# Patient Record
Sex: FEMALE | Race: OTHER | NOT HISPANIC OR LATINO | URBAN - METROPOLITAN AREA
[De-identification: names, ages, dates, MRNs, and addresses within clinical notes are randomized per-mention and may not be internally consistent; named-entity substitution may affect disease eponyms.]

---

## 2023-08-01 ENCOUNTER — INPATIENT (INPATIENT)
Facility: HOSPITAL | Age: 78
LOS: 12 days | Discharge: HOME | End: 2023-08-14
Attending: INTERNAL MEDICINE | Admitting: INTERNAL MEDICINE
Payer: MEDICARE

## 2023-08-01 VITALS
DIASTOLIC BLOOD PRESSURE: 57 MMHG | OXYGEN SATURATION: 96 % | SYSTOLIC BLOOD PRESSURE: 96 MMHG | TEMPERATURE: 99 F | HEART RATE: 73 BPM | RESPIRATION RATE: 16 BRPM

## 2023-08-01 DIAGNOSIS — R62.7 ADULT FAILURE TO THRIVE: ICD-10-CM

## 2023-08-01 LAB
ALBUMIN SERPL ELPH-MCNC: 3.3 G/DL — SIGNIFICANT CHANGE UP (ref 3.3–5)
ALP SERPL-CCNC: 83 U/L — SIGNIFICANT CHANGE UP (ref 40–120)
ALT FLD-CCNC: 45 U/L — HIGH (ref 4–33)
ANION GAP SERPL CALC-SCNC: 11 MMOL/L — SIGNIFICANT CHANGE UP (ref 7–14)
APPEARANCE UR: CLEAR — SIGNIFICANT CHANGE UP
APTT BLD: 24.5 SEC — SIGNIFICANT CHANGE UP (ref 24.5–35.6)
AST SERPL-CCNC: 37 U/L — HIGH (ref 4–32)
B PERT DNA SPEC QL NAA+PROBE: SIGNIFICANT CHANGE UP
B PERT+PARAPERT DNA PNL SPEC NAA+PROBE: SIGNIFICANT CHANGE UP
BACTERIA # UR AUTO: NEGATIVE /HPF — SIGNIFICANT CHANGE UP
BASE EXCESS BLDV CALC-SCNC: -0.4 MMOL/L — SIGNIFICANT CHANGE UP (ref -2–3)
BASOPHILS # BLD AUTO: 0.01 K/UL — SIGNIFICANT CHANGE UP (ref 0–0.2)
BASOPHILS NFR BLD AUTO: 0.1 % — SIGNIFICANT CHANGE UP (ref 0–2)
BILIRUB SERPL-MCNC: 1.1 MG/DL — SIGNIFICANT CHANGE UP (ref 0.2–1.2)
BILIRUB UR-MCNC: NEGATIVE — SIGNIFICANT CHANGE UP
BLOOD GAS VENOUS COMPREHENSIVE RESULT: SIGNIFICANT CHANGE UP
BORDETELLA PARAPERTUSSIS (RAPRVP): SIGNIFICANT CHANGE UP
BUN SERPL-MCNC: 31 MG/DL — HIGH (ref 7–23)
C PNEUM DNA SPEC QL NAA+PROBE: SIGNIFICANT CHANGE UP
CALCIUM SERPL-MCNC: 9 MG/DL — SIGNIFICANT CHANGE UP (ref 8.4–10.5)
CAST: 1 /LPF — SIGNIFICANT CHANGE UP (ref 0–4)
CHLORIDE BLDV-SCNC: 105 MMOL/L — SIGNIFICANT CHANGE UP (ref 96–108)
CHLORIDE SERPL-SCNC: 103 MMOL/L — SIGNIFICANT CHANGE UP (ref 98–107)
CO2 BLDV-SCNC: 26.1 MMOL/L — HIGH (ref 22–26)
CO2 SERPL-SCNC: 21 MMOL/L — LOW (ref 22–31)
COLOR SPEC: YELLOW — SIGNIFICANT CHANGE UP
CREAT SERPL-MCNC: 0.67 MG/DL — SIGNIFICANT CHANGE UP (ref 0.5–1.3)
DIFF PNL FLD: NEGATIVE — SIGNIFICANT CHANGE UP
EGFR: 90 ML/MIN/1.73M2 — SIGNIFICANT CHANGE UP
EOSINOPHIL # BLD AUTO: 0.08 K/UL — SIGNIFICANT CHANGE UP (ref 0–0.5)
EOSINOPHIL NFR BLD AUTO: 0.9 % — SIGNIFICANT CHANGE UP (ref 0–6)
FLUAV SUBTYP SPEC NAA+PROBE: SIGNIFICANT CHANGE UP
FLUBV RNA SPEC QL NAA+PROBE: SIGNIFICANT CHANGE UP
GAS PNL BLDV: 135 MMOL/L — LOW (ref 136–145)
GLUCOSE BLDV-MCNC: 93 MG/DL — SIGNIFICANT CHANGE UP (ref 70–99)
GLUCOSE SERPL-MCNC: 95 MG/DL — SIGNIFICANT CHANGE UP (ref 70–99)
GLUCOSE UR QL: NEGATIVE MG/DL — SIGNIFICANT CHANGE UP
HADV DNA SPEC QL NAA+PROBE: SIGNIFICANT CHANGE UP
HCO3 BLDV-SCNC: 25 MMOL/L — SIGNIFICANT CHANGE UP (ref 22–29)
HCOV 229E RNA SPEC QL NAA+PROBE: SIGNIFICANT CHANGE UP
HCOV HKU1 RNA SPEC QL NAA+PROBE: SIGNIFICANT CHANGE UP
HCOV NL63 RNA SPEC QL NAA+PROBE: SIGNIFICANT CHANGE UP
HCOV OC43 RNA SPEC QL NAA+PROBE: SIGNIFICANT CHANGE UP
HCT VFR BLD CALC: 28.7 % — LOW (ref 34.5–45)
HCT VFR BLDA CALC: 27 % — LOW (ref 34.5–46.5)
HGB BLD CALC-MCNC: 9.1 G/DL — LOW (ref 11.7–16.1)
HGB BLD-MCNC: 8.6 G/DL — LOW (ref 11.5–15.5)
HMPV RNA SPEC QL NAA+PROBE: SIGNIFICANT CHANGE UP
HPIV1 RNA SPEC QL NAA+PROBE: SIGNIFICANT CHANGE UP
HPIV2 RNA SPEC QL NAA+PROBE: SIGNIFICANT CHANGE UP
HPIV3 RNA SPEC QL NAA+PROBE: SIGNIFICANT CHANGE UP
HPIV4 RNA SPEC QL NAA+PROBE: SIGNIFICANT CHANGE UP
IANC: 7.48 K/UL — HIGH (ref 1.8–7.4)
IMM GRANULOCYTES NFR BLD AUTO: 0.8 % — SIGNIFICANT CHANGE UP (ref 0–0.9)
INR BLD: 1.09 RATIO — SIGNIFICANT CHANGE UP (ref 0.85–1.18)
KETONES UR-MCNC: NEGATIVE MG/DL — SIGNIFICANT CHANGE UP
LACTATE BLDV-MCNC: 1.2 MMOL/L — SIGNIFICANT CHANGE UP (ref 0.5–2)
LEUKOCYTE ESTERASE UR-ACNC: ABNORMAL
LYMPHOCYTES # BLD AUTO: 1.15 K/UL — SIGNIFICANT CHANGE UP (ref 1–3.3)
LYMPHOCYTES # BLD AUTO: 12.6 % — LOW (ref 13–44)
M PNEUMO DNA SPEC QL NAA+PROBE: SIGNIFICANT CHANGE UP
MCHC RBC-ENTMCNC: 19.4 PG — LOW (ref 27–34)
MCHC RBC-ENTMCNC: 30 GM/DL — LOW (ref 32–36)
MCV RBC AUTO: 64.8 FL — LOW (ref 80–100)
MONOCYTES # BLD AUTO: 0.37 K/UL — SIGNIFICANT CHANGE UP (ref 0–0.9)
MONOCYTES NFR BLD AUTO: 4 % — SIGNIFICANT CHANGE UP (ref 2–14)
NEUTROPHILS # BLD AUTO: 7.48 K/UL — HIGH (ref 1.8–7.4)
NEUTROPHILS NFR BLD AUTO: 81.6 % — HIGH (ref 43–77)
NITRITE UR-MCNC: NEGATIVE — SIGNIFICANT CHANGE UP
NRBC # BLD: 0 /100 WBCS — SIGNIFICANT CHANGE UP (ref 0–0)
NRBC # FLD: 0 K/UL — SIGNIFICANT CHANGE UP (ref 0–0)
PCO2 BLDV: 42 MMHG — SIGNIFICANT CHANGE UP (ref 39–52)
PH BLDV: 7.38 — SIGNIFICANT CHANGE UP (ref 7.32–7.43)
PH UR: 6.5 — SIGNIFICANT CHANGE UP (ref 5–8)
PLATELET # BLD AUTO: 220 K/UL — SIGNIFICANT CHANGE UP (ref 150–400)
PO2 BLDV: 29 MMHG — SIGNIFICANT CHANGE UP (ref 25–45)
POTASSIUM BLDV-SCNC: 4.8 MMOL/L — SIGNIFICANT CHANGE UP (ref 3.5–5.1)
POTASSIUM SERPL-MCNC: 5.1 MMOL/L — SIGNIFICANT CHANGE UP (ref 3.5–5.3)
POTASSIUM SERPL-SCNC: 5.1 MMOL/L — SIGNIFICANT CHANGE UP (ref 3.5–5.3)
PROT SERPL-MCNC: 7.1 G/DL — SIGNIFICANT CHANGE UP (ref 6–8.3)
PROT UR-MCNC: SIGNIFICANT CHANGE UP MG/DL
PROTHROM AB SERPL-ACNC: 12.3 SEC — SIGNIFICANT CHANGE UP (ref 9.5–13)
RAPID RVP RESULT: SIGNIFICANT CHANGE UP
RBC # BLD: 4.43 M/UL — SIGNIFICANT CHANGE UP (ref 3.8–5.2)
RBC # FLD: 22.3 % — HIGH (ref 10.3–14.5)
RBC CASTS # UR COMP ASSIST: 1 /HPF — SIGNIFICANT CHANGE UP (ref 0–4)
REVIEW: SIGNIFICANT CHANGE UP
RSV RNA SPEC QL NAA+PROBE: SIGNIFICANT CHANGE UP
RV+EV RNA SPEC QL NAA+PROBE: SIGNIFICANT CHANGE UP
SAO2 % BLDV: 45.9 % — LOW (ref 67–88)
SARS-COV-2 RNA SPEC QL NAA+PROBE: SIGNIFICANT CHANGE UP
SODIUM SERPL-SCNC: 135 MMOL/L — SIGNIFICANT CHANGE UP (ref 135–145)
SP GR SPEC: 1.02 — SIGNIFICANT CHANGE UP (ref 1–1.03)
SQUAMOUS # UR AUTO: 4 /HPF — SIGNIFICANT CHANGE UP (ref 0–5)
UROBILINOGEN FLD QL: 1 MG/DL — SIGNIFICANT CHANGE UP (ref 0.2–1)
WBC # BLD: 9.16 K/UL — SIGNIFICANT CHANGE UP (ref 3.8–10.5)
WBC # FLD AUTO: 9.16 K/UL — SIGNIFICANT CHANGE UP (ref 3.8–10.5)
WBC UR QL: 6 /HPF — HIGH (ref 0–5)

## 2023-08-01 PROCEDURE — 71045 X-RAY EXAM CHEST 1 VIEW: CPT | Mod: 26

## 2023-08-01 PROCEDURE — 93010 ELECTROCARDIOGRAM REPORT: CPT

## 2023-08-01 PROCEDURE — 99285 EMERGENCY DEPT VISIT HI MDM: CPT

## 2023-08-01 RX ORDER — LANOLIN ALCOHOL/MO/W.PET/CERES
3 CREAM (GRAM) TOPICAL AT BEDTIME
Refills: 0 | Status: DISCONTINUED | OUTPATIENT
Start: 2023-08-01 | End: 2023-08-14

## 2023-08-01 RX ORDER — CEFTRIAXONE 500 MG/1
1000 INJECTION, POWDER, FOR SOLUTION INTRAMUSCULAR; INTRAVENOUS ONCE
Refills: 0 | Status: COMPLETED | OUTPATIENT
Start: 2023-08-01 | End: 2023-08-01

## 2023-08-01 RX ORDER — CARVEDILOL PHOSPHATE 80 MG/1
6.25 CAPSULE, EXTENDED RELEASE ORAL EVERY 12 HOURS
Refills: 0 | Status: DISCONTINUED | OUTPATIENT
Start: 2023-08-01 | End: 2023-08-14

## 2023-08-01 RX ORDER — HEPARIN SODIUM 5000 [USP'U]/ML
5000 INJECTION INTRAVENOUS; SUBCUTANEOUS EVERY 8 HOURS
Refills: 0 | Status: DISCONTINUED | OUTPATIENT
Start: 2023-08-01 | End: 2023-08-14

## 2023-08-01 RX ORDER — POTASSIUM BICARBONATE 978 MG/1
20 TABLET, EFFERVESCENT ORAL DAILY
Refills: 0 | Status: DISCONTINUED | OUTPATIENT
Start: 2023-08-01 | End: 2023-08-01

## 2023-08-01 RX ORDER — SODIUM CHLORIDE 9 MG/ML
1000 INJECTION INTRAMUSCULAR; INTRAVENOUS; SUBCUTANEOUS
Refills: 0 | Status: DISCONTINUED | OUTPATIENT
Start: 2023-08-01 | End: 2023-08-02

## 2023-08-01 RX ORDER — MEGESTROL ACETATE 40 MG/ML
40 SUSPENSION ORAL
Refills: 0 | Status: DISCONTINUED | OUTPATIENT
Start: 2023-08-01 | End: 2023-08-04

## 2023-08-01 RX ORDER — FERROUS SULFATE 325(65) MG
325 TABLET ORAL DAILY
Refills: 0 | Status: DISCONTINUED | OUTPATIENT
Start: 2023-08-01 | End: 2023-08-14

## 2023-08-01 RX ORDER — MIRTAZAPINE 45 MG/1
30 TABLET, ORALLY DISINTEGRATING ORAL DAILY
Refills: 0 | Status: DISCONTINUED | OUTPATIENT
Start: 2023-08-01 | End: 2023-08-14

## 2023-08-01 RX ORDER — ACETAMINOPHEN 500 MG
650 TABLET ORAL EVERY 6 HOURS
Refills: 0 | Status: DISCONTINUED | OUTPATIENT
Start: 2023-08-01 | End: 2023-08-14

## 2023-08-01 RX ORDER — SODIUM CHLORIDE 9 MG/ML
500 INJECTION INTRAMUSCULAR; INTRAVENOUS; SUBCUTANEOUS ONCE
Refills: 0 | Status: COMPLETED | OUTPATIENT
Start: 2023-08-01 | End: 2023-08-01

## 2023-08-01 RX ORDER — ACETAMINOPHEN 500 MG
1000 TABLET ORAL ONCE
Refills: 0 | Status: COMPLETED | OUTPATIENT
Start: 2023-08-01 | End: 2023-08-01

## 2023-08-01 RX ADMIN — Medication 400 MILLIGRAM(S): at 18:16

## 2023-08-01 RX ADMIN — Medication 1000 MILLIGRAM(S): at 21:06

## 2023-08-01 RX ADMIN — CEFTRIAXONE 100 MILLIGRAM(S): 500 INJECTION, POWDER, FOR SOLUTION INTRAMUSCULAR; INTRAVENOUS at 19:25

## 2023-08-01 RX ADMIN — SODIUM CHLORIDE 75 MILLILITER(S): 9 INJECTION INTRAMUSCULAR; INTRAVENOUS; SUBCUTANEOUS at 23:19

## 2023-08-01 RX ADMIN — SODIUM CHLORIDE 500 MILLILITER(S): 9 INJECTION INTRAMUSCULAR; INTRAVENOUS; SUBCUTANEOUS at 18:16

## 2023-08-01 RX ADMIN — HEPARIN SODIUM 5000 UNIT(S): 5000 INJECTION INTRAVENOUS; SUBCUTANEOUS at 23:18

## 2023-08-01 NOTE — ED ADULT TRIAGE NOTE - CHIEF COMPLAINT QUOTE
Coming from [facility]. Pt is failure to thrive and hasn't been eating for three months, coming in for possible PEG tube placement. History HTN, alzheimer's.

## 2023-08-01 NOTE — ED PROVIDER NOTE - OBJECTIVE STATEMENT
Gaby Olmos is a 78 year old female with an extensive medical history including multiple myeloma and dementia who presents from the assisted living facility [facility]. Patient is unable to provide a reliable history due to dementia.    Per Ramiro Jain, patient is presenting with referral from her PCP for PEG tube placement. [facility] nursing staff reports that the patient is not eating and has failure to thrive. Her MOLST form states she is DNR/DNI but is agreeable with PEG tube. Patient's sister, Lindsey ([phone]), is to be contacted for medical decisions/consent. Gaby Olmos is a 78 year old female with an extensive medical history including multiple myeloma and dementia who presents from the assisted living facility [facility]. Patient is unable to provide a reliable history due to dementia.    Per [facility], patient is presenting with referral from her PCP for PEG tube placement. [facility] nursing staff reports that the patient is not eating and has failure to thrive. Her MOLST form states she is DNR/DNI but is agreeable with PEG tube. Patient's sister, Lindsey ([phone]), is to be contacted for medical decisions/consent.    Ne Baugh MD PGY3: Patient is a 78-year-old history of dementia and multiple myeloma presented from [facility] for failure to thrive.  Family states that patient has not been eating, decreased p.o.  Family would like PEG tube placed for additional nutrition for patient.  Patient unable to provide additional history, alert and oriented to self and to sometimes to place.  This is her baseline.

## 2023-08-01 NOTE — CHART NOTE - NSCHARTNOTEFT_GEN_A_CORE
medical attending (ROMULO [phone])  patient well known to me from the nursing home she will be admitted to my service for PEG placement discuss with the family and the patient despite all medical management including Remeron and Megace patient condition did not improve in the past 3-4 weeks.  Any question please contact me

## 2023-08-01 NOTE — ED PROVIDER NOTE - PSYCHIATRIC LEVEL OF CONSCIOUSNESS
Frequently repeats questions regarding the examiner's role and her current location ("is this the doctor's office?")/CONFUSED

## 2023-08-01 NOTE — ED PROVIDER NOTE - PROGRESS NOTE DETAILS
Ne Baugh MD PGY3: Patient TBA FTT. CXR no focal findings. UA pending, will straight cath. Patient TBA medicine. Rectal temp 100.6 will give empiric CTX.

## 2023-08-01 NOTE — ED PROVIDER NOTE - CHIEF COMPLAINT
The patient is a 78y Female complaining of The patient is a 78y Female complaining of "I can't breathe through my sinuses sometimes"

## 2023-08-01 NOTE — ED ADULT NURSE NOTE - CHIEF COMPLAINT QUOTE
Coming from [facility]. Pt is failure to thrive and hasn't been eating for three months, coming in for possible PEG tube placement. History HTN, alzheimer's.

## 2023-08-01 NOTE — ED ADULT NURSE NOTE - NSFALLUNIVINTERV_ED_ALL_ED
Bed/Stretcher in lowest position, wheels locked, appropriate side rails in place/Call bell, personal items and telephone in reach/Instruct patient to call for assistance before getting out of bed/chair/stretcher/Non-slip footwear applied when patient is off stretcher/[place] to call system/Physically safe environment - no spills, clutter or unnecessary equipment/Purposeful proactive rounding/Room/bathroom lighting operational, light cord in reach

## 2023-08-01 NOTE — ED PROVIDER NOTE - ATTENDING CONTRIBUTION TO CARE
Attending Statement: I have personally seen and examined this patient. I have fully participated in the care of this patient. I have reviewed all pertinent clinical information, including history physical exam, plan and the Resident's note and agree except as noted  78-year-old female history of hypertension, dementia sent in from [facility] for PEG placement for failure to thrive.  According to notes from the nursing home patient has been decreased p.o. intake and there is a concern for failure to thrive.  Patient not endorsing any vomiting or abdominal pain but states she does not feel well.  She is ANO x2 at baseline.  There is no family at bedside.    Vital signs noted blood pressure of 96/57 with an oral temp of 99 obtain a rectal temp patient feels warm to touch.  Alert and oriented x2, cooperative.  Dry mucous membranes not icteric not jaundiced.  No work of breathing respiratory effort but not hypoxic soft nondistended nontender abdomen.    Plan EKG, rectal time, labs, urine, IV fluids, admission.

## 2023-08-01 NOTE — ED PROVIDER NOTE - BREATH SOUNDS
Few crackles to the bilateral bases and faint expiratory wheezes heard best at the bases. Otherwise clear.

## 2023-08-01 NOTE — ED PROVIDER NOTE - CLINICAL SUMMARY MEDICAL DECISION MAKING FREE TEXT BOX
Patient is a 78-year-old history of dementia and multiple myeloma presented from [facility] for failure to thrive.  Will get infectious work-up, patient not febrile in triage however feels warm on exam.  Patient to be admitted for PEG tube, failure to thrive.  Will get UA, chest x-ray, and blood work.  Patient vital stable at this time, no need for emergent interventions, will continue to monitor and reassess.

## 2023-08-01 NOTE — ED PROVIDER NOTE - CONSTITUTIONAL MENTATION
Alert when called by name. Oriented to self. Disoriented to time (states it is 02/1923), location (states she is in [place]).

## 2023-08-01 NOTE — ED ADULT TRIAGE NOTE - PRO INTERPRETER NEED 2
What Type Of Note Output Would You Prefer (Optional)?: Bullet Format
Is The Patient Presenting As Previously Scheduled?: No, they are a work-in
Is This A New Presentation, Or A Follow-Up?: Rash
English

## 2023-08-02 DIAGNOSIS — E87.5 HYPERKALEMIA: ICD-10-CM

## 2023-08-02 DIAGNOSIS — R33.9 RETENTION OF URINE, UNSPECIFIED: ICD-10-CM

## 2023-08-02 DIAGNOSIS — E83.52 HYPERCALCEMIA: ICD-10-CM

## 2023-08-02 DIAGNOSIS — R74.01 ELEVATION OF LEVELS OF LIVER TRANSAMINASE LEVELS: ICD-10-CM

## 2023-08-02 DIAGNOSIS — D50.9 IRON DEFICIENCY ANEMIA, UNSPECIFIED: ICD-10-CM

## 2023-08-02 DIAGNOSIS — E43 UNSPECIFIED SEVERE PROTEIN-CALORIE MALNUTRITION: ICD-10-CM

## 2023-08-02 DIAGNOSIS — Z98.890 OTHER SPECIFIED POSTPROCEDURAL STATES: Chronic | ICD-10-CM

## 2023-08-02 DIAGNOSIS — F32.9 MAJOR DEPRESSIVE DISORDER, SINGLE EPISODE, UNSPECIFIED: ICD-10-CM

## 2023-08-02 DIAGNOSIS — Z29.9 ENCOUNTER FOR PROPHYLACTIC MEASURES, UNSPECIFIED: ICD-10-CM

## 2023-08-02 LAB
A1C WITH ESTIMATED AVERAGE GLUCOSE RESULT: 4.8 % — SIGNIFICANT CHANGE UP (ref 4–5.6)
ALBUMIN SERPL ELPH-MCNC: 3.2 G/DL — LOW (ref 3.3–5)
ALP SERPL-CCNC: 82 U/L — SIGNIFICANT CHANGE UP (ref 40–120)
ALT FLD-CCNC: 45 U/L — HIGH (ref 4–33)
ANION GAP SERPL CALC-SCNC: 10 MMOL/L — SIGNIFICANT CHANGE UP (ref 7–14)
APTT BLD: 21.6 SEC — LOW (ref 24.5–35.6)
AST SERPL-CCNC: 37 U/L — HIGH (ref 4–32)
BASOPHILS # BLD AUTO: 0.01 K/UL — SIGNIFICANT CHANGE UP (ref 0–0.2)
BASOPHILS NFR BLD AUTO: 0.1 % — SIGNIFICANT CHANGE UP (ref 0–2)
BILIRUB SERPL-MCNC: 0.8 MG/DL — SIGNIFICANT CHANGE UP (ref 0.2–1.2)
BUN SERPL-MCNC: 28 MG/DL — HIGH (ref 7–23)
CALCIUM SERPL-MCNC: 8.5 MG/DL — SIGNIFICANT CHANGE UP (ref 8.4–10.5)
CHLORIDE SERPL-SCNC: 105 MMOL/L — SIGNIFICANT CHANGE UP (ref 98–107)
CHOLEST SERPL-MCNC: 86 MG/DL — SIGNIFICANT CHANGE UP
CO2 SERPL-SCNC: 21 MMOL/L — LOW (ref 22–31)
CREAT SERPL-MCNC: 0.7 MG/DL — SIGNIFICANT CHANGE UP (ref 0.5–1.3)
CULTURE RESULTS: SIGNIFICANT CHANGE UP
EGFR: 89 ML/MIN/1.73M2 — SIGNIFICANT CHANGE UP
EOSINOPHIL # BLD AUTO: 0.06 K/UL — SIGNIFICANT CHANGE UP (ref 0–0.5)
EOSINOPHIL NFR BLD AUTO: 0.9 % — SIGNIFICANT CHANGE UP (ref 0–6)
ESTIMATED AVERAGE GLUCOSE: 91 — SIGNIFICANT CHANGE UP
FOLATE SERPL-MCNC: >20 NG/ML — HIGH (ref 3.1–17.5)
GLUCOSE BLDC GLUCOMTR-MCNC: 79 MG/DL — SIGNIFICANT CHANGE UP (ref 70–99)
GLUCOSE BLDC GLUCOMTR-MCNC: 80 MG/DL — SIGNIFICANT CHANGE UP (ref 70–99)
GLUCOSE BLDC GLUCOMTR-MCNC: 87 MG/DL — SIGNIFICANT CHANGE UP (ref 70–99)
GLUCOSE BLDC GLUCOMTR-MCNC: 97 MG/DL — SIGNIFICANT CHANGE UP (ref 70–99)
GLUCOSE BLDC GLUCOMTR-MCNC: 97 MG/DL — SIGNIFICANT CHANGE UP (ref 70–99)
GLUCOSE BLDC GLUCOMTR-MCNC: 98 MG/DL — SIGNIFICANT CHANGE UP (ref 70–99)
GLUCOSE SERPL-MCNC: 79 MG/DL — SIGNIFICANT CHANGE UP (ref 70–99)
HBV CORE AB SER-ACNC: REACTIVE
HBV SURFACE AB SER-ACNC: SIGNIFICANT CHANGE UP
HCT VFR BLD CALC: 29.5 % — LOW (ref 34.5–45)
HCV AB S/CO SERPL IA: 0.07 S/CO — SIGNIFICANT CHANGE UP (ref 0–0.99)
HCV AB SERPL-IMP: SIGNIFICANT CHANGE UP
HDLC SERPL-MCNC: 23 MG/DL — LOW
HGB BLD-MCNC: 8.9 G/DL — LOW (ref 11.5–15.5)
IANC: 5.44 K/UL — SIGNIFICANT CHANGE UP (ref 1.8–7.4)
IMM GRANULOCYTES NFR BLD AUTO: 0.9 % — SIGNIFICANT CHANGE UP (ref 0–0.9)
INR BLD: 1.13 RATIO — SIGNIFICANT CHANGE UP (ref 0.85–1.18)
LIPID PNL WITH DIRECT LDL SERPL: 44 MG/DL — SIGNIFICANT CHANGE UP
LYMPHOCYTES # BLD AUTO: 1.1 K/UL — SIGNIFICANT CHANGE UP (ref 1–3.3)
LYMPHOCYTES # BLD AUTO: 15.8 % — SIGNIFICANT CHANGE UP (ref 13–44)
MAGNESIUM SERPL-MCNC: 2.2 MG/DL — SIGNIFICANT CHANGE UP (ref 1.6–2.6)
MCHC RBC-ENTMCNC: 19.5 PG — LOW (ref 27–34)
MCHC RBC-ENTMCNC: 30.2 GM/DL — LOW (ref 32–36)
MCV RBC AUTO: 64.7 FL — LOW (ref 80–100)
MONOCYTES # BLD AUTO: 0.3 K/UL — SIGNIFICANT CHANGE UP (ref 0–0.9)
MONOCYTES NFR BLD AUTO: 4.3 % — SIGNIFICANT CHANGE UP (ref 2–14)
NEUTROPHILS # BLD AUTO: 5.44 K/UL — SIGNIFICANT CHANGE UP (ref 1.8–7.4)
NEUTROPHILS NFR BLD AUTO: 78 % — HIGH (ref 43–77)
NON HDL CHOLESTEROL: 63 MG/DL — SIGNIFICANT CHANGE UP
NRBC # BLD: 0 /100 WBCS — SIGNIFICANT CHANGE UP (ref 0–0)
NRBC # FLD: 0 K/UL — SIGNIFICANT CHANGE UP (ref 0–0)
PHOSPHATE SERPL-MCNC: 3 MG/DL — SIGNIFICANT CHANGE UP (ref 2.5–4.5)
PLATELET # BLD AUTO: 228 K/UL — SIGNIFICANT CHANGE UP (ref 150–400)
POTASSIUM SERPL-MCNC: 4.5 MMOL/L — SIGNIFICANT CHANGE UP (ref 3.5–5.3)
POTASSIUM SERPL-SCNC: 4.5 MMOL/L — SIGNIFICANT CHANGE UP (ref 3.5–5.3)
PROT SERPL-MCNC: 7.2 G/DL — SIGNIFICANT CHANGE UP (ref 6–8.3)
PROTHROM AB SERPL-ACNC: 12.6 SEC — SIGNIFICANT CHANGE UP (ref 9.5–13)
RBC # BLD: 4.56 M/UL — SIGNIFICANT CHANGE UP (ref 3.8–5.2)
RBC # FLD: 22.7 % — HIGH (ref 10.3–14.5)
SODIUM SERPL-SCNC: 136 MMOL/L — SIGNIFICANT CHANGE UP (ref 135–145)
SPECIMEN SOURCE: SIGNIFICANT CHANGE UP
TRIGL SERPL-MCNC: 94 MG/DL — SIGNIFICANT CHANGE UP
VIT B12 SERPL-MCNC: 312 PG/ML — SIGNIFICANT CHANGE UP (ref 200–900)
WBC # BLD: 6.97 K/UL — SIGNIFICANT CHANGE UP (ref 3.8–10.5)
WBC # FLD AUTO: 6.97 K/UL — SIGNIFICANT CHANGE UP (ref 3.8–10.5)

## 2023-08-02 PROCEDURE — 76705 ECHO EXAM OF ABDOMEN: CPT | Mod: 26

## 2023-08-02 PROCEDURE — 99233 SBSQ HOSP IP/OBS HIGH 50: CPT

## 2023-08-02 PROCEDURE — 99223 1ST HOSP IP/OBS HIGH 75: CPT | Mod: GC

## 2023-08-02 RX ADMIN — Medication 3 MILLIGRAM(S): at 23:00

## 2023-08-02 RX ADMIN — Medication 325 MILLIGRAM(S): at 12:04

## 2023-08-02 RX ADMIN — MIRTAZAPINE 30 MILLIGRAM(S): 45 TABLET, ORALLY DISINTEGRATING ORAL at 12:04

## 2023-08-02 RX ADMIN — MEGESTROL ACETATE 40 MILLIGRAM(S): 40 SUSPENSION ORAL at 18:02

## 2023-08-02 RX ADMIN — HEPARIN SODIUM 5000 UNIT(S): 5000 INJECTION INTRAVENOUS; SUBCUTANEOUS at 13:37

## 2023-08-02 RX ADMIN — HEPARIN SODIUM 5000 UNIT(S): 5000 INJECTION INTRAVENOUS; SUBCUTANEOUS at 21:03

## 2023-08-02 RX ADMIN — CARVEDILOL PHOSPHATE 6.25 MILLIGRAM(S): 80 CAPSULE, EXTENDED RELEASE ORAL at 05:50

## 2023-08-02 RX ADMIN — Medication 1 APPLICATORFUL: at 14:09

## 2023-08-02 RX ADMIN — HEPARIN SODIUM 5000 UNIT(S): 5000 INJECTION INTRAVENOUS; SUBCUTANEOUS at 05:51

## 2023-08-02 RX ADMIN — CARVEDILOL PHOSPHATE 6.25 MILLIGRAM(S): 80 CAPSULE, EXTENDED RELEASE ORAL at 18:02

## 2023-08-02 RX ADMIN — MEGESTROL ACETATE 40 MILLIGRAM(S): 40 SUSPENSION ORAL at 05:50

## 2023-08-02 NOTE — H&P ADULT - NSHPPHYSICALEXAM_GEN_ALL_CORE
Vital Signs Last 24 Hrs  T(C): 36.8 (01 Aug 2023 21:05), Max: 38 (01 Aug 2023 16:43)  T(F): 98.2 (01 Aug 2023 21:05), Max: 100.4 (01 Aug 2023 16:43)  HR: 73 (01 Aug 2023 21:05) (73 - 77)  BP: 100/63 (01 Aug 2023 21:05) (96/57 - 100/63)  BP(mean): --  RR: 16 (01 Aug 2023 21:05) (16 - 18)  SpO2: 99% (01 Aug 2023 21:05) (96% - 99%)    Parameters below as of 01 Aug 2023 21:05  Patient On (Oxygen Delivery Method): room air        CONSTITUTIONAL: in no apparent distress, temporal wasting, cachectic, frail elderly female   EYES: No conjunctival or scleral injection, non-icteric; PERRLA and symmetric  ENMT: No external nasal lesions; nasal mucosa not inflamed; partially edentulous; no pharyngeal injection or exudates, dry mucous membranes  NECK: Trachea midline without palpable neck mass; thyroid not enlarged and non-tender  RESPIRATORY: Breathing comfortably; no dullness to percussion; lungs CTA without wheeze, rhonchi audible bilaterally   CARDIOVASCULAR: +S1S2, RRR, no M/G/R; pedal pulses full and symmetric; no lower extremity edema   CHEST/BREAST: Breasts are symmetric in appearance; no palpable masses or lumps  GASTROINTESTINAL: No palpable masses or tenderness, +BS throughout, no rebound/guarding; no hepatosplenomegaly; no hernia palpated  MUSCULOSKELETAL: no digital clubbing or cyanosis; no paraspinal tenderness; examination of the  (head/neck, spine/ribs/pelvis, RUE, LUE, RLE, LLE) without misalignment, normal strength and tone of extremities  SKIN: No rashes or ulcers noted; no subcutaneous nodules or induration palpable, multiple ecchymotic areas on both legs and arms (old and new bruising), swan neck deformity to MCP and PIP of both hands   NEUROLOGIC: CN II-XII intact; normal reflexes in upper and lower extremities; sensation intact in LEs b/l to light touch  PSYCHIATRIC: A+O x 1 (not oriented to location or time, oriented to self); mood and affect appropriate; appropriate insight and judgment

## 2023-08-02 NOTE — H&P ADULT - ASSESSMENT
Ms. Olmos is a 78-year-old F with history of dementia/alzheimer’s disease, MM, RA, primary pulmonary hypertension, MDD, cataract, OA, MM (not currently treated, previously on revlimid), mild protein calorie malnutrition who presents as a transfer for failure to thrive with recommendation of  PEG tube placement by her PCP.

## 2023-08-02 NOTE — H&P ADULT - HISTORY OF PRESENT ILLNESS
Ms. Olmos is a 78-year-old F with history of dementia/alzheimer’s disease, MM, RA, primary pulmonary hypertension, MDD, cataract, OA, MM (not currently treated, previously on revlimid), mild protein calorie malnutrition who presents as a transfer for failure to thrive and need for PEG tube placement. She was recently seen by her PCP with recommendation for a PEG tube. She currently lives in assisted living facility- [facility].     She currently reports that she has decrease PO intake over the last few months with associated nausea and vomiting of undigested food which started 1 month ago. She denies abdominal pain. She also reports urinary retention in the past with some fecal incontinence. She was straight cath in the ED. She received 500 cc of IVF and one dose of ceftriaxone in the ED. Her MOLST says DNR/DNI/No dialysis but states yes for PEG tube. She associates the decrease PO intake with a significant amount of weight over the last few months.    She had a recent admission for E. Coli UTI in 01/2023 which was complicated by aspiration pneumonia (treated with 10-day course of Augmentin). She was seen by speech and swallow which recommended a chopped thin liquids diet. She was previously on Revlimid for MM. She also has a history of hypotension in the past. Baseline Cr 0.94 a, Ca 9, Hb 9.3/32.3. Labs today is sig for the following: WBC 7.48, Hb/HCT 8.6/28.7, MCV 64.8, RDW 22.3, ANC 7.48, neutrophil 81.6%, BUN 31, Cr 0.67, K 5.1, AST/ALT 37/45, LA 1.2. Urinalysis showed the following: trace LE, 6 WBC.

## 2023-08-02 NOTE — H&P ADULT - PROBLEM SELECTOR PLAN 4
-Ca 10.4 on admission- differential diagnosis includes MM (has history of) vs dehydration   -will treat with IVF (500cc bolus on admission)  -continue to monitor for now -AST/ALT 37/45  -will order ABD US and hepatitis labs   -continue to monitor -K 5.1, likely from dehydration  -no chest pain on admission.   -EKG ordered  -repeat K in the AM   -continue to monitor closely

## 2023-08-02 NOTE — H&P ADULT - PROBLEM SELECTOR PLAN 6
DVT prophylaxis: heparin 5000 units Q8hr   GI prophylaxis: none  Diet: NPO at midnight for G tube placement tomorrow   DNR/DNI/no dialysis Hb 8.3 from 9 in March 2023   -MCV 64 on admission   -likely combination of iron deficiency and anemia of chronic disease  -continue ferrous sulfate 324 mg daily

## 2023-08-02 NOTE — H&P ADULT - PROBLEM SELECTOR PLAN 2
-continue Remeron 30 mg nightly -history of urinary retention in the past  -will do bladder scan every 6 hours and on 3rd attempt place knight catheter

## 2023-08-02 NOTE — H&P ADULT - NSHPREVIEWOFSYSTEMS_GEN_ALL_CORE
limited review of symptoms- states no fevers, chills, has pain in joints in her arms and feet, urinary retention, fecal incontinence and rest of symptoms as HPI.

## 2023-08-02 NOTE — H&P ADULT - TIME BILLING
Preparing to see the patient including review of tests and other providers' notes, confirming history with patient/family member, performing medical examination and evaluation, counseling and educating the patient/family/caregiver, ordering medications, tests and procedures, communicating with other health care professionals, documenting clinical information in the EMR, independently interpreting results and communicating results to the patient/family/caregiver, care coordination.

## 2023-08-02 NOTE — H&P ADULT - NSICDXPASTMEDICALHX_GEN_ALL_CORE_FT
PAST MEDICAL HISTORY:  Cataract     Dementia     MDD (major depressive disorder)     Mild protein-calorie malnutrition     Multiple myeloma     Osteoarthritis     Primary pulmonary hypertension     Rheumatoid arthritis

## 2023-08-02 NOTE — H&P ADULT - PROBLEM SELECTOR PLAN 3
-K 5.1, likely from dehydration  -no chest pain on admission.   -EKG ordered  -repeat K in the AM   -continue to monitor closely -continue Remeron 30 mg nightly

## 2023-08-02 NOTE — H&P ADULT - PROBLEM SELECTOR PLAN 7
DVT prophylaxis: heparin 5000 units Q8hr   GI prophylaxis: none  Diet: NPO at midnight for G tube placement tomorrow   DNR/DNI/no dialysis

## 2023-08-02 NOTE — PROGRESS NOTE ADULT - SUBJECTIVE AND OBJECTIVE BOX
CHIEF COMPLAINT:   is a 78 year old female with an extensive medical history including multiple myeloma and dementia who presents from the assisted living facility [facility]. Patient is unable to provide a reliable history due to dementia.    	Per [facility], patient is presenting with referral from her PCP for PEG tube placement. [facility] nursing staff reports that the patient is not eating and has failure to thrive. Her MOLST form states she is DNR/DNI but is agreeable with PEG tube. Patient's sister, Lindsey ([phone]), is to be contacted for medical decisions/consent.    	Ne Baugh MD PGY3: Patient is a 78-year-old history of dementia and multiple myeloma presented from [facility] for failure to thrive.  Family states that patient has not been eating, decreased p.o.  Family would like PEG tube placed for additional nutrition for patient.  Patient unable to provide additional history, alert and oriented to self and to sometimes to place.  This is her baselineSUBJECTIVE:     REVIEW OF SYSTEMS:    CONSTITUTIONAL: (  )  weakness,  (  ) fevers or chills  EYES/ENT: (  )visual changes;     NECK: (  ) pain or stiffness  RESPIRATORY:   (  )cough, wheezing, hemoptysis;  (  ) shortness of breath  CARDIOVASCULAR:  (  )chest pain or palpitations  GASTROINTESTINAL:   (  )abdominal or epigastric pain.  (  ) nausea, vomiting, or hematemesis;   (   ) diarrhea or constipation.   GENITOURINARY:   (    ) dysuria, frequency or hematuria  NEUROLOGICAL:  (   ) numbness or weakness   All other review of systems is negative unless indicated above    Vital Signs Last 24 Hrs  T(C): 36.9 (02 Aug 2023 05:37), Max: 38 (01 Aug 2023 16:43)  T(F): 98.4 (02 Aug 2023 05:37), Max: 100.4 (01 Aug 2023 16:43)  HR: 62 (02 Aug 2023 05:37) (62 - 79)  BP: 107/66 (02 Aug 2023 05:37) (96/57 - 123/72)  BP(mean): --  RR: 20 (02 Aug 2023 05:37) (16 - 22)  SpO2: 100% (02 Aug 2023 05:37) (96% - 100%)    Parameters below as of 02 Aug 2023 05:37  Patient On (Oxygen Delivery Method): room air        I&O's Summary      CAPILLARY BLOOD GLUCOSE      POCT Blood Glucose.: 80 mg/dL (02 Aug 2023 04:45)  POCT Blood Glucose.: 79 mg/dL (02 Aug 2023 03:31)  POCT Blood Glucose.: 93 mg/dL (01 Aug 2023 13:34)      PHYSICAL EXAM:    Constitutional:  (   ) NAD,   (   )awake and alert  HEENT: PERR, EOMI,    Neck: Soft and supple, No LAD, No JVD  Respiratory:  (    Breath sounds are clear bilaterally,    (   ) wheezing, rales or rhonchi  Cardiovascular:     (   )S1 and S2, regular rate and rhythm, no Murmurs, gallops or rubs  Gastrointestinal:  (   )Bowel Sounds present, soft,   (  )nontender, nondistended,    Extremities:    (  ) peripheral edema  Vascular: 2+ peripheral pulses  Neurological:    (    )A/O x 3,   (  ) focal deficits  Musculoskeletal:    (   )  normal strength b/l upper  (     ) normal  lower extremities  Skin: No rashes    MEDICATIONS:  MEDICATIONS  (STANDING):  carvedilol 6.25 milliGRAM(s) Oral every 12 hours  clotrimazole 2% Vaginal Cream 1 Applicatorful Vaginal daily  ferrous    sulfate 325 milliGRAM(s) Oral daily  heparin   Injectable 5000 Unit(s) SubCutaneous every 8 hours  megestrol 40 milliGRAM(s) Oral two times a day  mirtazapine 30 milliGRAM(s) Oral daily      LABS: All Labs Reviewed:                        8.9    6.97  )-----------( 228      ( 02 Aug 2023 05:40 )             29.5     08-02    136  |  105  |  28<H>  ----------------------------<  79  4.5   |  21<L>  |  0.70    Ca    8.5      02 Aug 2023 05:40  Phos  3.0     08-02  Mg     2.20     08-02    TPro  7.2  /  Alb  3.2<L>  /  TBili  0.8  /  DBili  x   /  AST  37<H>  /  ALT  45<H>  /  AlkPhos  82  08-02    PT/INR - ( 02 Aug 2023 05:40 )   PT: 12.6 sec;   INR: 1.13 ratio         PTT - ( 02 Aug 2023 05:40 )  PTT:21.6 sec      Blood Culture:   Urine Culture      RADIOLOGY/EKG:    ASSESSMENT AND PLAN:  Ms. Olmos is a 78-year-old F with history of dementia/alzheimer’s disease, MM, RA, primary pulmonary hypertension, MDD, cataract, OA, MM (not currently treated, previously on revlimid), mild protein calorie malnutrition who presents as a transfer for failure to thrive with recommendation of  PEG tube placement by her PCP.        Problem/Plan - 1:  ·  Problem: Severe protein-calorie malnutrition.   ·  Plan: -patient with significant weight loss  -decrease PO intake,  was megestrol BID at nursing home  but not effective   -GI consult for PEG tube sent, follow up with GI in the AM.    Problem/Plan - 2:  ·  Problem: Urinary retention.  ·  Plan: -history of urinary retention in the past  -will do bladder scan every 6 hours and on 3rd attempt place knight catheter.    Problem/Plan - 3:  ·  Problem: MDD (major depressive disorder).  ·  Plan: -continue Remeron 30 mg nightly.    Problem/Plan - 4:  ·  Problem: Hyperkalemia.   -repeat K in the AM   -continue to monitor closely.    Problem/Plan - 5:     ·  Problem: Microcytic anemia.  ·  Plan: Hb 8.3 from 9 in March 2023    -continue ferrous sulfate 324 mg daily.    Problem/Plan - 7:  ·  Problem: Need for prophylactic measure.   ·  Plan: DVT prophylaxis: heparin 5000 units Q8hr   GI prophylaxis: none       DVT PPX:    ADVANCED DIRECTIVE:    DISPOSITION: CHIEF COMPLAINT:   is a 78 year old female with an extensive medical history including multiple myeloma and dementia who presents from the assisted living facility [facility]. Patient is unable to provide a reliable history due to dementia.    	 She is a patient at [facility], patient is presenting for PEG tube placement.   patient is not eating and has failure to thrive. Her MOLST form states she is DNR/DNI but is agreeable with PEG tube. Patient's sister, Lindsey ([phone]), is to be contacted for medical decisions/consent.    	     REVIEW OF SYSTEMS:    CONSTITUTIONAL: (x  )  weakness,  (  ) fevers or chills  EYES/ENT: (  )visual changes;     NECK: (  ) pain or stiffness  RESPIRATORY:   (  )cough, wheezing, hemoptysis;  (  ) shortness of breath  CARDIOVASCULAR:  (  )chest pain or palpitations  GASTROINTESTINAL:   (  )abdominal or epigastric pain.  (  ) nausea, vomiting, or hematemesis;   (   ) diarrhea or constipation.   GENITOURINARY:   (    ) dysuria, frequency or hematuria  NEUROLOGICAL:  (   ) numbness or weakness   All other review of systems is negative unless indicated above    Vital Signs Last 24 Hrs  T(C): 36.9 (02 Aug 2023 05:37), Max: 38 (01 Aug 2023 16:43)  T(F): 98.4 (02 Aug 2023 05:37), Max: 100.4 (01 Aug 2023 16:43)  HR: 62 (02 Aug 2023 05:37) (62 - 79)  BP: 107/66 (02 Aug 2023 05:37) (96/57 - 123/72)  BP(mean): --  RR: 20 (02 Aug 2023 05:37) (16 - 22)  SpO2: 100% (02 Aug 2023 05:37) (96% - 100%)    Parameters below as of 02 Aug 2023 05:37  Patient On (Oxygen Delivery Method): room air        I&O's Summary      CAPILLARY BLOOD GLUCOSE      POCT Blood Glucose.: 80 mg/dL (02 Aug 2023 04:45)  POCT Blood Glucose.: 79 mg/dL (02 Aug 2023 03:31)  POCT Blood Glucose.: 93 mg/dL (01 Aug 2023 13:34)      PHYSICAL EXAM:    Constitutional:  ( x  ) NAD,   (  x )awake and verbalt  HEENT: PERR, EOMI,    Neck: Soft and supple, No LAD, No JVD  Respiratory:  (   decrease Breath sounds    Cardiovascular:     ( x  )S1 and S2, regular rate and rhythm, no Murmurs, gallops or rubs  Gastrointestinal:  ( x  )Bowel Sounds present, soft,   (  )nontender, nondistended,    Extremities:    (  ) peripheral edema  Vascular: 2+ peripheral pulses  Neurological:    ( x   )A/O x 1   (  ) focal deficits  Musculoskeletal:    ( x  )  normal strength b/l upper    Skin: No rashes    MEDICATIONS:  MEDICATIONS  (STANDING):  carvedilol 6.25 milliGRAM(s) Oral every 12 hours  clotrimazole 2% Vaginal Cream 1 Applicatorful Vaginal daily  ferrous    sulfate 325 milliGRAM(s) Oral daily  heparin   Injectable 5000 Unit(s) SubCutaneous every 8 hours  megestrol 40 milliGRAM(s) Oral two times a day  mirtazapine 30 milliGRAM(s) Oral daily      LABS: All Labs Reviewed:                        8.9    6.97  )-----------( 228      ( 02 Aug 2023 05:40 )             29.5     08-02    136  |  105  |  28<H>  ----------------------------<  79  4.5   |  21<L>  |  0.70    Ca    8.5      02 Aug 2023 05:40  Phos  3.0     08-02  Mg     2.20     08-02    TPro  7.2  /  Alb  3.2<L>  /  TBili  0.8  /  DBili  x   /  AST  37<H>  /  ALT  45<H>  /  AlkPhos  82  08-02    PT/INR - ( 02 Aug 2023 05:40 )   PT: 12.6 sec;   INR: 1.13 ratio         PTT - ( 02 Aug 2023 05:40 )  PTT:21.6 sec      Blood Culture:   Urine Culture      RADIOLOGY/EKG:    ASSESSMENT AND PLAN:  Ms. Olmos is a 78-year-old F with history of dementia/alzheimer’s disease, MM, RA, primary pulmonary hypertension, MDD, cataract, OA, MM (not currently treated, previously on revlimid), mild protein calorie malnutrition who presents as a transfer for failure to thrive with recommendation of  PEG tube placement          Problem/Plan - 1:  ·  Problem: Severe protein-calorie malnutrition.   ·  Plan: -patient with significant weight loss  -decrease PO intake,  was megestrol BID at nursing home  but not effective   -GI consult for PEG tube sent, follow up with GI  .    Problem/Plan - 2:  ·  Problem: Urinary retention.  ·  Plan: -history of urinary retention in the past  -will do bladder scan every 6 hours and on 3rd attempt place knight catheter.    Problem/Plan - 3:  ·  Problem: MDD (major depressive disorder).  ·  Plan: -continue Remeron 30 mg nightly.    Problem/Plan - 4:  ·  Problem: Hyperkalemia.   -repeat K in the AM   -continue to monitor closely.    Problem/Plan - 5:     ·  Problem: Microcytic anemia.  ·  Plan: Hb 8.3 from 9 in March 2023    -continue ferrous sulfate 324 mg daily.    Problem/Plan - 7:  ·  Problem: Need for prophylactic measure.   ·  Plan: DVT prophylaxis: heparin 5000 units Q8hr   GI prophylaxis: none       DVT PPX:    ADVANCED DIRECTIVE:    DISPOSITION:

## 2023-08-02 NOTE — H&P ADULT - NSHPLABSRESULTS_GEN_ALL_CORE
8.6    9.16  )-----------( 220      ( 01 Aug 2023 16:30 )             28.7     135  |  103  |  31<H>  ----------------------------<  95     08  5.1   |  21<L>  |  0.67    Ca    9.0      01 Aug 2023 16:30  Phos  2.8       Mg     2.10         TPro  7.1  /  Alb  3.3  /  TBili  1.1  /  DBili  x   /  AST  37<H>  /  ALT  45<H>  /  AlkPhos  83      PT/INR: 12.3/1.09 (23 @ 16:30)  PTT: 24.5 (23 @ 16:30)      16:30 - VBG - pH: 7.38  | pCO2: 42    | pO2: 29    | Lactate: 1.2                        Urinalysis Basic - ( 01 Aug 2023 18:40 )  Color: Yellow / Appearance: Clear / S.020 / pH: 6.5  Gluc: Negative mg/dL / Ketone: Negative mg/dL  / Bili: Negative / Urobili: 1.0 mg/dL   Blood: Negative / Protein: Trace mg/dL / Nitrite: Negative   Leuk Esterase: Trace / RBC: 1 /HPF / WBC 6 /HPF   Sq Epi: x / Non Sq Epi: x / Bacteria: Negative /HPF

## 2023-08-02 NOTE — H&P ADULT - PROBLEM SELECTOR PLAN 1
-patient with significant weight loss  -decrease PO intake, taking megestrol BID at nursing home for appetite stimulation   -nutrition consult for severe protein malnutrition   -at risk for refeeding syndrome, needs to have close monitoring of K, Mg, and phosphorous level  -GI consult for PEG tube sent, follow up with GI in the AM

## 2023-08-02 NOTE — H&P ADULT - PROBLEM SELECTOR PLAN 5
Hb 8.3 from 9 in March 2023   -MCV 64 on admission   -likely combination of iron deficiency and anemia of chronic disease  -continue ferrous sulfate 324 mg daily -AST/ALT 37/45  -will order ABD US and hepatitis labs   -continue to monitor

## 2023-08-02 NOTE — H&P ADULT - NSHPSOCIALHISTORY_GEN_ALL_CORE
lives in assisted living facility. smoked cigarettes as a teenager and in college, drinks etoh occasionally, and denies drugs.

## 2023-08-02 NOTE — CONSULT NOTE ADULT - ASSESSMENT
Pt is a 77 yo F w/ PMHx dementia/alzheimer’s disease, RA, primary pulmonary hypertension, MDD, cataract, OA, MM (not currently treated, previously on revlimid), mild protein calorie malnutrition who presents as a transfer for failure to thrive.     #Dementia  #Pulmonary hypertension  #FTT. Pt with worsening appetite over the past few weeks, reduced po intake, weight loss.    Recommendations  - speech and swallow evaluation  - nutrition c/s  - TTE given history of pulmonary hypertension Pt is a 79 yo F w/ PMHx dementia/alzheimer’s disease, RA, primary pulmonary hypertension, MDD, cataract, OA, MM (not currently treated, previously on revlimid), mild protein calorie malnutrition who presents as a transfer for failure to thrive.     #Dementia  #Pulmonary hypertension  #FTT. Pt with worsening appetite over the past few weeks, reduced po intake, weight loss.    Recommendations  - speech and swallow evaluation  - nutrition c/s  - TTE given history of pulmonary hypertension  - discussed risks/benefits of EGD and PEG placement with sister and HCP Lindsey, who wants to go ahead with PEG placement    GI will continue to follow.     All recommendations are tentative until note is attested by attending.     Alida Brown, PGY5  Gastroenterology/Hepatology Fellow  Available on Microsoft Teams  [phone] ([facility] Short Range Pager)  [phone] (Long Range Pager)    After 5pm, please contact the on-call GI fellow. [phone]

## 2023-08-03 LAB
ALBUMIN SERPL ELPH-MCNC: 3.2 G/DL — LOW (ref 3.3–5)
ALP SERPL-CCNC: 85 U/L — SIGNIFICANT CHANGE UP (ref 40–120)
ALT FLD-CCNC: 46 U/L — HIGH (ref 4–33)
ANION GAP SERPL CALC-SCNC: 12 MMOL/L — SIGNIFICANT CHANGE UP (ref 7–14)
AST SERPL-CCNC: 31 U/L — SIGNIFICANT CHANGE UP (ref 4–32)
BASOPHILS # BLD AUTO: 0.02 K/UL — SIGNIFICANT CHANGE UP (ref 0–0.2)
BASOPHILS NFR BLD AUTO: 0.3 % — SIGNIFICANT CHANGE UP (ref 0–2)
BILIRUB SERPL-MCNC: 0.8 MG/DL — SIGNIFICANT CHANGE UP (ref 0.2–1.2)
BUN SERPL-MCNC: 28 MG/DL — HIGH (ref 7–23)
CALCIUM SERPL-MCNC: 8.6 MG/DL — SIGNIFICANT CHANGE UP (ref 8.4–10.5)
CHLORIDE SERPL-SCNC: 104 MMOL/L — SIGNIFICANT CHANGE UP (ref 98–107)
CO2 SERPL-SCNC: 18 MMOL/L — LOW (ref 22–31)
CREAT SERPL-MCNC: 0.68 MG/DL — SIGNIFICANT CHANGE UP (ref 0.5–1.3)
EGFR: 90 ML/MIN/1.73M2 — SIGNIFICANT CHANGE UP
EOSINOPHIL # BLD AUTO: 0.05 K/UL — SIGNIFICANT CHANGE UP (ref 0–0.5)
EOSINOPHIL NFR BLD AUTO: 0.8 % — SIGNIFICANT CHANGE UP (ref 0–6)
GLUCOSE BLDC GLUCOMTR-MCNC: 81 MG/DL — SIGNIFICANT CHANGE UP (ref 70–99)
GLUCOSE BLDC GLUCOMTR-MCNC: 91 MG/DL — SIGNIFICANT CHANGE UP (ref 70–99)
GLUCOSE SERPL-MCNC: 85 MG/DL — SIGNIFICANT CHANGE UP (ref 70–99)
HCT VFR BLD CALC: 33.1 % — LOW (ref 34.5–45)
HCV AB S/CO SERPL IA: 0.07 S/CO — SIGNIFICANT CHANGE UP (ref 0–0.99)
HCV AB SERPL-IMP: SIGNIFICANT CHANGE UP
HGB BLD-MCNC: 9.8 G/DL — LOW (ref 11.5–15.5)
IANC: 4 K/UL — SIGNIFICANT CHANGE UP (ref 1.8–7.4)
IMM GRANULOCYTES NFR BLD AUTO: 1.7 % — HIGH (ref 0–0.9)
LYMPHOCYTES # BLD AUTO: 1.41 K/UL — SIGNIFICANT CHANGE UP (ref 1–3.3)
LYMPHOCYTES # BLD AUTO: 23.7 % — SIGNIFICANT CHANGE UP (ref 13–44)
MAGNESIUM SERPL-MCNC: 2.2 MG/DL — SIGNIFICANT CHANGE UP (ref 1.6–2.6)
MCHC RBC-ENTMCNC: 19.4 PG — LOW (ref 27–34)
MCHC RBC-ENTMCNC: 29.6 GM/DL — LOW (ref 32–36)
MCV RBC AUTO: 65.5 FL — LOW (ref 80–100)
MONOCYTES # BLD AUTO: 0.36 K/UL — SIGNIFICANT CHANGE UP (ref 0–0.9)
MONOCYTES NFR BLD AUTO: 6.1 % — SIGNIFICANT CHANGE UP (ref 2–14)
NEUTROPHILS # BLD AUTO: 4 K/UL — SIGNIFICANT CHANGE UP (ref 1.8–7.4)
NEUTROPHILS NFR BLD AUTO: 67.4 % — SIGNIFICANT CHANGE UP (ref 43–77)
NRBC # BLD: 0 /100 WBCS — SIGNIFICANT CHANGE UP (ref 0–0)
NRBC # FLD: 0 K/UL — SIGNIFICANT CHANGE UP (ref 0–0)
PHOSPHATE SERPL-MCNC: 3.1 MG/DL — SIGNIFICANT CHANGE UP (ref 2.5–4.5)
PLATELET # BLD AUTO: 237 K/UL — SIGNIFICANT CHANGE UP (ref 150–400)
POTASSIUM SERPL-MCNC: 5 MMOL/L — SIGNIFICANT CHANGE UP (ref 3.5–5.3)
POTASSIUM SERPL-SCNC: 5 MMOL/L — SIGNIFICANT CHANGE UP (ref 3.5–5.3)
PROT SERPL-MCNC: 7.1 G/DL — SIGNIFICANT CHANGE UP (ref 6–8.3)
RBC # BLD: 5.05 M/UL — SIGNIFICANT CHANGE UP (ref 3.8–5.2)
RBC # FLD: 22.5 % — HIGH (ref 10.3–14.5)
SODIUM SERPL-SCNC: 134 MMOL/L — LOW (ref 135–145)
WBC # BLD: 5.94 K/UL — SIGNIFICANT CHANGE UP (ref 3.8–10.5)
WBC # FLD AUTO: 5.94 K/UL — SIGNIFICANT CHANGE UP (ref 3.8–10.5)

## 2023-08-03 PROCEDURE — 93306 TTE W/DOPPLER COMPLETE: CPT | Mod: 26

## 2023-08-03 RX ADMIN — HEPARIN SODIUM 5000 UNIT(S): 5000 INJECTION INTRAVENOUS; SUBCUTANEOUS at 22:01

## 2023-08-03 RX ADMIN — MEGESTROL ACETATE 40 MILLIGRAM(S): 40 SUSPENSION ORAL at 17:46

## 2023-08-03 RX ADMIN — HEPARIN SODIUM 5000 UNIT(S): 5000 INJECTION INTRAVENOUS; SUBCUTANEOUS at 13:05

## 2023-08-03 RX ADMIN — Medication 1 APPLICATORFUL: at 11:36

## 2023-08-03 RX ADMIN — HEPARIN SODIUM 5000 UNIT(S): 5000 INJECTION INTRAVENOUS; SUBCUTANEOUS at 06:38

## 2023-08-03 RX ADMIN — CARVEDILOL PHOSPHATE 6.25 MILLIGRAM(S): 80 CAPSULE, EXTENDED RELEASE ORAL at 17:55

## 2023-08-03 RX ADMIN — Medication 325 MILLIGRAM(S): at 11:35

## 2023-08-03 RX ADMIN — MIRTAZAPINE 30 MILLIGRAM(S): 45 TABLET, ORALLY DISINTEGRATING ORAL at 11:36

## 2023-08-03 RX ADMIN — MEGESTROL ACETATE 40 MILLIGRAM(S): 40 SUSPENSION ORAL at 06:37

## 2023-08-03 NOTE — DIETITIAN INITIAL EVALUATION ADULT - PROBLEM SELECTOR PLAN 4
-K 5.1, likely from dehydration  -no chest pain on admission.   -EKG ordered  -repeat K in the AM   -continue to monitor closely

## 2023-08-03 NOTE — DIETITIAN INITIAL EVALUATION ADULT - OTHER INFO
78 year old female with a PMH of dementia/alzheimer’s disease, RA, primary pulmonary hypertension, MDD, cataract, MM (not currently treated, previously on revlimid), mild protein calorie malnutrition who presents as a transfer for failure to thrive with recommendation of PEG tube placement per chart.    Patient currently on pureed and is pending swallow evaluation. Patient didn't consume much of breakfast this morning per observation. Currently on Megestrol and Remeron per chart. No GI distress reported at this time. No food allergies noted per chart. Confirms height is 66 in. Unable to obtain UBW. No weights noted in HIE. No admission weight noted at this time, recommend obtain weight to assess tube feed. No edema or pressure injuries noted per RN flow sheet.    Per GI note (8/2), plan for possible PEG placement Friday (8/4).

## 2023-08-03 NOTE — PROGRESS NOTE ADULT - SUBJECTIVE AND OBJECTIVE BOX
CHIEF COMPLAINT: patient seemed to be more awake and was eating her breakfast pure diet       she  is a 78 year old female with an extensive medical history including multiple myeloma and dementia who presents from the assisted living facility [facility]. Patient is unable to provide a reliable history due to dementia.    	 She is a patient at [facility], patient is presenting for PEG tube placement.   patient is not eating and has failure to thrive. Her MOLST form states she is DNR/DNI but is agreeable with PEG tube. Patient's sister, Lindsey ([phone]), is to be contacted for medical decisions/consent.      SUBJECTIVE:     REVIEW OF SYSTEMS:    CONSTITUTIONAL: (  )  weakness,  (  ) fevers or chills  EYES/ENT: (  )visual changes;     NECK: (  ) pain or stiffness  RESPIRATORY:   (  )cough, wheezing, hemoptysis;  (  ) shortness of breath  CARDIOVASCULAR:  (  )chest pain or palpitations  GASTROINTESTINAL:   (  )abdominal or epigastric pain.  (  ) nausea, vomiting, or hematemesis;   (   ) diarrhea or constipation.   GENITOURINARY:   (    ) dysuria, frequency or hematuria  NEUROLOGICAL:  (   ) numbness or weakness   All other review of systems is negative unless indicated above    Vital Signs Last 24 Hrs  T(C): 36.4 (03 Aug 2023 09:06), Max: 37.1 (02 Aug 2023 20:59)  T(F): 97.6 (03 Aug 2023 09:06), Max: 98.8 (02 Aug 2023 20:59)  HR: 58 (03 Aug 2023 09:06) (57 - 80)  BP: 115/66 (03 Aug 2023 09:06) (95/55 - 143/76)  BP(mean): --  RR: 18 (03 Aug 2023 09:06) (14 - 19)  SpO2: 100% (03 Aug 2023 09:06) (96% - 100%)    Parameters below as of 03 Aug 2023 09:06  Patient On (Oxygen Delivery Method): room air        I&O's Summary    02 Aug 2023 07:01  -  03 Aug 2023 07:00  --------------------------------------------------------  IN: 300 mL / OUT: 500 mL / NET: -200 mL    03 Aug 2023 07:01  -  03 Aug 2023 12:15  --------------------------------------------------------  IN: 0 mL / OUT: 250 mL / NET: -250 mL        CAPILLARY BLOOD GLUCOSE      POCT Blood Glucose.: 81 mg/dL (03 Aug 2023 08:32)  POCT Blood Glucose.: 97 mg/dL (02 Aug 2023 20:50)  POCT Blood Glucose.: 87 mg/dL (02 Aug 2023 17:16)  POCT Blood Glucose.: 98 mg/dL (02 Aug 2023 13:05)      PHYSICAL EXAM:    Constitutional:  (   ) NAD,   (   )awake and alert  HEENT: PERR, EOMI,    Neck: Soft and supple, No LAD, No JVD  Respiratory:  (    Breath sounds are clear bilaterally,    (   ) wheezing, rales or rhonchi  Cardiovascular:     (   )S1 and S2, regular rate and rhythm, no Murmurs, gallops or rubs  Gastrointestinal:  (   )Bowel Sounds present, soft,   (  )nontender, nondistended,    Extremities:    (  ) peripheral edema  Vascular: 2+ peripheral pulses  Neurological:    (    )A/O x 3,   (  ) focal deficits  Musculoskeletal:    (   )  normal strength b/l upper  (     ) normal  lower extremities  Skin: No rashes    MEDICATIONS:  MEDICATIONS  (STANDING):  carvedilol 6.25 milliGRAM(s) Oral every 12 hours  clotrimazole 2% Vaginal Cream 1 Applicatorful Vaginal daily  ferrous    sulfate 325 milliGRAM(s) Oral daily  heparin   Injectable 5000 Unit(s) SubCutaneous every 8 hours  megestrol 40 milliGRAM(s) Oral two times a day  mirtazapine 30 milliGRAM(s) Oral daily      LABS: All Labs Reviewed:                        9.8    5.94  )-----------( 237      ( 03 Aug 2023 06:58 )             33.1     08-03    134<L>  |  104  |  28<H>  ----------------------------<  85  5.0   |  18<L>  |  0.68    Ca    8.6      03 Aug 2023 06:58  Phos  3.1     08-03  Mg     2.20     08-03    TPro  7.1  /  Alb  3.2<L>  /  TBili  0.8  /  DBili  x   /  AST  31  /  ALT  46<H>  /  AlkPhos  85  08-03    PT/INR - ( 02 Aug 2023 05:40 )   PT: 12.6 sec;   INR: 1.13 ratio         PTT - ( 02 Aug 2023 05:40 )  PTT:21.6 sec      Blood Culture: 08-01 @ 23:00  Organism --  Gram Stain Blood -- Gram Stain --  Specimen Source Clean Catch Clean Catch (Midstream)  Culture-Blood --    08-01 @ 18:15  Organism --  Gram Stain Blood -- Gram Stain --  Specimen Source .Blood Blood-Peripheral  Culture-Blood --    08-01 @ 18:00  Organism --  Gram Stain Blood -- Gram Stain --  Specimen Source .Blood Blood-Peripheral  Culture-Blood --           8.9    6.97  )-----------( 228      ( 02 Aug 2023 05:40 )             29.5     08-02    136  |  105  |  28<H>  ----------------------------<  79  4.5   |  21<L>  |  0.70        Urine Culture      RADIOLOGY/EKG:    ASSESSMENT AND PLAN:  Ms. Olmos is a 78-year-old F with history of dementia/alzheimer’s disease, MM, RA, primary pulmonary hypertension, MDD, cataract, OA, MM (not currently treated, previously on revlimid), mild protein calorie malnutrition who presents as a transfer for failure to thrive with recommendation of  PEG tube placement          Problem/Plan - 1:  ·  Problem: Severe protein-calorie malnutrition.   ·  Plan: -patient with significant weight loss  -decrease PO intake,  was megestrol BID at nursing home  but not effective   -GI consult for PEG tube sent, follow up with GI  .    Problem/Plan - 2:  ·  Problem: Urinary retention.  ·  Plan: -history of urinary retention in the past  -will do bladder scan every 6 hours and on 3rd attempt place knight catheter.    Problem/Plan - 3:  ·  Problem: MDD (major depressive disorder).  ·  Plan: -continue Remeron 30 mg nightly.    Problem/Plan - 4:  ·  Problem: Hyperkalemia.   -repeat K in the AM   -continue to monitor closely.    Problem/Plan - 5:     ·  Problem: Microcytic anemia.  ·  Plan: Hb 8.3 from 9 in March 2023    -continue ferrous sulfate 324 mg daily.    Problem/Plan - 7:  ·  Problem: Need for prophylactic measure.   ·  Plan: DVT prophylaxis: heparin 5000 units Q8hr   GI prophylaxis: none      -8/3/2023 GI consult appreciated for possible PEG insertion a.m.    ADVANCED DIRECTIVE:    DISPOSITION:

## 2023-08-03 NOTE — PATIENT PROFILE ADULT - FALL HARM RISK - HARM RISK INTERVENTIONS

## 2023-08-03 NOTE — DIETITIAN INITIAL EVALUATION ADULT - PERTINENT MEDS FT
MEDICATIONS  (STANDING):  carvedilol 6.25 milliGRAM(s) Oral every 12 hours  clotrimazole 2% Vaginal Cream 1 Applicatorful Vaginal daily  ferrous    sulfate 325 milliGRAM(s) Oral daily  heparin   Injectable 5000 Unit(s) SubCutaneous every 8 hours  megestrol 40 milliGRAM(s) Oral two times a day  mirtazapine 30 milliGRAM(s) Oral daily    MEDICATIONS  (PRN):  acetaminophen     Tablet .. 650 milliGRAM(s) Oral every 6 hours PRN Mild Pain (1 - 3)  melatonin 3 milliGRAM(s) Oral at bedtime PRN Insomnia

## 2023-08-03 NOTE — DIETITIAN INITIAL EVALUATION ADULT - PERTINENT LABORATORY DATA
08-03    134<L>  |  104  |  28<H>  ----------------------------<  85  5.0   |  18<L>  |  0.68    Ca    8.6      03 Aug 2023 06:58  Phos  3.1     08-03  Mg     2.20     08-03    TPro  7.1  /  Alb  3.2<L>  /  TBili  0.8  /  DBili  x   /  AST  31  /  ALT  46<H>  /  AlkPhos  85  08-03  POCT Blood Glucose.: 81 mg/dL (08-03-23 @ 08:32)  A1C with Estimated Average Glucose Result: 4.8 % (08-02-23 @ 05:40)

## 2023-08-03 NOTE — DIETITIAN INITIAL EVALUATION ADULT - PROBLEM SELECTOR PLAN 6
Hb 8.3 from 9 in March 2023   -MCV 64 on admission   -likely combination of iron deficiency and anemia of chronic disease  -continue ferrous sulfate 324 mg daily

## 2023-08-03 NOTE — DIETITIAN INITIAL EVALUATION ADULT - REASON
Unable to get consent at this time, however noted w/ overt signs of severe muscle loss in temporal region and severe fat loss in orbital/buccal region per observation.

## 2023-08-03 NOTE — DIETITIAN INITIAL EVALUATION ADULT - ORAL INTAKE PTA/DIET HISTORY
Patient seen for assessment. Not receptive to assessment at this time. Stated "I'm not feeling well, please go away". Patient did confirm having poor appetite/PO intake for months PTA.

## 2023-08-03 NOTE — PROGRESS NOTE ADULT - SUBJECTIVE AND OBJECTIVE BOX
CHIEF COMPLAINT:    SUBJECTIVE:     REVIEW OF SYSTEMS:    CONSTITUTIONAL: (  )  weakness,  (  ) fevers or chills  EYES/ENT: (  )visual changes;     NECK: (  ) pain or stiffness  RESPIRATORY:   (  )cough, wheezing, hemoptysis;  (  ) shortness of breath  CARDIOVASCULAR:  (  )chest pain or palpitations  GASTROINTESTINAL:   (  )abdominal or epigastric pain.  (  ) nausea, vomiting, or hematemesis;   (   ) diarrhea or constipation.   GENITOURINARY:   (    ) dysuria, frequency or hematuria  NEUROLOGICAL:  (   ) numbness or weakness   All other review of systems is negative unless indicated above    Vital Signs Last 24 Hrs  T(C): 36.4 (03 Aug 2023 09:06), Max: 37.1 (02 Aug 2023 20:59)  T(F): 97.6 (03 Aug 2023 09:06), Max: 98.8 (02 Aug 2023 20:59)  HR: 58 (03 Aug 2023 09:06) (57 - 80)  BP: 115/66 (03 Aug 2023 09:06) (95/55 - 143/76)  BP(mean): --  RR: 18 (03 Aug 2023 09:06) (14 - 19)  SpO2: 100% (03 Aug 2023 09:06) (96% - 100%)    Parameters below as of 03 Aug 2023 09:06  Patient On (Oxygen Delivery Method): room air        I&O's Summary    02 Aug 2023 07:01  -  03 Aug 2023 07:00  --------------------------------------------------------  IN: 300 mL / OUT: 500 mL / NET: -200 mL    03 Aug 2023 07:01  -  03 Aug 2023 09:14  --------------------------------------------------------  IN: 0 mL / OUT: 250 mL / NET: -250 mL        CAPILLARY BLOOD GLUCOSE      POCT Blood Glucose.: 81 mg/dL (03 Aug 2023 08:32)  POCT Blood Glucose.: 97 mg/dL (02 Aug 2023 20:50)  POCT Blood Glucose.: 87 mg/dL (02 Aug 2023 17:16)  POCT Blood Glucose.: 98 mg/dL (02 Aug 2023 13:05)      PHYSICAL EXAM:    Constitutional:  (   ) NAD,   (   )awake and alert  HEENT: PERR, EOMI,    Neck: Soft and supple, No LAD, No JVD  Respiratory:  (    Breath sounds are clear bilaterally,    (   ) wheezing, rales or rhonchi  Cardiovascular:     (   )S1 and S2, regular rate and rhythm, no Murmurs, gallops or rubs  Gastrointestinal:  (   )Bowel Sounds present, soft,   (  )nontender, nondistended,    Extremities:    (  ) peripheral edema  Vascular: 2+ peripheral pulses  Neurological:    (    )A/O x 3,   (  ) focal deficits  Musculoskeletal:    (   )  normal strength b/l upper  (     ) normal  lower extremities  Skin: No rashes    MEDICATIONS:  MEDICATIONS  (STANDING):  carvedilol 6.25 milliGRAM(s) Oral every 12 hours  clotrimazole 2% Vaginal Cream 1 Applicatorful Vaginal daily  ferrous    sulfate 325 milliGRAM(s) Oral daily  heparin   Injectable 5000 Unit(s) SubCutaneous every 8 hours  megestrol 40 milliGRAM(s) Oral two times a day  mirtazapine 30 milliGRAM(s) Oral daily      LABS: All Labs Reviewed:                        9.8    5.94  )-----------( 237      ( 03 Aug 2023 06:58 )             33.1     08-03    134<L>  |  104  |  28<H>  ----------------------------<  85  5.0   |  18<L>  |  0.68    Ca    8.6      03 Aug 2023 06:58  Phos  3.1     08-03  Mg     2.20     08-03    TPro  7.1  /  Alb  3.2<L>  /  TBili  0.8  /  DBili  x   /  AST  31  /  ALT  46<H>  /  AlkPhos  85  08-03    PT/INR - ( 02 Aug 2023 05:40 )   PT: 12.6 sec;   INR: 1.13 ratio         PTT - ( 02 Aug 2023 05:40 )  PTT:21.6 sec      Blood Culture: 08-01 @ 23:00  Organism --  Gram Stain Blood -- Gram Stain --  Specimen Source Clean Catch Clean Catch (Midstream)  Culture-Blood --    08-01 @ 18:15  Organism --  Gram Stain Blood -- Gram Stain --  Specimen Source .Blood Blood-Peripheral  Culture-Blood --    08-01 @ 18:00  Organism --  Gram Stain Blood -- Gram Stain --  Specimen Source .Blood Blood-Peripheral  Culture-Blood --      Urine Culture      RADIOLOGY/EKG:    ASSESSMENT AND PLAN:    DVT PPX:    ADVANCED DIRECTIVE:    DISPOSITION:

## 2023-08-03 NOTE — DIETITIAN INITIAL EVALUATION ADULT - ENTERAL
When medically feasible, recommend Jevity 1.5 via PEG @ 45 mL/hr. x 24 hrs. for total volume 1,080 mL. Provides 1,620 kcal (27.5 kcal/kg), 69 g pro (1.2 g/kg) based on IBW 58.9 kg and 821 mL of fluid (TF free water), defer additional water flushes to team. Begin tube feeds @ 5 mL/hr. and increase by 10 mL/hr. every 6 hrs. to goal rate @ 45 mL/hr.

## 2023-08-03 NOTE — DIETITIAN INITIAL EVALUATION ADULT - ADD RECOMMEND
Continue w/ regular diet, defer consistencies to team. Nutrition department will provide magic cup BID (580 kcal, 18 g pro). Monitor PO intake unless contraindicated pending swallow evaluation. Nutrition remains available for reconsult as needed.

## 2023-08-03 NOTE — DIETITIAN INITIAL EVALUATION ADULT - PROBLEM SELECTOR PLAN 2
-history of urinary retention in the past  -will do bladder scan every 6 hours and on 3rd attempt place knight catheter

## 2023-08-04 LAB
ALBUMIN SERPL ELPH-MCNC: 3.4 G/DL — SIGNIFICANT CHANGE UP (ref 3.3–5)
ALP SERPL-CCNC: 83 U/L — SIGNIFICANT CHANGE UP (ref 40–120)
ALT FLD-CCNC: 42 U/L — HIGH (ref 4–33)
ANION GAP SERPL CALC-SCNC: 12 MMOL/L — SIGNIFICANT CHANGE UP (ref 7–14)
AST SERPL-CCNC: 25 U/L — SIGNIFICANT CHANGE UP (ref 4–32)
BASOPHILS # BLD AUTO: 0 K/UL — SIGNIFICANT CHANGE UP (ref 0–0.2)
BASOPHILS NFR BLD AUTO: 0 % — SIGNIFICANT CHANGE UP (ref 0–2)
BILIRUB SERPL-MCNC: 0.5 MG/DL — SIGNIFICANT CHANGE UP (ref 0.2–1.2)
BLD GP AB SCN SERPL QL: NEGATIVE — SIGNIFICANT CHANGE UP
BUN SERPL-MCNC: 27 MG/DL — HIGH (ref 7–23)
CALCIUM SERPL-MCNC: 8.9 MG/DL — SIGNIFICANT CHANGE UP (ref 8.4–10.5)
CHLORIDE SERPL-SCNC: 105 MMOL/L — SIGNIFICANT CHANGE UP (ref 98–107)
CO2 SERPL-SCNC: 19 MMOL/L — LOW (ref 22–31)
CREAT SERPL-MCNC: 0.72 MG/DL — SIGNIFICANT CHANGE UP (ref 0.5–1.3)
EGFR: 86 ML/MIN/1.73M2 — SIGNIFICANT CHANGE UP
EOSINOPHIL # BLD AUTO: 0 K/UL — SIGNIFICANT CHANGE UP (ref 0–0.5)
EOSINOPHIL NFR BLD AUTO: 0 % — SIGNIFICANT CHANGE UP (ref 0–6)
GLUCOSE BLDC GLUCOMTR-MCNC: 57 MG/DL — LOW (ref 70–99)
GLUCOSE BLDC GLUCOMTR-MCNC: 64 MG/DL — LOW (ref 70–99)
GLUCOSE BLDC GLUCOMTR-MCNC: 68 MG/DL — LOW (ref 70–99)
GLUCOSE BLDC GLUCOMTR-MCNC: 71 MG/DL — SIGNIFICANT CHANGE UP (ref 70–99)
GLUCOSE BLDC GLUCOMTR-MCNC: 81 MG/DL — SIGNIFICANT CHANGE UP (ref 70–99)
GLUCOSE SERPL-MCNC: 76 MG/DL — SIGNIFICANT CHANGE UP (ref 70–99)
HCT VFR BLD CALC: 33 % — LOW (ref 34.5–45)
HGB BLD-MCNC: 9.8 G/DL — LOW (ref 11.5–15.5)
IANC: 6.8 K/UL — SIGNIFICANT CHANGE UP (ref 1.8–7.4)
LYMPHOCYTES # BLD AUTO: 0.22 K/UL — LOW (ref 1–3.3)
LYMPHOCYTES # BLD AUTO: 2.6 % — LOW (ref 13–44)
MAGNESIUM SERPL-MCNC: 2.3 MG/DL — SIGNIFICANT CHANGE UP (ref 1.6–2.6)
MCHC RBC-ENTMCNC: 19.6 PG — LOW (ref 27–34)
MCHC RBC-ENTMCNC: 29.7 GM/DL — LOW (ref 32–36)
MCV RBC AUTO: 65.9 FL — LOW (ref 80–100)
MONOCYTES # BLD AUTO: 0.36 K/UL — SIGNIFICANT CHANGE UP (ref 0–0.9)
MONOCYTES NFR BLD AUTO: 4.3 % — SIGNIFICANT CHANGE UP (ref 2–14)
NEUTROPHILS # BLD AUTO: 7.77 K/UL — HIGH (ref 1.8–7.4)
NEUTROPHILS NFR BLD AUTO: 92.2 % — HIGH (ref 43–77)
PHOSPHATE SERPL-MCNC: 3.2 MG/DL — SIGNIFICANT CHANGE UP (ref 2.5–4.5)
PLATELET # BLD AUTO: 222 K/UL — SIGNIFICANT CHANGE UP (ref 150–400)
POTASSIUM SERPL-MCNC: 4.4 MMOL/L — SIGNIFICANT CHANGE UP (ref 3.5–5.3)
POTASSIUM SERPL-SCNC: 4.4 MMOL/L — SIGNIFICANT CHANGE UP (ref 3.5–5.3)
PROT SERPL-MCNC: 7.3 G/DL — SIGNIFICANT CHANGE UP (ref 6–8.3)
RBC # BLD: 5.01 M/UL — SIGNIFICANT CHANGE UP (ref 3.8–5.2)
RBC # FLD: 22.6 % — HIGH (ref 10.3–14.5)
RH IG SCN BLD-IMP: POSITIVE — SIGNIFICANT CHANGE UP
SODIUM SERPL-SCNC: 136 MMOL/L — SIGNIFICANT CHANGE UP (ref 135–145)
WBC # BLD: 8.43 K/UL — SIGNIFICANT CHANGE UP (ref 3.8–10.5)
WBC # FLD AUTO: 8.43 K/UL — SIGNIFICANT CHANGE UP (ref 3.8–10.5)

## 2023-08-04 PROCEDURE — 43235 EGD DIAGNOSTIC BRUSH WASH: CPT | Mod: GC

## 2023-08-04 DEVICE — FEEDING TUBE PEG KIT ENDOVIVE STANDARD 20FR PULL WITH LIDOCAINE AMPULE: Type: IMPLANTABLE DEVICE | Status: FUNCTIONAL

## 2023-08-04 RX ORDER — ACETAMINOPHEN 500 MG
1000 TABLET ORAL ONCE
Refills: 0 | Status: COMPLETED | OUTPATIENT
Start: 2023-08-04 | End: 2023-08-04

## 2023-08-04 RX ADMIN — Medication 400 MILLIGRAM(S): at 18:53

## 2023-08-04 RX ADMIN — Medication 1 APPLICATORFUL: at 12:24

## 2023-08-04 RX ADMIN — HEPARIN SODIUM 5000 UNIT(S): 5000 INJECTION INTRAVENOUS; SUBCUTANEOUS at 14:27

## 2023-08-04 RX ADMIN — HEPARIN SODIUM 5000 UNIT(S): 5000 INJECTION INTRAVENOUS; SUBCUTANEOUS at 05:14

## 2023-08-04 RX ADMIN — HEPARIN SODIUM 5000 UNIT(S): 5000 INJECTION INTRAVENOUS; SUBCUTANEOUS at 21:41

## 2023-08-04 NOTE — CHART NOTE - NSCHARTNOTEFT_GEN_A_CORE
Pt s/p EGD with attempted PEG placement. PEG placement aborted as patient noted to develop extraluminal hematoma after trochar insertion, which remained stable in size by end of procedure. Patient was hemodynamically stable during and after procedure.   - please obtain CBC tonight for monitoring of H/H  - CLD diet today  - if patient stable over the weekend, can reattempt PEG placement next week  - provation note to follow    Alida Brown PGY6  GI/Hepatology Fellow

## 2023-08-04 NOTE — CHART NOTE - NSCHARTNOTEFT_GEN_A_CORE
Consult in for MST score 2 or >. Patient was assessed by writer yesterday, refer to RD note (8/3). Will continue to monitor and f/u per protocol.

## 2023-08-04 NOTE — SWALLOW BEDSIDE ASSESSMENT ADULT - COMMENTS
As per Internal Medicine Progress Note " patient seemed to be more awake and was eating her breakfast pure diet she  is a 78 year old female with an extensive medical history including multiple myeloma and dementia who presents from the assisted living facility [facility]. Patient is unable to provide a reliable history due to dementia. She is a patient at [facility], patient is presenting for PEG tube placement.   patient is not eating and has failure to thrive. Her MOLST form states she is DNR/DNI but is agreeable with PEG tube. Patient's sister, Lindsey ([phone]), is to be contacted for medical decisions/consent"    CXR 8/1 "No focal consolidation. Small areas of linear atelectasis versus scarring in the left lower lobe."     Order received, chart contents noted. As per discussion with ACP, patient is NPO for a potential PEG placement today. As per team, swallow evaluation to be deferred at this time, and will reconsult when medically optimized.

## 2023-08-04 NOTE — PROGRESS NOTE ADULT - SUBJECTIVE AND OBJECTIVE BOX
CHIEF COMPLAINT:    patient seems to be more awake NPO for PEG insertion       she  is a 78 year old female with an extensive medical history including multiple myeloma and dementia who presents from the assisted living facility [facility]. Patient is unable to provide a reliable history due to dementia.    	 She is a patient at [facility], patient is presenting for PEG tube placement.   patient is not eating and has failure to thrive. Her MOLST form states she is DNR/DNI but is agreeable with PEG tube. Patient's sister, Lindsey ([phone]), is to be contacted for medical decisions/consent.    SUBJECTIVE:     REVIEW OF SYSTEMS:    CONSTITUTIONAL: ( x )  weakness,  (  ) fevers or chills  EYES/ENT: (  )visual changes;     NECK: (  ) pain or stiffness  RESPIRATORY:   (  )cough, wheezing, hemoptysis;  (  ) shortness of breath  CARDIOVASCULAR:  (  )chest pain or palpitations  GASTROINTESTINAL:   (  )abdominal or epigastric pain.  (  ) nausea, vomiting, or hematemesis;   (   ) diarrhea or constipation.   GENITOURINARY:   (    ) dysuria, frequency or hematuria  NEUROLOGICAL:  (   ) numbness or weakness   All other review of systems is negative unless indicated above    Vital Signs Last 24 Hrs  T(C): 36.4 (04 Aug 2023 04:19), Max: 36.9 (03 Aug 2023 17:05)  T(F): 97.5 (04 Aug 2023 04:19), Max: 98.5 (03 Aug 2023 17:05)  HR: 63 (04 Aug 2023 04:19) (60 - 72)  BP: 139/77 (04 Aug 2023 04:19) (98/58 - 139/77)  BP(mean): --  RR: 18 (04 Aug 2023 04:19) (18 - 18)  SpO2: 100% (04 Aug 2023 04:19) (100% - 100%)    Parameters below as of 04 Aug 2023 04:19  Patient On (Oxygen Delivery Method): nasal cannula  O2 Flow (L/min): 2      I&O's Summary    03 Aug 2023 07:01  -  04 Aug 2023 07:00  --------------------------------------------------------  IN: 0 mL / OUT: 700 mL / NET: -700 mL        CAPILLARY BLOOD GLUCOSE      POCT Blood Glucose.: 91 mg/dL (03 Aug 2023 12:26)      PHYSICAL EXAM:    Constitutional:  ( x  ) NAD,   ( x  )awake and  verbal  HEENT: PERR, EOMI,    Neck: Soft and supple, No LAD, No JVD  Respiratory:  (   x Breath sounds are clear bilaterally,    (   ) wheezing, rales or rhonchi  Cardiovascular:     (  x )S1 and S2, regular rate and rhythm, no Murmurs, gallops or rubs  Gastrointestinal:  (  x )Bowel Sounds present, soft,   (  )nontender, nondistended,    Extremities:    (  ) peripheral edema  Vascular: 2+ peripheral pulses  Neurological:    ( x   )A/O x  1,   (  ) focal deficits  Musculoskeletal:    (   )  normal strength b/l upper  (     ) normal  lower extremities  Skin: No rashes    MEDICATIONS:  MEDICATIONS  (STANDING):  carvedilol 6.25 milliGRAM(s) Oral every 12 hours  clotrimazole 2% Vaginal Cream 1 Applicatorful Vaginal daily  ferrous    sulfate 325 milliGRAM(s) Oral daily  heparin   Injectable 5000 Unit(s) SubCutaneous every 8 hours  megestrol 40 milliGRAM(s) Oral two times a day  mirtazapine 30 milliGRAM(s) Oral daily      LABS: All Labs Reviewed:                        9.8    5.94  )-----------( 237      ( 03 Aug 2023 06:58 )             33.1     08-04    136  |  105  |  27<H>  ----------------------------<  76  4.4   |  19<L>  |  0.72    Ca    8.9      04 Aug 2023 05:34  Phos  3.2     08-04  Mg     2.30     08-04    TPro  7.3  /  Alb  3.4  /  TBili  0.5  /  DBili  x   /  AST  25  /  ALT  42<H>  /  AlkPhos  83  08-04          Blood Culture: 08-01 @ 23:00  Organism --  Gram Stain Blood -- Gram Stain --  Specimen Source Clean Catch Clean Catch (Midstream)  Culture-Blood --    08-01 @ 18:15  Organism --  Gram Stain Blood -- Gram Stain --  Specimen Source .Blood Blood-Peripheral  Culture-Blood --    08-01 @ 18:00  Organism --  Gram Stain Blood -- Gram Stain --  Specimen Source .Blood Blood-Peripheral  Culture-Blood --      Urine Culture      RADIOLOGY/EKG:    ASSESSMENT AND PLAN:  Ms. Olmos is a 78-year-old F with history of dementia/alzheimer’s disease, MM, RA, primary pulmonary hypertension, MDD, cataract, OA, MM (not currently treated, previously on revlimid), mild protein calorie malnutrition who presents as a transfer for failure to thrive with recommendation of  PEG tube placement          Problem/Plan - 1:  ·  Problem: Severe protein-calorie malnutrition.   ·  Plan: -patient with significant weight loss  -decrease PO intake,  was megestrol BID at nursing home  but not effective   -GI consult for PEG tube sent, follow up with GI  .    Problem/Plan - 2:  ·  Problem: Urinary retention.  ·  Plan: -history of urinary retention in the past  -will do bladder scan every 6 hours and on 3rd attempt place knight catheter.    Problem/Plan - 3:  ·  Problem: MDD (major depressive disorder).  ·  Plan: -continue Remeron 30 mg nightly.    Problem/Plan - 4:  ·  Problem: Hyperkalemia.   -repeat K in the AM   -continue to monitor closely.    Problem/Plan - 5:     ·  Problem: Microcytic anemia.  ·  Plan: Hb 8.3 from 9 in March 2023    -continue ferrous sulfate 324 mg daily.    Problem/Plan - 7:  ·  Problem: Need for prophylactic measure.   ·  Plan: DVT prophylaxis: heparin 5000 units Q8hr   GI prophylaxis: none      -8/3/2023 GI consult appreciated for possible PEG insertion a.m.  -8/4/2023 will D/C megace      DVT PPX:    ADVANCED DIRECTIVE:    DISPOSITION:

## 2023-08-05 LAB
ALBUMIN SERPL ELPH-MCNC: 3.1 G/DL — LOW (ref 3.3–5)
ALP SERPL-CCNC: 84 U/L — SIGNIFICANT CHANGE UP (ref 40–120)
ALT FLD-CCNC: 26 U/L — SIGNIFICANT CHANGE UP (ref 4–33)
ANION GAP SERPL CALC-SCNC: 15 MMOL/L — HIGH (ref 7–14)
AST SERPL-CCNC: 26 U/L — SIGNIFICANT CHANGE UP (ref 4–32)
BILIRUB SERPL-MCNC: 0.6 MG/DL — SIGNIFICANT CHANGE UP (ref 0.2–1.2)
BUN SERPL-MCNC: 37 MG/DL — HIGH (ref 7–23)
CALCIUM SERPL-MCNC: 8.7 MG/DL — SIGNIFICANT CHANGE UP (ref 8.4–10.5)
CHLORIDE SERPL-SCNC: 105 MMOL/L — SIGNIFICANT CHANGE UP (ref 98–107)
CO2 SERPL-SCNC: 17 MMOL/L — LOW (ref 22–31)
CREAT SERPL-MCNC: 0.88 MG/DL — SIGNIFICANT CHANGE UP (ref 0.5–1.3)
EGFR: 68 ML/MIN/1.73M2 — SIGNIFICANT CHANGE UP
GLUCOSE BLDC GLUCOMTR-MCNC: 77 MG/DL — SIGNIFICANT CHANGE UP (ref 70–99)
GLUCOSE SERPL-MCNC: 53 MG/DL — CRITICAL LOW (ref 70–99)
HCT VFR BLD CALC: 33.9 % — LOW (ref 34.5–45)
HGB BLD-MCNC: 9.8 G/DL — LOW (ref 11.5–15.5)
MAGNESIUM SERPL-MCNC: 2.2 MG/DL — SIGNIFICANT CHANGE UP (ref 1.6–2.6)
MCHC RBC-ENTMCNC: 19.6 PG — LOW (ref 27–34)
MCHC RBC-ENTMCNC: 28.9 GM/DL — LOW (ref 32–36)
MCV RBC AUTO: 67.9 FL — LOW (ref 80–100)
NRBC # BLD: 0 /100 WBCS — SIGNIFICANT CHANGE UP (ref 0–0)
NRBC # FLD: 0 K/UL — SIGNIFICANT CHANGE UP (ref 0–0)
PHOSPHATE SERPL-MCNC: 3 MG/DL — SIGNIFICANT CHANGE UP (ref 2.5–4.5)
PLATELET # BLD AUTO: 206 K/UL — SIGNIFICANT CHANGE UP (ref 150–400)
POTASSIUM SERPL-MCNC: 4.5 MMOL/L — SIGNIFICANT CHANGE UP (ref 3.5–5.3)
POTASSIUM SERPL-SCNC: 4.5 MMOL/L — SIGNIFICANT CHANGE UP (ref 3.5–5.3)
PROT SERPL-MCNC: 7 G/DL — SIGNIFICANT CHANGE UP (ref 6–8.3)
RBC # BLD: 4.99 M/UL — SIGNIFICANT CHANGE UP (ref 3.8–5.2)
RBC # FLD: 23.2 % — HIGH (ref 10.3–14.5)
SODIUM SERPL-SCNC: 137 MMOL/L — SIGNIFICANT CHANGE UP (ref 135–145)
WBC # BLD: 5.89 K/UL — SIGNIFICANT CHANGE UP (ref 3.8–10.5)
WBC # FLD AUTO: 5.89 K/UL — SIGNIFICANT CHANGE UP (ref 3.8–10.5)

## 2023-08-05 RX ADMIN — MIRTAZAPINE 30 MILLIGRAM(S): 45 TABLET, ORALLY DISINTEGRATING ORAL at 11:44

## 2023-08-05 RX ADMIN — Medication 325 MILLIGRAM(S): at 11:48

## 2023-08-05 RX ADMIN — HEPARIN SODIUM 5000 UNIT(S): 5000 INJECTION INTRAVENOUS; SUBCUTANEOUS at 06:57

## 2023-08-05 RX ADMIN — HEPARIN SODIUM 5000 UNIT(S): 5000 INJECTION INTRAVENOUS; SUBCUTANEOUS at 21:27

## 2023-08-05 RX ADMIN — Medication 650 MILLIGRAM(S): at 22:27

## 2023-08-05 RX ADMIN — CARVEDILOL PHOSPHATE 6.25 MILLIGRAM(S): 80 CAPSULE, EXTENDED RELEASE ORAL at 06:53

## 2023-08-05 RX ADMIN — CARVEDILOL PHOSPHATE 6.25 MILLIGRAM(S): 80 CAPSULE, EXTENDED RELEASE ORAL at 18:02

## 2023-08-05 RX ADMIN — HEPARIN SODIUM 5000 UNIT(S): 5000 INJECTION INTRAVENOUS; SUBCUTANEOUS at 13:12

## 2023-08-05 RX ADMIN — Medication 650 MILLIGRAM(S): at 21:27

## 2023-08-05 NOTE — PROGRESS NOTE ADULT - SUBJECTIVE AND OBJECTIVE BOX
Gastroenterology Progress Note    Interval Events:   -No further bleeding from site of trochar with pt otherwise denying any ongoing n/v/abd pain.     Allergies:  No Known Allergies      Hospital Medications:  acetaminophen     Tablet .. 650 milliGRAM(s) Oral every 6 hours PRN  carvedilol 6.25 milliGRAM(s) Oral every 12 hours  clotrimazole 1% Cream 1 Application(s) Topical <User Schedule>  ferrous    sulfate 325 milliGRAM(s) Oral daily  heparin   Injectable 5000 Unit(s) SubCutaneous every 8 hours  melatonin 3 milliGRAM(s) Oral at bedtime PRN  mirtazapine 30 milliGRAM(s) Oral daily      ROS: 14 point ROS negative unless otherwise state in subjective    PHYSICAL EXAM:   Vital Signs:  Vital Signs Last 24 Hrs  T(C): 37.1 (05 Aug 2023 09:00), Max: 37.1 (04 Aug 2023 21:05)  T(F): 98.8 (05 Aug 2023 09:00), Max: 98.8 (05 Aug 2023 09:00)  HR: 63 (05 Aug 2023 09:00) (62 - 76)  BP: 97/83 (05 Aug 2023 09:00) (91/51 - 122/79)  BP(mean): --  RR: 17 (05 Aug 2023 09:00) (15 - 20)  SpO2: 98% (05 Aug 2023 09:00) (95% - 100%)    Parameters below as of 05 Aug 2023 09:00  Patient On (Oxygen Delivery Method): room air      Daily Height in cm: 170.18 (04 Aug 2023 17:05)    Daily     GENERAL:  No acute distress  HEENT:  NCAT  CHEST: no resp distress  HEART:  RRR  ABDOMEN:  Soft, non-tender, non-distended, normoactive bowel sounds. No bleeding from trochar site insertion   EXTREMITIES:  No edema  NEURO:  Alert and oriented x 3, no asterixis, no tremor    LABS:                        9.8    5.89  )-----------( 206      ( 05 Aug 2023 04:17 )             33.9     Mean Cell Volume: 67.9 fL (08-05-23 @ 04:17)    08-05    137  |  105  |  37<H>  ----------------------------<  53<LL>  4.5   |  17<L>  |  0.88    Ca    8.7      05 Aug 2023 04:17  Phos  3.0     08-05  Mg     2.20     08-05    TPro  7.0  /  Alb  3.1<L>  /  TBili  0.6  /  DBili  x   /  AST  26  /  ALT  26  /  AlkPhos  84  08-05    LIVER FUNCTIONS - ( 05 Aug 2023 04:17 )  Alb: 3.1 g/dL / Pro: 7.0 g/dL / ALK PHOS: 84 U/L / ALT: 26 U/L / AST: 26 U/L / GGT: x             Urinalysis Basic - ( 05 Aug 2023 04:17 )    Color: x / Appearance: x / SG: x / pH: x  Gluc: 53 mg/dL / Ketone: x  / Bili: x / Urobili: x   Blood: x / Protein: x / Nitrite: x   Leuk Esterase: x / RBC: x / WBC x   Sq Epi: x / Non Sq Epi: x / Bacteria: x      Imaging: EGD report reviewed

## 2023-08-05 NOTE — PROGRESS NOTE ADULT - SUBJECTIVE AND OBJECTIVE BOX
CHIEF COMPLAINT:    SUBJECTIVE:     REVIEW OF SYSTEMS:    CONSTITUTIONAL: (  )  weakness,  (  ) fevers or chills  EYES/ENT: (  )visual changes;     NECK: (  ) pain or stiffness  RESPIRATORY:   (  )cough, wheezing, hemoptysis;  (  ) shortness of breath  CARDIOVASCULAR:  (  )chest pain or palpitations  GASTROINTESTINAL:   (  )abdominal or epigastric pain.  (  ) nausea, vomiting, or hematemesis;   (   ) diarrhea or constipation.   GENITOURINARY:   (    ) dysuria, frequency or hematuria  NEUROLOGICAL:  (   ) numbness or weakness   All other review of systems is negative unless indicated above    Vital Signs Last 24 Hrs  T(C): 36.9 (05 Aug 2023 05:05), Max: 37.1 (04 Aug 2023 21:05)  T(F): 98.4 (05 Aug 2023 05:05), Max: 98.7 (04 Aug 2023 21:05)  HR: 75 (05 Aug 2023 05:05) (62 - 76)  BP: 99/62 (05 Aug 2023 05:05) (91/51 - 122/79)  BP(mean): --  RR: 16 (05 Aug 2023 05:05) (15 - 20)  SpO2: 95% (05 Aug 2023 05:05) (95% - 100%)    Parameters below as of 05 Aug 2023 05:05  Patient On (Oxygen Delivery Method): room air        I&O's Summary      CAPILLARY BLOOD GLUCOSE      POCT Blood Glucose.: 77 mg/dL (05 Aug 2023 08:50)  POCT Blood Glucose.: 81 mg/dL (04 Aug 2023 16:07)  POCT Blood Glucose.: 64 mg/dL (04 Aug 2023 16:03)  POCT Blood Glucose.: 68 mg/dL (04 Aug 2023 15:59)  POCT Blood Glucose.: 71 mg/dL (04 Aug 2023 11:55)  POCT Blood Glucose.: 57 mg/dL (04 Aug 2023 11:49)      PHYSICAL EXAM:    Constitutional:  (   ) NAD,   (   )awake and alert  HEENT: PERR, EOMI,    Neck: Soft and supple, No LAD, No JVD  Respiratory:  (    Breath sounds are clear bilaterally,    (   ) wheezing, rales or rhonchi  Cardiovascular:     (   )S1 and S2, regular rate and rhythm, no Murmurs, gallops or rubs  Gastrointestinal:  (   )Bowel Sounds present, soft,   (  )nontender, nondistended,    Extremities:    (  ) peripheral edema  Vascular: 2+ peripheral pulses  Neurological:    (    )A/O x 3,   (  ) focal deficits  Musculoskeletal:    (   )  normal strength b/l upper  (     ) normal  lower extremities  Skin: No rashes    MEDICATIONS:  MEDICATIONS  (STANDING):  carvedilol 6.25 milliGRAM(s) Oral every 12 hours  clotrimazole 2% Vaginal Cream 1 Applicatorful Vaginal daily  ferrous    sulfate 325 milliGRAM(s) Oral daily  heparin   Injectable 5000 Unit(s) SubCutaneous every 8 hours  mirtazapine 30 milliGRAM(s) Oral daily      LABS: All Labs Reviewed:                        9.8    5.89  )-----------( 206      ( 05 Aug 2023 04:17 )             33.9     08-05    137  |  105  |  37<H>  ----------------------------<  53<LL>  4.5   |  17<L>  |  0.88    Ca    8.7      05 Aug 2023 04:17  Phos  3.0     08-05  Mg     2.20     08-05    TPro  7.0  /  Alb  3.1<L>  /  TBili  0.6  /  DBili  x   /  AST  26  /  ALT  26  /  AlkPhos  84  08-05          Blood Culture: 08-01 @ 23:00  Organism --  Gram Stain Blood -- Gram Stain --  Specimen Source Clean Catch Clean Catch (Midstream)  Culture-Blood --    08-01 @ 18:15  Organism --  Gram Stain Blood -- Gram Stain --  Specimen Source .Blood Blood-Peripheral  Culture-Blood --    08-01 @ 18:00  Organism --  Gram Stain Blood -- Gram Stain --  Specimen Source .Blood Blood-Peripheral  Culture-Blood --      Urine Culture      RADIOLOGY/EKG:    ASSESSMENT AND PLAN:    DVT PPX:    ADVANCED DIRECTIVE:    DISPOSITION: CHIEF COMPLAINT:  event of PEG placement noted it was discontinued due to intraluminal hematoma during the procedure patient hungry once food discussed with ACP team and nursing to start on pure diet  SUBJECTIVE:     REVIEW OF SYSTEMS:    CONSTITUTIONAL: ( x )  weakness,  (  ) fevers or chills  EYES/ENT: (  )visual changes;     NECK: (  ) pain or stiffness  RESPIRATORY:   (  )cough, wheezing, hemoptysis;  (  ) shortness of breath  CARDIOVASCULAR:  (  )chest pain or palpitations  GASTROINTESTINAL:   (  )abdominal or epigastric pain.  (  ) nausea, vomiting, or hematemesis;   (   ) diarrhea or constipation.   GENITOURINARY:   (    ) dysuria, frequency or hematuria  NEUROLOGICAL:  (   ) numbness or weakness   All other review of systems is negative unless indicated above    Vital Signs Last 24 Hrs  T(C): 36.9 (05 Aug 2023 05:05), Max: 37.1 (04 Aug 2023 21:05)  T(F): 98.4 (05 Aug 2023 05:05), Max: 98.7 (04 Aug 2023 21:05)  HR: 75 (05 Aug 2023 05:05) (62 - 76)  BP: 99/62 (05 Aug 2023 05:05) (91/51 - 122/79)  BP(mean): --  RR: 16 (05 Aug 2023 05:05) (15 - 20)  SpO2: 95% (05 Aug 2023 05:05) (95% - 100%)    Parameters below as of 05 Aug 2023 05:05  Patient On (Oxygen Delivery Method): room air        I&O's Summary      CAPILLARY BLOOD GLUCOSE      POCT Blood Glucose.: 77 mg/dL (05 Aug 2023 08:50)  POCT Blood Glucose.: 81 mg/dL (04 Aug 2023 16:07)  POCT Blood Glucose.: 64 mg/dL (04 Aug 2023 16:03)  POCT Blood Glucose.: 68 mg/dL (04 Aug 2023 15:59)  POCT Blood Glucose.: 71 mg/dL (04 Aug 2023 11:55)  POCT Blood Glucose.: 57 mg/dL (04 Aug 2023 11:49)      PHYSICAL EXAM:    Constitutional:  (  x ) NAD,   (   )awake and alert  HEENT: PERR, EOMI,    Neck: Soft and supple, No LAD, No JVD  Respiratory:  (  x  Breath sounds are clear bilaterally,    (   ) wheezing, rales or rhonchi  Cardiovascular:     (x   )S1 and S2, regular rate and rhythm, no Murmurs, gallops or rubs  Gastrointestinal:  ( x  )Bowel Sounds present, soft,   (  )nontender, nondistended,    Extremities:    (x  ) peripheral edema  Vascular: 2+ peripheral pulses  Neurological:    (  x  )A/O x  2,   (  ) focal deficits  Musculoskeletal:    (   )  normal strength b/l upper  (     ) normal  lower extremities  Skin: No rashes    MEDICATIONS:  MEDICATIONS  (STANDING):  carvedilol 6.25 milliGRAM(s) Oral every 12 hours  clotrimazole 2% Vaginal Cream 1 Applicatorful Vaginal daily  ferrous    sulfate 325 milliGRAM(s) Oral daily  heparin   Injectable 5000 Unit(s) SubCutaneous every 8 hours  mirtazapine 30 milliGRAM(s) Oral daily      LABS: All Labs Reviewed:                        9.8    5.89  )-----------( 206      ( 05 Aug 2023 04:17 )             33.9     08-05    137  |  105  |  37<H>  ----------------------------<  53<LL>  4.5   |  17<L>  |  0.88    Ca    8.7      05 Aug 2023 04:17  Phos  3.0     08-05  Mg     2.20     08-05    TPro  7.0  /  Alb  3.1<L>  /  TBili  0.6  /  DBili  x   /  AST  26  /  ALT  26  /  AlkPhos  84  08-05                 9.8    5.94  )-----------( 237      ( 03 Aug 2023 06:58 )             33.1     Blood Culture: 08-01 @ 23:00  Organism --  Gram Stain Blood -- Gram Stain --  Specimen Source Clean Catch Clean Catch (Midstream)  Culture-Blood --    08-01 @ 18:15  Organism --  Gram Stain Blood -- Gram Stain --  Specimen Source .Blood Blood-Peripheral  Culture-Blood --    08-01 @ 18:00  Organism --  Gram Stain Blood -- Gram Stain --  Specimen Source .Blood Blood-Peripheral  Culture-Blood --      Urine Culture      RADIOLOGY/EKG:    ASSESSMENT AND PLAN:  Ms. Olmos is a 78-year-old F with history of dementia/alzheimer’s disease, MM, RA, primary pulmonary hypertension, MDD, cataract, OA, MM (not currently treated, previously on revlimid), mild protein calorie malnutrition who presents as a transfer for failure to thrive with recommendation of  PEG tube placement          Problem/Plan - 1:  ·  Problem: Severe protein-calorie malnutrition.   ·  Plan: -patient with significant weight loss  -decrease PO intake,  was megestrol BID at nursing home  but not effective   -GI consult for PEG tube sent, follow up with GI  .    Problem/Plan - 2:  ·  Problem: Urinary retention.  ·  Plan: -history of urinary retention in the past  -will do bladder scan every 6 hours and on 3rd attempt place knight catheter.    Problem/Plan - 3:  ·  Problem: MDD (major depressive disorder).  ·  Plan: -continue Remeron 30 mg nightly.    Problem/Plan - 4:  ·  Problem: Hyperkalemia.   -repeat K in the AM   -continue to monitor closely.    Problem/Plan - 5:     ·  Problem: Microcytic anemia.  ·  Plan: Hb 8.3 from 9 in March 2023    -continue ferrous sulfate 324 mg daily.    Problem/Plan - 7:  ·  Problem: Need for prophylactic measure.   ·  Plan: DVT prophylaxis: heparin 5000 units Q8hr   GI prophylaxis: none      -8/3/2023 GI consult appreciated for possible PEG insertion a.m.  -8/4/2023 will D/C megace  -8/5/2023 PEG placement was unsuccessful start patient on pured diet for PEG placement in the next 2-3 days hemoglobin stable        DVT PPX:    ADVANCED DIRECTIVE:    DISPOSITION:

## 2023-08-05 NOTE — PROGRESS NOTE ADULT - ASSESSMENT
79 yo F w/ PMHx dementia/alzheimer’s disease, RA, primary pulmonary hypertension, MDD, cataract, OA, MM (not currently treated, previously on revlimid), mild protein calorie malnutrition who presents as a transfer for failure to thrive.     #Dementia  #Pulmonary hypertension  #FTT. Pt with worsening appetite over the past few weeks, reduced po intake, weight loss.  Underwent EGD yesterday with attempted PEG placement. PEG placement aborted as patient noted to develop extraluminal hematoma after trochar insertion, which remained stable in size by end of procedure. Patient was hemodynamically stable during and after procedure. CB stable with no signs of any overt bleedng.if patient stable over the weekend, can reattempt PEG placement next week     Recommendations  -Dier per SLP recs   -Trend BCC QD and transfuse to keep hgb>7   -if patient stable over the weekend, can reattempt PEG placement next week. Please keep NPO after MN on Sunday night for possible repeat EGD Monday 8/7/2023 pending endo availably     All recommendations are tentative until note is attested by attending.     Howie Crum, PGY-5  Gastroenterology/Hepatology Fellow  Available on [employer] Teams   [phone] (Long Range Pager)  [phone] (Short Range Pager [facility])    After 5pm, please contact the on-call GI fellow. [phone]

## 2023-08-06 LAB
ALBUMIN SERPL ELPH-MCNC: 3.1 G/DL — LOW (ref 3.3–5)
ALP SERPL-CCNC: 79 U/L — SIGNIFICANT CHANGE UP (ref 40–120)
ALT FLD-CCNC: 15 U/L — SIGNIFICANT CHANGE UP (ref 4–33)
ANION GAP SERPL CALC-SCNC: 13 MMOL/L — SIGNIFICANT CHANGE UP (ref 7–14)
AST SERPL-CCNC: 22 U/L — SIGNIFICANT CHANGE UP (ref 4–32)
B PERT DNA SPEC QL NAA+PROBE: SIGNIFICANT CHANGE UP
B PERT+PARAPERT DNA PNL SPEC NAA+PROBE: SIGNIFICANT CHANGE UP
BILIRUB SERPL-MCNC: 0.7 MG/DL — SIGNIFICANT CHANGE UP (ref 0.2–1.2)
BORDETELLA PARAPERTUSSIS (RAPRVP): SIGNIFICANT CHANGE UP
BUN SERPL-MCNC: 53 MG/DL — HIGH (ref 7–23)
C PNEUM DNA SPEC QL NAA+PROBE: SIGNIFICANT CHANGE UP
CALCIUM SERPL-MCNC: 8.5 MG/DL — SIGNIFICANT CHANGE UP (ref 8.4–10.5)
CHLORIDE SERPL-SCNC: 106 MMOL/L — SIGNIFICANT CHANGE UP (ref 98–107)
CO2 SERPL-SCNC: 16 MMOL/L — LOW (ref 22–31)
CREAT SERPL-MCNC: 0.85 MG/DL — SIGNIFICANT CHANGE UP (ref 0.5–1.3)
CULTURE RESULTS: SIGNIFICANT CHANGE UP
CULTURE RESULTS: SIGNIFICANT CHANGE UP
EGFR: 71 ML/MIN/1.73M2 — SIGNIFICANT CHANGE UP
FLUAV SUBTYP SPEC NAA+PROBE: SIGNIFICANT CHANGE UP
FLUBV RNA SPEC QL NAA+PROBE: SIGNIFICANT CHANGE UP
GLUCOSE SERPL-MCNC: 83 MG/DL — SIGNIFICANT CHANGE UP (ref 70–99)
HADV DNA SPEC QL NAA+PROBE: SIGNIFICANT CHANGE UP
HCOV 229E RNA SPEC QL NAA+PROBE: SIGNIFICANT CHANGE UP
HCOV HKU1 RNA SPEC QL NAA+PROBE: SIGNIFICANT CHANGE UP
HCOV NL63 RNA SPEC QL NAA+PROBE: SIGNIFICANT CHANGE UP
HCOV OC43 RNA SPEC QL NAA+PROBE: SIGNIFICANT CHANGE UP
HCT VFR BLD CALC: 35 % — SIGNIFICANT CHANGE UP (ref 34.5–45)
HGB BLD-MCNC: 10.2 G/DL — LOW (ref 11.5–15.5)
HMPV RNA SPEC QL NAA+PROBE: SIGNIFICANT CHANGE UP
HPIV1 RNA SPEC QL NAA+PROBE: SIGNIFICANT CHANGE UP
HPIV2 RNA SPEC QL NAA+PROBE: SIGNIFICANT CHANGE UP
HPIV3 RNA SPEC QL NAA+PROBE: SIGNIFICANT CHANGE UP
HPIV4 RNA SPEC QL NAA+PROBE: SIGNIFICANT CHANGE UP
M PNEUMO DNA SPEC QL NAA+PROBE: SIGNIFICANT CHANGE UP
MAGNESIUM SERPL-MCNC: 2.2 MG/DL — SIGNIFICANT CHANGE UP (ref 1.6–2.6)
MCHC RBC-ENTMCNC: 19.5 PG — LOW (ref 27–34)
MCHC RBC-ENTMCNC: 29.1 GM/DL — LOW (ref 32–36)
MCV RBC AUTO: 67 FL — LOW (ref 80–100)
NRBC # BLD: 0 /100 WBCS — SIGNIFICANT CHANGE UP (ref 0–0)
NRBC # FLD: 0 K/UL — SIGNIFICANT CHANGE UP (ref 0–0)
PHOSPHATE SERPL-MCNC: 2.3 MG/DL — LOW (ref 2.5–4.5)
PLATELET # BLD AUTO: 253 K/UL — SIGNIFICANT CHANGE UP (ref 150–400)
POTASSIUM SERPL-MCNC: 3.9 MMOL/L — SIGNIFICANT CHANGE UP (ref 3.5–5.3)
POTASSIUM SERPL-SCNC: 3.9 MMOL/L — SIGNIFICANT CHANGE UP (ref 3.5–5.3)
PROT SERPL-MCNC: 6.8 G/DL — SIGNIFICANT CHANGE UP (ref 6–8.3)
RAPID RVP RESULT: SIGNIFICANT CHANGE UP
RBC # BLD: 5.22 M/UL — HIGH (ref 3.8–5.2)
RBC # FLD: 23.6 % — HIGH (ref 10.3–14.5)
RSV RNA SPEC QL NAA+PROBE: SIGNIFICANT CHANGE UP
RV+EV RNA SPEC QL NAA+PROBE: SIGNIFICANT CHANGE UP
SARS-COV-2 RNA SPEC QL NAA+PROBE: SIGNIFICANT CHANGE UP
SODIUM SERPL-SCNC: 135 MMOL/L — SIGNIFICANT CHANGE UP (ref 135–145)
SPECIMEN SOURCE: SIGNIFICANT CHANGE UP
SPECIMEN SOURCE: SIGNIFICANT CHANGE UP
VIT B1 SERPL-MCNC: 83.4 NMOL/L — SIGNIFICANT CHANGE UP (ref 66.5–200)
WBC # BLD: 5.18 K/UL — SIGNIFICANT CHANGE UP (ref 3.8–10.5)
WBC # FLD AUTO: 5.18 K/UL — SIGNIFICANT CHANGE UP (ref 3.8–10.5)

## 2023-08-06 PROCEDURE — 71045 X-RAY EXAM CHEST 1 VIEW: CPT | Mod: 26

## 2023-08-06 RX ORDER — IPRATROPIUM/ALBUTEROL SULFATE 18-103MCG
3 AEROSOL WITH ADAPTER (GRAM) INHALATION ONCE
Refills: 0 | Status: COMPLETED | OUTPATIENT
Start: 2023-08-06 | End: 2023-08-06

## 2023-08-06 RX ORDER — SODIUM CHLORIDE 9 MG/ML
1000 INJECTION, SOLUTION INTRAVENOUS
Refills: 0 | Status: DISCONTINUED | OUTPATIENT
Start: 2023-08-06 | End: 2023-08-14

## 2023-08-06 RX ADMIN — MIRTAZAPINE 30 MILLIGRAM(S): 45 TABLET, ORALLY DISINTEGRATING ORAL at 11:54

## 2023-08-06 RX ADMIN — CARVEDILOL PHOSPHATE 6.25 MILLIGRAM(S): 80 CAPSULE, EXTENDED RELEASE ORAL at 06:51

## 2023-08-06 RX ADMIN — Medication 1 APPLICATION(S): at 09:54

## 2023-08-06 RX ADMIN — HEPARIN SODIUM 5000 UNIT(S): 5000 INJECTION INTRAVENOUS; SUBCUTANEOUS at 21:55

## 2023-08-06 RX ADMIN — Medication 3 MILLILITER(S): at 17:50

## 2023-08-06 RX ADMIN — Medication 325 MILLIGRAM(S): at 11:55

## 2023-08-06 RX ADMIN — HEPARIN SODIUM 5000 UNIT(S): 5000 INJECTION INTRAVENOUS; SUBCUTANEOUS at 14:30

## 2023-08-06 RX ADMIN — CARVEDILOL PHOSPHATE 6.25 MILLIGRAM(S): 80 CAPSULE, EXTENDED RELEASE ORAL at 18:29

## 2023-08-06 RX ADMIN — SODIUM CHLORIDE 75 MILLILITER(S): 9 INJECTION, SOLUTION INTRAVENOUS at 18:21

## 2023-08-06 RX ADMIN — HEPARIN SODIUM 5000 UNIT(S): 5000 INJECTION INTRAVENOUS; SUBCUTANEOUS at 06:51

## 2023-08-06 NOTE — CHART NOTE - NSCHARTNOTEFT_GEN_A_CORE
Patient desaturating to 85% on room air. Assessed at bedside. She is in NAD and denies SOB. Lungs with ronchi and course wheezes. Duoneb x1 ordered. RVP ordered. F/u CXR. Placed on 3L NC with SpO2 >98%. Will continue to monitor on continuous pulse oximetry. Patient desaturating to 85% on room air. Assessed at bedside. She is in NAD and denies SOB. Lungs with ronchi and coarse wheezes. Duoneb x1 ordered. RVP ordered. F/u CXR. Placed on 3L NC with SpO2 >98%. Will continue to monitor on continuous pulse oximetry.

## 2023-08-06 NOTE — CHART NOTE - NSCHARTNOTEFT_GEN_A_CORE
Pending endoscopy availability on Monday, can plan for possible repeat EGD+/-PEG tube placement on Monday 8/7/2023    Recommendations:  -Please keep NPO after MN on Sunday night for possible repeat EGD+/-PEG tube placement on Monday 8/7/2023 pending endoscopy availability   -Please ensure morning labs are drawn at 2am, electrolytes repleted as necessary, and Hg>7      Howie Crum, PGY-5  Gastroenterology/Hepatology Fellow  Available on [employer] Teams   [phone] (Long Range Pager)  [phone] (Short Range Pager [facility])    After 5pm, please contact the on-call GI fellow. [phone]

## 2023-08-06 NOTE — PROGRESS NOTE ADULT - SUBJECTIVE AND OBJECTIVE BOX
CHIEF COMPLAINT:    SUBJECTIVE:     REVIEW OF SYSTEMS:    CONSTITUTIONAL: (  )  weakness,  (  ) fevers or chills  EYES/ENT: (  )visual changes;     NECK: (  ) pain or stiffness  RESPIRATORY:   (  )cough, wheezing, hemoptysis;  (  ) shortness of breath  CARDIOVASCULAR:  (  )chest pain or palpitations  GASTROINTESTINAL:   (  )abdominal or epigastric pain.  (  ) nausea, vomiting, or hematemesis;   (   ) diarrhea or constipation.   GENITOURINARY:   (    ) dysuria, frequency or hematuria  NEUROLOGICAL:  (   ) numbness or weakness   All other review of systems is negative unless indicated above    Vital Signs Last 24 Hrs  T(C): 36.8 (06 Aug 2023 10:05), Max: 37.1 (05 Aug 2023 13:00)  T(F): 98.2 (06 Aug 2023 10:05), Max: 98.8 (05 Aug 2023 13:00)  HR: 81 (06 Aug 2023 10:05) (72 - 96)  BP: 96/58 (06 Aug 2023 10:05) (96/58 - 144/89)  BP(mean): --  RR: 18 (06 Aug 2023 10:05) (16 - 18)  SpO2: 96% (06 Aug 2023 10:05) (96% - 100%)    Parameters below as of 06 Aug 2023 10:05  Patient On (Oxygen Delivery Method): room air        I&O's Summary    05 Aug 2023 07:01  -  06 Aug 2023 07:00  --------------------------------------------------------  IN: 443 mL / OUT: 200 mL / NET: 243 mL    06 Aug 2023 07:01  -  06 Aug 2023 11:03  --------------------------------------------------------  IN: 60 mL / OUT: 0 mL / NET: 60 mL        CAPILLARY BLOOD GLUCOSE          PHYSICAL EXAM:    Constitutional:  (   ) NAD,   (   )awake and alert  HEENT: PERR, EOMI,    Neck: Soft and supple, No LAD, No JVD  Respiratory:  (    Breath sounds are clear bilaterally,    (   ) wheezing, rales or rhonchi  Cardiovascular:     (   )S1 and S2, regular rate and rhythm, no Murmurs, gallops or rubs  Gastrointestinal:  (   )Bowel Sounds present, soft,   (  )nontender, nondistended,    Extremities:    (  ) peripheral edema  Vascular: 2+ peripheral pulses  Neurological:    (    )A/O x 3,   (  ) focal deficits  Musculoskeletal:    (   )  normal strength b/l upper  (     ) normal  lower extremities  Skin: No rashes    MEDICATIONS:  MEDICATIONS  (STANDING):  carvedilol 6.25 milliGRAM(s) Oral every 12 hours  clotrimazole 1% Cream 1 Application(s) Topical <User Schedule>  ferrous    sulfate 325 milliGRAM(s) Oral daily  heparin   Injectable 5000 Unit(s) SubCutaneous every 8 hours  mirtazapine 30 milliGRAM(s) Oral daily      LABS: All Labs Reviewed:                        10.2   5.18  )-----------( 253      ( 06 Aug 2023 07:00 )             35.0     08-06    135  |  106  |  53<H>  ----------------------------<  83  3.9   |  16<L>  |  0.85    Ca    8.5      06 Aug 2023 07:00  Phos  2.3     08-06  Mg     2.20     08-06    TPro  6.8  /  Alb  3.1<L>  /  TBili  0.7  /  DBili  x   /  AST  22  /  ALT  15  /  AlkPhos  79  08-06          Blood Culture: 08-01 @ 23:00  Organism --  Gram Stain Blood -- Gram Stain --  Specimen Source Clean Catch Clean Catch (Midstream)  Culture-Blood --    08-01 @ 18:15  Organism --  Gram Stain Blood -- Gram Stain --  Specimen Source .Blood Blood-Peripheral  Culture-Blood --    08-01 @ 18:00  Organism --  Gram Stain Blood -- Gram Stain --  Specimen Source .Blood Blood-Peripheral  Culture-Blood --      Urine Culture      RADIOLOGY/EKG:    ASSESSMENT AND PLAN:    DVT PPX:    ADVANCED DIRECTIVE:    DISPOSITION: CHIEF COMPLAINT:  patient awake verbal reported decreased oral intake she is taking only Ensure and liquid discussed with the patient and nursing in detail hopefully she is scheduled for PEG insertion a.m.  SUBJECTIVE:     REVIEW OF SYSTEMS:    CONSTITUTIONAL: ( x )  weakness,  (  ) fevers or chills  EYES/ENT: (  )visual changes;     NECK: (  ) pain or stiffness  RESPIRATORY:   (  )cough, wheezing, hemoptysis;  (  ) shortness of breath  CARDIOVASCULAR:  (  )chest pain or palpitations  GASTROINTESTINAL:   (  )abdominal or epigastric pain.  (  ) nausea, vomiting, or hematemesis;   (   ) diarrhea or constipation.   GENITOURINARY:   (    ) dysuria, frequency or hematuria  NEUROLOGICAL:  (   ) numbness or weakness   All other review of systems is negative unless indicated above    Vital Signs Last 24 Hrs  T(C): 36.8 (06 Aug 2023 10:05), Max: 37.1 (05 Aug 2023 13:00)  T(F): 98.2 (06 Aug 2023 10:05), Max: 98.8 (05 Aug 2023 13:00)  HR: 81 (06 Aug 2023 10:05) (72 - 96)  BP: 96/58 (06 Aug 2023 10:05) (96/58 - 144/89)  BP(mean): --  RR: 18 (06 Aug 2023 10:05) (16 - 18)  SpO2: 96% (06 Aug 2023 10:05) (96% - 100%)    Parameters below as of 06 Aug 2023 10:05  Patient On (Oxygen Delivery Method): room air        I&O's Summary    05 Aug 2023 07:01  -  06 Aug 2023 07:00  --------------------------------------------------------  IN: 443 mL / OUT: 200 mL / NET: 243 mL    06 Aug 2023 07:01  -  06 Aug 2023 11:03  --------------------------------------------------------  IN: 60 mL / OUT: 0 mL / NET: 60 mL        CAPILLARY BLOOD GLUCOSE          PHYSICAL EXAM:    Constitutional:  ( x  ) NAD,   ( x  )awake and  verbal  HEENT: PERR, EOMI,    Neck: Soft and supple, No LAD, No JVD  Respiratory:  (  x  Breath sounds are clear bilaterally,    (   ) wheezing, rales or rhonchi  Cardiovascular:     ( x  )S1 and S2, regular rate and rhythm, no Murmurs, gallops or rubs  Gastrointestinal:  (  x )Bowel Sounds present, soft,   (  )nontender, nondistended,    Extremities:    (  ) peripheral edema  Vascular: 2+ peripheral pulses  Neurological:    (  x  )A/O x  1   (  ) focal deficits  Musculoskeletal:    (   )  normal strength b/l upper  (     ) normal  lower extremities  Skin: No rashes    MEDICATIONS:  MEDICATIONS  (STANDING):  carvedilol 6.25 milliGRAM(s) Oral every 12 hours  clotrimazole 1% Cream 1 Application(s) Topical <User Schedule>  ferrous    sulfate 325 milliGRAM(s) Oral daily  heparin   Injectable 5000 Unit(s) SubCutaneous every 8 hours  mirtazapine 30 milliGRAM(s) Oral daily      LABS: All Labs Reviewed:                        10.2   5.18  )-----------( 253      ( 06 Aug 2023 07:00 )             35.0     08-06    135  |  106  |  53<H>  ----------------------------<  83  3.9   |  16<L>  |  0.85    Ca    8.5      06 Aug 2023 07:00  Phos  2.3     08-06  Mg     2.20     08-06    TPro  6.8  /  Alb  3.1<L>  /  TBili  0.7  /  DBili  x   /  AST  22  /  ALT  15  /  AlkPhos  79  08-06          Blood Culture: 08-01 @ 23:00  Organism --  Gram Stain Blood -- Gram Stain --  Specimen Source Clean Catch Clean Catch (Midstream)  Culture-Blood --    08-01 @ 18:15  Organism --  Gram Stain Blood -- Gram Stain --  Specimen Source .Blood Blood-Peripheral  Culture-Blood --    08-01 @ 18:00  Organism --  Gram Stain Blood -- Gram Stain --  Specimen Source .Blood Blood-Peripheral  Culture-Blood --  137  |  105  |  37<H>  ----------------------------<  53<LL>  4.5   |  17<L>  |  0.88       Urine Culture      RADIOLOGY/EKG:    ASSESSMENT AND PLAN:  Ms. Olmos is a 78-year-old F with history of dementia/alzheimer’s disease, MM, RA, primary pulmonary hypertension, MDD, cataract, OA, MM (not currently treated, previously on revlimid), mild protein calorie malnutrition who presents as a transfer for failure to thrive with recommendation of  PEG tube placement          Problem/Plan - 1:  ·  Problem: Severe protein-calorie malnutrition.   ·  Plan: -patient with significant weight loss  -decrease PO intake,  was megestrol BID at nursing home  but not effective   -GI consult for PEG tube sent, follow up with GI  .    Problem/Plan - 2:  ·  Problem: Urinary retention.  ·  Plan: -history of urinary retention in the past  -will do bladder scan every 6 hours and on 3rd attempt place knight catheter.    Problem/Plan - 3:  ·  Problem: MDD (major depressive disorder).  ·  Plan: -continue Remeron 30 mg nightly.    Problem/Plan - 4:  ·  Problem: Hyperkalemia.   -repeat K in the AM   -continue to monitor closely.    Problem/Plan - 5:     ·  Problem: Microcytic anemia.  ·  Plan: Hb 8.3 from 9 in March 2023    -continue ferrous sulfate 324 mg daily.    Problem/Plan - 7:  ·  Problem: Need for prophylactic measure.   ·  Plan: DVT prophylaxis: heparin 5000 units Q8hr   GI prophylaxis: none      -8/3/2023 GI consult appreciated for possible PEG insertion a.m.  -8/4/2023 will D/C megace  -8/5/2023 PEG placement was unsuccessful start patient on pured diet for PEG placement in the next 2-3 days hemoglobin stable   -8/6/2023 with started on gentle hydration her BUN is 53 was 37 yesterday discussed with ACP  DVT PPX:    ADVANCED DIRECTIVE:    DISPOSITION:

## 2023-08-07 LAB
ALBUMIN SERPL ELPH-MCNC: 3 G/DL — LOW (ref 3.3–5)
ALP SERPL-CCNC: 69 U/L — SIGNIFICANT CHANGE UP (ref 40–120)
ALT FLD-CCNC: 17 U/L — SIGNIFICANT CHANGE UP (ref 4–33)
ANION GAP SERPL CALC-SCNC: 9 MMOL/L — SIGNIFICANT CHANGE UP (ref 7–14)
APTT BLD: 26.3 SEC — SIGNIFICANT CHANGE UP (ref 24.5–35.6)
AST SERPL-CCNC: 18 U/L — SIGNIFICANT CHANGE UP (ref 4–32)
BILIRUB SERPL-MCNC: 0.4 MG/DL — SIGNIFICANT CHANGE UP (ref 0.2–1.2)
BLD GP AB SCN SERPL QL: NEGATIVE — SIGNIFICANT CHANGE UP
BUN SERPL-MCNC: 46 MG/DL — HIGH (ref 7–23)
CALCIUM SERPL-MCNC: 8.4 MG/DL — SIGNIFICANT CHANGE UP (ref 8.4–10.5)
CHLORIDE SERPL-SCNC: 106 MMOL/L — SIGNIFICANT CHANGE UP (ref 98–107)
CO2 SERPL-SCNC: 22 MMOL/L — SIGNIFICANT CHANGE UP (ref 22–31)
CREAT SERPL-MCNC: 0.78 MG/DL — SIGNIFICANT CHANGE UP (ref 0.5–1.3)
EGFR: 78 ML/MIN/1.73M2 — SIGNIFICANT CHANGE UP
GLUCOSE BLDC GLUCOMTR-MCNC: 108 MG/DL — HIGH (ref 70–99)
GLUCOSE SERPL-MCNC: 115 MG/DL — HIGH (ref 70–99)
HCT VFR BLD CALC: 31.2 % — LOW (ref 34.5–45)
HGB BLD-MCNC: 9.3 G/DL — LOW (ref 11.5–15.5)
MAGNESIUM SERPL-MCNC: 2.1 MG/DL — SIGNIFICANT CHANGE UP (ref 1.6–2.6)
MCHC RBC-ENTMCNC: 20 PG — LOW (ref 27–34)
MCHC RBC-ENTMCNC: 29.8 GM/DL — LOW (ref 32–36)
MCV RBC AUTO: 67 FL — LOW (ref 80–100)
NRBC # BLD: 0 /100 WBCS — SIGNIFICANT CHANGE UP (ref 0–0)
NRBC # FLD: 0 K/UL — SIGNIFICANT CHANGE UP (ref 0–0)
PHOSPHATE SERPL-MCNC: 2.2 MG/DL — LOW (ref 2.5–4.5)
PLATELET # BLD AUTO: 241 K/UL — SIGNIFICANT CHANGE UP (ref 150–400)
POTASSIUM SERPL-MCNC: 3.6 MMOL/L — SIGNIFICANT CHANGE UP (ref 3.5–5.3)
POTASSIUM SERPL-SCNC: 3.6 MMOL/L — SIGNIFICANT CHANGE UP (ref 3.5–5.3)
PROT SERPL-MCNC: 6.2 G/DL — SIGNIFICANT CHANGE UP (ref 6–8.3)
RBC # BLD: 4.66 M/UL — SIGNIFICANT CHANGE UP (ref 3.8–5.2)
RBC # FLD: 23.2 % — HIGH (ref 10.3–14.5)
RH IG SCN BLD-IMP: POSITIVE — SIGNIFICANT CHANGE UP
SODIUM SERPL-SCNC: 137 MMOL/L — SIGNIFICANT CHANGE UP (ref 135–145)
WBC # BLD: 6.59 K/UL — SIGNIFICANT CHANGE UP (ref 3.8–10.5)
WBC # FLD AUTO: 6.59 K/UL — SIGNIFICANT CHANGE UP (ref 3.8–10.5)

## 2023-08-07 PROCEDURE — 99232 SBSQ HOSP IP/OBS MODERATE 35: CPT | Mod: GC

## 2023-08-07 RX ORDER — PANTOPRAZOLE SODIUM 20 MG/1
40 TABLET, DELAYED RELEASE ORAL
Refills: 0 | Status: DISCONTINUED | OUTPATIENT
Start: 2023-08-07 | End: 2023-08-13

## 2023-08-07 RX ADMIN — PANTOPRAZOLE SODIUM 40 MILLIGRAM(S): 20 TABLET, DELAYED RELEASE ORAL at 18:38

## 2023-08-07 RX ADMIN — SODIUM CHLORIDE 75 MILLILITER(S): 9 INJECTION, SOLUTION INTRAVENOUS at 23:43

## 2023-08-07 RX ADMIN — HEPARIN SODIUM 5000 UNIT(S): 5000 INJECTION INTRAVENOUS; SUBCUTANEOUS at 22:58

## 2023-08-07 RX ADMIN — Medication 1 APPLICATION(S): at 09:32

## 2023-08-07 NOTE — PROGRESS NOTE ADULT - SUBJECTIVE AND OBJECTIVE BOX
CHIEF COMPLAINT:    SUBJECTIVE:     REVIEW OF SYSTEMS:    CONSTITUTIONAL: (  )  weakness,  (  ) fevers or chills  EYES/ENT: (  )visual changes;     NECK: (  ) pain or stiffness  RESPIRATORY:   (  )cough, wheezing, hemoptysis;  (  ) shortness of breath  CARDIOVASCULAR:  (  )chest pain or palpitations  GASTROINTESTINAL:   (  )abdominal or epigastric pain.  (  ) nausea, vomiting, or hematemesis;   (   ) diarrhea or constipation.   GENITOURINARY:   (    ) dysuria, frequency or hematuria  NEUROLOGICAL:  (   ) numbness or weakness   All other review of systems is negative unless indicated above    Vital Signs Last 24 Hrs  T(C): 36.6 (07 Aug 2023 06:05), Max: 36.9 (06 Aug 2023 14:05)  T(F): 97.8 (07 Aug 2023 06:05), Max: 98.5 (06 Aug 2023 18:05)  HR: 69 (07 Aug 2023 06:05) (60 - 85)  BP: 110/60 (07 Aug 2023 06:05) (96/58 - 112/57)  BP(mean): --  RR: 18 (07 Aug 2023 06:05) (17 - 18)  SpO2: 100% (07 Aug 2023 06:05) (85% - 100%)    Parameters below as of 07 Aug 2023 06:05  Patient On (Oxygen Delivery Method): nasal cannula  O2 Flow (L/min): 3      I&O's Summary    06 Aug 2023 07:01  -  07 Aug 2023 07:00  --------------------------------------------------------  IN: 120 mL / OUT: 550 mL / NET: -430 mL        CAPILLARY BLOOD GLUCOSE          PHYSICAL EXAM:    Constitutional:  (   ) NAD,   (   )awake and alert  HEENT: PERR, EOMI,    Neck: Soft and supple, No LAD, No JVD  Respiratory:  (    Breath sounds are clear bilaterally,    (   ) wheezing, rales or rhonchi  Cardiovascular:     (   )S1 and S2, regular rate and rhythm, no Murmurs, gallops or rubs  Gastrointestinal:  (   )Bowel Sounds present, soft,   (  )nontender, nondistended,    Extremities:    (  ) peripheral edema  Vascular: 2+ peripheral pulses  Neurological:    (    )A/O x 3,   (  ) focal deficits  Musculoskeletal:    (   )  normal strength b/l upper  (     ) normal  lower extremities  Skin: No rashes    MEDICATIONS:  MEDICATIONS  (STANDING):  carvedilol 6.25 milliGRAM(s) Oral every 12 hours  clotrimazole 1% Cream 1 Application(s) Topical <User Schedule>  dextrose 5% + sodium chloride 0.45%. 1000 milliLiter(s) (75 mL/Hr) IV Continuous <Continuous>  ferrous    sulfate 325 milliGRAM(s) Oral daily  heparin   Injectable 5000 Unit(s) SubCutaneous every 8 hours  mirtazapine 30 milliGRAM(s) Oral daily      LABS: All Labs Reviewed:                        9.3    6.59  )-----------( 241      ( 07 Aug 2023 01:40 )             31.2     08-07    137  |  106  |  46<H>  ----------------------------<  115<H>  3.6   |  22  |  0.78    Ca    8.4      07 Aug 2023 01:40  Phos  2.2     08-07  Mg     2.10     08-07    TPro  6.2  /  Alb  3.0<L>  /  TBili  0.4  /  DBili  x   /  AST  18  /  ALT  17  /  AlkPhos  69  08-07    PTT - ( 07 Aug 2023 04:18 )  PTT:26.3 sec      Blood Culture:   Urine Culture      RADIOLOGY/EKG:    ASSESSMENT AND PLAN:    DVT PPX:    ADVANCED DIRECTIVE:    DISPOSITION: CHIEF COMPLAINT:  no event overnight patient laying in the bed seems to be more lethargic waiting for possible PEG placement today  SUBJECTIVE:     REVIEW OF SYSTEMS:    CONSTITUTIONAL: ( X )  weakness,  (  ) fevers or chills  EYES/ENT: (  )visual changes;     NECK: (  ) pain or stiffness  RESPIRATORY:   (  )cough, wheezing, hemoptysis;  (  ) shortness of breath  CARDIOVASCULAR:  (  )chest pain or palpitations  GASTROINTESTINAL:   (  )abdominal or epigastric pain.  (  ) nausea, vomiting, or hematemesis;   (   ) diarrhea or constipation.   GENITOURINARY:   (    ) dysuria, frequency or hematuria  NEUROLOGICAL:  (   ) numbness or weakness   All other review of systems is negative unless indicated above    Vital Signs Last 24 Hrs  T(C): 36.6 (07 Aug 2023 06:05), Max: 36.9 (06 Aug 2023 14:05)  T(F): 97.8 (07 Aug 2023 06:05), Max: 98.5 (06 Aug 2023 18:05)  HR: 69 (07 Aug 2023 06:05) (60 - 85)  BP: 110/60 (07 Aug 2023 06:05) (96/58 - 112/57)  BP(mean): --  RR: 18 (07 Aug 2023 06:05) (17 - 18)  SpO2: 100% (07 Aug 2023 06:05) (85% - 100%)    Parameters below as of 07 Aug 2023 06:05  Patient On (Oxygen Delivery Method): nasal cannula  O2 Flow (L/min): 3      I&O's Summary    06 Aug 2023 07:01  -  07 Aug 2023 07:00  --------------------------------------------------------  IN: 120 mL / OUT: 550 mL / NET: -430 mL        CAPILLARY BLOOD GLUCOSE          PHYSICAL EXAM:    Constitutional:  (  next ) NAD,   ( X  )awake lethargic  HEENT: PERR, EOMI,    Neck: Soft and supple, No LAD, No JVD  Respiratory:  (  X  Breath sounds are clear bilaterally,    (   ) wheezing, rales or rhonchi  Cardiovascular:     ( X  )S1 and S2, regular rate and rhythm, no Murmurs, gallops or rubs  Gastrointestinal:  (   )Bowel Sounds present, soft,   (  )nontender, nondistended,    Extremities:    (  ) peripheral edema  Vascular: 2+ peripheral pulses  Neurological:    (  X  )A/O x1,   (  ) focal deficits  Musculoskeletal:    (   )  normal strength b/l upper  (     ) normal  lower extremities  Skin: No rashes    MEDICATIONS:  MEDICATIONS  (STANDING):  carvedilol 6.25 milliGRAM(s) Oral every 12 hours  clotrimazole 1% Cream 1 Application(s) Topical <User Schedule>  dextrose 5% + sodium chloride 0.45%. 1000 milliLiter(s) (75 mL/Hr) IV Continuous <Continuous>  ferrous    sulfate 325 milliGRAM(s) Oral daily  heparin   Injectable 5000 Unit(s) SubCutaneous every 8 hours  mirtazapine 30 milliGRAM(s) Oral daily      LABS: All Labs Reviewed:                        9.3    6.59  )-----------( 241      ( 07 Aug 2023 01:40 )             31.2     08-07    137  |  106  |  46<H>  ----------------------------<  115<H>  3.6   |  22  |  0.78    Ca    8.4      07 Aug 2023 01:40  Phos  2.2     08-07  Mg     2.10     08-07    TPro  6.2  /  Alb  3.0<L>  /  TBili  0.4  /  DBili  x   /  AST  18  /  ALT  17  /  AlkPhos  69  08-07    PTT - ( 07 Aug 2023 04:18 )  PTT:26.3 sec  135  |  106  |  53<H>  ----------------------------<  83  3.9   |  16<L>  |  0.85    Blood Culture:   Urine Culture      RADIOLOGY/EKG:    ASSESSMENT AND PLAN:  Ms. Olmos is a 78-year-old F with history of dementia/alzheimer’s disease, MM, RA, primary pulmonary hypertension, MDD, cataract, OA, MM (not currently treated, previously on revlimid), mild protein calorie malnutrition who presents as a transfer for failure to thrive with recommendation of  PEG tube placement          Problem/Plan - 1:  ·  Problem: Severe protein-calorie malnutrition.   ·  Plan: -patient with significant weight loss  -decrease PO intake,  was megestrol BID at nursing home  but not effective   -GI consult for PEG tube sent, follow up with GI  .    Problem/Plan - 2:  ·  Problem: Urinary retention.  ·  Plan: -history of urinary retention in the past  -will do bladder scan every 6 hours and on 3rd attempt place knight catheter.    Problem/Plan - 3:  ·  Problem: MDD (major depressive disorder).  ·  Plan: -continue Remeron 30 mg nightly.    Problem/Plan - 4:  ·  Problem: Hyperkalemia.   -repeat K in the AM   -continue to monitor closely.    Problem/Plan - 5:     ·  Problem: Microcytic anemia.  ·  Plan: Hb 8.3 from 9 in March 2023    -continue ferrous sulfate 324 mg daily.    Problem/Plan - 7:  ·  Problem: Need for prophylactic measure.   ·  Plan: DVT prophylaxis: heparin 5000 units Q8hr   GI prophylaxis: none      -8/3/2023 GI consult appreciated for possible PEG insertion a.m.  -8/4/2023 will D/C megace  -8/5/2023 PEG placement was unsuccessful start patient on pured diet for PEG placement in the next 2-3 days hemoglobin stable   -8/6/2023 with started on gentle hydration her BUN is 53 was 37 yesterday discussed with ACP  -8/7/2023 hypernatremia improved waiting for possible PEG placement if can be off oxygen as per GI    DVT PPX:    ADVANCED DIRECTIVE:    DISPOSITION:

## 2023-08-07 NOTE — PROGRESS NOTE ADULT - SUBJECTIVE AND OBJECTIVE BOX
Gastroenterology/Hepatology Progress Note    Interval Events: Patient denies abdominal pain. No reported melena, hematochezia. Pt noted to have new hypoxia.    Allergies:  No Known Allergies    Hospital Medications:  acetaminophen     Tablet .. 650 milliGRAM(s) Oral every 6 hours PRN  carvedilol 6.25 milliGRAM(s) Oral every 12 hours  clotrimazole 1% Cream 1 Application(s) Topical <User Schedule>  dextrose 5% + sodium chloride 0.45%. 1000 milliLiter(s) IV Continuous <Continuous>  ferrous    sulfate 325 milliGRAM(s) Oral daily  heparin   Injectable 5000 Unit(s) SubCutaneous every 8 hours  melatonin 3 milliGRAM(s) Oral at bedtime PRN  mirtazapine 30 milliGRAM(s) Oral daily    ROS: 14 point ROS negative unless otherwise state in subjective    PHYSICAL EXAM:   Vital Signs:  Vital Signs Last 24 Hrs  T(C): 36.3 (07 Aug 2023 09:30), Max: 36.9 (06 Aug 2023 14:05)  T(F): 97.4 (07 Aug 2023 09:30), Max: 98.5 (06 Aug 2023 18:05)  HR: 53 (07 Aug 2023 09:30) (53 - 85)  BP: 98/62 (07 Aug 2023 09:30) (96/58 - 112/57)  BP(mean): --  RR: 18 (07 Aug 2023 09:30) (17 - 18)  SpO2: 100% (07 Aug 2023 09:30) (85% - 100%)    Parameters below as of 07 Aug 2023 09:30  Patient On (Oxygen Delivery Method): nasal cannula  O2 Flow (L/min): 1    Daily     Daily     GENERAL:  No acute distress  HEENT:  NCAT, no scleral icterus  CHEST: no resp distress  HEART:  RRR  ABDOMEN:  Soft, non-tender, non-distended. small horizontal incision at site of prior attempted PEG placement CDI  EXTREMITIES:  No cyanosis, clubbing, or edema  SKIN:  No rash/erythema/ecchymoses   NEURO:  Awake    LABS:                        9.3    6.59  )-----------( 241      ( 07 Aug 2023 01:40 )             31.2     Mean Cell Volume: 67.0 fL (08-07-23 @ 01:40)    08-07    137  |  106  |  46<H>  ----------------------------<  115<H>  3.6   |  22  |  0.78    Ca    8.4      07 Aug 2023 01:40  Phos  2.2     08-07  Mg     2.10     08-07    TPro  6.2  /  Alb  3.0<L>  /  TBili  0.4  /  DBili  x   /  AST  18  /  ALT  17  /  AlkPhos  69  08-07    LIVER FUNCTIONS - ( 07 Aug 2023 01:40 )  Alb: 3.0 g/dL / Pro: 6.2 g/dL / ALK PHOS: 69 U/L / ALT: 17 U/L / AST: 18 U/L / GGT: x           PTT - ( 07 Aug 2023 04:18 )  PTT:26.3 sec  Urinalysis Basic - ( 07 Aug 2023 01:40 )    Color: x / Appearance: x / SG: x / pH: x  Gluc: 115 mg/dL / Ketone: x  / Bili: x / Urobili: x   Blood: x / Protein: x / Nitrite: x   Leuk Esterase: x / RBC: x / WBC x   Sq Epi: x / Non Sq Epi: x / Bacteria: x    Imaging:  reviewed

## 2023-08-07 NOTE — PROGRESS NOTE ADULT - ASSESSMENT
77 yo F w/ PMHx dementia/alzheimer’s disease, RA, primary pulmonary hypertension, MDD, cataract, OA, MM (not currently treated, previously on revlimid), mild protein calorie malnutrition who presents as a transfer for failure to thrive.     #Dementia  #Pulmonary hypertension  #FTT. Pt with worsening appetite over the past few weeks, reduced po intake, weight loss.  Underwent EGD yesterday with attempted PEG placement. PEG placement aborted as patient noted to develop extraluminal hematoma after trochar insertion, which remained stable in size by end of procedure. Patient was hemodynamically stable during and after procedure.      Recommendations  -Diet per SLP recs   - place on protonix 40 mg IV BID given esophagitis seen on EGD  -Will hold off on EGD/PEG today given new hypoxia requiring supplemental O2. Patient undergoing w/u.   -can reattempt PEG placement tomorrow if off O2  - please keep NPO after MN      GI will continue to follow.     All recommendations are tentative until note is attested by attending.     Alida Brown, PGY5  Gastroenterology/Hepatology Fellow  Available on [employer] Teams  [phone] ([employer] Short Range Pager)  [phone] (Long Range Pager)    After 5pm, please contact the on-call GI fellow. [phone]

## 2023-08-08 LAB
ALBUMIN SERPL ELPH-MCNC: 3 G/DL — LOW (ref 3.3–5)
ALP SERPL-CCNC: 67 U/L — SIGNIFICANT CHANGE UP (ref 40–120)
ALT FLD-CCNC: 14 U/L — SIGNIFICANT CHANGE UP (ref 4–33)
ANION GAP SERPL CALC-SCNC: 10 MMOL/L — SIGNIFICANT CHANGE UP (ref 7–14)
APTT BLD: 32.6 SEC — SIGNIFICANT CHANGE UP (ref 24.5–35.6)
AST SERPL-CCNC: 20 U/L — SIGNIFICANT CHANGE UP (ref 4–32)
BASOPHILS # BLD AUTO: 0.01 K/UL — SIGNIFICANT CHANGE UP (ref 0–0.2)
BASOPHILS # BLD AUTO: 0.02 K/UL — SIGNIFICANT CHANGE UP (ref 0–0.2)
BASOPHILS NFR BLD AUTO: 0.2 % — SIGNIFICANT CHANGE UP (ref 0–2)
BASOPHILS NFR BLD AUTO: 0.2 % — SIGNIFICANT CHANGE UP (ref 0–2)
BILIRUB SERPL-MCNC: 0.6 MG/DL — SIGNIFICANT CHANGE UP (ref 0.2–1.2)
BLD GP AB SCN SERPL QL: NEGATIVE — SIGNIFICANT CHANGE UP
BUN SERPL-MCNC: 22 MG/DL — SIGNIFICANT CHANGE UP (ref 7–23)
CALCIUM SERPL-MCNC: 7.9 MG/DL — LOW (ref 8.4–10.5)
CHLORIDE SERPL-SCNC: 105 MMOL/L — SIGNIFICANT CHANGE UP (ref 98–107)
CK MB CFR SERPL CALC: 1.2 NG/ML — SIGNIFICANT CHANGE UP
CK SERPL-CCNC: <10 U/L — LOW (ref 25–170)
CO2 SERPL-SCNC: 20 MMOL/L — LOW (ref 22–31)
CREAT SERPL-MCNC: 0.54 MG/DL — SIGNIFICANT CHANGE UP (ref 0.5–1.3)
EGFR: 95 ML/MIN/1.73M2 — SIGNIFICANT CHANGE UP
EOSINOPHIL # BLD AUTO: 0.04 K/UL — SIGNIFICANT CHANGE UP (ref 0–0.5)
EOSINOPHIL # BLD AUTO: 0.04 K/UL — SIGNIFICANT CHANGE UP (ref 0–0.5)
EOSINOPHIL NFR BLD AUTO: 0.5 % — SIGNIFICANT CHANGE UP (ref 0–6)
EOSINOPHIL NFR BLD AUTO: 0.7 % — SIGNIFICANT CHANGE UP (ref 0–6)
GLUCOSE BLDC GLUCOMTR-MCNC: 119 MG/DL — HIGH (ref 70–99)
GLUCOSE BLDC GLUCOMTR-MCNC: 92 MG/DL — SIGNIFICANT CHANGE UP (ref 70–99)
GLUCOSE BLDC GLUCOMTR-MCNC: 96 MG/DL — SIGNIFICANT CHANGE UP (ref 70–99)
GLUCOSE SERPL-MCNC: 121 MG/DL — HIGH (ref 70–99)
HCT VFR BLD CALC: 21.1 % — LOW (ref 34.5–45)
HCT VFR BLD CALC: 24.3 % — LOW (ref 34.5–45)
HCT VFR BLD CALC: 31.9 % — LOW (ref 34.5–45)
HGB BLD-MCNC: 5.6 G/DL — CRITICAL LOW (ref 11.5–15.5)
HGB BLD-MCNC: 7.2 G/DL — LOW (ref 11.5–15.5)
HGB BLD-MCNC: 9.4 G/DL — LOW (ref 11.5–15.5)
IANC: 4.14 K/UL — SIGNIFICANT CHANGE UP (ref 1.8–7.4)
IANC: 6 K/UL — SIGNIFICANT CHANGE UP (ref 1.8–7.4)
IMM GRANULOCYTES NFR BLD AUTO: 0.6 % — SIGNIFICANT CHANGE UP (ref 0–0.9)
IMM GRANULOCYTES NFR BLD AUTO: 0.9 % — SIGNIFICANT CHANGE UP (ref 0–0.9)
INR BLD: 1.74 RATIO — HIGH (ref 0.85–1.18)
LYMPHOCYTES # BLD AUTO: 0.86 K/UL — LOW (ref 1–3.3)
LYMPHOCYTES # BLD AUTO: 1.55 K/UL — SIGNIFICANT CHANGE UP (ref 1–3.3)
LYMPHOCYTES # BLD AUTO: 16 % — SIGNIFICANT CHANGE UP (ref 13–44)
LYMPHOCYTES # BLD AUTO: 19.3 % — SIGNIFICANT CHANGE UP (ref 13–44)
MAGNESIUM SERPL-MCNC: 1.9 MG/DL — SIGNIFICANT CHANGE UP (ref 1.6–2.6)
MCHC RBC-ENTMCNC: 19.5 PG — LOW (ref 27–34)
MCHC RBC-ENTMCNC: 19.7 PG — LOW (ref 27–34)
MCHC RBC-ENTMCNC: 20.4 PG — LOW (ref 27–34)
MCHC RBC-ENTMCNC: 26.5 GM/DL — LOW (ref 32–36)
MCHC RBC-ENTMCNC: 29.5 GM/DL — LOW (ref 32–36)
MCHC RBC-ENTMCNC: 29.6 GM/DL — LOW (ref 32–36)
MCV RBC AUTO: 66.3 FL — LOW (ref 80–100)
MCV RBC AUTO: 66.4 FL — LOW (ref 80–100)
MCV RBC AUTO: 76.7 FL — LOW (ref 80–100)
MONOCYTES # BLD AUTO: 0.27 K/UL — SIGNIFICANT CHANGE UP (ref 0–0.9)
MONOCYTES # BLD AUTO: 0.37 K/UL — SIGNIFICANT CHANGE UP (ref 0–0.9)
MONOCYTES NFR BLD AUTO: 4.6 % — SIGNIFICANT CHANGE UP (ref 2–14)
MONOCYTES NFR BLD AUTO: 5 % — SIGNIFICANT CHANGE UP (ref 2–14)
NEUTROPHILS # BLD AUTO: 4.14 K/UL — SIGNIFICANT CHANGE UP (ref 1.8–7.4)
NEUTROPHILS # BLD AUTO: 6 K/UL — SIGNIFICANT CHANGE UP (ref 1.8–7.4)
NEUTROPHILS NFR BLD AUTO: 74.8 % — SIGNIFICANT CHANGE UP (ref 43–77)
NEUTROPHILS NFR BLD AUTO: 77.2 % — HIGH (ref 43–77)
NRBC # BLD: 0 /100 WBCS — SIGNIFICANT CHANGE UP (ref 0–0)
NRBC # FLD: 0 K/UL — SIGNIFICANT CHANGE UP (ref 0–0)
NRBC # FLD: 0 K/UL — SIGNIFICANT CHANGE UP (ref 0–0)
NRBC # FLD: 0.02 K/UL — HIGH (ref 0–0)
PHOSPHATE SERPL-MCNC: 1.1 MG/DL — LOW (ref 2.5–4.5)
PLATELET # BLD AUTO: 165 K/UL — SIGNIFICANT CHANGE UP (ref 150–400)
PLATELET # BLD AUTO: 210 K/UL — SIGNIFICANT CHANGE UP (ref 150–400)
PLATELET # BLD AUTO: 238 K/UL — SIGNIFICANT CHANGE UP (ref 150–400)
POTASSIUM SERPL-MCNC: 3.3 MMOL/L — LOW (ref 3.5–5.3)
POTASSIUM SERPL-SCNC: 3.3 MMOL/L — LOW (ref 3.5–5.3)
PROT SERPL-MCNC: 5.8 G/DL — LOW (ref 6–8.3)
PROTHROM AB SERPL-ACNC: 19.2 SEC — HIGH (ref 9.5–13)
RBC # BLD: 2.75 M/UL — LOW (ref 3.8–5.2)
RBC # BLD: 3.66 M/UL — LOW (ref 3.8–5.2)
RBC # BLD: 4.81 M/UL — SIGNIFICANT CHANGE UP (ref 3.8–5.2)
RBC # FLD: 23.4 % — HIGH (ref 10.3–14.5)
RBC # FLD: 23.7 % — HIGH (ref 10.3–14.5)
RBC # FLD: 25.6 % — HIGH (ref 10.3–14.5)
RH IG SCN BLD-IMP: POSITIVE — SIGNIFICANT CHANGE UP
SODIUM SERPL-SCNC: 135 MMOL/L — SIGNIFICANT CHANGE UP (ref 135–145)
TROPONIN T, HIGH SENSITIVITY RESULT: 16 NG/L — SIGNIFICANT CHANGE UP
WBC # BLD: 5.37 K/UL — SIGNIFICANT CHANGE UP (ref 3.8–10.5)
WBC # BLD: 7 K/UL — SIGNIFICANT CHANGE UP (ref 3.8–10.5)
WBC # BLD: 8.03 K/UL — SIGNIFICANT CHANGE UP (ref 3.8–10.5)
WBC # FLD AUTO: 5.37 K/UL — SIGNIFICANT CHANGE UP (ref 3.8–10.5)
WBC # FLD AUTO: 7 K/UL — SIGNIFICANT CHANGE UP (ref 3.8–10.5)
WBC # FLD AUTO: 8.03 K/UL — SIGNIFICANT CHANGE UP (ref 3.8–10.5)

## 2023-08-08 PROCEDURE — 93010 ELECTROCARDIOGRAM REPORT: CPT

## 2023-08-08 PROCEDURE — 99223 1ST HOSP IP/OBS HIGH 75: CPT

## 2023-08-08 PROCEDURE — 99232 SBSQ HOSP IP/OBS MODERATE 35: CPT | Mod: GC

## 2023-08-08 PROCEDURE — 71045 X-RAY EXAM CHEST 1 VIEW: CPT | Mod: 26

## 2023-08-08 RX ORDER — POTASSIUM CHLORIDE 20 MEQ
20 PACKET (EA) ORAL
Refills: 0 | Status: COMPLETED | OUTPATIENT
Start: 2023-08-08 | End: 2023-08-08

## 2023-08-08 RX ADMIN — SODIUM CHLORIDE 75 MILLILITER(S): 9 INJECTION, SOLUTION INTRAVENOUS at 12:33

## 2023-08-08 RX ADMIN — CARVEDILOL PHOSPHATE 6.25 MILLIGRAM(S): 80 CAPSULE, EXTENDED RELEASE ORAL at 18:03

## 2023-08-08 RX ADMIN — MIRTAZAPINE 30 MILLIGRAM(S): 45 TABLET, ORALLY DISINTEGRATING ORAL at 12:22

## 2023-08-08 RX ADMIN — HEPARIN SODIUM 5000 UNIT(S): 5000 INJECTION INTRAVENOUS; SUBCUTANEOUS at 14:21

## 2023-08-08 RX ADMIN — PANTOPRAZOLE SODIUM 40 MILLIGRAM(S): 20 TABLET, DELAYED RELEASE ORAL at 18:12

## 2023-08-08 RX ADMIN — Medication 1 APPLICATION(S): at 10:06

## 2023-08-08 RX ADMIN — Medication 325 MILLIGRAM(S): at 12:22

## 2023-08-08 RX ADMIN — Medication 20 MILLIEQUIVALENT(S): at 16:07

## 2023-08-08 RX ADMIN — Medication 20 MILLIEQUIVALENT(S): at 18:06

## 2023-08-08 RX ADMIN — HEPARIN SODIUM 5000 UNIT(S): 5000 INJECTION INTRAVENOUS; SUBCUTANEOUS at 21:45

## 2023-08-08 RX ADMIN — PANTOPRAZOLE SODIUM 40 MILLIGRAM(S): 20 TABLET, DELAYED RELEASE ORAL at 05:42

## 2023-08-08 NOTE — PROGRESS NOTE ADULT - SUBJECTIVE AND OBJECTIVE BOX
CHIEF COMPLAINT:    SUBJECTIVE:     REVIEW OF SYSTEMS:    CONSTITUTIONAL: (  )  weakness,  (  ) fevers or chills  EYES/ENT: (  )visual changes;     NECK: (  ) pain or stiffness  RESPIRATORY:   (  )cough, wheezing, hemoptysis;  (  ) shortness of breath  CARDIOVASCULAR:  (  )chest pain or palpitations  GASTROINTESTINAL:   (  )abdominal or epigastric pain.  (  ) nausea, vomiting, or hematemesis;   (   ) diarrhea or constipation.   GENITOURINARY:   (    ) dysuria, frequency or hematuria  NEUROLOGICAL:  (   ) numbness or weakness   All other review of systems is negative unless indicated above    Vital Signs Last 24 Hrs  T(C): 37.1 (08 Aug 2023 04:06), Max: 37.1 (08 Aug 2023 04:06)  T(F): 98.7 (08 Aug 2023 04:06), Max: 98.7 (08 Aug 2023 04:06)  HR: 76 (08 Aug 2023 04:06) (53 - 80)  BP: 132/83 (08 Aug 2023 04:06) (89/47 - 132/83)  BP(mean): --  RR: 17 (08 Aug 2023 04:06) (17 - 18)  SpO2: 100% (08 Aug 2023 04:06) (98% - 100%)    Parameters below as of 08 Aug 2023 04:06  Patient On (Oxygen Delivery Method): room air        I&O's Summary    07 Aug 2023 07:01  -  08 Aug 2023 07:00  --------------------------------------------------------  IN: 0 mL / OUT: 400 mL / NET: -400 mL        CAPILLARY BLOOD GLUCOSE      POCT Blood Glucose.: 92 mg/dL (08 Aug 2023 08:54)  POCT Blood Glucose.: 119 mg/dL (08 Aug 2023 02:37)  POCT Blood Glucose.: 108 mg/dL (07 Aug 2023 21:14)      PHYSICAL EXAM:    Constitutional:  (   ) NAD,   (   )awake and alert  HEENT: PERR, EOMI,    Neck: Soft and supple, No LAD, No JVD  Respiratory:  (    Breath sounds are clear bilaterally,    (   ) wheezing, rales or rhonchi  Cardiovascular:     (   )S1 and S2, regular rate and rhythm, no Murmurs, gallops or rubs  Gastrointestinal:  (   )Bowel Sounds present, soft,   (  )nontender, nondistended,    Extremities:    (  ) peripheral edema  Vascular: 2+ peripheral pulses  Neurological:    (    )A/O x 3,   (  ) focal deficits  Musculoskeletal:    (   )  normal strength b/l upper  (     ) normal  lower extremities  Skin: No rashes    MEDICATIONS:  MEDICATIONS  (STANDING):  carvedilol 6.25 milliGRAM(s) Oral every 12 hours  clotrimazole 1% Cream 1 Application(s) Topical <User Schedule>  dextrose 5% + sodium chloride 0.45%. 1000 milliLiter(s) (75 mL/Hr) IV Continuous <Continuous>  ferrous    sulfate 325 milliGRAM(s) Oral daily  heparin   Injectable 5000 Unit(s) SubCutaneous every 8 hours  mirtazapine 30 milliGRAM(s) Oral daily  pantoprazole  Injectable 40 milliGRAM(s) IV Push two times a day      LABS: All Labs Reviewed:                        7.2    7.00  )-----------( 210      ( 08 Aug 2023 05:48 )             24.3     08-08    135  |  105  |  22  ----------------------------<  121<H>  3.3<L>   |  20<L>  |  0.54    Ca    7.9<L>      08 Aug 2023 05:17  Phos  1.1     08-08  Mg     1.90     08-08    TPro  5.8<L>  /  Alb  3.0<L>  /  TBili  0.6  /  DBili  x   /  AST  20  /  ALT  14  /  AlkPhos  67  08-08    PT/INR - ( 08 Aug 2023 03:52 )   PT: 19.2 sec;   INR: 1.74 ratio         PTT - ( 08 Aug 2023 03:52 )  PTT:32.6 sec  CARDIAC MARKERS ( 08 Aug 2023 03:52 )  x     / x     / <10 U/L / x     / 1.2 ng/mL      Blood Culture:   Urine Culture      RADIOLOGY/EKG:    ASSESSMENT AND PLAN:    DVT PPX:    ADVANCED DIRECTIVE:    DISPOSITION: CHIEF COMPLAINT:  no event overnight patient resting in the bed without any shortness of breath she is more verbal than yesterday waiting for PEG placement  SUBJECTIVE:     REVIEW OF SYSTEMS:    CONSTITUTIONAL: (x  )  weakness,  (  ) fevers or chills  EYES/ENT: (  )visual changes;     NECK: (  ) pain or stiffness  RESPIRATORY:   (  )cough, wheezing, hemoptysis;  (  ) shortness of breath  CARDIOVASCULAR:  (  )chest pain or palpitations  GASTROINTESTINAL:   (  )abdominal or epigastric pain.  (  ) nausea, vomiting, or hematemesis;   (   ) diarrhea or constipation.   GENITOURINARY:   (    ) dysuria, frequency or hematuria  NEUROLOGICAL:  (   ) numbness or weakness   All other review of systems is negative unless indicated above    Vital Signs Last 24 Hrs  T(C): 37.1 (08 Aug 2023 04:06), Max: 37.1 (08 Aug 2023 04:06)  T(F): 98.7 (08 Aug 2023 04:06), Max: 98.7 (08 Aug 2023 04:06)  HR: 76 (08 Aug 2023 04:06) (53 - 80)  BP: 132/83 (08 Aug 2023 04:06) (89/47 - 132/83)  BP(mean): --  RR: 17 (08 Aug 2023 04:06) (17 - 18)  SpO2: 100% (08 Aug 2023 04:06) (98% - 100%)    Parameters below as of 08 Aug 2023 04:06  Patient On (Oxygen Delivery Method): room air        I&O's Summary    07 Aug 2023 07:01  -  08 Aug 2023 07:00  --------------------------------------------------------  IN: 0 mL / OUT: 400 mL / NET: -400 mL        CAPILLARY BLOOD GLUCOSE      POCT Blood Glucose.: 92 mg/dL (08 Aug 2023 08:54)  POCT Blood Glucose.: 119 mg/dL (08 Aug 2023 02:37)  POCT Blood Glucose.: 108 mg/dL (07 Aug 2023 21:14)      PHYSICAL EXAM:    Constitutional:  ( x  ) NAD,   (  x )awake and  verbal  HEENT: PERR, EOMI,    Neck: Soft and supple, No LAD, No JVD  Respiratory:  (  x  Breath sounds are clear bilaterally,    (   ) wheezing, rales or rhonchi  Cardiovascular:     ( x  )S1 and S2, regular rate and rhythm, no Murmurs, gallops or rubs  Gastrointestinal:  ( x  )Bowel Sounds present, soft,   (  )nontender, nondistended,    Extremities:    (  ) peripheral edema  Vascular: 2+ peripheral pulses  Neurological:    (  x  )A/O x  1   (  ) focal deficits  Musculoskeletal:    (   )  normal strength b/l upper  (     ) normal  lower extremities  Skin: No rashes    MEDICATIONS:  MEDICATIONS  (STANDING):  carvedilol 6.25 milliGRAM(s) Oral every 12 hours  clotrimazole 1% Cream 1 Application(s) Topical <User Schedule>  dextrose 5% + sodium chloride 0.45%. 1000 milliLiter(s) (75 mL/Hr) IV Continuous <Continuous>  ferrous    sulfate 325 milliGRAM(s) Oral daily  heparin   Injectable 5000 Unit(s) SubCutaneous every 8 hours  mirtazapine 30 milliGRAM(s) Oral daily  pantoprazole  Injectable 40 milliGRAM(s) IV Push two times a day      LABS: All Labs Reviewed:                        7.2    7.00  )-----------( 210      ( 08 Aug 2023 05:48 )             24.3     08-08    135  |  105  |  22  ----------------------------<  121<H>  3.3<L>   |  20<L>  |  0.54    Ca    7.9<L>      08 Aug 2023 05:17  Phos  1.1     08-08  Mg     1.90     08-08    TPro  5.8<L>  /  Alb  3.0<L>  /  TBili  0.6  /  DBili  x   /  AST  20  /  ALT  14  /  AlkPhos  67  08-08    PT/INR - ( 08 Aug 2023 03:52 )   PT: 19.2 sec;   INR: 1.74 ratio         PTT - ( 08 Aug 2023 03:52 )  PTT:32.6 sec  CARDIAC MARKERS ( 08 Aug 2023 03:52 )  x     / x     / <10 U/L / x     / 1.2 ng/mL      Blood Culture:   Urine Culture      RADIOLOGY/EKG:    ASSESSMENT AND PLAN:  Ms. Olmos is a 78-year-old F with history of dementia/alzheimer’s disease, MM, RA, primary pulmonary hypertension, MDD, cataract, OA, MM (not currently treated, previously on revlimid), mild protein calorie malnutrition who presents as a transfer for failure to thrive with recommendation of  PEG tube placement          Problem/Plan - 1:  ·  Problem: Severe protein-calorie malnutrition.   ·  Plan: -patient with significant weight loss  -decrease PO intake,  was megestrol BID at nursing home  but not effective   -GI consult for PEG tube sent, follow up with GI  .    Problem/Plan - 2:  ·  Problem: Urinary retention.  ·  Plan: -history of urinary retention in the past  -will do bladder scan every 6 hours and on 3rd attempt place knight catheter.    Problem/Plan - 3:  ·  Problem: MDD (major depressive disorder).  ·  Plan: -continue Remeron 30 mg nightly.    Problem/Plan - 4:  ·  Problem: Hyperkalemia.   -repeat K in the AM   -continue to monitor closely.    Problem/Plan - 5:     ·  Problem: Microcytic anemia.  ·  Plan: Hb 8.3 from 9 in March 2023    -continue ferrous sulfate 324 mg daily.    Problem/Plan - 7:  ·  Problem: Need for prophylactic measure.   ·  Plan: DVT prophylaxis: heparin 5000 units Q8hr   GI prophylaxis: none      -8/3/2023 GI consult appreciated for possible PEG insertion a.m.  -8/4/2023 will D/C megace  -8/5/2023 PEG placement was unsuccessful start patient on pured diet for PEG placement in the next 2-3 days hemoglobin stable   -8/6/2023 with started on gentle hydration her BUN is 53 was 37 yesterday discussed with ACP  -8/7/2023 hypernatremia improved waiting for possible PEG placement if can be off oxygen as per GI  -8/8/2023 waiting for PEG placement      DVT PPX:    ADVANCED DIRECTIVE:    DISPOSITION:

## 2023-08-08 NOTE — CHART NOTE - NSCHARTNOTEFT_GEN_A_CORE
Notified by RN that patient complains of chest pain. Patient seen and examined at bedside. Patient endorses 8/10 chest pain that is worse with coughing. Chest pain is located in bilateral upper chest and nonradiating. On exam, heart sounds RRR, S1, S2. Lung sounds with coarse breath sounds throughout. Patient saturating 99% on RA but with copious secretions. Will repeat CXR. EKG performed without any ischemic changes, NSR @ 82 BPM. Will add on cardiac enzymes to am labs sent now. Continue continuous pulse oximetry.     Vital Signs Last 24 Hrs  T(C): 37.1 (08 Aug 2023 04:06), Max: 37.1 (08 Aug 2023 04:06)  T(F): 98.7 (08 Aug 2023 04:06), Max: 98.7 (08 Aug 2023 04:06)  HR: 76 (08 Aug 2023 04:06) (53 - 80)  BP: 132/83 (08 Aug 2023 04:06) (89/47 - 132/83)  BP(mean): --  RR: 17 (08 Aug 2023 04:06) (17 - 18)  SpO2: 100% (08 Aug 2023 04:06) (98% - 100%)    Parameters below as of 08 Aug 2023 04:06  Patient On (Oxygen Delivery Method): room air Notified by RN that patient complains of chest pain. Patient seen and examined at bedside. Patient endorses 9/10 chest pain that is worse with coughing. Chest pain is located in bilateral upper chest and nonradiating. On exam, heart sounds RRR, S1, S2. Lung sounds with coarse breath sounds throughout. Patient saturating 99% on RA but with copious secretions. Will repeat CXR. EKG performed without any ischemic changes, NSR @ 82 BPM. Will add on cardiac enzymes to am labs sent now. Continue continuous pulse oximetry.     Vital Signs Last 24 Hrs  T(C): 37.1 (08 Aug 2023 04:06), Max: 37.1 (08 Aug 2023 04:06)  T(F): 98.7 (08 Aug 2023 04:06), Max: 98.7 (08 Aug 2023 04:06)  HR: 76 (08 Aug 2023 04:06) (53 - 80)  BP: 132/83 (08 Aug 2023 04:06) (89/47 - 132/83)  BP(mean): --  RR: 17 (08 Aug 2023 04:06) (17 - 18)  SpO2: 100% (08 Aug 2023 04:06) (98% - 100%)    Parameters below as of 08 Aug 2023 04:06  Patient On (Oxygen Delivery Method): room air Notified by RN that patient complains of chest pain. Patient seen and examined at bedside. Patient endorses 9/10 chest pain that is worse with coughing. Chest pain is located in bilateral upper chest and nonradiating. Denies SOB or HA. On exam, heart sounds RRR, S1, S2. Lung sounds with coarse breath sounds throughout. Patient saturating 99% on RA but with copious secretions. Will repeat CXR. EKG performed without any ischemic changes, NSR @ 82 BPM. Will add on cardiac enzymes to am labs sent now. Continue continuous pulse oximetry.     Vital Signs Last 24 Hrs  T(C): 37.1 (08 Aug 2023 04:06), Max: 37.1 (08 Aug 2023 04:06)  T(F): 98.7 (08 Aug 2023 04:06), Max: 98.7 (08 Aug 2023 04:06)  HR: 76 (08 Aug 2023 04:06) (53 - 80)  BP: 132/83 (08 Aug 2023 04:06) (89/47 - 132/83)  BP(mean): --  RR: 17 (08 Aug 2023 04:06) (17 - 18)  SpO2: 100% (08 Aug 2023 04:06) (98% - 100%)    Parameters below as of 08 Aug 2023 04:06  Patient On (Oxygen Delivery Method): room air

## 2023-08-08 NOTE — PROGRESS NOTE ADULT - SUBJECTIVE AND OBJECTIVE BOX
Gastroenterology/Hepatology Progress Note    Interval Events: Patient noted to have episode of hypotension overnight. Hgb noted to drop to 5.6, however was 7.2 on repeat. Remains on NC.    Allergies:  No Known Allergies    Hospital Medications:  acetaminophen     Tablet .. 650 milliGRAM(s) Oral every 6 hours PRN  carvedilol 6.25 milliGRAM(s) Oral every 12 hours  clotrimazole 1% Cream 1 Application(s) Topical <User Schedule>  dextrose 5% + sodium chloride 0.45%. 1000 milliLiter(s) IV Continuous <Continuous>  ferrous    sulfate 325 milliGRAM(s) Oral daily  heparin   Injectable 5000 Unit(s) SubCutaneous every 8 hours  melatonin 3 milliGRAM(s) Oral at bedtime PRN  mirtazapine 30 milliGRAM(s) Oral daily  pantoprazole  Injectable 40 milliGRAM(s) IV Push two times a day    ROS: 14 point ROS negative unless otherwise state in subjective    PHYSICAL EXAM:   Vital Signs:  Vital Signs Last 24 Hrs  T(C): 36.9 (08 Aug 2023 08:06), Max: 37.1 (08 Aug 2023 04:06)  T(F): 98.4 (08 Aug 2023 08:06), Max: 98.7 (08 Aug 2023 04:06)  HR: 70 (08 Aug 2023 08:06) (62 - 80)  BP: 113/72 (08 Aug 2023 08:06) (89/47 - 132/83)  BP(mean): --  RR: 17 (08 Aug 2023 08:06) (17 - 18)  SpO2: 100% (08 Aug 2023 08:06) (98% - 100%)    Parameters below as of 08 Aug 2023 08:06  Patient On (Oxygen Delivery Method): room air      Daily     Daily     GENERAL:  No acute distress  HEENT:  NCAT, no scleral icterus  CHEST: no resp distress  HEART:  RRR  ABDOMEN:  Soft, non-tender, non-distended   EXTREMITIES:  No cyanosis, clubbing, or edema  SKIN:  No rash/erythema/ecchymoses   NEURO:  Awake, alert    LABS:                        7.2    7.00  )-----------( 210      ( 08 Aug 2023 05:48 )             24.3     Mean Cell Volume: 66.4 fL (08-08-23 @ 05:48)    08-08    135  |  105  |  22  ----------------------------<  121<H>  3.3<L>   |  20<L>  |  0.54    Ca    7.9<L>      08 Aug 2023 05:17  Phos  1.1     08-08  Mg     1.90     08-08    TPro  5.8<L>  /  Alb  3.0<L>  /  TBili  0.6  /  DBili  x   /  AST  20  /  ALT  14  /  AlkPhos  67  08-08    LIVER FUNCTIONS - ( 08 Aug 2023 05:17 )  Alb: 3.0 g/dL / Pro: 5.8 g/dL / ALK PHOS: 67 U/L / ALT: 14 U/L / AST: 20 U/L / GGT: x           PT/INR - ( 08 Aug 2023 03:52 )   PT: 19.2 sec;   INR: 1.74 ratio         PTT - ( 08 Aug 2023 03:52 )  PTT:32.6 sec  Urinalysis Basic - ( 08 Aug 2023 05:17 )    Color: x / Appearance: x / SG: x / pH: x  Gluc: 121 mg/dL / Ketone: x  / Bili: x / Urobili: x   Blood: x / Protein: x / Nitrite: x   Leuk Esterase: x / RBC: x / WBC x   Sq Epi: x / Non Sq Epi: x / Bacteria: x      Imaging:  reviewed

## 2023-08-08 NOTE — CONSULT NOTE ADULT - ATTENDING COMMENTS
# FTT in adult with progressive dementia  # Pulmonary HTN  # MDD  # MM not currently on treatment    --Please obtain baseline TTE  --Nutrition consultation  --A PEG tube is unlikely to improve patient's quality of life given comorbidities; however, at times it can be beneficial to family or caregivers and is therefore often pursued in patients with progressive dementia. Risks of EGD/PEG include, but are not limited to anesthesia-related complications, bleeding, infection, perforation, and injury to adjacent organs; complications may be more acute, but long-term complications (infection, leakage, buried bumper, etc) exist as well and a PEG tube does not decrease the risk of aspiration. If within GOC, can pursue EGD with PEG placement later this week, likely Friday 8/4 if schedule allows. Would request temporary reversal of DNR/DNI for procedure itself    Additional recommendations as above.
patient seen and examined  labs and vitals reviewed  agree with above assessment and plan  78F w dementia/Alzheimer's disease, multiple myeloma, primary pulmonary HTN, p/w FTT now plan for PEG tube  pt is comfortable appearing  limited history due to baseline mental status A and o x1  denies cp or SOB  EKG showing SR  TTE showing preserved LV systolic function, normal RV. no severe valvular lesions  pt requiring PEG placement due to FTT, no acute CV issues, pt may proceed without further CV testing

## 2023-08-08 NOTE — PROGRESS NOTE ADULT - ASSESSMENT
79 yo F w/ PMHx dementia/alzheimer’s disease, RA, primary pulmonary hypertension, MDD, cataract, OA, MM (not currently treated, previously on revlimid), mild protein calorie malnutrition who presents as a transfer for failure to thrive.     #Dementia  #Pulmonary hypertension  #FTT. Pt with worsening appetite over the past few weeks, reduced po intake, weight loss.  Underwent EGD yesterday with attempted PEG placement. PEG placement aborted as patient noted to develop extraluminal hematoma after trochar insertion, which remained stable in size by end of procedure. Patient was hemodynamically stable during and after procedure.      Recommendations  - repeat CBC this am. If drop in Hgb is actual, recommend obtaining CT angio A/P to rule out enlarging extra-luminal hematoma  - protonix 40 mg IV BID given esophagitis seen on EGD  -Will hold off on EGD/PEG today given new hypoxia requiring supplemental O2 and drop in Hgb    GI will continue to follow.     All recommendations are tentative until note is attested by attending.     Alida Brown, PGY5  Gastroenterology/Hepatology Fellow  Available on [employer] Teams  [phone] ([employer] Short Range Pager)  [phone] (Long Range Pager)    After 5pm, please contact the on-call GI fellow. [phone]

## 2023-08-08 NOTE — CONSULT NOTE ADULT - ASSESSMENT
Gaby Olmos is a 78y old female with a hx of dementia/Alzheimer's disease, multiple myeloma (prevously on Revlimid), RA, primary pulmonary HTN, MDD, OA, and protein calorie malnutrition who presented from assisted living facility for recommendations for a PEG tube from her PCP. Cardiology is consulted for pre-op risk stratification. Gaby Olmos is a 78y old female with a hx of dementia/Alzheimer's disease, multiple myeloma (prevously on Revlimid), RA, primary pulmonary HTN, MDD, OA, and protein calorie malnutrition who presented from assisted living facility for recommendations for a PEG tube from her PCP. Cardiology is consulted for pre-op risk stratification.    Unclear hx of pulmonary HTN and severity. Does not seem to follow with a cardiologist or take medications for pulmonary HTN. Able to ambulate, but with occasional requirements for oxygen (<4 METS). Currently requiring NC O2 3L. No known prior hx of ischemia or arrhythmia. On Tele, has been in NSR. TTE 8/5 demonstrated EF 66% with normal biventricular function and no wall abnormalities.      RECOMMENDATIONS:  -     Plan discussed with attending. Note is incomplete until signed by attending.    __________  Too Thorpe MD PGY-1 Gaby Olmos is a 78y old female with a hx of dementia/Alzheimer's disease, multiple myeloma (prevously on Revlimid), RA, primary pulmonary HTN, MDD, OA, and protein calorie malnutrition who presented from assisted living facility for recommendations for a PEG tube from her PCP. Cardiology is consulted for pre-op risk stratification.    Unclear hx of pulmonary HTN and severity. Does not seem to follow with a cardiologist or take medications for pulmonary HTN. Able to ambulate, but with occasional requirements for oxygen (<4 METS). Currently requiring NC O2 3L. No known prior hx of ischemia or arrhythmia. EKG 8/8 was NSR with no signs of acute ischemia. TTE 8/5 demonstrated EF 66% with normal biventricular function and no wall abnormalities.      RECOMMENDATIONS:  -     Plan discussed with attending. Note is incomplete until signed by attending.    __________  Too Thorpe MD PGY-1 Gaby Olmos is a 78y old female with a hx of dementia/Alzheimer's disease, multiple myeloma (prevously on Revlimid), RA, primary pulmonary HTN, MDD, OA, and protein calorie malnutrition who presented from assisted living facility for recommendations for a PEG tube from her PCP. Cardiology is consulted for pre-op risk stratification.    Patient is comfortable in bed and satting 90s on RA. No known prior hx of ischemia or arrhythmia. EKG 8/8 was NSR with no signs of acute ischemia. TTE 8/5 demonstrated EF 66% with normal biventricular function and no wall abnormalities. Unclear hx of pulmonary HTN, but does not take any medications for the illness and the TTE showed RV/RA size and function.    RECOMMENDATION: No further cardiac testing needed at this time. Patient may go for the necessary PEG tube placement.    Plan discussed with attending. Note is incomplete until signed by attending.    __________  Too Thorpe MD PGY-1

## 2023-08-08 NOTE — CONSULT NOTE ADULT - SUBJECTIVE AND OBJECTIVE BOX
HPI:  Gaby Olmos is a 78y old female with a hx of dementia/Alzheimer's disease, multiple myeloma (prevously on Revlimid), RA, primary pulmonary HTN, MDD, OA, and protein calorie malnutrition who presented from assisted living facility for recommendations for a PEG tube from her PCP. Cardiology is consulted for pre-op risk stratification.    Patient is AxO x1 this morning, which is consistent since admission, and unable to provide an accurate history. She has been having decreased PO intake for the last few months with associated n/v. States she is able to ambulate slowly without issues, but occasionally requires oxygen. Able to go up stairs as well, but has weakness and SOB. Previously diagnosed with pulmonary HTN about 2-3 yrs ago at an OSH, but does not remember where, what tests were done, or what medications she was on. No prior hx of ischemia or arrhythmias.    PEG placement was attempted on 8/5, but aborted due to the development of an extraluminal hematoma after trochar insertion. hematoma remained stable in size through end of procedure and patient was hemodynamically stable throughout procedure. Patient became hypoxic to 85% on 8/6, requiring O2 3L NC. CXR had no acute concerns with mild atelectasis. Patient also occasionally hypotensive throughout admission and noted to have a hx of hypotensive episodes. Currently BP 110s/70-80s while on home carvedilol.       Allergies:  No Known Allergies    Medications:  acetaminophen     Tablet .. 650 milliGRAM(s) Oral every 6 hours PRN  carvedilol 6.25 milliGRAM(s) Oral every 12 hours  clotrimazole 1% Cream 1 Application(s) Topical <User Schedule>  dextrose 5% + sodium chloride 0.45%. 1000 milliLiter(s) IV Continuous <Continuous>  ferrous    sulfate 325 milliGRAM(s) Oral daily  heparin   Injectable 5000 Unit(s) SubCutaneous every 8 hours  melatonin 3 milliGRAM(s) Oral at bedtime PRN  mirtazapine 30 milliGRAM(s) Oral daily  pantoprazole  Injectable 40 milliGRAM(s) IV Push two times a day      Vitals:  T(C): 36.9 (08-08-23 @ 08:06), Max: 37.1 (08-08-23 @ 04:06)  HR: 70 (08-08-23 @ 08:06) (62 - 80)  BP: 113/72 (08-08-23 @ 08:06) (89/47 - 132/83)  RR: 17 (08-08-23 @ 08:06) (17 - 18)  SpO2: 100% (08-08-23 @ 08:06) (98% - 100%)    I/O's:    08-07-23 @ 07:01  -  08-08-23 @ 07:00  --------------------------------------------------------  IN: 0 mL / OUT: 400 mL / NET: -400 mL    Physical Exam:  CONSTITUTIONAL: Thin, cachetic. No apparent distress.  SKIN: No rashes or ecchymosis.  EYES: PERRLA. EOMI. No scleral icterus.  ENT: No JVD. Supple. Oral mucosa with moist membranes. Hearing intact bilaterally.  CV: RRR. Normal S1 and S2. No murmurs, rubs, or gallops. No edema. Pulses equal bilaterally.  PULM: Clear to auscultation throughout. Respirations unlabored. No wheezing, rales, or rhonchi.  GI: Soft, non-tender. No palpable masses.   MSK: No cyanosis or clubbing. Normal ROM.  NEURO: CN grossly intact.  PSYCH: A+Ox1 only to self (stated she was in [place], [place], and the year was 1944)    Labs:                        7.2    7.00  )-----------( 210      ( 08 Aug 2023 05:48 )             24.3     08-08    135  |  105  |  22  ----------------------------<  121<H>  3.3<L>   |  20<L>  |  0.54    Ca    7.9<L>      08 Aug 2023 05:17  Phos  1.1     08-08  Mg     1.90     08-08    TPro  5.8<L>  /  Alb  3.0<L>  /  TBili  0.6  /  DBili  x   /  AST  20  /  ALT  14  /  AlkPhos  67  08-08      Radiology/Procedures:   HPI:  Gaby Olmos is a 78y old female with a hx of dementia/Alzheimer's disease, multiple myeloma (prevously on Revlimid), RA, primary pulmonary HTN, MDD, OA, and protein calorie malnutrition who presented from assisted living facility for recommendations for a PEG tube from her PCP. Cardiology is consulted for pre-op risk stratification.    Patient is AxO x1 this morning, which is consistent since admission, and unable to provide an accurate history. She has been having decreased PO intake for the last few months with associated n/v. States she is able to ambulate slowly without issues, but occasionally requires oxygen. Able to go up stairs as well, but has weakness and SOB. Previously diagnosed with pulmonary HTN about 2-3 yrs ago at an OSH, but does not remember where, what tests were done, or what medications she was on. No prior hx of ischemia or arrhythmias.    PEG placement was attempted on 8/5, but aborted due to the development of an extraluminal hematoma after trochar insertion. hematoma remained stable in size through end of procedure and patient was hemodynamically stable throughout procedure. Patient became hypoxic to 85% on 8/6, requiring O2 3L NC. CXR had no acute concerns with mild atelectasis. Patient also occasionally hypotensive throughout admission and noted to have a hx of hypotensive episodes. Currently BP 110s/70-80s while on home carvedilol.       Allergies:  No Known Allergies    Medications:  acetaminophen     Tablet .. 650 milliGRAM(s) Oral every 6 hours PRN  carvedilol 6.25 milliGRAM(s) Oral every 12 hours  clotrimazole 1% Cream 1 Application(s) Topical <User Schedule>  dextrose 5% + sodium chloride 0.45%. 1000 milliLiter(s) IV Continuous <Continuous>  ferrous    sulfate 325 milliGRAM(s) Oral daily  heparin   Injectable 5000 Unit(s) SubCutaneous every 8 hours  melatonin 3 milliGRAM(s) Oral at bedtime PRN  mirtazapine 30 milliGRAM(s) Oral daily  pantoprazole  Injectable 40 milliGRAM(s) IV Push two times a day      Vitals:  T(C): 36.9 (08-08-23 @ 08:06), Max: 37.1 (08-08-23 @ 04:06)  HR: 70 (08-08-23 @ 08:06) (62 - 80)  BP: 113/72 (08-08-23 @ 08:06) (89/47 - 132/83)  RR: 17 (08-08-23 @ 08:06) (17 - 18)  SpO2: 100% (08-08-23 @ 08:06) (98% - 100%)    I/O's:    08-07-23 @ 07:01  -  08-08-23 @ 07:00  --------------------------------------------------------  IN: 0 mL / OUT: 400 mL / NET: -400 mL    Physical Exam:  CONSTITUTIONAL: Thin, cachetic. No apparent distress.  SKIN: No rashes or ecchymosis.  EYES: PERRLA. EOMI. No scleral icterus.  ENT: No JVD. Supple. Oral mucosa with moist membranes. Hearing intact bilaterally.  CV: RRR. Normal S1 and S2. No murmurs, rubs, or gallops. No edema. Pulses equal bilaterally.  PULM: Clear to auscultation throughout. Respirations unlabored. No wheezing, rales, or rhonchi.  GI: Soft, non-tender. No palpable masses.   MSK: No cyanosis or clubbing. Normal ROM.  NEURO: CN grossly intact.  PSYCH: A+Ox1 only to self (stated she was in [place], [place], and the year was 1944)    Labs:                        7.2    7.00  )-----------( 210      ( 08 Aug 2023 05:48 )             24.3     08-08    135  |  105  |  22  ----------------------------<  121<H>  3.3<L>   |  20<L>  |  0.54    Ca    7.9<L>      08 Aug 2023 05:17  Phos  1.1     08-08  Mg     1.90     08-08    TPro  5.8<L>  /  Alb  3.0<L>  /  TBili  0.6  /  DBili  x   /  AST  20  /  ALT  14  /  AlkPhos  67  08-08    Cardiovascular Testing:  TTE 08.03.2023  CONCLUSIONS:  EF: 66%  1. Mitral annular calcification, otherwise normal mitral  valve. Moderate mitral regurgitation.  2. Calcified trileaflet aortic valve with decreased  opening. Peak transaortic valve gradient equals 15 mm Hg,  mean transaortic valve gradient equals 7 mm Hg, estimated  aortic valve area equals 1.7 sqcm (by continuity equation),  consistent with mild aortic stenosis. Mild aortic  regurgitation.  3. Normal left ventricular internal dimensions and wall  thicknesses.  4. Normal left ventricular systolic function. No segmental  wall motion abnormalities.  5. Normal right ventricular size and function.    No EKG on file.  Tele: NSR HPI:  Gaby Olmos is a 78y old female with a hx of dementia/Alzheimer's disease, multiple myeloma (prevously on Revlimid), RA, primary pulmonary HTN, MDD, OA, and protein calorie malnutrition who presented from assisted living facility for recommendations for a PEG tube from her PCP. Cardiology is consulted for pre-op risk stratification.    Patient is AxO x1 this morning, which is consistent since admission, and unable to provide an accurate history. She has been having decreased PO intake for the last few months with associated n/v. States she is able to ambulate slowly without issues, but occasionally requires oxygen. Able to go up stairs as well, but has weakness and SOB. Previously diagnosed with pulmonary HTN about 2-3 yrs ago at an OSH, but does not remember where, what tests were done, or what medications she was on. No prior hx of ischemia or arrhythmias.    PEG placement was attempted on 8/5, but aborted due to the development of an extraluminal hematoma after trochar insertion. hematoma remained stable in size through end of procedure and patient was hemodynamically stable throughout procedure. Patient became hypoxic to 85% on 8/6, requiring O2 3L NC. CXR had no acute concerns with mild atelectasis. Patient also occasionally hypotensive throughout admission and noted to have a hx of hypotensive episodes. Currently BP 110s/70-80s while on home carvedilol.       Allergies:  No Known Allergies    Medications:  acetaminophen     Tablet .. 650 milliGRAM(s) Oral every 6 hours PRN  carvedilol 6.25 milliGRAM(s) Oral every 12 hours  clotrimazole 1% Cream 1 Application(s) Topical <User Schedule>  dextrose 5% + sodium chloride 0.45%. 1000 milliLiter(s) IV Continuous <Continuous>  ferrous    sulfate 325 milliGRAM(s) Oral daily  heparin   Injectable 5000 Unit(s) SubCutaneous every 8 hours  melatonin 3 milliGRAM(s) Oral at bedtime PRN  mirtazapine 30 milliGRAM(s) Oral daily  pantoprazole  Injectable 40 milliGRAM(s) IV Push two times a day      Vitals:  T(C): 36.9 (08-08-23 @ 08:06), Max: 37.1 (08-08-23 @ 04:06)  HR: 70 (08-08-23 @ 08:06) (62 - 80)  BP: 113/72 (08-08-23 @ 08:06) (89/47 - 132/83)  RR: 17 (08-08-23 @ 08:06) (17 - 18)  SpO2: 100% (08-08-23 @ 08:06) (98% - 100%)    I/O's:    08-07-23 @ 07:01  -  08-08-23 @ 07:00  --------------------------------------------------------  IN: 0 mL / OUT: 400 mL / NET: -400 mL    Physical Exam:  CONSTITUTIONAL: Thin, cachetic. No apparent distress.  SKIN: No rashes or ecchymosis.  EYES: PERRLA. EOMI. No scleral icterus.  ENT: No JVD. Supple. Oral mucosa with moist membranes. Hearing intact bilaterally.  CV: RRR. Normal S1 and S2. No murmurs, rubs, or gallops. No edema. Pulses equal bilaterally.  PULM: Clear to auscultation throughout. Respirations unlabored. No wheezing, rales, or rhonchi.  GI: Soft, non-tender. No palpable masses.   MSK: No cyanosis or clubbing. Normal ROM.  NEURO: CN grossly intact.  PSYCH: A+Ox1 only to self (stated she was in [place], [place], and the year was 1944)    Labs:                        7.2    7.00  )-----------( 210      ( 08 Aug 2023 05:48 )             24.3     08-08    135  |  105  |  22  ----------------------------<  121<H>  3.3<L>   |  20<L>  |  0.54    Ca    7.9<L>      08 Aug 2023 05:17  Phos  1.1     08-08  Mg     1.90     08-08    TPro  5.8<L>  /  Alb  3.0<L>  /  TBili  0.6  /  DBili  x   /  AST  20  /  ALT  14  /  AlkPhos  67  08-08    Cardiovascular Testing:  TTE 08.03.2023  CONCLUSIONS:  EF: 66%  1. Mitral annular calcification, otherwise normal mitral  valve. Moderate mitral regurgitation.  2. Calcified trileaflet aortic valve with decreased  opening. Peak transaortic valve gradient equals 15 mm Hg,  mean transaortic valve gradient equals 7 mm Hg, estimated  aortic valve area equals 1.7 sqcm (by continuity equation),  consistent with mild aortic stenosis. Mild aortic  regurgitation.  3. Normal left ventricular internal dimensions and wall  thicknesses.  4. Normal left ventricular systolic function. No segmental  wall motion abnormalities.  5. Normal right ventricular size and function.    EKG 08.08.2023: NSR with no signs of ischemia

## 2023-08-09 LAB
ALBUMIN SERPL ELPH-MCNC: 2.8 G/DL — LOW (ref 3.3–5)
ALP SERPL-CCNC: 61 U/L — SIGNIFICANT CHANGE UP (ref 40–120)
ALT FLD-CCNC: 15 U/L — SIGNIFICANT CHANGE UP (ref 4–33)
ANION GAP SERPL CALC-SCNC: 9 MMOL/L — SIGNIFICANT CHANGE UP (ref 7–14)
APTT BLD: 34.8 SEC — SIGNIFICANT CHANGE UP (ref 24.5–35.6)
AST SERPL-CCNC: 15 U/L — SIGNIFICANT CHANGE UP (ref 4–32)
BASOPHILS # BLD AUTO: 0.03 K/UL — SIGNIFICANT CHANGE UP (ref 0–0.2)
BASOPHILS NFR BLD AUTO: 0.5 % — SIGNIFICANT CHANGE UP (ref 0–2)
BILIRUB SERPL-MCNC: 0.5 MG/DL — SIGNIFICANT CHANGE UP (ref 0.2–1.2)
BUN SERPL-MCNC: 12 MG/DL — SIGNIFICANT CHANGE UP (ref 7–23)
CALCIUM SERPL-MCNC: 8 MG/DL — LOW (ref 8.4–10.5)
CHLORIDE SERPL-SCNC: 109 MMOL/L — HIGH (ref 98–107)
CO2 SERPL-SCNC: 20 MMOL/L — LOW (ref 22–31)
CREAT SERPL-MCNC: 0.54 MG/DL — SIGNIFICANT CHANGE UP (ref 0.5–1.3)
EGFR: 95 ML/MIN/1.73M2 — SIGNIFICANT CHANGE UP
EOSINOPHIL # BLD AUTO: 0.07 K/UL — SIGNIFICANT CHANGE UP (ref 0–0.5)
EOSINOPHIL NFR BLD AUTO: 1.1 % — SIGNIFICANT CHANGE UP (ref 0–6)
GLUCOSE BLDC GLUCOMTR-MCNC: 83 MG/DL — SIGNIFICANT CHANGE UP (ref 70–99)
GLUCOSE SERPL-MCNC: 77 MG/DL — SIGNIFICANT CHANGE UP (ref 70–99)
HCT VFR BLD CALC: 29.8 % — LOW (ref 34.5–45)
HGB BLD-MCNC: 8.8 G/DL — LOW (ref 11.5–15.5)
IANC: 4.17 K/UL — SIGNIFICANT CHANGE UP (ref 1.8–7.4)
IMM GRANULOCYTES NFR BLD AUTO: 0.8 % — SIGNIFICANT CHANGE UP (ref 0–0.9)
INR BLD: 1.14 RATIO — SIGNIFICANT CHANGE UP (ref 0.85–1.18)
LYMPHOCYTES # BLD AUTO: 1.78 K/UL — SIGNIFICANT CHANGE UP (ref 1–3.3)
LYMPHOCYTES # BLD AUTO: 27.6 % — SIGNIFICANT CHANGE UP (ref 13–44)
MAGNESIUM SERPL-MCNC: 1.8 MG/DL — SIGNIFICANT CHANGE UP (ref 1.6–2.6)
MCHC RBC-ENTMCNC: 20 PG — LOW (ref 27–34)
MCHC RBC-ENTMCNC: 29.5 GM/DL — LOW (ref 32–36)
MCV RBC AUTO: 67.6 FL — LOW (ref 80–100)
MONOCYTES # BLD AUTO: 0.34 K/UL — SIGNIFICANT CHANGE UP (ref 0–0.9)
MONOCYTES NFR BLD AUTO: 5.3 % — SIGNIFICANT CHANGE UP (ref 2–14)
NEUTROPHILS # BLD AUTO: 4.17 K/UL — SIGNIFICANT CHANGE UP (ref 1.8–7.4)
NEUTROPHILS NFR BLD AUTO: 64.7 % — SIGNIFICANT CHANGE UP (ref 43–77)
NRBC # BLD: 0 /100 WBCS — SIGNIFICANT CHANGE UP (ref 0–0)
NRBC # FLD: 0 K/UL — SIGNIFICANT CHANGE UP (ref 0–0)
PHOSPHATE SERPL-MCNC: 1.3 MG/DL — LOW (ref 2.5–4.5)
PLATELET # BLD AUTO: 214 K/UL — SIGNIFICANT CHANGE UP (ref 150–400)
POTASSIUM SERPL-MCNC: 3.6 MMOL/L — SIGNIFICANT CHANGE UP (ref 3.5–5.3)
POTASSIUM SERPL-SCNC: 3.6 MMOL/L — SIGNIFICANT CHANGE UP (ref 3.5–5.3)
PROT SERPL-MCNC: 5.7 G/DL — LOW (ref 6–8.3)
PROTHROM AB SERPL-ACNC: 12.8 SEC — SIGNIFICANT CHANGE UP (ref 9.5–13)
RBC # BLD: 4.41 M/UL — SIGNIFICANT CHANGE UP (ref 3.8–5.2)
RBC # FLD: 24 % — HIGH (ref 10.3–14.5)
SODIUM SERPL-SCNC: 138 MMOL/L — SIGNIFICANT CHANGE UP (ref 135–145)
WBC # BLD: 6.44 K/UL — SIGNIFICANT CHANGE UP (ref 3.8–10.5)
WBC # FLD AUTO: 6.44 K/UL — SIGNIFICANT CHANGE UP (ref 3.8–10.5)

## 2023-08-09 PROCEDURE — 99233 SBSQ HOSP IP/OBS HIGH 50: CPT

## 2023-08-09 PROCEDURE — 99232 SBSQ HOSP IP/OBS MODERATE 35: CPT | Mod: GC

## 2023-08-09 RX ORDER — POTASSIUM PHOSPHATE, MONOBASIC POTASSIUM PHOSPHATE, DIBASIC 236; 224 MG/ML; MG/ML
30 INJECTION, SOLUTION INTRAVENOUS ONCE
Refills: 0 | Status: COMPLETED | OUTPATIENT
Start: 2023-08-09 | End: 2023-08-09

## 2023-08-09 RX ADMIN — PANTOPRAZOLE SODIUM 40 MILLIGRAM(S): 20 TABLET, DELAYED RELEASE ORAL at 18:47

## 2023-08-09 RX ADMIN — PANTOPRAZOLE SODIUM 40 MILLIGRAM(S): 20 TABLET, DELAYED RELEASE ORAL at 05:50

## 2023-08-09 RX ADMIN — SODIUM CHLORIDE 75 MILLILITER(S): 9 INJECTION, SOLUTION INTRAVENOUS at 16:18

## 2023-08-09 RX ADMIN — SODIUM CHLORIDE 75 MILLILITER(S): 9 INJECTION, SOLUTION INTRAVENOUS at 22:44

## 2023-08-09 RX ADMIN — CARVEDILOL PHOSPHATE 6.25 MILLIGRAM(S): 80 CAPSULE, EXTENDED RELEASE ORAL at 17:11

## 2023-08-09 RX ADMIN — SODIUM CHLORIDE 75 MILLILITER(S): 9 INJECTION, SOLUTION INTRAVENOUS at 04:27

## 2023-08-09 RX ADMIN — Medication 1 APPLICATION(S): at 08:23

## 2023-08-09 RX ADMIN — POTASSIUM PHOSPHATE, MONOBASIC POTASSIUM PHOSPHATE, DIBASIC 83.33 MILLIMOLE(S): 236; 224 INJECTION, SOLUTION INTRAVENOUS at 12:56

## 2023-08-09 NOTE — PROGRESS NOTE ADULT - ASSESSMENT
78F w dementia/Alzheimer's disease, multiple myeloma, primary pulmonary HTN, p/w FTT now plan for PEG tube  pt is comfortable appearing  limited history due to baseline mental status A and o x1  denies cp or SOB  EKG showing SR  TTE showing preserved LV systolic function, normal RV. no severe valvular lesions  pt requiring PEG placement due to FTT, no acute CV issues, pt may proceed without further CV testing.

## 2023-08-09 NOTE — PROGRESS NOTE ADULT - SUBJECTIVE AND OBJECTIVE BOX
24H hour events:   pt resting  no acute events overnight  MEDICATIONS:  carvedilol 6.25 milliGRAM(s) Oral every 12 hours  heparin   Injectable 5000 Unit(s) SubCutaneous every 8 hours        acetaminophen     Tablet .. 650 milliGRAM(s) Oral every 6 hours PRN  melatonin 3 milliGRAM(s) Oral at bedtime PRN  mirtazapine 30 milliGRAM(s) Oral daily    pantoprazole  Injectable 40 milliGRAM(s) IV Push two times a day      clotrimazole 1% Cream 1 Application(s) Topical <User Schedule>  dextrose 5% + sodium chloride 0.45%. 1000 milliLiter(s) IV Continuous <Continuous>  ferrous    sulfate 325 milliGRAM(s) Oral daily          PHYSICAL EXAM:  T(C): 36.6 (08-09-23 @ 09:00), Max: 37.1 (08-08-23 @ 18:00)  HR: 61 (08-09-23 @ 09:00) (61 - 81)  BP: 134/66 (08-09-23 @ 09:00) (93/55 - 134/66)  RR: 17 (08-09-23 @ 09:00) (17 - 18)  SpO2: 100% (08-09-23 @ 09:00) (96% - 100%)  Wt(kg): --  I&O's Summary    08 Aug 2023 07:01  -  09 Aug 2023 07:00  --------------------------------------------------------  IN: 0 mL / OUT: 850 mL / NET: -850 mL        Appearance: Normal	  HEENT:   Normal oral mucosa, PERRL, EOMI	  Lymphatic: No lymphadenopathy  Cardiovascular: Normal S1 S2, No JVD, No murmurs, No edema  Respiratory: coarse bs b/l	  Psychiatry: A & O x 1  Gastrointestinal:  Soft, Non-tender, + BS	  Skin: No rashes, No ecchymoses, No cyanosis	  Neurologic: unable to assess  Extremities: Normal range of motion, No clubbing, cyanosis       LABS:	 	    CBC Full  -  ( 09 Aug 2023 04:15 )  WBC Count : 6.44 K/uL  Hemoglobin : 8.8 g/dL  Hematocrit : 29.8 %  Platelet Count - Automated : 214 K/uL  Mean Cell Volume : 67.6 fL  Mean Cell Hemoglobin : 20.0 pg  Mean Cell Hemoglobin Concentration : 29.5 gm/dL  Auto Neutrophil # : 4.17 K/uL  Auto Lymphocyte # : 1.78 K/uL  Auto Monocyte # : 0.34 K/uL  Auto Eosinophil # : 0.07 K/uL  Auto Basophil # : 0.03 K/uL  Auto Neutrophil % : 64.7 %  Auto Lymphocyte % : 27.6 %  Auto Monocyte % : 5.3 %  Auto Eosinophil % : 1.1 %  Auto Basophil % : 0.5 %    08-09    138  |  109<H>  |  12  ----------------------------<  77  3.6   |  20<L>  |  0.54  08-08    135  |  105  |  22  ----------------------------<  121<H>  3.3<L>   |  20<L>  |  0.54    Ca    8.0<L>      09 Aug 2023 04:15  Ca    7.9<L>      08 Aug 2023 05:17  Phos  1.3     08-09  Phos  1.1     08-08  Mg     1.80     08-09  Mg     1.90     08-08    TPro  5.7<L>  /  Alb  2.8<L>  /  TBili  0.5  /  DBili  x   /  AST  15  /  ALT  15  /  AlkPhos  61  08-09  TPro  5.8<L>  /  Alb  3.0<L>  /  TBili  0.6  /  DBili  x   /  AST  20  /  ALT  14  /  AlkPhos  67  08-08      proBNP:   Lipid Profile:   HgA1c:   TSH:       CARDIAC MARKERS:            TELEMETRY: 	    ECG:  	  RADIOLOGY:  OTHER: 	    PREVIOUS DIAGNOSTIC TESTING:    [ ] Echocardiogram:  [ ]  Catheterization:  [ ] Stress Test:  	  	  ASSESSMENT/PLAN:

## 2023-08-09 NOTE — PROGRESS NOTE ADULT - SUBJECTIVE AND OBJECTIVE BOX
Gastroenterology/Hepatology Progress Note    Interval Events: Hgb stable. Patient denies abdominal pain. Comfortable off O2 today.    Allergies:  No Known Allergies    Hospital Medications:  acetaminophen     Tablet .. 650 milliGRAM(s) Oral every 6 hours PRN  carvedilol 6.25 milliGRAM(s) Oral every 12 hours  clotrimazole 1% Cream 1 Application(s) Topical <User Schedule>  dextrose 5% + sodium chloride 0.45%. 1000 milliLiter(s) IV Continuous <Continuous>  ferrous    sulfate 325 milliGRAM(s) Oral daily  heparin   Injectable 5000 Unit(s) SubCutaneous every 8 hours  melatonin 3 milliGRAM(s) Oral at bedtime PRN  mirtazapine 30 milliGRAM(s) Oral daily  pantoprazole  Injectable 40 milliGRAM(s) IV Push two times a day  potassium phosphate IVPB 30 milliMole(s) IV Intermittent once    ROS: 14 point ROS negative unless otherwise state in subjective    PHYSICAL EXAM:   Vital Signs:  Vital Signs Last 24 Hrs  T(C): 36.6 (09 Aug 2023 09:00), Max: 37.1 (08 Aug 2023 18:00)  T(F): 97.9 (09 Aug 2023 09:00), Max: 98.7 (08 Aug 2023 18:00)  HR: 61 (09 Aug 2023 09:00) (61 - 81)  BP: 134/66 (09 Aug 2023 09:00) (93/55 - 134/66)  BP(mean): --  RR: 17 (09 Aug 2023 09:00) (17 - 18)  SpO2: 100% (09 Aug 2023 09:00) (96% - 100%)    Parameters below as of 09 Aug 2023 09:00  Patient On (Oxygen Delivery Method): room air      Daily     Daily     GENERAL:  No acute distress  HEENT:  NCAT, no scleral icterus  CHEST: no resp distress  HEART:  RRR  ABDOMEN:  Soft, non-tender, non-distended   EXTREMITIES:  No cyanosis, clubbing, or edema  SKIN:  No rash/erythema/ecchymoses   NEURO:  awake, alert    LABS:                        8.8    6.44  )-----------( 214      ( 09 Aug 2023 04:15 )             29.8     Mean Cell Volume: 67.6 fL (08-09-23 @ 04:15)    08-09    138  |  109<H>  |  12  ----------------------------<  77  3.6   |  20<L>  |  0.54    Ca    8.0<L>      09 Aug 2023 04:15  Phos  1.3     08-09  Mg     1.80     08-09    TPro  5.7<L>  /  Alb  2.8<L>  /  TBili  0.5  /  DBili  x   /  AST  15  /  ALT  15  /  AlkPhos  61  08-09    LIVER FUNCTIONS - ( 09 Aug 2023 04:15 )  Alb: 2.8 g/dL / Pro: 5.7 g/dL / ALK PHOS: 61 U/L / ALT: 15 U/L / AST: 15 U/L / GGT: x           PT/INR - ( 09 Aug 2023 04:15 )   PT: 12.8 sec;   INR: 1.14 ratio         PTT - ( 09 Aug 2023 04:15 )  PTT:34.8 sec  Urinalysis Basic - ( 09 Aug 2023 04:15 )    Color: x / Appearance: x / SG: x / pH: x  Gluc: 77 mg/dL / Ketone: x  / Bili: x / Urobili: x   Blood: x / Protein: x / Nitrite: x   Leuk Esterase: x / RBC: x / WBC x   Sq Epi: x / Non Sq Epi: x / Bacteria: x        Imaging:  reviewed

## 2023-08-09 NOTE — PROGRESS NOTE ADULT - ASSESSMENT
79 yo F w/ PMHx dementia/alzheimer’s disease, RA, primary pulmonary hypertension, MDD, cataract, OA, MM (not currently treated, previously on revlimid), mild protein calorie malnutrition who presents as a transfer for failure to thrive.     #Dementia  #Pulmonary hypertension  #FTT. Pt with worsening appetite over the past few weeks, reduced po intake, weight loss.  Underwent EGD yesterday with attempted PEG placement. PEG placement aborted as patient noted to develop extraluminal hematoma after trochar insertion, which remained stable in size by end of procedure. Patient was hemodynamically stable during and after procedure.      Recommendations  - protonix 40 mg IV BID given esophagitis seen on EGD  - plan for EGD with PEG placement tomorrow (unable to do today due to limited endo availability). Please keep npo after MN.    GI will continue to follow.     All recommendations are tentative until note is attested by attending.     Alida Brown, PGY5  Gastroenterology/Hepatology Fellow  Available on Microsoft Teams  [phone] ([employer] Short Range Pager)  [phone] (Long Range Pager)    After 5pm, please contact the on-call GI fellow. [phone]

## 2023-08-09 NOTE — CHART NOTE - NSCHARTNOTEFT_GEN_A_CORE
NUTRITION FOLLOW-UP:    78y old female with a hx of dementia/Alzheimer's disease, multiple myeloma (previously on Revlimid), RA, primary pulmonary HTN, MDD, OA, and protein calorie malnutrition who presented from assisted living facility for recommendations for a PEG tube from her PCP. As per GI (8/5/23)- Pt Underwent EGD yesterday with attempted PEG placement. PEG placement aborted as patient noted to develop extraluminal hematoma after trochar insertion, which remained stable in size by end of procedure.    Pt seen NPO today but usually consuming poorly ~ 25% meals per RN. No GI distress (nausea/vomiting/diarrhea/constipation.) at present. whem medically feasible to place PEG tube for alternate nutrition support, will recommend Jevity 1.5 via PEG @ 45 mL/hr. x 24 hrs. for total volume 1,080 mL. Provides 1,620 kcal (27.5 kcal/kg), 69 g pro (1.2 g/kg) based on IBW 58.9 kg and 821 mL H2o. Noted skin ecchymosis, no edema per nursing flow sheet.     Weight: 94.6# (8/8/23), previously used dosing wt- 130# (8/4/23)    Labs: 08-09 Na 138 mmol/L Glu 77 mg/dL K+ 3.6 mmol/L Cr 0.54 mg/dL BUN 12 mg/dL Phos 1.3 mg/dL<L>  08-09 @ 08:26 POCT 83 mg/dL  08-08 @ 15:02 POCT 96 mg/dL     Current Diet: Diet, NPO after Midnight:      NPO Start Date: 08-Aug-2023,   NPO Start Time: 23:59 (08-08-23 @ 14:07)  Diet, Pureed (08-05-23 @ 09:20)    Skin-ecchymosis, no edema as per RN flow sheet    MEDICATIONS  (STANDING):  carvedilol 6.25 milliGRAM(s) Oral every 12 hours  clotrimazole 1% Cream 1 Application(s) Topical <User Schedule>  dextrose 5% + sodium chloride 0.45%. 1000 milliLiter(s) (75 mL/Hr) IV Continuous <Continuous>  ferrous    sulfate 325 milliGRAM(s) Oral daily  heparin   Injectable 5000 Unit(s) SubCutaneous every 8 hours  mirtazapine 30 milliGRAM(s) Oral daily  pantoprazole  Injectable 40 milliGRAM(s) IV Push two times a day    Estimated needs:  No changes since previous assessment    Nutrition Diagnosis:  Ongoing- Severe malnutrition    RD to Remain Available:    Additional Recommendations:   Recommend Jevity 1.5 via PEG @ 45 mL/hr. x 24 hrs.  Monitor tolerance, weight trends, nutrition related lab values, BMs/GI distress, hydration status, skin integrity.       Loretta López RDN #[id number]

## 2023-08-09 NOTE — PROGRESS NOTE ADULT - SUBJECTIVE AND OBJECTIVE BOX
CHIEF COMPLAINT:    SUBJECTIVE:     REVIEW OF SYSTEMS:    CONSTITUTIONAL: (  )  weakness,  (  ) fevers or chills  EYES/ENT: (  )visual changes;     NECK: (  ) pain or stiffness  RESPIRATORY:   (  )cough, wheezing, hemoptysis;  (  ) shortness of breath  CARDIOVASCULAR:  (  )chest pain or palpitations  GASTROINTESTINAL:   (  )abdominal or epigastric pain.  (  ) nausea, vomiting, or hematemesis;   (   ) diarrhea or constipation.   GENITOURINARY:   (    ) dysuria, frequency or hematuria  NEUROLOGICAL:  (   ) numbness or weakness   All other review of systems is negative unless indicated above    Vital Signs Last 24 Hrs  T(C): 36.6 (09 Aug 2023 09:00), Max: 37.1 (08 Aug 2023 18:00)  T(F): 97.9 (09 Aug 2023 09:00), Max: 98.7 (08 Aug 2023 18:00)  HR: 61 (09 Aug 2023 09:00) (61 - 81)  BP: 134/66 (09 Aug 2023 09:00) (93/55 - 134/66)  BP(mean): --  RR: 17 (09 Aug 2023 09:00) (17 - 18)  SpO2: 100% (09 Aug 2023 09:00) (96% - 100%)    Parameters below as of 09 Aug 2023 09:00  Patient On (Oxygen Delivery Method): room air        I&O's Summary    08 Aug 2023 07:01  -  09 Aug 2023 07:00  --------------------------------------------------------  IN: 0 mL / OUT: 850 mL / NET: -850 mL        CAPILLARY BLOOD GLUCOSE      POCT Blood Glucose.: 83 mg/dL (09 Aug 2023 08:26)  POCT Blood Glucose.: 96 mg/dL (08 Aug 2023 15:02)      PHYSICAL EXAM:    Constitutional:  (   ) NAD,   (   )awake and alert  HEENT: PERR, EOMI,    Neck: Soft and supple, No LAD, No JVD  Respiratory:  (    Breath sounds are clear bilaterally,    (   ) wheezing, rales or rhonchi  Cardiovascular:     (   )S1 and S2, regular rate and rhythm, no Murmurs, gallops or rubs  Gastrointestinal:  (   )Bowel Sounds present, soft,   (  )nontender, nondistended,    Extremities:    (  ) peripheral edema  Vascular: 2+ peripheral pulses  Neurological:    (    )A/O x 3,   (  ) focal deficits  Musculoskeletal:    (   )  normal strength b/l upper  (     ) normal  lower extremities  Skin: No rashes    MEDICATIONS:  MEDICATIONS  (STANDING):  carvedilol 6.25 milliGRAM(s) Oral every 12 hours  clotrimazole 1% Cream 1 Application(s) Topical <User Schedule>  dextrose 5% + sodium chloride 0.45%. 1000 milliLiter(s) (75 mL/Hr) IV Continuous <Continuous>  ferrous    sulfate 325 milliGRAM(s) Oral daily  heparin   Injectable 5000 Unit(s) SubCutaneous every 8 hours  mirtazapine 30 milliGRAM(s) Oral daily  pantoprazole  Injectable 40 milliGRAM(s) IV Push two times a day  potassium phosphate IVPB 30 milliMole(s) IV Intermittent once      LABS: All Labs Reviewed:                        8.8    6.44  )-----------( 214      ( 09 Aug 2023 04:15 )             29.8     08-09    138  |  109<H>  |  12  ----------------------------<  77  3.6   |  20<L>  |  0.54    Ca    8.0<L>      09 Aug 2023 04:15  Phos  1.3     08-09  Mg     1.80     08-09    TPro  5.7<L>  /  Alb  2.8<L>  /  TBili  0.5  /  DBili  x   /  AST  15  /  ALT  15  /  AlkPhos  61  08-09    PT/INR - ( 09 Aug 2023 04:15 )   PT: 12.8 sec;   INR: 1.14 ratio         PTT - ( 09 Aug 2023 04:15 )  PTT:34.8 sec  CARDIAC MARKERS ( 08 Aug 2023 03:52 )  x     / x     / <10 U/L / x     / 1.2 ng/mL      Blood Culture:   Urine Culture      RADIOLOGY/EKG:    ASSESSMENT AND PLAN:    DVT PPX:    ADVANCED DIRECTIVE:    DISPOSITION: CHIEF COMPLAINT: patient condition remain stable no event overnight cardiology clearance noted for PEG insertion patient off oxygen O2 sat stable no further cardiac workup need to be done GI aware of cardiology and planning to do the PEG insertion in a.m.    SUBJECTIVE:     REVIEW OF SYSTEMS:    CONSTITUTIONAL: ( x )  weakness,  (  ) fevers or chills  EYES/ENT: (  )visual changes;     NECK: (  ) pain or stiffness  RESPIRATORY:   (  )cough, wheezing, hemoptysis;  (  ) shortness of breath  CARDIOVASCULAR:  (  )chest pain or palpitations  GASTROINTESTINAL:   (  )abdominal or epigastric pain.  (  ) nausea, vomiting, or hematemesis;   (   ) diarrhea or constipation.   GENITOURINARY:   (    ) dysuria, frequency or hematuria  NEUROLOGICAL:  (   ) numbness or weakness   All other review of systems is negative unless indicated above    Vital Signs Last 24 Hrs  T(C): 36.6 (09 Aug 2023 09:00), Max: 37.1 (08 Aug 2023 18:00)  T(F): 97.9 (09 Aug 2023 09:00), Max: 98.7 (08 Aug 2023 18:00)  HR: 61 (09 Aug 2023 09:00) (61 - 81)  BP: 134/66 (09 Aug 2023 09:00) (93/55 - 134/66)  BP(mean): --  RR: 17 (09 Aug 2023 09:00) (17 - 18)  SpO2: 100% (09 Aug 2023 09:00) (96% - 100%)    Parameters below as of 09 Aug 2023 09:00  Patient On (Oxygen Delivery Method): room air        I&O's Summary    08 Aug 2023 07:01  -  09 Aug 2023 07:00  --------------------------------------------------------  IN: 0 mL / OUT: 850 mL / NET: -850 mL        CAPILLARY BLOOD GLUCOSE      POCT Blood Glucose.: 83 mg/dL (09 Aug 2023 08:26)  POCT Blood Glucose.: 96 mg/dL (08 Aug 2023 15:02)      PHYSICAL EXAM:    Constitutional:  ( x  ) NAD,   ( x  )awake and alert  HEENT: PERR, EOMI,    Neck: Soft and supple, No LAD, No JVD  Respiratory:  (  x  Breath sounds are clear bilaterally,    (   ) wheezing, rales or rhonchi  Cardiovascular:     (  x)S1 and S2, regular rate and rhythm, no Murmurs, gallops or rubs  Gastrointestinal:  (  x )Bowel Sounds present, soft,   (  )nontender, nondistended,    Extremities:    (  ) peripheral edema  Vascular: 2+ peripheral pulses  Neurological:    (  x  )A/O x  1   (  ) focal deficits  Musculoskeletal:    (   )  normal strength b/l upper  (     ) normal  lower extremities  Skin: No rashes    MEDICATIONS:  MEDICATIONS  (STANDING):  carvedilol 6.25 milliGRAM(s) Oral every 12 hours  clotrimazole 1% Cream 1 Application(s) Topical <User Schedule>  dextrose 5% + sodium chloride 0.45%. 1000 milliLiter(s) (75 mL/Hr) IV Continuous <Continuous>  ferrous    sulfate 325 milliGRAM(s) Oral daily  heparin   Injectable 5000 Unit(s) SubCutaneous every 8 hours  mirtazapine 30 milliGRAM(s) Oral daily  pantoprazole  Injectable 40 milliGRAM(s) IV Push two times a day  potassium phosphate IVPB 30 milliMole(s) IV Intermittent once      LABS: All Labs Reviewed:                        8.8    6.44  )-----------( 214      ( 09 Aug 2023 04:15 )             29.8     08-09    138  |  109<H>  |  12  ----------------------------<  77  3.6   |  20<L>  |  0.54    Ca    8.0<L>      09 Aug 2023 04:15  Phos  1.3     08-09  Mg     1.80     08-09    TPro  5.7<L>  /  Alb  2.8<L>  /  TBili  0.5  /  DBili  x   /  AST  15  /  ALT  15  /  AlkPhos  61  08-09    PT/INR - ( 09 Aug 2023 04:15 )   PT: 12.8 sec;   INR: 1.14 ratio         PTT - ( 09 Aug 2023 04:15 )  PTT:34.8 sec  CARDIAC MARKERS ( 08 Aug 2023 03:52 )  x     / x     / <10 U/L / x     / 1.2 ng/mL      Blood Culture:   Urine Culture      RADIOLOGY/EKG:    ASSESSMENT AND PLAN:  Ms. Olmos is a 78-year-old F with history of dementia/alzheimer’s disease, MM, RA, primary pulmonary hypertension, MDD, cataract, OA, MM (not currently treated, previously on revlimid), mild protein calorie malnutrition who presents as a transfer for failure to thrive with recommendation of  PEG tube placement          Problem/Plan - 1:  ·  Problem: Severe protein-calorie malnutrition.   ·  Plan: -patient with significant weight loss  -decrease PO intake,  was megestrol BID at nursing home  but not effective   -GI consult for PEG tube sent, follow up with GI  .    Problem/Plan - 2:  ·  Problem: Urinary retention.  ·  Plan: -history of urinary retention in the past  -will do bladder scan every 6 hours and on 3rd attempt place knight catheter.    Problem/Plan - 3:  ·  Problem: MDD (major depressive disorder).  ·  Plan: -continue Remeron 30 mg nightly.    Problem/Plan - 4:  ·  Problem: Hyperkalemia.   -repeat K in the AM   -continue to monitor closely.    Problem/Plan - 5:     ·  Problem: Microcytic anemia.  ·  Plan: Hb 8.3 from 9 in March 2023    -continue ferrous sulfate 324 mg daily.    Problem/Plan - 7:  ·  Problem: Need for prophylactic measure.   ·  Plan: DVT prophylaxis: heparin 5000 units Q8hr   GI prophylaxis: none      -8/3/2023 GI consult appreciated for possible PEG insertion a.m.  -8/4/2023 will D/C megace  -8/5/2023 PEG placement was unsuccessful start patient on pured diet for PEG placement in the next 2-3 days hemoglobin stable   -8/6/2023 with started on gentle hydration her BUN is 53 was 37 yesterday discussed with ACP  -8/7/2023 hypernatremia improved waiting for possible PEG placement if can be off oxygen as per GI  -8/8/2023 waiting for PEG placement  -8/9/2023   cardiology clearance noted for PEG insertion in am  patient off oxygen O2 sat stable    DVT PPX:    ADVANCED DIRECTIVE:    DISPOSITION:

## 2023-08-10 LAB
ANION GAP SERPL CALC-SCNC: 10 MMOL/L — SIGNIFICANT CHANGE UP (ref 7–14)
BUN SERPL-MCNC: 8 MG/DL — SIGNIFICANT CHANGE UP (ref 7–23)
CALCIUM SERPL-MCNC: 7.7 MG/DL — LOW (ref 8.4–10.5)
CHLORIDE SERPL-SCNC: 111 MMOL/L — HIGH (ref 98–107)
CO2 SERPL-SCNC: 17 MMOL/L — LOW (ref 22–31)
CREAT SERPL-MCNC: 0.56 MG/DL — SIGNIFICANT CHANGE UP (ref 0.5–1.3)
EGFR: 94 ML/MIN/1.73M2 — SIGNIFICANT CHANGE UP
GLUCOSE BLDC GLUCOMTR-MCNC: 78 MG/DL — SIGNIFICANT CHANGE UP (ref 70–99)
GLUCOSE BLDC GLUCOMTR-MCNC: 81 MG/DL — SIGNIFICANT CHANGE UP (ref 70–99)
GLUCOSE BLDC GLUCOMTR-MCNC: 90 MG/DL — SIGNIFICANT CHANGE UP (ref 70–99)
GLUCOSE BLDC GLUCOMTR-MCNC: 91 MG/DL — SIGNIFICANT CHANGE UP (ref 70–99)
GLUCOSE BLDC GLUCOMTR-MCNC: 91 MG/DL — SIGNIFICANT CHANGE UP (ref 70–99)
GLUCOSE BLDC GLUCOMTR-MCNC: 95 MG/DL — SIGNIFICANT CHANGE UP (ref 70–99)
GLUCOSE SERPL-MCNC: 82 MG/DL — SIGNIFICANT CHANGE UP (ref 70–99)
HCT VFR BLD CALC: 29.4 % — LOW (ref 34.5–45)
HGB BLD-MCNC: 8.7 G/DL — LOW (ref 11.5–15.5)
MAGNESIUM SERPL-MCNC: 1.6 MG/DL — SIGNIFICANT CHANGE UP (ref 1.6–2.6)
MCHC RBC-ENTMCNC: 20.1 PG — LOW (ref 27–34)
MCHC RBC-ENTMCNC: 29.6 GM/DL — LOW (ref 32–36)
MCV RBC AUTO: 68.1 FL — LOW (ref 80–100)
NRBC # BLD: 0 /100 WBCS — SIGNIFICANT CHANGE UP (ref 0–0)
NRBC # FLD: 0 K/UL — SIGNIFICANT CHANGE UP (ref 0–0)
PHOSPHATE SERPL-MCNC: 2.4 MG/DL — LOW (ref 2.5–4.5)
PLATELET # BLD AUTO: 194 K/UL — SIGNIFICANT CHANGE UP (ref 150–400)
POTASSIUM SERPL-MCNC: 3.8 MMOL/L — SIGNIFICANT CHANGE UP (ref 3.5–5.3)
POTASSIUM SERPL-SCNC: 3.8 MMOL/L — SIGNIFICANT CHANGE UP (ref 3.5–5.3)
RBC # BLD: 4.32 M/UL — SIGNIFICANT CHANGE UP (ref 3.8–5.2)
RBC # FLD: 24.8 % — HIGH (ref 10.3–14.5)
SODIUM SERPL-SCNC: 138 MMOL/L — SIGNIFICANT CHANGE UP (ref 135–145)
WBC # BLD: 4.75 K/UL — SIGNIFICANT CHANGE UP (ref 3.8–10.5)
WBC # FLD AUTO: 4.75 K/UL — SIGNIFICANT CHANGE UP (ref 3.8–10.5)

## 2023-08-10 PROCEDURE — 99232 SBSQ HOSP IP/OBS MODERATE 35: CPT

## 2023-08-10 PROCEDURE — 43246 EGD PLACE GASTROSTOMY TUBE: CPT | Mod: GC

## 2023-08-10 DEVICE — PEG KT SAFETY 20FR: Type: IMPLANTABLE DEVICE | Status: FUNCTIONAL

## 2023-08-10 RX ADMIN — PANTOPRAZOLE SODIUM 40 MILLIGRAM(S): 20 TABLET, DELAYED RELEASE ORAL at 05:59

## 2023-08-10 RX ADMIN — HEPARIN SODIUM 5000 UNIT(S): 5000 INJECTION INTRAVENOUS; SUBCUTANEOUS at 23:34

## 2023-08-10 RX ADMIN — SODIUM CHLORIDE 75 MILLILITER(S): 9 INJECTION, SOLUTION INTRAVENOUS at 06:43

## 2023-08-10 RX ADMIN — PANTOPRAZOLE SODIUM 40 MILLIGRAM(S): 20 TABLET, DELAYED RELEASE ORAL at 18:46

## 2023-08-10 RX ADMIN — CARVEDILOL PHOSPHATE 6.25 MILLIGRAM(S): 80 CAPSULE, EXTENDED RELEASE ORAL at 23:32

## 2023-08-10 RX ADMIN — Medication 1 APPLICATION(S): at 09:17

## 2023-08-10 NOTE — PROVIDER CONTACT NOTE (OTHER) - RECOMMENDATIONS
no recommendations/interventions per acp.
ROSALBA Valles made aware.
fellow made aware. No interventions ordered at this time.
ACP made aware, will continue to monitor.
Start D5 IV fluids
as per endoscopy, patient developed hematoma during PEG placement attempt.

## 2023-08-10 NOTE — PROVIDER CONTACT NOTE (OTHER) - ACTION/TREATMENT ORDERED:
ACP made aware.No interventions ordered at this time.
no new orders given at this time
EGK, vitals, and labs preformed (cardiac enzymes added to labs), provider notified. providers came to bedside to assess
acp aware  awaiting order
no new orders given at this time
remains on . acp aware  no visible signs of distress
ACP made aware. Repeat blood sugar in 1hour, repeat blood sugar 80. No further interventions ordered at this time.
No new orders at this time, continuos monitoring in place.
STAT CBC and type and screen labs
ACP made aware, will continue to monitor.
fellow made aware. No interventions ordered at this time.

## 2023-08-10 NOTE — PROGRESS NOTE ADULT - SUBJECTIVE AND OBJECTIVE BOX
CHIEF COMPLAINT:    SUBJECTIVE:     REVIEW OF SYSTEMS:    CONSTITUTIONAL: (  )  weakness,  (  ) fevers or chills  EYES/ENT: (  )visual changes;     NECK: (  ) pain or stiffness  RESPIRATORY:   (  )cough, wheezing, hemoptysis;  (  ) shortness of breath  CARDIOVASCULAR:  (  )chest pain or palpitations  GASTROINTESTINAL:   (  )abdominal or epigastric pain.  (  ) nausea, vomiting, or hematemesis;   (   ) diarrhea or constipation.   GENITOURINARY:   (    ) dysuria, frequency or hematuria  NEUROLOGICAL:  (   ) numbness or weakness   All other review of systems is negative unless indicated above    Vital Signs Last 24 Hrs  T(C): 36.6 (10 Aug 2023 06:30), Max: 36.9 (09 Aug 2023 17:00)  T(F): 97.9 (10 Aug 2023 06:30), Max: 98.4 (09 Aug 2023 17:00)  HR: 54 (10 Aug 2023 06:30) (54 - 72)  BP: 118/80 (10 Aug 2023 06:30) (117/93 - 136/65)  BP(mean): --  RR: 17 (10 Aug 2023 06:30) (17 - 17)  SpO2: 100% (10 Aug 2023 06:30) (100% - 100%)    Parameters below as of 10 Aug 2023 06:30  Patient On (Oxygen Delivery Method): room air        I&O's Summary    09 Aug 2023 07:01  -  10 Aug 2023 07:00  --------------------------------------------------------  IN: 807 mL / OUT: 500 mL / NET: 307 mL        CAPILLARY BLOOD GLUCOSE      POCT Blood Glucose.: 81 mg/dL (10 Aug 2023 06:31)  POCT Blood Glucose.: 91 mg/dL (10 Aug 2023 00:29)  POCT Blood Glucose.: 90 mg/dL (10 Aug 2023 00:23)  POCT Blood Glucose.: 83 mg/dL (09 Aug 2023 08:26)      PHYSICAL EXAM:    Constitutional:  (   ) NAD,   (   )awake and alert  HEENT: PERR, EOMI,    Neck: Soft and supple, No LAD, No JVD  Respiratory:  (    Breath sounds are clear bilaterally,    (   ) wheezing, rales or rhonchi  Cardiovascular:     (   )S1 and S2, regular rate and rhythm, no Murmurs, gallops or rubs  Gastrointestinal:  (   )Bowel Sounds present, soft,   (  )nontender, nondistended,    Extremities:    (  ) peripheral edema  Vascular: 2+ peripheral pulses  Neurological:    (    )A/O x 3,   (  ) focal deficits  Musculoskeletal:    (   )  normal strength b/l upper  (     ) normal  lower extremities  Skin: No rashes    MEDICATIONS:  MEDICATIONS  (STANDING):  carvedilol 6.25 milliGRAM(s) Oral every 12 hours  clotrimazole 1% Cream 1 Application(s) Topical <User Schedule>  dextrose 5% + sodium chloride 0.45%. 1000 milliLiter(s) (75 mL/Hr) IV Continuous <Continuous>  ferrous    sulfate 325 milliGRAM(s) Oral daily  heparin   Injectable 5000 Unit(s) SubCutaneous every 8 hours  mirtazapine 30 milliGRAM(s) Oral daily  pantoprazole  Injectable 40 milliGRAM(s) IV Push two times a day      LABS: All Labs Reviewed:                        8.7    4.75  )-----------( 194      ( 10 Aug 2023 05:36 )             29.4     08-10    138  |  111<H>  |  8   ----------------------------<  82  3.8   |  17<L>  |  0.56    Ca    7.7<L>      10 Aug 2023 05:36  Phos  1.3     08-09  Mg     1.60     08-10    TPro  5.7<L>  /  Alb  2.8<L>  /  TBili  0.5  /  DBili  x   /  AST  15  /  ALT  15  /  AlkPhos  61  08-09    PT/INR - ( 09 Aug 2023 04:15 )   PT: 12.8 sec;   INR: 1.14 ratio         PTT - ( 09 Aug 2023 04:15 )  PTT:34.8 sec      Blood Culture:   Urine Culture      RADIOLOGY/EKG:    ASSESSMENT AND PLAN:    DVT PPX:    ADVANCED DIRECTIVE:    DISPOSITION: CHIEF COMPLAINT:  patient laying in the bed awake and verbal no event overnight waiting for   PEG placement  today   SUBJECTIVE:     REVIEW OF SYSTEMS:    CONSTITUTIONAL: (x  )  weakness,  (  ) fevers or chills  EYES/ENT: (  )visual changes;     NECK: (  ) pain or stiffness  RESPIRATORY:   (  )cough, wheezing, hemoptysis;  (  ) shortness of breath  CARDIOVASCULAR:  (  )chest pain or palpitations  GASTROINTESTINAL:   (  )abdominal or epigastric pain.  (  ) nausea, vomiting, or hematemesis;   (   ) diarrhea or constipation.   GENITOURINARY:   (    ) dysuria, frequency or hematuria  NEUROLOGICAL:  (   ) numbness or weakness   All other review of systems is negative unless indicated above    Vital Signs Last 24 Hrs  T(C): 36.6 (10 Aug 2023 06:30), Max: 36.9 (09 Aug 2023 17:00)  T(F): 97.9 (10 Aug 2023 06:30), Max: 98.4 (09 Aug 2023 17:00)  HR: 54 (10 Aug 2023 06:30) (54 - 72)  BP: 118/80 (10 Aug 2023 06:30) (117/93 - 136/65)  BP(mean): --  RR: 17 (10 Aug 2023 06:30) (17 - 17)  SpO2: 100% (10 Aug 2023 06:30) (100% - 100%)    Parameters below as of 10 Aug 2023 06:30  Patient On (Oxygen Delivery Method): room air        I&O's Summary    09 Aug 2023 07:01  -  10 Aug 2023 07:00  --------------------------------------------------------  IN: 807 mL / OUT: 500 mL / NET: 307 mL        CAPILLARY BLOOD GLUCOSE      POCT Blood Glucose.: 81 mg/dL (10 Aug 2023 06:31)  POCT Blood Glucose.: 91 mg/dL (10 Aug 2023 00:29)  POCT Blood Glucose.: 90 mg/dL (10 Aug 2023 00:23)  POCT Blood Glucose.: 83 mg/dL (09 Aug 2023 08:26)      PHYSICAL EXAM:    Constitutional:  ( x  ) NAD,   ( x  )awake   HEENT: PERR, EOMI,    Neck: Soft and supple, No LAD, No JVD  Respiratory:  (   x Breath sounds are clear bilaterally,    (   ) wheezing, rales or rhonchi  Cardiovascular:     ( x  )S1 and S2, regular rate and rhythm, no Murmurs, gallops or rubs  Gastrointestinal:  ( x  )Bowel Sounds present, soft,   (  )nontender, nondistended,    Extremities:    (  ) peripheral edema  Vascular: 2+ peripheral pulses  Neurological:    (  x  )A/O x 1,   (  ) focal deficits  Musculoskeletal:    (   )  normal strength b/l upper  (     ) normal  lower extremities  Skin: No rashes    MEDICATIONS:  MEDICATIONS  (STANDING):  carvedilol 6.25 milliGRAM(s) Oral every 12 hours  clotrimazole 1% Cream 1 Application(s) Topical <User Schedule>  dextrose 5% + sodium chloride 0.45%. 1000 milliLiter(s) (75 mL/Hr) IV Continuous <Continuous>  ferrous    sulfate 325 milliGRAM(s) Oral daily  heparin   Injectable 5000 Unit(s) SubCutaneous every 8 hours  mirtazapine 30 milliGRAM(s) Oral daily  pantoprazole  Injectable 40 milliGRAM(s) IV Push two times a day      LABS: All Labs Reviewed:                        8.7    4.75  )-----------( 194      ( 10 Aug 2023 05:36 )             29.4     08-10    138  |  111<H>  |  8   ----------------------------<  82  3.8   |  17<L>  |  0.56    Ca    7.7<L>      10 Aug 2023 05:36  Phos  1.3     08-09  Mg     1.60     08-10    TPro  5.7<L>  /  Alb  2.8<L>  /  TBili  0.5  /  DBili  x   /  AST  15  /  ALT  15  /  AlkPhos  61  08-09    PT/INR - ( 09 Aug 2023 04:15 )   PT: 12.8 sec;   INR: 1.14 ratio         PTT - ( 09 Aug 2023 04:15 )  PTT:34.8 sec      Blood Culture:   Urine Culture      RADIOLOGY/EKG:    ASSESSMENT AND PLAN:  Ms. Olmos is a 78-year-old F with history of dementia/alzheimer’s disease, MM, RA, primary pulmonary hypertension, MDD, cataract, OA, MM (not currently treated, previously on revlimid), mild protein calorie malnutrition who presents as a transfer for failure to thrive with recommendation of  PEG tube placement          Problem/Plan - 1:  ·  Problem: Severe protein-calorie malnutrition.   ·  Plan: -patient with significant weight loss  -decrease PO intake,  was megestrol BID at nursing home  but not effective   -GI consult for PEG tube sent, follow up with GI  .    Problem/Plan - 2:  ·  Problem: Urinary retention.  ·  Plan: -history of urinary retention in the past  -will do bladder scan every 6 hours and on 3rd attempt place knight catheter.    Problem/Plan - 3:  ·  Problem: MDD (major depressive disorder).  ·  Plan: -continue Remeron 30 mg nightly.    Problem/Plan - 4:  ·  Problem: Hyperkalemia.   -repeat K in the AM   -continue to monitor closely.    Problem/Plan - 5:     ·  Problem: Microcytic anemia.  ·  Plan: Hb 8.3 from 9 in March 2023    -continue ferrous sulfate 324 mg daily.    Problem/Plan - 7:  ·  Problem: Need for prophylactic measure.   ·  Plan: DVT prophylaxis: heparin 5000 units Q8hr   GI prophylaxis: none      -8/3/2023 GI consult appreciated for possible PEG insertion a.m.  -8/4/2023 will D/C megace  -8/5/2023 PEG placement was unsuccessful start patient on pured diet for PEG placement in the next 2-3 days hemoglobin stable   -8/6/2023 with started on gentle hydration her BUN is 53 was 37 yesterday discussed with ACP  -8/7/2023 hypernatremia improved waiting for possible PEG placement if can be off oxygen as per GI  -8/8/2023 waiting for PEG placement  -8/9/2023   cardiology clearance noted for PEG insertion in am  patient off oxygen O2 sat stable    -8/10/2023 condition remained stable for PEG placement a day if no complications she can be discharged back to rehab in a.m. Dr. Zora Black([phone])Miguel Garvey will cover me from 8/11/2022 for the rest of her care  DVT PPX:    ADVANCED DIRECTIVE:    DISPOSITION:

## 2023-08-10 NOTE — PROVIDER CONTACT NOTE (OTHER) - REASON
patient noted with bleeding from abdomen
/54
02 sustaining 85% on room air, pt. placed on 2 l/min n.c. and 02 improved to 96%
blood sugar 57, 71
blood sugar 79
chest pressure
o2
bp
pt sp02 100% on room air
HR 40s
BP 91/51

## 2023-08-10 NOTE — PROGRESS NOTE ADULT - SUBJECTIVE AND OBJECTIVE BOX
24H hour events:   pt resting  no acute events overnight  MEDICATIONS:  carvedilol 6.25 milliGRAM(s) Oral every 12 hours  heparin   Injectable 5000 Unit(s) SubCutaneous every 8 hours        acetaminophen     Tablet .. 650 milliGRAM(s) Oral every 6 hours PRN  melatonin 3 milliGRAM(s) Oral at bedtime PRN  mirtazapine 30 milliGRAM(s) Oral daily    pantoprazole  Injectable 40 milliGRAM(s) IV Push two times a day      clotrimazole 1% Cream 1 Application(s) Topical <User Schedule>  dextrose 5% + sodium chloride 0.45%. 1000 milliLiter(s) IV Continuous <Continuous>  ferrous    sulfate 325 milliGRAM(s) Oral daily          PHYSICAL EXAM:  T(C): 36.6 (08-10-23 @ 14:34), Max: 36.9 (08-09-23 @ 17:00)  HR: 76 (08-10-23 @ 14:34) (54 - 76)  BP: 126/66 (08-10-23 @ 14:34) (117/93 - 136/65)  RR: 14 (08-10-23 @ 14:34) (14 - 17)  SpO2: 96% (08-10-23 @ 14:34) (96% - 100%)  Wt(kg): --  I&O's Summary    09 Aug 2023 07:01  -  10 Aug 2023 07:00  --------------------------------------------------------  IN: 1107 mL / OUT: 1500 mL / NET: -393 mL        Appearance: Normal	  HEENT:   Normal oral mucosa, PERRL, EOMI	  Lymphatic: No lymphadenopathy  Cardiovascular: Normal S1 S2, No JVD, No murmurs, No edema  Respiratory: coarse bs b/l	  Psychiatry: A & O x 0  Gastrointestinal:  Soft, Non-tender, + BS	  Skin: No rashes, No ecchymoses, No cyanosis	  Neurologic: unable to assess  Extremities: Normal range of motion, No clubbing, cyanosis       LABS:	 	    CBC Full  -  ( 10 Aug 2023 05:36 )  WBC Count : 4.75 K/uL  Hemoglobin : 8.7 g/dL  Hematocrit : 29.4 %  Platelet Count - Automated : 194 K/uL  Mean Cell Volume : 68.1 fL  Mean Cell Hemoglobin : 20.1 pg  Mean Cell Hemoglobin Concentration : 29.6 gm/dL  Auto Neutrophil # : x  Auto Lymphocyte # : x  Auto Monocyte # : x  Auto Eosinophil # : x  Auto Basophil # : x  Auto Neutrophil % : x  Auto Lymphocyte % : x  Auto Monocyte % : x  Auto Eosinophil % : x  Auto Basophil % : x    08-10    138  |  111<H>  |  8   ----------------------------<  82  3.8   |  17<L>  |  0.56  08-09    138  |  109<H>  |  12  ----------------------------<  77  3.6   |  20<L>  |  0.54    Ca    7.7<L>      10 Aug 2023 05:36  Ca    8.0<L>      09 Aug 2023 04:15  Phos  2.4     08-10  Phos  1.3     08-09  Mg     1.60     08-10  Mg     1.80     08-09    TPro  5.7<L>  /  Alb  2.8<L>  /  TBili  0.5  /  DBili  x   /  AST  15  /  ALT  15  /  AlkPhos  61  08-09      proBNP:   Lipid Profile:   HgA1c:   TSH:       CARDIAC MARKERS:            TELEMETRY: 	    ECG:  	  RADIOLOGY:  OTHER: 	    PREVIOUS DIAGNOSTIC TESTING:    [ ] Echocardiogram:  [ ]  Catheterization:  [ ] Stress Test:  	  	  ASSESSMENT/PLAN:

## 2023-08-10 NOTE — PROVIDER CONTACT NOTE (OTHER) - BACKGROUND
Admitting dx: failure to thrive
Patient admitted for failure to thrive in adult. PMH dementia, multiple myeloma.
pt admitted for failure to thrive, decrease PO intake, currently NPO for possible PEG placement.
pt presented with failure to thrive. pending PEG placement. pmh dementia
Admitting dx: failure to thrive

## 2023-08-10 NOTE — PROVIDER CONTACT NOTE (OTHER) - SITUATION
pt sp02 100% on room air
BP 91/51
patient noted with bleeding from abdomen
02 sustaining 85% on room air, pt. placed on 2 l/min n.c. and 02 improved to 96%
Pt kept desatting to mid 80s on tele.  Placed on 2 L of oxygen with no improvement. Increased to 3 L and satting 95-96%.
Pt victor m to 40s.
pt complaining of 9/10 midsternal chest pressure
her bp is 59/47 hr 66  asmyptomatic  Pt at [facility]  holding coreg per parameter
/54
blood sugar 57, repeat taken mins after; 71. pt NPO since midnight.
blood sugar 79
Laceration of scalp  surgical repair- 2010  Left rotator cuff tear    S/P shoulder surgery  left

## 2023-08-10 NOTE — PROGRESS NOTE ADULT - ASSESSMENT
78F w dementia/Alzheimer's disease, multiple myeloma, primary pulmonary HTN, p/w FTT now plan for PEG tube      s/p peg tube without cv issues  cont care per primary team

## 2023-08-10 NOTE — PROVIDER CONTACT NOTE (OTHER) - NAME OF MD/NP/PA/DO NOTIFIED:
Marcello Brooks (ACP)
ACP Reena Damico
Isaac Vann
RIGOBERTO Velasquez
nancy [employer] [employer]
leon major
Mayra Velasquez (ACP)
[facility] [id number]
Marcello Brooks (ACP)
GI FELLOW BRUCE HAN
ROSALBA WHITE

## 2023-08-10 NOTE — PROVIDER CONTACT NOTE (OTHER) - ASSESSMENT
On assessment 02 sustaining 85% on room air, pt. placed on 2 l/min n.c. and 02 improved to 96%. ACP present at bedside and made aware.
see VS flowsheet. pt persistently coughing. complaining of 9/10 non radiating midsternal chest pressure. no nonverbal signs of pain present.
Pt is a&ox2, forgetful at times. VS WDL. NSR on tele. pt sp02 100% on room air
her bp is 59/47 hr 66  asmyptomatic  Pt at [facility]  holding coreg per parameter
Pt is a&ox2, forgetful at times. VS WDL. NSR on tele. Denies any chest pain, SOB, pain/discomfort, light headedness, dizziness.
Pt is a&ox2, forgetful at times. VS WDL. NSR on tele. Denies any chest pain, SOB, pain/discomfort, light headedness, dizziness.
patient asymptomatic
patient asymptomatic
Pt kept desatting to mid 80s on tele.  Placed on 2 L of oxygen with no improvement. Increased to 3 L and satting 95-96%.
patient asymptomatic
Pt victor m to the 40s. Pt asymptomatic, no signs of respiratory distress.

## 2023-08-10 NOTE — PROVIDER CONTACT NOTE (OTHER) - DATE AND TIME:
02-Aug-2023 06:48
06-Aug-2023 17:01
07-Aug-2023 18:37
08-Aug-2023 09:15
04-Aug-2023 21:15
07-Aug-2023 02:30
10-Aug-2023 00:00
04-Aug-2023 12:00
04-Aug-2023 18:56
04-Aug-2023 20:00
08-Aug-2023 04:10

## 2023-08-11 LAB
ANION GAP SERPL CALC-SCNC: 6 MMOL/L — LOW (ref 7–14)
BUN SERPL-MCNC: 8 MG/DL — SIGNIFICANT CHANGE UP (ref 7–23)
CALCIUM SERPL-MCNC: 7.6 MG/DL — LOW (ref 8.4–10.5)
CHLORIDE SERPL-SCNC: 108 MMOL/L — HIGH (ref 98–107)
CO2 SERPL-SCNC: 21 MMOL/L — LOW (ref 22–31)
CREAT SERPL-MCNC: 0.59 MG/DL — SIGNIFICANT CHANGE UP (ref 0.5–1.3)
EGFR: 93 ML/MIN/1.73M2 — SIGNIFICANT CHANGE UP
GLUCOSE SERPL-MCNC: 72 MG/DL — SIGNIFICANT CHANGE UP (ref 70–99)
HCT VFR BLD CALC: 28.2 % — LOW (ref 34.5–45)
HGB BLD-MCNC: 8.3 G/DL — LOW (ref 11.5–15.5)
MAGNESIUM SERPL-MCNC: 1.5 MG/DL — LOW (ref 1.6–2.6)
MCHC RBC-ENTMCNC: 20 PG — LOW (ref 27–34)
MCHC RBC-ENTMCNC: 29.4 GM/DL — LOW (ref 32–36)
MCV RBC AUTO: 68.1 FL — LOW (ref 80–100)
NRBC # BLD: 0 /100 WBCS — SIGNIFICANT CHANGE UP (ref 0–0)
NRBC # FLD: 0 K/UL — SIGNIFICANT CHANGE UP (ref 0–0)
PHOSPHATE SERPL-MCNC: 1.6 MG/DL — LOW (ref 2.5–4.5)
PLATELET # BLD AUTO: 169 K/UL — SIGNIFICANT CHANGE UP (ref 150–400)
POTASSIUM SERPL-MCNC: 3.3 MMOL/L — LOW (ref 3.5–5.3)
POTASSIUM SERPL-SCNC: 3.3 MMOL/L — LOW (ref 3.5–5.3)
RBC # BLD: 4.14 M/UL — SIGNIFICANT CHANGE UP (ref 3.8–5.2)
RBC # FLD: 24.9 % — HIGH (ref 10.3–14.5)
SODIUM SERPL-SCNC: 135 MMOL/L — SIGNIFICANT CHANGE UP (ref 135–145)
WBC # BLD: 6.98 K/UL — SIGNIFICANT CHANGE UP (ref 3.8–10.5)
WBC # FLD AUTO: 6.98 K/UL — SIGNIFICANT CHANGE UP (ref 3.8–10.5)

## 2023-08-11 PROCEDURE — 99232 SBSQ HOSP IP/OBS MODERATE 35: CPT | Mod: GC

## 2023-08-11 RX ORDER — MAGNESIUM SULFATE 500 MG/ML
1 VIAL (ML) INJECTION ONCE
Refills: 0 | Status: COMPLETED | OUTPATIENT
Start: 2023-08-11 | End: 2023-08-11

## 2023-08-11 RX ORDER — POTASSIUM CHLORIDE 20 MEQ
40 PACKET (EA) ORAL ONCE
Refills: 0 | Status: COMPLETED | OUTPATIENT
Start: 2023-08-11 | End: 2023-08-11

## 2023-08-11 RX ADMIN — PANTOPRAZOLE SODIUM 40 MILLIGRAM(S): 20 TABLET, DELAYED RELEASE ORAL at 05:23

## 2023-08-11 RX ADMIN — SODIUM CHLORIDE 75 MILLILITER(S): 9 INJECTION, SOLUTION INTRAVENOUS at 12:24

## 2023-08-11 RX ADMIN — Medication 40 MILLIEQUIVALENT(S): at 12:24

## 2023-08-11 RX ADMIN — Medication 100 GRAM(S): at 12:51

## 2023-08-11 RX ADMIN — CARVEDILOL PHOSPHATE 6.25 MILLIGRAM(S): 80 CAPSULE, EXTENDED RELEASE ORAL at 21:21

## 2023-08-11 RX ADMIN — HEPARIN SODIUM 5000 UNIT(S): 5000 INJECTION INTRAVENOUS; SUBCUTANEOUS at 05:22

## 2023-08-11 RX ADMIN — HEPARIN SODIUM 5000 UNIT(S): 5000 INJECTION INTRAVENOUS; SUBCUTANEOUS at 21:21

## 2023-08-11 RX ADMIN — PANTOPRAZOLE SODIUM 40 MILLIGRAM(S): 20 TABLET, DELAYED RELEASE ORAL at 17:12

## 2023-08-11 RX ADMIN — HEPARIN SODIUM 5000 UNIT(S): 5000 INJECTION INTRAVENOUS; SUBCUTANEOUS at 13:34

## 2023-08-11 RX ADMIN — Medication 325 MILLIGRAM(S): at 12:21

## 2023-08-11 RX ADMIN — Medication 1 APPLICATION(S): at 09:59

## 2023-08-11 RX ADMIN — MIRTAZAPINE 30 MILLIGRAM(S): 45 TABLET, ORALLY DISINTEGRATING ORAL at 12:20

## 2023-08-11 NOTE — PROGRESS NOTE ADULT - SUBJECTIVE AND OBJECTIVE BOX
Coverage for Dr Vasques     Patient is a 78y old  Female who presents with a chief complaint of severe protein malnutrition (10 Aug 2023 16:58)      INTERVAL HPI/OVERNIGHT EVENTS: PEG placed     MEDICATIONS  (STANDING):  carvedilol 6.25 milliGRAM(s) Oral every 12 hours  clotrimazole 1% Cream 1 Application(s) Topical <User Schedule>  dextrose 5% + sodium chloride 0.45%. 1000 milliLiter(s) (75 mL/Hr) IV Continuous <Continuous>  ferrous    sulfate 325 milliGRAM(s) Oral daily  heparin   Injectable 5000 Unit(s) SubCutaneous every 8 hours  magnesium sulfate  IVPB 1 Gram(s) IV Intermittent once  mirtazapine 30 milliGRAM(s) Oral daily  pantoprazole  Injectable 40 milliGRAM(s) IV Push two times a day  potassium chloride   Powder 40 milliEquivalent(s) Oral once    MEDICATIONS  (PRN):  acetaminophen     Tablet .. 650 milliGRAM(s) Oral every 6 hours PRN Mild Pain (1 - 3)  melatonin 3 milliGRAM(s) Oral at bedtime PRN Insomnia          Allergies    No Known Allergies    Intolerances        REVIEW OF SYSTEMS:  non verbal       PHYSICAL EXAM:  Vital Signs Last 24 Hrs  T(C): 36.8 (11 Aug 2023 09:20), Max: 36.8 (11 Aug 2023 09:20)  T(F): 98.3 (11 Aug 2023 09:20), Max: 98.3 (11 Aug 2023 09:20)  HR: 54 (11 Aug 2023 09:20) (54 - 85)  BP: 114/63 (11 Aug 2023 09:20) (97/63 - 126/66)  BP(mean): --  RR: 17 (11 Aug 2023 09:20) (14 - 19)  SpO2: 100% (11 Aug 2023 09:20) (96% - 100%)    Parameters below as of 11 Aug 2023 09:20  Patient On (Oxygen Delivery Method): room air        GENERAL: NAD, well-groomed, well-developed  HEAD:  Atraumatic, Normocephalic  EYES: EOMI, PERRLA, conjunctiva and sclera clear  NECK: Supple, No JVD, Normal thyroid    CHEST/LUNG: Clear to auscultation bilaterally; No rales, rhonchi, wheezing, or rubs  HEART: S1S2 regular, without murmur, rub nor gallop  ABDOMEN: Soft, Nontender, Nondistended; Bowel sounds present        LABS:                        8.3    6.98  )-----------( 169      ( 11 Aug 2023 03:37 )             28.2     11 Aug 2023 03:37    135    |  108    |  8      ----------------------------<  72     3.3     |  21     |  0.59     Ca    7.6        11 Aug 2023 03:37  Phos  1.6       11 Aug 2023 03:37  Mg     1.50      11 Aug 2023 03:37        CAPILLARY BLOOD GLUCOSE        < end of copied text >  < from: Upper Endoscopy (08.10.23 @ 15:38) >  Impression:          - Bleb found inthe esophagus.                       - LA Grade B esophagitis.                       - Z-line regular.                       - Gastroesophageal flap valve classified as Hill Grade                        III (minimal fold, loose to endoscope, hiatal hernia                        likely).                       - Non-bleeding erosive gastropathy.                       - Scar in the gastric body.                       - Normal duodenal bulb and second portion of the                        duodenum.                      - An externally removable PEG placement was successfully                        completed.                       - No specimens collected.  Recommendation:      - Return patient to [facility] for ongoing care.      - Hold tube feeds until cleared by GI in the morning    < end of copied text >  POCT Blood Glucose.: 91 mg/dL (10 Aug 2023 15:50)  POCT Blood Glucose.: 78 mg/dL (10 Aug 2023 12:37)          Assessment-  s/p PEG  to start PEG feedings and return to [facility] Coverage for Dr Vasques     Patient is a 78y old  Female who presents with a chief complaint of severe protein malnutrition (10 Aug 2023 16:58)      INTERVAL HPI/OVERNIGHT EVENTS: PEG placed     MEDICATIONS  (STANDING):  carvedilol 6.25 milliGRAM(s) Oral every 12 hours  clotrimazole 1% Cream 1 Application(s) Topical <User Schedule>  dextrose 5% + sodium chloride 0.45%. 1000 milliLiter(s) (75 mL/Hr) IV Continuous <Continuous>  ferrous    sulfate 325 milliGRAM(s) Oral daily  heparin   Injectable 5000 Unit(s) SubCutaneous every 8 hours  magnesium sulfate  IVPB 1 Gram(s) IV Intermittent once  mirtazapine 30 milliGRAM(s) Oral daily  pantoprazole  Injectable 40 milliGRAM(s) IV Push two times a day  potassium chloride   Powder 40 milliEquivalent(s) Oral once    MEDICATIONS  (PRN):  acetaminophen     Tablet .. 650 milliGRAM(s) Oral every 6 hours PRN Mild Pain (1 - 3)  melatonin 3 milliGRAM(s) Oral at bedtime PRN Insomnia          Allergies    No Known Allergies    Intolerances        REVIEW OF SYSTEMS:  non verbal       PHYSICAL EXAM:  Vital Signs Last 24 Hrs  T(C): 36.8 (11 Aug 2023 09:20), Max: 36.8 (11 Aug 2023 09:20)  T(F): 98.3 (11 Aug 2023 09:20), Max: 98.3 (11 Aug 2023 09:20)  HR: 54 (11 Aug 2023 09:20) (54 - 85)  BP: 114/63 (11 Aug 2023 09:20) (97/63 - 126/66)  BP(mean): --  RR: 17 (11 Aug 2023 09:20) (14 - 19)  SpO2: 100% (11 Aug 2023 09:20) (96% - 100%)    Parameters below as of 11 Aug 2023 09:20  Patient On (Oxygen Delivery Method): room air        GENERAL: NAD, well-groomed, well-developed  HEAD:  Atraumatic, Normocephalic  EYES: EOMI, PERRLA, conjunctiva and sclera clear  NECK: Supple, No JVD, Normal thyroid    CHEST/LUNG: Clear to auscultation bilaterally; No rales, rhonchi, wheezing, or rubs  HEART: S1S2 regular, without murmur, rub nor gallop  ABDOMEN: Soft, Nontender, Nondistended; Bowel sounds present        LABS:                        8.3    6.98  )-----------( 169      ( 11 Aug 2023 03:37 )             28.2     11 Aug 2023 03:37    135    |  108    |  8      ----------------------------<  72     3.3     |  21     |  0.59     Ca    7.6        11 Aug 2023 03:37  Phos  1.6       11 Aug 2023 03:37  Mg     1.50      11 Aug 2023 03:37        CAPILLARY BLOOD GLUCOSE        < end of copied text >  < from: Upper Endoscopy (08.10.23 @ 15:38) >  Impression:          - Bleb found inthe esophagus.                       - LA Grade B esophagitis.                       - Z-line regular.                       - Gastroesophageal flap valve classified as Hill Grade                        III (minimal fold, loose to endoscope, hiatal hernia                        likely).                       - Non-bleeding erosive gastropathy.                       - Scar in the gastric body.                       - Normal duodenal bulb and second portion of the                        duodenum.                      - An externally removable PEG placement was successfully                        completed.                       - No specimens collected.  Recommendation:      - Return patient to hospital [facility] for ongoing care.      - Hold tube feeds until cleared by GI in the morning    < end of copied text >  POCT Blood Glucose.: 91 mg/dL (10 Aug 2023 15:50)  POCT Blood Glucose.: 78 mg/dL (10 Aug 2023 12:37)          Assessment-  s/p PEG  to start PEG feedings and return to NH  Anemia-  H/H stable

## 2023-08-11 NOTE — PROGRESS NOTE ADULT - SUBJECTIVE AND OBJECTIVE BOX
Gastroenterology/Hepatology Progress Note      Interval Events:     Allergies:  No Known Allergies      Hospital Medications:  acetaminophen     Tablet .. 650 milliGRAM(s) Oral every 6 hours PRN  carvedilol 6.25 milliGRAM(s) Oral every 12 hours  clotrimazole 1% Cream 1 Application(s) Topical <User Schedule>  dextrose 5% + sodium chloride 0.45%. 1000 milliLiter(s) IV Continuous <Continuous>  ferrous    sulfate 325 milliGRAM(s) Oral daily  heparin   Injectable 5000 Unit(s) SubCutaneous every 8 hours  melatonin 3 milliGRAM(s) Oral at bedtime PRN  mirtazapine 30 milliGRAM(s) Oral daily  pantoprazole  Injectable 40 milliGRAM(s) IV Push two times a day      ROS: 14 point ROS negative unless otherwise state in subjective    PHYSICAL EXAM:   Vital Signs:  Vital Signs Last 24 Hrs  T(C): 36.8 (11 Aug 2023 09:20), Max: 36.8 (11 Aug 2023 09:20)  T(F): 98.3 (11 Aug 2023 09:20), Max: 98.3 (11 Aug 2023 09:20)  HR: 54 (11 Aug 2023 09:20) (54 - 85)  BP: 114/63 (11 Aug 2023 09:20) (97/63 - 126/66)  BP(mean): --  RR: 17 (11 Aug 2023 09:20) (14 - 19)  SpO2: 100% (11 Aug 2023 09:20) (96% - 100%)    Parameters below as of 11 Aug 2023 09:20  Patient On (Oxygen Delivery Method): room air      Daily     Daily     GENERAL:  No acute distress  HEENT:  NCAT, no scleral icterus  CHEST: no resp distress  HEART:  RRR  ABDOMEN:  Soft, non-tender, non-distended, normoactive bowel sounds, no masses  EXTREMITIES:  No cyanosis, clubbing, or edema  SKIN:  No rash/erythema/ecchymoses/petechiae/wounds/abscess/warm/dry  NEURO:  Alert and oriented x 3, no asterixis, no tremor    LABS:                        8.3    6.98  )-----------( 169      ( 11 Aug 2023 03:37 )             28.2     Mean Cell Volume: 68.1 fL (08-11-23 @ 03:37)    08-11    135  |  108<H>  |  8   ----------------------------<  72  3.3<L>   |  21<L>  |  0.59    Ca    7.6<L>      11 Aug 2023 03:37  Phos  1.6     08-11  Mg     1.50     08-11          Urinalysis Basic - ( 11 Aug 2023 03:37 )    Color: x / Appearance: x / SG: x / pH: x  Gluc: 72 mg/dL / Ketone: x  / Bili: x / Urobili: x   Blood: x / Protein: x / Nitrite: x   Leuk Esterase: x / RBC: x / WBC x   Sq Epi: x / Non Sq Epi: x / Bacteria: x            Imaging:           Gastroenterology/Hepatology Progress Note    Interval Events: s/p PEG placement yesterday. Patient denies abdominal pain.    Allergies:  No Known Allergies      Hospital Medications:  acetaminophen     Tablet .. 650 milliGRAM(s) Oral every 6 hours PRN  carvedilol 6.25 milliGRAM(s) Oral every 12 hours  clotrimazole 1% Cream 1 Application(s) Topical <User Schedule>  dextrose 5% + sodium chloride 0.45%. 1000 milliLiter(s) IV Continuous <Continuous>  ferrous    sulfate 325 milliGRAM(s) Oral daily  heparin   Injectable 5000 Unit(s) SubCutaneous every 8 hours  melatonin 3 milliGRAM(s) Oral at bedtime PRN  mirtazapine 30 milliGRAM(s) Oral daily  pantoprazole  Injectable 40 milliGRAM(s) IV Push two times a day      ROS: 14 point ROS negative unless otherwise state in subjective    PHYSICAL EXAM:   Vital Signs:  Vital Signs Last 24 Hrs  T(C): 36.8 (11 Aug 2023 09:20), Max: 36.8 (11 Aug 2023 09:20)  T(F): 98.3 (11 Aug 2023 09:20), Max: 98.3 (11 Aug 2023 09:20)  HR: 54 (11 Aug 2023 09:20) (54 - 85)  BP: 114/63 (11 Aug 2023 09:20) (97/63 - 126/66)  BP(mean): --  RR: 17 (11 Aug 2023 09:20) (14 - 19)  SpO2: 100% (11 Aug 2023 09:20) (96% - 100%)    Parameters below as of 11 Aug 2023 09:20  Patient On (Oxygen Delivery Method): room air      Daily     Daily     GENERAL:  No acute distress  HEENT:  NCAT, no scleral icterus  CHEST: no resp distress  HEART:  RRR  ABDOMEN:  Soft, non-tender, non-distended   EXTREMITIES:  No cyanosis, clubbing, or edema  SKIN:  No rash/erythema/ecchymoses   NEURO:  Awake    LABS:                        8.3    6.98  )-----------( 169      ( 11 Aug 2023 03:37 )             28.2     Mean Cell Volume: 68.1 fL (08-11-23 @ 03:37)    08-11    135  |  108<H>  |  8   ----------------------------<  72  3.3<L>   |  21<L>  |  0.59    Ca    7.6<L>      11 Aug 2023 03:37  Phos  1.6     08-11  Mg     1.50     08-11      Urinalysis Basic - ( 11 Aug 2023 03:37 )    Color: x / Appearance: x / SG: x / pH: x  Gluc: 72 mg/dL / Ketone: x  / Bili: x / Urobili: x   Blood: x / Protein: x / Nitrite: x   Leuk Esterase: x / RBC: x / WBC x   Sq Epi: x / Non Sq Epi: x / Bacteria: x        Imaging:    [facility]  _______________________________________________________________________________  Patient Name: Gaby Olmos             Procedure Date: 8/10/2023 3:38 PM  MRN: [id number]                     Account Number: [id number]  YOB: 1945              Admit Type: Inpatient  Room: [place] [address]                         Gender: Female  Attending MD: MOHAMUD BEST MD   _______________________________________________________________________________     Procedure:           Upper GI endoscopy  Indications:         Failure to thrive  Providers:           MOHAMUD BEST MD, Alida Brown (Fellow), Gabriella Menjivar (Fellow)  Medicines:           Monitored Anesthesia Care  Complications:       No immediate complications.  Procedure:           Pre-Anesthesia Assessment:                       - Prior to the procedure, a History and Physical was                        performed, and patient medications and allergies were              reviewed. The patient is unable to give consent                        secondary to the patient being legally incompetent to                        consent. The risks and benefits of the procedure and the                        sedation options and risks were discussed with the                        patient's sister. All questions were answered and                        informed consent was obtained. Patient identification                        and proposed procedure were verified by the physician,                        the nurse, the anesthesiologist and the anesthetist in                        the pre-procedure area in the procedure room in the                        endoscopy suite. Mental Status Examination: alert but                     confused. ASA Grade Assessment: III - A patient with                        severe systemic disease. After reviewing the risks and                        benefits, the patient was deemed in satisfactory                        condition to undergo the procedure. The anesthesia plan                        was to use monitored anesthesia care (MAC). Immediately                        prior to administration of medications, the patient was                        re-assessed for adequacy to receive sedatives. The heart                        rate, respiratory rate, oxygen saturations, blood                        pressure, adequacy of pulmonary ventilation, and                        response to care were monitored throughout the                       procedure. The physical status of the patient was                        re-assessed after the procedure.                       After obtaining informed consent, the endoscope was                        passed under direct vision. Throughout the procedure,                        the patient's blood pressure, pulse, and oxygen                        saturations were monitored continuously. The Endoscope                        was introduced through the mouth, and advanced tothe                        second part of duodenum. The upper GI endoscopy was                        accomplished without difficulty. The patient tolerated                        the procedure well.                                 Findings:       A single medium-sized submucosal lesion with a localized distribution        was found in the lower third of the esophagus.       LA Grade B (one or more mucosal breaks greater than 5 mm, not extending        between the tops of two mucosal folds) esophagitis with no bleeding was        found.       The Z-line was regular.       The gastroesophageal flap valve was visualized endoscopically and        classified as Hill Grade III (minimal fold, loose to endoscope, hiatal        hernia likely).       The exam of the esophagus was otherwise normal.       A few localized, diminutive non-bleeding erosions were found in the        gastric antrum. There were no stigmata of recent bleeding.       A scar was found in the gastric body. The scar tissue was healthy in        appearance.       The exam of the stomach was otherwise normal.       The duodenal bulb and second portion of the duodenum were normal.       The patient was placed in the supine position for PEG placement. The        stomach was insufflated to appose gastric and abdominal walls. A site        was located in the body of the stomach with excellent transillumination        for placement. The abdominal wall was marked and prepped erci sterile        manner. The area was anesthetized with 10 mL of 0.5% lidocaine. The        trocar needle was introduced through the abdominal wall and into the        stomach under direct endoscopic view. A snare was introduced through the        endoscope and opened in the gastric lumen. The guide wire was passed        through the trocar and into the open snare. The snare was closed around        the guide wire. The endoscope and snare were removed, pulling the wire        out through the mouth. A skin incision was made at the site of needle        insertion. The externally removable 20 Fr EndoVive Safety gastrostomy        tube was lubricated. The G-tube was tied to the guide wire and pulled        through the mouth and into the stomach. The trocar needle was removed,        and the gastrostomy tube was pulled out from the stomach through the        skin. The external bumper was attached to the gastrostomy tube, and the        tube was cut to remove the guide wire. The final position of the        gastrostomy tube was confirmed by skin marking noted to be 3 cm at the        external bumper. The final tension and compression of the abdominal wall        by the PEG tube and external bumper were checked and revealed that the        bumper was moderately tight and mildly deforming the skin. The feeding        tube was capped, and the tube site cleaned and dressed.                                                                                   Impression:          - Bleb found inthe esophagus.                       - LA Grade B esophagitis.                       - Z-line regular.                       - Gastroesophageal flap valve classified as Hill Grade                        III (minimal fold, loose to endoscope, hiatal hernia                        likely).                       - Non-bleeding erosive gastropathy.                       - Scar in the gastric body.                       - Normal duodenal bulb and second portion of the                        duodenum.                      - An externally removable PEG placement was successfully                        completed.                       - No specimens collected.  Recommendation:      - Return patient to hospital [facility] for ongoing care.      - Hold tube feeds until cleared by GI in the morning                                                                                   Attending Participation:       I was present and participated during the entire procedure from        insertionto removal of the endoscope.                                                                                     _________________________  MOHAMUD BEST MD  8/10/2023 5:20:06 PM  Number of Addenda: 0    Note Initiated On: 8/10/2023 3:38 PM           Gastroenterology/Hepatology Progress Note    Interval Events: s/p PEG placement yesterday. Patient denies abdominal pain. PEG loosened to 3.5 cm.     Allergies:  No Known Allergies    Hospital Medications:  acetaminophen     Tablet .. 650 milliGRAM(s) Oral every 6 hours PRN  carvedilol 6.25 milliGRAM(s) Oral every 12 hours  clotrimazole 1% Cream 1 Application(s) Topical <User Schedule>  dextrose 5% + sodium chloride 0.45%. 1000 milliLiter(s) IV Continuous <Continuous>  ferrous    sulfate 325 milliGRAM(s) Oral daily  heparin   Injectable 5000 Unit(s) SubCutaneous every 8 hours  melatonin 3 milliGRAM(s) Oral at bedtime PRN  mirtazapine 30 milliGRAM(s) Oral daily  pantoprazole  Injectable 40 milliGRAM(s) IV Push two times a day    ROS: 14 point ROS negative unless otherwise state in subjective    PHYSICAL EXAM:   Vital Signs:  Vital Signs Last 24 Hrs  T(C): 36.8 (11 Aug 2023 09:20), Max: 36.8 (11 Aug 2023 09:20)  T(F): 98.3 (11 Aug 2023 09:20), Max: 98.3 (11 Aug 2023 09:20)  HR: 54 (11 Aug 2023 09:20) (54 - 85)  BP: 114/63 (11 Aug 2023 09:20) (97/63 - 126/66)  BP(mean): --  RR: 17 (11 Aug 2023 09:20) (14 - 19)  SpO2: 100% (11 Aug 2023 09:20) (96% - 100%)    Parameters below as of 11 Aug 2023 09:20  Patient On (Oxygen Delivery Method): room air    Daily     Daily     GENERAL:  No acute distress  HEENT:  NCAT, no scleral icterus  CHEST: no resp distress  HEART:  RRR  ABDOMEN:  Soft, non-tender, non-distended   EXTREMITIES:  No cyanosis, clubbing, or edema  SKIN:  No rash/erythema/ecchymoses   NEURO:  Awake    LABS:                        8.3    6.98  )-----------( 169      ( 11 Aug 2023 03:37 )             28.2     Mean Cell Volume: 68.1 fL (08-11-23 @ 03:37)    08-11    135  |  108<H>  |  8   ----------------------------<  72  3.3<L>   |  21<L>  |  0.59    Ca    7.6<L>      11 Aug 2023 03:37  Phos  1.6     08-11  Mg     1.50     08-11      Urinalysis Basic - ( 11 Aug 2023 03:37 )    Color: x / Appearance: x / SG: x / pH: x  Gluc: 72 mg/dL / Ketone: x  / Bili: x / Urobili: x   Blood: x / Protein: x / Nitrite: x   Leuk Esterase: x / RBC: x / WBC x   Sq Epi: x / Non Sq Epi: x / Bacteria: x        Imaging:    [facility]  _______________________________________________________________________________  Patient Name: Gaby Olmos             Procedure Date: 8/10/2023 3:38 PM  MRN: [id number]                     Account Number: [id number]  YOB: 1945              Admit Type: Inpatient  Room: [place] [address]                         Gender: Female  Attending MD: MOHAMUD BEST MD   _______________________________________________________________________________     Procedure:           Upper GI endoscopy  Indications:         Failure to thrive  Providers:           MOHAMUD BEST MD, Alida Brown (Fellow), Gabriella Menjivar (Fellow)  Medicines:           Monitored Anesthesia Care  Complications:       No immediate complications.  Procedure:           Pre-Anesthesia Assessment:                       - Prior to the procedure, a History and Physical was                        performed, and patient medications and allergies were              reviewed. The patient is unable to give consent                        secondary to the patient being legally incompetent to                        consent. The risks and benefits of the procedure and the                        sedation options and risks were discussed with the                        patient's sister. All questions were answered and                        informed consent was obtained. Patient identification                        and proposed procedure were verified by the physician,                        the nurse, the anesthesiologist and the anesthetist in                        the pre-procedure area in the procedure room in the                        endoscopy suite. Mental Status Examination: alert but                     confused. ASA Grade Assessment: III - A patient with                        severe systemic disease. After reviewing the risks and                        benefits, the patient was deemed in satisfactory                        condition to undergo the procedure. The anesthesia plan                        was to use monitored anesthesia care (MAC). Immediately                        prior to administration of medications, the patient was                        re-assessed for adequacy to receive sedatives. The heart                        rate, respiratory rate, oxygen saturations, blood                        pressure, adequacy of pulmonary ventilation, and                        response to care were monitored throughout the                       procedure. The physical status of the patient was                        re-assessed after the procedure.                       After obtaining informed consent, the endoscope was                        passed under direct vision. Throughout the procedure,                        the patient's blood pressure, pulse, and oxygen                        saturations were monitored continuously. The Endoscope                        was introduced through the mouth, and advanced tothe                        second part of duodenum. The upper GI endoscopy was                        accomplished without difficulty. The patient tolerated                        the procedure well.                                 Findings:       A single medium-sized submucosal lesion with a localized distribution        was found in the lower third of the esophagus.       LA Grade B (one or more mucosal breaks greater than 5 mm, not extending        between the tops of two mucosal folds) esophagitis with no bleeding was        found.       The Z-line was regular.       The gastroesophageal flap valve was visualized endoscopically and        classified as Hill Grade III (minimal fold, loose to endoscope, hiatal        hernia likely).       The exam of the esophagus was otherwise normal.       A few localized, diminutive non-bleeding erosions were found in the        gastric antrum. There were no stigmata of recent bleeding.       A scar was found in the gastric body. The scar tissue was healthy in        appearance.       The exam of the stomach was otherwise normal.       The duodenal bulb and second portion of the duodenum were normal.       The patient was placed in the supine position for PEG placement. The        stomach was insufflated to appose gastric and abdominal walls. A site        was located in the body of the stomach with excellent transillumination        for placement. The abdominal wall was marked and prepped eric sterile        manner. The area was anesthetized with 10 mL of 0.5% lidocaine. The        trocar needle was introduced through the abdominal wall and into the        stomach under direct endoscopic view. A snare was introduced through the        endoscope and opened in the gastric lumen. The guide wire was passed        through the trocar and into the open snare. The snare was closed around        the guide wire. The endoscope and snare were removed, pulling the wire        out through the mouth. A skin incision was made at the site of needle        insertion. The externally removable 20 Fr EndoVive Safety gastrostomy        tube was lubricated. The G-tube was tied to the guide wire and pulled        through the mouth and into the stomach. The trocar needle was removed,        and the gastrostomy tube was pulled out from the stomach through the        skin. The external bumper was attached to the gastrostomy tube, and the        tube was cut to remove the guide wire. The final position of the        gastrostomy tube was confirmed by skin marking noted to be 3 cm at the        external bumper. The final tension and compression of the abdominal wall        by the PEG tube and external bumper were checked and revealed that the        bumper was moderately tight and mildly deforming the skin. The feeding        tube was capped, and the tube site cleaned and dressed.                                                                                   Impression:          - Bleb found inthe esophagus.                       - LA Grade B esophagitis.                       - Z-line regular.                       - Gastroesophageal flap valve classified as Hill Grade                        III (minimal fold, loose to endoscope, hiatal hernia                        likely).                       - Non-bleeding erosive gastropathy.                       - Scar in the gastric body.                       - Normal duodenal bulb and second portion of the                        duodenum.                      - An externally removable PEG placement was successfully                        completed.                       - No specimens collected.  Recommendation:      - Return patient to hospital [facility] for ongoing care.      - Hold tube feeds until cleared by GI in the morning                                                                                   Attending Participation:       I was present and participated during the entire procedure from        insertionto removal of the endoscope.                                                                                     _________________________  MOHAMUD BEST MD  8/10/2023 5:20:06 PM  Number of Addenda: 0    Note Initiated On: 8/10/2023 3:38 PM           Gastroenterology/Hepatology Progress Note    Interval Events: s/p PEG placement yesterday. Patient denies abdominal pain. PEG loosened to 4 cm at the skin.     Allergies:  No Known Allergies    Hospital Medications:  acetaminophen     Tablet .. 650 milliGRAM(s) Oral every 6 hours PRN  carvedilol 6.25 milliGRAM(s) Oral every 12 hours  clotrimazole 1% Cream 1 Application(s) Topical <User Schedule>  dextrose 5% + sodium chloride 0.45%. 1000 milliLiter(s) IV Continuous <Continuous>  ferrous    sulfate 325 milliGRAM(s) Oral daily  heparin   Injectable 5000 Unit(s) SubCutaneous every 8 hours  melatonin 3 milliGRAM(s) Oral at bedtime PRN  mirtazapine 30 milliGRAM(s) Oral daily  pantoprazole  Injectable 40 milliGRAM(s) IV Push two times a day    ROS: 14 point ROS negative unless otherwise state in subjective    PHYSICAL EXAM:   Vital Signs:  Vital Signs Last 24 Hrs  T(C): 36.8 (11 Aug 2023 09:20), Max: 36.8 (11 Aug 2023 09:20)  T(F): 98.3 (11 Aug 2023 09:20), Max: 98.3 (11 Aug 2023 09:20)  HR: 54 (11 Aug 2023 09:20) (54 - 85)  BP: 114/63 (11 Aug 2023 09:20) (97/63 - 126/66)  BP(mean): --  RR: 17 (11 Aug 2023 09:20) (14 - 19)  SpO2: 100% (11 Aug 2023 09:20) (96% - 100%)    Parameters below as of 11 Aug 2023 09:20  Patient On (Oxygen Delivery Method): room air    Daily     Daily     GENERAL:  No acute distress  HEENT:  NCAT, no scleral icterus  CHEST: no resp distress  HEART:  RRR  ABDOMEN:  Soft, non-tender, non-distended. PEG site CDI  EXTREMITIES:  No cyanosis, clubbing, or edema  SKIN:  No rash/erythema/ecchymoses   NEURO:  Awake    LABS:                        8.3    6.98  )-----------( 169      ( 11 Aug 2023 03:37 )             28.2     Mean Cell Volume: 68.1 fL (08-11-23 @ 03:37)    08-11    135  |  108<H>  |  8   ----------------------------<  72  3.3<L>   |  21<L>  |  0.59    Ca    7.6<L>      11 Aug 2023 03:37  Phos  1.6     08-11  Mg     1.50     08-11      Urinalysis Basic - ( 11 Aug 2023 03:37 )    Color: x / Appearance: x / SG: x / pH: x  Gluc: 72 mg/dL / Ketone: x  / Bili: x / Urobili: x   Blood: x / Protein: x / Nitrite: x   Leuk Esterase: x / RBC: x / WBC x   Sq Epi: x / Non Sq Epi: x / Bacteria: x        Imaging:    [facility]  _______________________________________________________________________________  Patient Name: Gaby Olmos             Procedure Date: 8/10/2023 3:38 PM  MRN: [id number]                     Account Number: [id number]  YOB: 1945              Admit Type: Inpatient  Room: [place] [address]                         Gender: Female  Attending MD: MOHAMUD BEST MD   _______________________________________________________________________________     Procedure:           Upper GI endoscopy  Indications:         Failure to thrive  Providers:           MOHAMUD BEST MD, Alida Brown (Fellow), Gabriella Menjivar (Fellow)  Medicines:           Monitored Anesthesia Care  Complications:       No immediate complications.  Procedure:           Pre-Anesthesia Assessment:                       - Prior to the procedure, a History and Physical was                        performed, and patient medications and allergies were              reviewed. The patient is unable to give consent                        secondary to the patient being legally incompetent to                        consent. The risks and benefits of the procedure and the                        sedation options and risks were discussed with the                        patient's sister. All questions were answered and                        informed consent was obtained. Patient identification                        and proposed procedure were verified by the physician,                        the nurse, the anesthesiologist and the anesthetist in                        the pre-procedure area in the procedure room in the                        endoscopy suite. Mental Status Examination: alert but                     confused. ASA Grade Assessment: III - A patient with                        severe systemic disease. After reviewing the risks and                        benefits, the patient was deemed in satisfactory                        condition to undergo the procedure. The anesthesia plan                        was to use monitored anesthesia care (MAC). Immediately                        prior to administration of medications, the patient was                        re-assessed for adequacy to receive sedatives. The heart                        rate, respiratory rate, oxygen saturations, blood                        pressure, adequacy of pulmonary ventilation, and                        response to care were monitored throughout the                       procedure. The physical status of the patient was                        re-assessed after the procedure.                       After obtaining informed consent, the endoscope was                        passed under direct vision. Throughout the procedure,                        the patient's blood pressure, pulse, and oxygen                        saturations were monitored continuously. The Endoscope                        was introduced through the mouth, and advanced tothe                        second part of duodenum. The upper GI endoscopy was                        accomplished without difficulty. The patient tolerated                        the procedure well.                                 Findings:       A single medium-sized submucosal lesion with a localized distribution        was found in the lower third of the esophagus.       LA Grade B (one or more mucosal breaks greater than 5 mm, not extending        between the tops of two mucosal folds) esophagitis with no bleeding was        found.       The Z-line was regular.       The gastroesophageal flap valve was visualized endoscopically and        classified as Hill Grade III (minimal fold, loose to endoscope, hiatal        hernia likely).       The exam of the esophagus was otherwise normal.       A few localized, diminutive non-bleeding erosions were found in the        gastric antrum. There were no stigmata of recent bleeding.       A scar was found in the gastric body. The scar tissue was healthy in        appearance.       The exam of the stomach was otherwise normal.       The duodenal bulb and second portion of the duodenum were normal.       The patient was placed in the supine position for PEG placement. The        stomach was insufflated to appose gastric and abdominal walls. A site        was located in the body of the stomach with excellent transillumination        for placement. The abdominal wall was marked and prepped eirc sterile        manner. The area was anesthetized with 10 mL of 0.5% lidocaine. The        trocar needle was introduced through the abdominal wall and into the        stomach under direct endoscopic view. A snare was introduced through the        endoscope and opened in the gastric lumen. The guide wire was passed        through the trocar and into the open snare. The snare was closed around        the guide wire. The endoscope and snare were removed, pulling the wire        out through the mouth. A skin incision was made at the site of needle        insertion. The externally removable 20 Fr EndoVive Safety gastrostomy        tube was lubricated. The G-tube was tied to the guide wire and pulled        through the mouth and into the stomach. The trocar needle was removed,        and the gastrostomy tube was pulled out from the stomach through the        skin. The external bumper was attached to the gastrostomy tube, and the        tube was cut to remove the guide wire. The final position of the        gastrostomy tube was confirmed by skin marking noted to be 3 cm at the        external bumper. The final tension and compression of the abdominal wall        by the PEG tube and external bumper were checked and revealed that the        bumper was moderately tight and mildly deforming the skin. The feeding        tube was capped, and the tube site cleaned and dressed.                                                                                   Impression:          - Bleb found inthe esophagus.                       - LA Grade B esophagitis.                       - Z-line regular.                       - Gastroesophageal flap valve classified as Hill Grade                        III (minimal fold, loose to endoscope, hiatal hernia                        likely).                       - Non-bleeding erosive gastropathy.                       - Scar in the gastric body.                       - Normal duodenal bulb and second portion of the                        duodenum.                      - An externally removable PEG placement was successfully                        completed.                       - No specimens collected.  Recommendation:      - Return patient to hospital [facility] for ongoing care.      - Hold tube feeds until cleared by GI in the morning                                                                                   Attending Participation:       I was present and participated during the entire procedure from        insertionto removal of the endoscope.                                                                                     _________________________  MOHAMUD BEST MD  8/10/2023 5:20:06 PM  Number of Addenda: 0    Note Initiated On: 8/10/2023 3:38 PM

## 2023-08-11 NOTE — PROGRESS NOTE ADULT - ATTENDING COMMENTS
78F with failure to thrive. s/p EGD with attempt at PEG placement on Friday 8/4 c/b extraluminal hematoma - procedure aborted. pt h/h stable but now with reported new o2 requirement. would confirm if o2 requirement is real and work up if present prior to repeat attempt at PEG placement.
repeat hgb stable.   Tentative plan for PEG tomorrow.
hgb drop this am - unclear if real  Would repeat CBC and if hgb is accurate, would image to assess the extramural gastric hematoma.
s/p PEG placement. Doing well.   Re gastric submucosal lesion - can follow up with GI to decide if work up is indicated.   please call back with questions

## 2023-08-11 NOTE — PROGRESS NOTE ADULT - ASSESSMENT
79 yo F w/ PMHx dementia/alzheimer’s disease, RA, primary pulmonary hypertension, MDD, cataract, OA, MM (not currently treated, previously on revlimid), mild protein calorie malnutrition who presents as a transfer for failure to thrive.     #Dementia  #Pulmonary hypertension  #FTT. Pt with worsening appetite over the past few weeks, reduced po intake, weight loss.  Underwent EGD w/ attempted PEG placement on 8/4. PEG placement aborted as patient noted to develop extraluminal hematoma after trochar insertion, which remained stable in size by end of procedure. Patient was hemodynamically stable during and after procedure.      Recommendations  - protonix 40 mg BID given esophagitis seen on EGD  - okay to start using PEG today for feeding  - nutrition consult for type and rate of feeding   - keep bumper clean and dry, recommend NO gauze underneath the bumper   - aspiration precautions, elevate head end of the bed  - close observation to prevent dislodgement, can use a binder for protection to prevent the patient from pulling out the PEG  - eventual Speech and Swallow evaluation as an outpatient to evaluate for swallowing, if it improves, can remove the PEG as an outpatient atleast 6-8 weeks post placement   - supportive care as per primary team.      All recommendations are tentative until note is attested by attending.     Alida Brown, PGY5  Gastroenterology/Hepatology Fellow  Available on [employer] Teams  [phone] ([employer] Short Range Pager)  [phone] (Long Range Pager)    After 5pm, please contact the on-call GI fellow. [phone]      77 yo F w/ PMHx dementia/alzheimer’s disease, RA, primary pulmonary hypertension, MDD, cataract, OA, MM (not currently treated, previously on revlimid), mild protein calorie malnutrition who presents as a transfer for failure to thrive.     #Dementia  #Pulmonary hypertension  #FTT. Pt with worsening appetite over the past few weeks, reduced po intake, weight loss.  Underwent EGD w/ attempted PEG placement on 8/4. PEG placement aborted as patient noted to develop extraluminal hematoma after trochar insertion, which remained stable in size by end of procedure. Patient was hemodynamically stable during and after procedure.    - s/p repeat EGD w/ successful PEG placement (bumper at 3 cm) on 8/10  #Submucosal lesion    Recommendations  - protonix 40 mg BID given esophagitis seen on EGD  - pt with a 2 cm submucosal lesion at GE junction. If within GOC, patient can follow up for EGD/EUS on the outpatient setting with Dr. Moni santamaria to start using PEG today for feeding  - nutrition consult for type and rate of feeding   - keep bumper clean and dry, recommend NO gauze underneath the bumper   - aspiration precautions, elevate head end of the bed  - close observation to prevent dislodgement, can use a binder for protection to prevent the patient from pulling out the PEG  - eventual Speech and Swallow evaluation as an outpatient to evaluate for swallowing, if it improves, can remove the PEG as an outpatient atleast 6-8 weeks post placement   - supportive care as per primary team.      All recommendations are tentative until note is attested by attending.     Alida Brown, PGY5  Gastroenterology/Hepatology Fellow  Available on [employer] Teams  [phone] ([employer] Short Range Pager)  [phone] (Long Range Pager)    After 5pm, please contact the on-call GI fellow. [phone]

## 2023-08-12 LAB
ANION GAP SERPL CALC-SCNC: 8 MMOL/L — SIGNIFICANT CHANGE UP (ref 7–14)
BUN SERPL-MCNC: 7 MG/DL — SIGNIFICANT CHANGE UP (ref 7–23)
CALCIUM SERPL-MCNC: 8.2 MG/DL — LOW (ref 8.4–10.5)
CHLORIDE SERPL-SCNC: 111 MMOL/L — HIGH (ref 98–107)
CO2 SERPL-SCNC: 20 MMOL/L — LOW (ref 22–31)
CREAT SERPL-MCNC: 0.61 MG/DL — SIGNIFICANT CHANGE UP (ref 0.5–1.3)
EGFR: 92 ML/MIN/1.73M2 — SIGNIFICANT CHANGE UP
GLUCOSE BLDC GLUCOMTR-MCNC: 98 MG/DL — SIGNIFICANT CHANGE UP (ref 70–99)
GLUCOSE SERPL-MCNC: 71 MG/DL — SIGNIFICANT CHANGE UP (ref 70–99)
HCT VFR BLD CALC: 28.5 % — LOW (ref 34.5–45)
HGB BLD-MCNC: 8.6 G/DL — LOW (ref 11.5–15.5)
MAGNESIUM SERPL-MCNC: 1.9 MG/DL — SIGNIFICANT CHANGE UP (ref 1.6–2.6)
MCHC RBC-ENTMCNC: 20.3 PG — LOW (ref 27–34)
MCHC RBC-ENTMCNC: 30.2 GM/DL — LOW (ref 32–36)
MCV RBC AUTO: 67.4 FL — LOW (ref 80–100)
NRBC # BLD: 0 /100 WBCS — SIGNIFICANT CHANGE UP (ref 0–0)
NRBC # FLD: 0 K/UL — SIGNIFICANT CHANGE UP (ref 0–0)
PHOSPHATE SERPL-MCNC: 1.8 MG/DL — LOW (ref 2.5–4.5)
PLATELET # BLD AUTO: 176 K/UL — SIGNIFICANT CHANGE UP (ref 150–400)
POTASSIUM SERPL-MCNC: 3.4 MMOL/L — LOW (ref 3.5–5.3)
POTASSIUM SERPL-SCNC: 3.4 MMOL/L — LOW (ref 3.5–5.3)
RBC # BLD: 4.23 M/UL — SIGNIFICANT CHANGE UP (ref 3.8–5.2)
RBC # FLD: 24.9 % — HIGH (ref 10.3–14.5)
SODIUM SERPL-SCNC: 139 MMOL/L — SIGNIFICANT CHANGE UP (ref 135–145)
WBC # BLD: 5.47 K/UL — SIGNIFICANT CHANGE UP (ref 3.8–10.5)
WBC # FLD AUTO: 5.47 K/UL — SIGNIFICANT CHANGE UP (ref 3.8–10.5)

## 2023-08-12 RX ORDER — POTASSIUM CHLORIDE 20 MEQ
40 PACKET (EA) ORAL ONCE
Refills: 0 | Status: COMPLETED | OUTPATIENT
Start: 2023-08-12 | End: 2023-08-12

## 2023-08-12 RX ORDER — POTASSIUM PHOSPHATE, MONOBASIC POTASSIUM PHOSPHATE, DIBASIC 236; 224 MG/ML; MG/ML
15 INJECTION, SOLUTION INTRAVENOUS ONCE
Refills: 0 | Status: COMPLETED | OUTPATIENT
Start: 2023-08-12 | End: 2023-08-12

## 2023-08-12 RX ADMIN — HEPARIN SODIUM 5000 UNIT(S): 5000 INJECTION INTRAVENOUS; SUBCUTANEOUS at 05:13

## 2023-08-12 RX ADMIN — Medication 325 MILLIGRAM(S): at 13:32

## 2023-08-12 RX ADMIN — CARVEDILOL PHOSPHATE 6.25 MILLIGRAM(S): 80 CAPSULE, EXTENDED RELEASE ORAL at 21:53

## 2023-08-12 RX ADMIN — HEPARIN SODIUM 5000 UNIT(S): 5000 INJECTION INTRAVENOUS; SUBCUTANEOUS at 13:38

## 2023-08-12 RX ADMIN — Medication 1 APPLICATION(S): at 10:19

## 2023-08-12 RX ADMIN — PANTOPRAZOLE SODIUM 40 MILLIGRAM(S): 20 TABLET, DELAYED RELEASE ORAL at 17:21

## 2023-08-12 RX ADMIN — CARVEDILOL PHOSPHATE 6.25 MILLIGRAM(S): 80 CAPSULE, EXTENDED RELEASE ORAL at 10:17

## 2023-08-12 RX ADMIN — PANTOPRAZOLE SODIUM 40 MILLIGRAM(S): 20 TABLET, DELAYED RELEASE ORAL at 05:13

## 2023-08-12 RX ADMIN — Medication 40 MILLIEQUIVALENT(S): at 10:18

## 2023-08-12 RX ADMIN — POTASSIUM PHOSPHATE, MONOBASIC POTASSIUM PHOSPHATE, DIBASIC 62.5 MILLIMOLE(S): 236; 224 INJECTION, SOLUTION INTRAVENOUS at 10:51

## 2023-08-12 RX ADMIN — MIRTAZAPINE 30 MILLIGRAM(S): 45 TABLET, ORALLY DISINTEGRATING ORAL at 13:43

## 2023-08-12 RX ADMIN — HEPARIN SODIUM 5000 UNIT(S): 5000 INJECTION INTRAVENOUS; SUBCUTANEOUS at 21:53

## 2023-08-12 RX ADMIN — SODIUM CHLORIDE 75 MILLILITER(S): 9 INJECTION, SOLUTION INTRAVENOUS at 14:41

## 2023-08-12 RX ADMIN — SODIUM CHLORIDE 75 MILLILITER(S): 9 INJECTION, SOLUTION INTRAVENOUS at 00:40

## 2023-08-12 NOTE — PROGRESS NOTE ADULT - SUBJECTIVE AND OBJECTIVE BOX
coverage for Dr Vasques  Patient is a 78y old  Female who presents with a chief complaint of severe protein malnutrition (11 Aug 2023 13:05)      INTERVAL HPI/OVERNIGHT EVENTS: none     MEDICATIONS  (STANDING):  carvedilol 6.25 milliGRAM(s) Oral every 12 hours  clotrimazole 1% Cream 1 Application(s) Topical <User Schedule>  dextrose 5% + sodium chloride 0.45%. 1000 milliLiter(s) (75 mL/Hr) IV Continuous <Continuous>  ferrous    sulfate 325 milliGRAM(s) Oral daily  heparin   Injectable 5000 Unit(s) SubCutaneous every 8 hours  mirtazapine 30 milliGRAM(s) Oral daily  pantoprazole  Injectable 40 milliGRAM(s) IV Push two times a day    MEDICATIONS  (PRN):  acetaminophen     Tablet .. 650 milliGRAM(s) Oral every 6 hours PRN Mild Pain (1 - 3)  melatonin 3 milliGRAM(s) Oral at bedtime PRN Insomnia          Allergies    No Known Allergies    Intolerances        REVIEW OF SYSTEMS:  CARDIOVASCULAR: No chest pain, palpitations, dizziness, or leg swelling; no shortness of breath     RESPIRATORY: No cough, wheezing, chills or hemoptysis; No shortness of breath    GASTROINTESTINAL: No abdominal or epigastric pain. No nausea, vomiting, or hematemesis; No diarrhea or constipation. No melena or hematochezia.    NEUROLOGICAL: No headaches, memory loss, loss of strength, numbness      PHYSICAL EXAM:  Vital Signs Last 24 Hrs  T(C): 36.4 (12 Aug 2023 05:10), Max: 36.9 (11 Aug 2023 13:20)  T(F): 97.5 (12 Aug 2023 05:10), Max: 98.5 (11 Aug 2023 17:20)  HR: 70 (12 Aug 2023 05:10) (54 - 95)  BP: 112/89 (12 Aug 2023 05:10) (112/89 - 132/72)  BP(mean): --  RR: 16 (12 Aug 2023 05:10) (16 - 19)  SpO2: 99% (12 Aug 2023 05:10) (98% - 100%)    Parameters below as of 12 Aug 2023 05:10  Patient On (Oxygen Delivery Method): room air          GENERAL: NAD, well-groomed, well-developed  HEAD:  Atraumatic, Normocephalic  EYES: EOMI, PERRLA, conjunctiva and sclera clear  NECK: Supple, No JVD, Normal thyroid    CHEST/LUNG: Clear to auscultation bilaterally; No rales, rhonchi, wheezing, or rubs  HEART: S1S2 regular, without murmur, rub nor gallop  ABDOMEN: Soft, Nontender, Nondistended; Bowel sounds present      LABS:      Ca    7.6        11 Aug 2023 03:37                Assessment-  s/p PEG  awaiting initiation of tube feeds  Anemia-  H/H stable, recheck cbc  Hypokalemia- replenished   D/c planning

## 2023-08-13 LAB
ANION GAP SERPL CALC-SCNC: 9 MMOL/L — SIGNIFICANT CHANGE UP (ref 7–14)
BUN SERPL-MCNC: 9 MG/DL — SIGNIFICANT CHANGE UP (ref 7–23)
CALCIUM SERPL-MCNC: 8.4 MG/DL — SIGNIFICANT CHANGE UP (ref 8.4–10.5)
CHLORIDE SERPL-SCNC: 110 MMOL/L — HIGH (ref 98–107)
CO2 SERPL-SCNC: 21 MMOL/L — LOW (ref 22–31)
CREAT SERPL-MCNC: 0.55 MG/DL — SIGNIFICANT CHANGE UP (ref 0.5–1.3)
EGFR: 94 ML/MIN/1.73M2 — SIGNIFICANT CHANGE UP
GLUCOSE SERPL-MCNC: 84 MG/DL — SIGNIFICANT CHANGE UP (ref 70–99)
HCT VFR BLD CALC: 28.4 % — LOW (ref 34.5–45)
HGB BLD-MCNC: 8.6 G/DL — LOW (ref 11.5–15.5)
MAGNESIUM SERPL-MCNC: 1.7 MG/DL — SIGNIFICANT CHANGE UP (ref 1.6–2.6)
MCHC RBC-ENTMCNC: 20.3 PG — LOW (ref 27–34)
MCHC RBC-ENTMCNC: 30.3 GM/DL — LOW (ref 32–36)
MCV RBC AUTO: 67.1 FL — LOW (ref 80–100)
NRBC # BLD: 0 /100 WBCS — SIGNIFICANT CHANGE UP (ref 0–0)
NRBC # FLD: 0 K/UL — SIGNIFICANT CHANGE UP (ref 0–0)
PHOSPHATE SERPL-MCNC: 1.8 MG/DL — LOW (ref 2.5–4.5)
PLATELET # BLD AUTO: 201 K/UL — SIGNIFICANT CHANGE UP (ref 150–400)
POTASSIUM SERPL-MCNC: 3.9 MMOL/L — SIGNIFICANT CHANGE UP (ref 3.5–5.3)
POTASSIUM SERPL-SCNC: 3.9 MMOL/L — SIGNIFICANT CHANGE UP (ref 3.5–5.3)
RBC # BLD: 4.23 M/UL — SIGNIFICANT CHANGE UP (ref 3.8–5.2)
RBC # FLD: 24.2 % — HIGH (ref 10.3–14.5)
SODIUM SERPL-SCNC: 140 MMOL/L — SIGNIFICANT CHANGE UP (ref 135–145)
WBC # BLD: 5.82 K/UL — SIGNIFICANT CHANGE UP (ref 3.8–10.5)
WBC # FLD AUTO: 5.82 K/UL — SIGNIFICANT CHANGE UP (ref 3.8–10.5)

## 2023-08-13 RX ORDER — POTASSIUM PHOSPHATE, MONOBASIC POTASSIUM PHOSPHATE, DIBASIC 236; 224 MG/ML; MG/ML
15 INJECTION, SOLUTION INTRAVENOUS ONCE
Refills: 0 | Status: COMPLETED | OUTPATIENT
Start: 2023-08-13 | End: 2023-08-13

## 2023-08-13 RX ORDER — PANTOPRAZOLE SODIUM 20 MG/1
40 TABLET, DELAYED RELEASE ORAL
Refills: 0 | Status: DISCONTINUED | OUTPATIENT
Start: 2023-08-13 | End: 2023-08-14

## 2023-08-13 RX ADMIN — CARVEDILOL PHOSPHATE 6.25 MILLIGRAM(S): 80 CAPSULE, EXTENDED RELEASE ORAL at 20:48

## 2023-08-13 RX ADMIN — HEPARIN SODIUM 5000 UNIT(S): 5000 INJECTION INTRAVENOUS; SUBCUTANEOUS at 13:08

## 2023-08-13 RX ADMIN — SODIUM CHLORIDE 75 MILLILITER(S): 9 INJECTION, SOLUTION INTRAVENOUS at 07:33

## 2023-08-13 RX ADMIN — HEPARIN SODIUM 5000 UNIT(S): 5000 INJECTION INTRAVENOUS; SUBCUTANEOUS at 21:11

## 2023-08-13 RX ADMIN — POTASSIUM PHOSPHATE, MONOBASIC POTASSIUM PHOSPHATE, DIBASIC 62.5 MILLIMOLE(S): 236; 224 INJECTION, SOLUTION INTRAVENOUS at 13:33

## 2023-08-13 RX ADMIN — PANTOPRAZOLE SODIUM 40 MILLIGRAM(S): 20 TABLET, DELAYED RELEASE ORAL at 17:19

## 2023-08-13 RX ADMIN — MIRTAZAPINE 30 MILLIGRAM(S): 45 TABLET, ORALLY DISINTEGRATING ORAL at 13:07

## 2023-08-13 RX ADMIN — Medication 1 APPLICATION(S): at 09:35

## 2023-08-13 RX ADMIN — Medication 325 MILLIGRAM(S): at 13:07

## 2023-08-13 RX ADMIN — CARVEDILOL PHOSPHATE 6.25 MILLIGRAM(S): 80 CAPSULE, EXTENDED RELEASE ORAL at 09:29

## 2023-08-13 RX ADMIN — PANTOPRAZOLE SODIUM 40 MILLIGRAM(S): 20 TABLET, DELAYED RELEASE ORAL at 05:11

## 2023-08-13 RX ADMIN — HEPARIN SODIUM 5000 UNIT(S): 5000 INJECTION INTRAVENOUS; SUBCUTANEOUS at 05:11

## 2023-08-13 NOTE — PROGRESS NOTE ADULT - SUBJECTIVE AND OBJECTIVE BOX
Coverage for Dr Vasques      Patient is a 78y old  Female who presents with a chief complaint of severe protein malnutrition (12 Aug 2023 06:12)      INTERVAL HPI/OVERNIGHT EVENTS:    MEDICATIONS  (STANDING):  carvedilol 6.25 milliGRAM(s) Oral every 12 hours  clotrimazole 1% Cream 1 Application(s) Topical <User Schedule>  dextrose 5% + sodium chloride 0.45%. 1000 milliLiter(s) (75 mL/Hr) IV Continuous <Continuous>  ferrous    sulfate 325 milliGRAM(s) Oral daily  heparin   Injectable 5000 Unit(s) SubCutaneous every 8 hours  mirtazapine 30 milliGRAM(s) Oral daily  pantoprazole  Injectable 40 milliGRAM(s) IV Push two times a day    MEDICATIONS  (PRN):  acetaminophen     Tablet .. 650 milliGRAM(s) Oral every 6 hours PRN Mild Pain (1 - 3)  melatonin 3 milliGRAM(s) Oral at bedtime PRN Insomnia        Orders last 24 hours:  POCT  Blood Glucose (08-12-23 @ 23:50)      Allergies    No Known Allergies    Intolerances            PHYSICAL EXAM:  Vital Signs Last 24 Hrs  T(C): 36.8 (13 Aug 2023 05:10), Max: 36.8 (12 Aug 2023 10:10)  T(F): 98.2 (13 Aug 2023 05:10), Max: 98.3 (12 Aug 2023 13:10)  HR: 78 (13 Aug 2023 05:10) (66 - 86)  BP: 131/77 (13 Aug 2023 05:10) (108/91 - 131/87)  BP(mean): --  RR: 17 (13 Aug 2023 05:10) (17 - 18)  SpO2: 100% (13 Aug 2023 05:10) (99% - 100%)    Parameters below as of 13 Aug 2023 05:10  Patient On (Oxygen Delivery Method): room air          GENERAL: NAD, well-groomed, well-developed  HEAD:  Atraumatic, Normocephalic  EYES: EOMI, PERRLA, conjunctiva and sclera clear  NECK: Supple, No JVD, Normal thyroid    CHEST/LUNG: Clear to auscultation bilaterally; No rales, rhonchi, wheezing, or rubs  HEART: S1S2 regular, without murmur, rub nor gallop  ABDOMEN: Soft, Mildly tender at PEG site , Nondistended; Bowel sounds present    LABS:    Assessment-  s/p PEG  awaiting initiation of tube feeds  Anemia-  H/H stable, recheck cbc  Hypokalemia- replenished   D/c planning     Change Pantoprazole to Per PEG :

## 2023-08-14 VITALS
TEMPERATURE: 98 F | SYSTOLIC BLOOD PRESSURE: 133 MMHG | OXYGEN SATURATION: 100 % | DIASTOLIC BLOOD PRESSURE: 74 MMHG | HEART RATE: 72 BPM | RESPIRATION RATE: 17 BRPM

## 2023-08-14 LAB
ANION GAP SERPL CALC-SCNC: 7 MMOL/L — SIGNIFICANT CHANGE UP (ref 7–14)
BLD GP AB SCN SERPL QL: NEGATIVE — SIGNIFICANT CHANGE UP
BUN SERPL-MCNC: 9 MG/DL — SIGNIFICANT CHANGE UP (ref 7–23)
CALCIUM SERPL-MCNC: 8 MG/DL — LOW (ref 8.4–10.5)
CHLORIDE SERPL-SCNC: 109 MMOL/L — HIGH (ref 98–107)
CO2 SERPL-SCNC: 23 MMOL/L — SIGNIFICANT CHANGE UP (ref 22–31)
CREAT SERPL-MCNC: 0.5 MG/DL — SIGNIFICANT CHANGE UP (ref 0.5–1.3)
EGFR: 97 ML/MIN/1.73M2 — SIGNIFICANT CHANGE UP
GLUCOSE SERPL-MCNC: 96 MG/DL — SIGNIFICANT CHANGE UP (ref 70–99)
HCT VFR BLD CALC: 25.8 % — LOW (ref 34.5–45)
HCT VFR BLD CALC: 27.2 % — LOW (ref 34.5–45)
HGB BLD-MCNC: 7.6 G/DL — LOW (ref 11.5–15.5)
HGB BLD-MCNC: 8.2 G/DL — LOW (ref 11.5–15.5)
MAGNESIUM SERPL-MCNC: 1.6 MG/DL — SIGNIFICANT CHANGE UP (ref 1.6–2.6)
MCHC RBC-ENTMCNC: 20 PG — LOW (ref 27–34)
MCHC RBC-ENTMCNC: 20.2 PG — LOW (ref 27–34)
MCHC RBC-ENTMCNC: 29.5 GM/DL — LOW (ref 32–36)
MCHC RBC-ENTMCNC: 30.1 GM/DL — LOW (ref 32–36)
MCV RBC AUTO: 67.2 FL — LOW (ref 80–100)
MCV RBC AUTO: 67.9 FL — LOW (ref 80–100)
NRBC # BLD: 0 /100 WBCS — SIGNIFICANT CHANGE UP (ref 0–0)
NRBC # BLD: 0 /100 WBCS — SIGNIFICANT CHANGE UP (ref 0–0)
NRBC # FLD: 0 K/UL — SIGNIFICANT CHANGE UP (ref 0–0)
NRBC # FLD: 0 K/UL — SIGNIFICANT CHANGE UP (ref 0–0)
PHOSPHATE SERPL-MCNC: 2 MG/DL — LOW (ref 2.5–4.5)
PLATELET # BLD AUTO: 167 K/UL — SIGNIFICANT CHANGE UP (ref 150–400)
PLATELET # BLD AUTO: 173 K/UL — SIGNIFICANT CHANGE UP (ref 150–400)
POTASSIUM SERPL-MCNC: 3.9 MMOL/L — SIGNIFICANT CHANGE UP (ref 3.5–5.3)
POTASSIUM SERPL-SCNC: 3.9 MMOL/L — SIGNIFICANT CHANGE UP (ref 3.5–5.3)
RBC # BLD: 3.8 M/UL — SIGNIFICANT CHANGE UP (ref 3.8–5.2)
RBC # BLD: 4.05 M/UL — SIGNIFICANT CHANGE UP (ref 3.8–5.2)
RBC # FLD: 24.3 % — HIGH (ref 10.3–14.5)
RBC # FLD: 24.5 % — HIGH (ref 10.3–14.5)
RH IG SCN BLD-IMP: POSITIVE — SIGNIFICANT CHANGE UP
SODIUM SERPL-SCNC: 139 MMOL/L — SIGNIFICANT CHANGE UP (ref 135–145)
WBC # BLD: 3.01 K/UL — LOW (ref 3.8–10.5)
WBC # BLD: 3.41 K/UL — LOW (ref 3.8–10.5)
WBC # FLD AUTO: 3.01 K/UL — LOW (ref 3.8–10.5)
WBC # FLD AUTO: 3.41 K/UL — LOW (ref 3.8–10.5)

## 2023-08-14 RX ORDER — PANTOPRAZOLE SODIUM 20 MG/1
40 TABLET, DELAYED RELEASE ORAL
Qty: 0 | Refills: 0 | DISCHARGE
Start: 2023-08-14

## 2023-08-14 RX ORDER — POTASSIUM BICARBONATE 978 MG/1
1 TABLET, EFFERVESCENT ORAL
Refills: 0 | DISCHARGE

## 2023-08-14 RX ORDER — LOPERAMIDE HCL 2 MG
1 TABLET ORAL
Refills: 0 | DISCHARGE

## 2023-08-14 RX ADMIN — Medication 1 APPLICATION(S): at 12:33

## 2023-08-14 RX ADMIN — HEPARIN SODIUM 5000 UNIT(S): 5000 INJECTION INTRAVENOUS; SUBCUTANEOUS at 05:14

## 2023-08-14 RX ADMIN — HEPARIN SODIUM 5000 UNIT(S): 5000 INJECTION INTRAVENOUS; SUBCUTANEOUS at 12:33

## 2023-08-14 RX ADMIN — PANTOPRAZOLE SODIUM 40 MILLIGRAM(S): 20 TABLET, DELAYED RELEASE ORAL at 05:14

## 2023-08-14 RX ADMIN — Medication 325 MILLIGRAM(S): at 12:32

## 2023-08-14 RX ADMIN — SODIUM CHLORIDE 75 MILLILITER(S): 9 INJECTION, SOLUTION INTRAVENOUS at 01:18

## 2023-08-14 RX ADMIN — CARVEDILOL PHOSPHATE 6.25 MILLIGRAM(S): 80 CAPSULE, EXTENDED RELEASE ORAL at 10:36

## 2023-08-14 RX ADMIN — MIRTAZAPINE 30 MILLIGRAM(S): 45 TABLET, ORALLY DISINTEGRATING ORAL at 12:32

## 2023-08-14 NOTE — DISCHARGE NOTE PROVIDER - DETAILS OF MALNUTRITION DIAGNOSIS/DIAGNOSES
This patient has been assessed with a concern for Malnutrition and was treated during this hospitalization for the following Nutrition diagnosis/diagnoses:     -  08/03/2023: Severe protein-calorie malnutrition

## 2023-08-14 NOTE — DISCHARGE NOTE PROVIDER - NSDCMRMEDTOKEN_GEN_ALL_CORE_FT
Coreg 6.25 mg oral tablet: 1 tab(s) orally 2 times a day  ferrous sulfate 324 mg (65 mg elemental iron) oral delayed release tablet: 1 tab(s) orally once a day  loperamide 2 mg oral tablet: 1 tab(s) orally every 8 hours as needed for  constipation  megestrol 40 mg oral tablet: 1 tab(s) orally 2 times a day  miconazole 2% topical ointment: Apply topically to affected area once a day to coccyx and groin  potassium bicarbonate 20 mEq oral tablet, effervescent: 1 tab(s) orally once a day  Remeron 30 mg oral tablet: 1 tab(s) orally once a day   Coreg 6.25 mg oral tablet: 1 tab(s) by gastrostomy tube 2 times a day  ferrous sulfate 324 mg (65 mg elemental iron) oral delayed release tablet: 1 tab(s) by gastrostomy tube once a day  megestrol 40 mg oral tablet: 1 tab(s) by gastrostomy tube 2 times a day  miconazole 2% topical ointment: Apply topically to affected area once a day to coccyx and groin  pantoprazole 40 mg oral granule, delayed release: 40 milligram(s) by gastrostomy tube 2 times a day  Remeron 30 mg oral tablet: 1 tab(s) by gastrostomy tube once a day

## 2023-08-14 NOTE — DISCHARGE NOTE PROVIDER - CARE PROVIDER_API CALL
Moni Brooks  Internal Medicine  [address] [place] [address]  Phone: [phone]  Fax: [phone]  Follow Up Time:     Linda Vasques  Internal Medicine  [address] [place] [address]  Phone: [phone]  Fax: [phone]  Follow Up Time:

## 2023-08-14 NOTE — CHART NOTE - NSCHARTNOTEFT_GEN_A_CORE
Discussed findings of esophageal submucosal lesion at GE junction found on EGD with sister Lindsey. Family will discuss whether they are interested in pursuing further work up on the outpatient setting (ie EUS w/ biopsy).    Please provide the following information for f/u at time of discharge in case family decides to pursue further w/u:  Phone number: [phone]   Faculty Practice at [address] [place] [address]    Alida Brown, PGY5  Gastroenterology/Hepatology Fellow  Available on [employer] Teams  [phone] ([employer] Short Range Pager)  [phone] (Long Range Pager)    After 5pm, please contact the on-call GI fellow. [phone]

## 2023-08-14 NOTE — CHART NOTE - NSCHARTNOTESELECT_GEN_ALL_CORE
GI/Event Note
Event Note
GI Fellow/Off Service Note
GI/Event Note
Nutrition Services
Nutrition Services

## 2023-08-14 NOTE — PROGRESS NOTE ADULT - REASON FOR ADMISSION
severe protein malnutrition

## 2023-08-14 NOTE — DISCHARGE NOTE PROVIDER - INSTRUCTIONS
Pureed diet as tolerated with Jevity 1.5 via PEG @ 45 mL/hr. x 24 hrs for additional caloric intake.  Pureed diet as tolerated with Jevity 1.5 via PEG @ 45 mL/hr. x 24 hrs for additional caloric intake. Free water 200cc q6h via PEG

## 2023-08-14 NOTE — PROGRESS NOTE ADULT - PROVIDER SPECIALTY LIST ADULT
Gastroenterology
Internal Medicine
Cardiology
Cardiology
Gastroenterology
Gastroenterology
Internal Medicine
Gastroenterology
Gastroenterology

## 2023-08-14 NOTE — DISCHARGE NOTE PROVIDER - HOSPITAL COURSE
77 yo F w/ PMHx dementia/alzheimer’s disease, RA, primary pulmonary hypertension, MDD, cataract, OA, MM (not currently treated, previously on Revlimid), mild protein calorie malnutrition who presents as a transfer for failure to thrive.     Pt admitted with FTT, worsening appetite over the past few weeks, reduced po intake, weight loss. Underwent EGD w/ attempted PEG placement on 8/4. PEG placement aborted as patient noted to develop extraluminal hematoma after trochar insertion, which remained stable in size by end of procedure. Patient was hemodynamically stable during and after procedure. Pt is s/p repeat EGD w/ successful PEG placement (bumper at 3 cm) on 8/10. Protonix 40 mg BID given esophagitis seen on EGD. Pt seen by nutrition and started on recommended tube feedings, Pt tolerating goal rate of Jevity 1.5 without complication.     Pt noted to have esophageal submucosal lesion at GE junction found on EGD with sister Lindsey. Family will discuss whether they are interested in pursuing further work up on the outpatient setting (ie EUS w/ biopsy). Clinic information provided.    Case discussed with Dr. Marquez, labs/vitals/medications reviewed, Pt medically cleared for discharge back to NH with outpt follow up as directed. 77 yo F w/ PMHx dementia/alzheimer’s disease, RA, primary pulmonary hypertension, MDD, cataract, OA, MM (not currently treated, previously on Revlimid), mild protein calorie malnutrition who presents as a transfer for failure to thrive.     Pt admitted with FTT, worsening appetite over the past few weeks, reduced po intake, weight loss. Underwent EGD w/ attempted PEG placement on 8/4. PEG placement aborted as patient noted to develop extraluminal hematoma after trochar insertion, which remained stable in size by end of procedure. Patient was hemodynamically stable during and after procedure. Pt is s/p repeat EGD w/ successful PEG placement (bumper at 3 cm) on 8/10. Protonix 40 mg BID given esophagitis seen on EGD. Pt seen by nutrition and started on recommended tube feedings, Pt tolerating goal rate of Jevity 1.5 without complication.     Pt noted to have esophageal submucosal lesion at GE junction found on EGD with sister Lindsey. Family will discuss whether they are interested in pursuing further work up on the outpatient setting (ie EUS w/ biopsy). Clinic information provided.    Case discussed with Dr. Marquez, labs/vitals/medications/feeds+free water reviewed, Pt medically cleared for discharge back to NH with outpt follow up as directed.

## 2023-08-14 NOTE — DISCHARGE NOTE PROVIDER - NSDCCPCAREPLAN_GEN_ALL_CORE_FT
PRINCIPAL DISCHARGE DIAGNOSIS  Diagnosis: FTT (failure to thrive) in adult  Assessment and Plan of Treatment:       SECONDARY DISCHARGE DIAGNOSES  Diagnosis: Severe protein-calorie malnutrition  Assessment and Plan of Treatment: You had a PEG tube placed for additional caloric intake. Continue recommend Jevity 1.5 via PEG @ 45 mL/hr. x 24 hrs.       PRINCIPAL DISCHARGE DIAGNOSIS  Diagnosis: FTT (failure to thrive) in adult  Assessment and Plan of Treatment: You had a PEG tube placed for additional caloric intake. Continue recommend Jevity 1.5 via PEG @ 45 mL/hr. x 24 hrs. Ensure compliance with your medications as directed. Follow up with your primary care physician for further monitoring at NH within 1 week.      SECONDARY DISCHARGE DIAGNOSES  Diagnosis: Severe protein-calorie malnutrition  Assessment and Plan of Treatment: You had a PEG tube placed for additional caloric intake. Continue recommend Jevity 1.5 via PEG @ 45 mL/hr. x 24 hrs. Ensure compliance with your medications as directed. Follow up with your primary care physician for further monitoring at NH within 1 week.    Diagnosis: Microcytic anemia  Assessment and Plan of Treatment: Follow up with your primary care physician for further monitoring at NH within 1 week. Continue iron supplement. Monitor CBC weekly    Diagnosis: Esophageal lesion  Assessment and Plan of Treatment: GI team discussed findings of esophageal submucosal lesion at GE junction found on EGD with sister Lindsey. Family will discuss whether they are interested in pursuing further work up on the outpatient setting (ie EUS w/ biopsy).  If family decides to pursue further work up please call for appointment:  Phone number: [phone]   Faculty Practice at [address] [place] [address]

## 2023-08-14 NOTE — DISCHARGE NOTE NURSING/CASE MANAGEMENT/SOCIAL WORK - PATIENT PORTAL LINK FT
You can access the FollowMyHealth Patient Portal offered by [facility] by registering at the following website: [email or web address]. By joining [employer]’s FollowMyHealth portal, you will also be able to view your health information using other applications (apps) compatible with our system.

## 2023-08-14 NOTE — PROGRESS NOTE ADULT - NUTRITIONAL ASSESSMENT
This patient has been assessed with a concern for Malnutrition and has been determined to have a diagnosis/diagnoses of Severe protein-calorie malnutrition.    This patient is being managed with:   Diet NPO after Midnight-     NPO Start Date: 08-Aug-2023   NPO Start Time: 23:59  Entered: Aug  8 2023  2:07PM    Diet Pureed-  Entered: Aug  5 2023  9:20AM  
This patient has been assessed with a concern for Malnutrition and has been determined to have a diagnosis/diagnoses of Severe protein-calorie malnutrition.    This patient is being managed with:   Diet Pureed-  Entered: Aug  5 2023  9:20AM  
This patient has been assessed with a concern for Malnutrition and has been determined to have a diagnosis/diagnoses of Severe protein-calorie malnutrition.    This patient is being managed with:   Diet NPO after Midnight-     NPO Start Date: 03-Aug-2023   NPO Start Time: 23:59  Entered: Aug  3 2023  4:41PM    Diet Pureed-  Entered: Aug  2 2023  9:26AM  
This patient has been assessed with a concern for Malnutrition and has been determined to have a diagnosis/diagnoses of Severe protein-calorie malnutrition.    This patient is being managed with:   Diet NPO after Midnight-     NPO Start Date: 06-Aug-2023   NPO Start Time: 23:59  Entered: Aug  6 2023  3:17PM    Diet Pureed-  Entered: Aug  5 2023  9:20AM  
This patient has been assessed with a concern for Malnutrition and has been determined to have a diagnosis/diagnoses of Severe protein-calorie malnutrition.    This patient is being managed with:   Diet NPO after Midnight-     NPO Start Date: 07-Aug-2023   NPO Start Time: 23:59  Entered: Aug  7 2023  2:40PM    Diet Pureed-  Entered: Aug  5 2023  9:20AM  
This patient has been assessed with a concern for Malnutrition and has been determined to have a diagnosis/diagnoses of Severe protein-calorie malnutrition.    This patient is being managed with:   Diet Pureed-  Tube Feeding Modality: Gastrostomy  Jevity 1.5 Vignesh (JEVITY1.5RTH)  Total Volume for 24 Hours (mL): 1080  Continuous  Starting Tube Feed Rate {mL per Hour}: 10  Increase Tube Feed Rate by (mL): 10     Every 4 hours  Until Goal Tube Feed Rate (mL per Hour): 45  Tube Feed Duration (in Hours): 24  Tube Feed Start Time: 17:40  Entered: Aug 11 2023  5:39PM  
This patient has been assessed with a concern for Malnutrition and has been determined to have a diagnosis/diagnoses of Severe protein-calorie malnutrition.    This patient is being managed with:   Diet Clear Liquid-  Entered: Aug  4 2023  8:20PM  
This patient has been assessed with a concern for Malnutrition and has been determined to have a diagnosis/diagnoses of Severe protein-calorie malnutrition.    This patient is being managed with:   Diet NPO after Midnight-     NPO Start Date: 09-Aug-2023   NPO Start Time: 23:59  Entered: Aug  9 2023  4:21PM    Diet NPO after Midnight-     NPO Start Date: 09-Aug-2023   NPO Start Time: 23:59  Entered: Aug  9 2023  3:11PM    Diet Pureed-  Entered: Aug  5 2023  9:20AM  
This patient has been assessed with a concern for Malnutrition and has been determined to have a diagnosis/diagnoses of Severe protein-calorie malnutrition.    This patient is being managed with:   Diet Pureed-  Entered: Aug  2 2023  9:26AM  
This patient has been assessed with a concern for Malnutrition and has been determined to have a diagnosis/diagnoses of Severe protein-calorie malnutrition.    This patient is being managed with:   Diet Pureed-  Entered: Aug  5 2023  9:20AM  
This patient has been assessed with a concern for Malnutrition and has been determined to have a diagnosis/diagnoses of Severe protein-calorie malnutrition.    This patient is being managed with:   Diet Pureed-  Tube Feeding Modality: Gastrostomy  Jevity 1.5 Vignesh (JEVITY1.5RTH)  Total Volume for 24 Hours (mL): 1080  Continuous  Starting Tube Feed Rate {mL per Hour}: 10  Increase Tube Feed Rate by (mL): 10     Every 4 hours  Until Goal Tube Feed Rate (mL per Hour): 45  Tube Feed Duration (in Hours): 24  Tube Feed Start Time: 17:40  Entered: Aug 11 2023  5:39PM  
This patient has been assessed with a concern for Malnutrition and has been determined to have a diagnosis/diagnoses of Severe protein-calorie malnutrition.    This patient is being managed with:   Diet Pureed-  Tube Feeding Modality: Gastrostomy  Jevity 1.5 Vignesh (JEVITY1.5RTH)  Total Volume for 24 Hours (mL): 1080  Continuous  Starting Tube Feed Rate {mL per Hour}: 10  Increase Tube Feed Rate by (mL): 10     Every 4 hours  Until Goal Tube Feed Rate (mL per Hour): 45  Tube Feed Duration (in Hours): 24  Tube Feed Start Time: 17:40  Entered: Aug 11 2023  5:39PM

## 2023-08-14 NOTE — PROGRESS NOTE ADULT - SUBJECTIVE AND OBJECTIVE BOX
Coverage for Dr Vasques    Patient is a 78y old  Female who presents with a chief complaint of severe protein malnutrition (13 Aug 2023 09:16)      INTERVAL HPI/OVERNIGHT EVENTS:    MEDICATIONS  (STANDING):  carvedilol 6.25 milliGRAM(s) Oral every 12 hours  clotrimazole 1% Cream 1 Application(s) Topical <User Schedule>  dextrose 5% + sodium chloride 0.45%. 1000 milliLiter(s) (75 mL/Hr) IV Continuous <Continuous>  ferrous    sulfate 325 milliGRAM(s) Oral daily  heparin   Injectable 5000 Unit(s) SubCutaneous every 8 hours  mirtazapine 30 milliGRAM(s) Oral daily  pantoprazole   Suspension 40 milliGRAM(s) Enteral Tube two times a day    MEDICATIONS  (PRN):  acetaminophen     Tablet .. 650 milliGRAM(s) Oral every 6 hours PRN Mild Pain (1 - 3)  melatonin 3 milliGRAM(s) Oral at bedtime PRN Insomnia          Allergies    No Known Allergies    Intolerances        REVIEW OF SYSTEMS:  CARDIOVASCULAR: No chest pain, palpitations, dizziness, or leg swelling; no shortness of breath     RESPIRATORY: No cough, wheezing, chills or hemoptysis; No shortness of breath    GASTROINTESTINAL: No abdominal or epigastric pain. No nausea, vomiting, or hematemesis; No diarrhea or constipation. No melena or hematochezia.    NEUROLOGICAL: No headaches, memory loss, loss of strength, numbness      PHYSICAL EXAM:  Vital Signs Last 24 Hrs  T(C): 36.7 (14 Aug 2023 04:45), Max: 37.1 (13 Aug 2023 17:10)  T(F): 98 (14 Aug 2023 04:45), Max: 98.7 (13 Aug 2023 17:10)  HR: 72 (14 Aug 2023 04:45) (70 - 88)  BP: 100/52 (14 Aug 2023 04:45) (100/52 - 149/77)  BP(mean): --  RR: 17 (14 Aug 2023 04:45) (17 - 18)  SpO2: 100% (14 Aug 2023 04:45) (96% - 100%)    Parameters below as of 14 Aug 2023 04:45  Patient On (Oxygen Delivery Method): room air          GENERAL: NAD, well-groomed, well-developed  HEAD:  Atraumatic, Normocephalic  EYES: EOMI, PERRLA, conjunctiva and sclera clear  NECK: Supple, No JVD, Normal thyroid    CHEST/LUNG: Clear to auscultation bilaterally; No rales, rhonchi, wheezing, or rubs  HEART: S1S2 regular, without murmur, rub nor gallop  ABDOMEN: Soft, Mildly tender at PEG site , Nondistended; Bowel sounds present        LABS:                        7.6    3.41  )-----------( 167      ( 14 Aug 2023 03:55 )             25.8     14 Aug 2023 03:55    139    |  109    |  9      ----------------------------<  96     3.9     |  23     |  0.50     Ca    8.0        14 Aug 2023 03:55  Phos  2.0       14 Aug 2023 03:55  Mg     1.60      14 Aug 2023 03:55      :  Assessment-  s/p PEG  tolerating f tube feeds  Anemia-  Slight drop in H/H stable, would recheck prior to d/c  Hypokalemia- replenished   D/c planning     Changed Pantoprazole to Per PEG

## 2023-09-21 ENCOUNTER — INPATIENT (INPATIENT)
Facility: HOSPITAL | Age: 78
LOS: 21 days | Discharge: NURSING, REHAB OR LONG-TERM CARE | End: 2023-10-13
Attending: INTERNAL MEDICINE | Admitting: INTERNAL MEDICINE
Payer: MEDICARE

## 2023-09-21 VITALS
TEMPERATURE: 99 F | HEIGHT: 67 IN | OXYGEN SATURATION: 98 % | SYSTOLIC BLOOD PRESSURE: 107 MMHG | RESPIRATION RATE: 18 BRPM | HEART RATE: 87 BPM | DIASTOLIC BLOOD PRESSURE: 63 MMHG

## 2023-09-21 DIAGNOSIS — Z98.890 OTHER SPECIFIED POSTPROCEDURAL STATES: Chronic | ICD-10-CM

## 2023-09-21 LAB
BLOOD GAS VENOUS COMPREHENSIVE RESULT: SIGNIFICANT CHANGE UP
IANC: 11.07 K/UL — HIGH (ref 1.8–7.4)

## 2023-09-21 PROCEDURE — 71045 X-RAY EXAM CHEST 1 VIEW: CPT | Mod: 26

## 2023-09-21 PROCEDURE — 99291 CRITICAL CARE FIRST HOUR: CPT | Mod: GC

## 2023-09-21 RX ORDER — SODIUM CHLORIDE 9 MG/ML
1000 INJECTION, SOLUTION INTRAVENOUS ONCE
Refills: 0 | Status: COMPLETED | OUTPATIENT
Start: 2023-09-21 | End: 2023-09-21

## 2023-09-21 RX ADMIN — SODIUM CHLORIDE 1000 MILLILITER(S): 9 INJECTION, SOLUTION INTRAVENOUS at 23:34

## 2023-09-21 NOTE — ED ADULT TRIAGE NOTE - PATIENT ON (OXYGEN DELIVERY METHOD)
Patient awake and alert, rr unlabored, skin warm and dry. VSS. Discharge instructions provided and explained to parent. Parent verbalized understanding of all instructions. Parent aware to follow up with PCP as instructed.  Aware to come back to ED if any difficulty breathing, not tolerating PO, uncontrolled vomiting or worsening symptoms. All questions were answered. Patient discharged home in stable condition.   room air

## 2023-09-21 NOTE — ED PROVIDER NOTE - PROGRESS NOTE DETAILS
Spoke with pt's emergency contact (tomeka jones) over phone.  Updated on pt's lab results requiring transfusion.  Reviewed risks and benefits of receiving blood products.  Patient understands and consents.  Witnessed by RN.

## 2023-09-21 NOTE — ED PROVIDER NOTE - OBJECTIVE STATEMENT
78-year-old female, history of Alzheimer's, presenting from nursing home for hypotension.  Per EMS, blood pressure at nursing home was 87/56.  Repeat here 107/63.  Patient reports cough over the past 2 days, denies chest pain, shortness of breath, abdominal pain, fever.  Reports over a week ago had burning with urination, but states she was put on a medicine and this is now resolved. 78-year-old female, history of Alzheimer's, MM, presenting from nursing home for hypotension.  Per EMS, blood pressure at nursing home was 87/56.  Repeat here 107/63.  Patient reports cough over the past 2 days, denies chest pain, shortness of breath, abdominal pain, fever.  Reports over a week ago had burning with urination, but states she was put on a medicine and this is now resolved.

## 2023-09-21 NOTE — ED ADULT TRIAGE NOTE - CHIEF COMPLAINT QUOTE
hypotension    pt sent from [facility] for hypotension. as per ems, nh report pt bp was 87/56.  now 107/63.  unknown labs were sent and started on ivf and abx.  23 gauge to left upper arm by nh. 20 gauge right wrist by ems.  pt alert, oriented x1 at baseline.  past medical hiustory- Alzheimer's, htn, gerd.  pt has a molst/dnr

## 2023-09-21 NOTE — ED ADULT NURSE NOTE - OBJECTIVE STATEMENT
Pt received, sent from nursing home for hypotension. Pt BP currently 127/74. Pt appears pale/weak. Bedbound, sacral wound and LLE wound noted. R arm swelling noted. Pt arrives with IV line in L arm and IV line in R hand. Per EMS L arm IV placed at nursing home and R hand IV placed by EMS. Pt is contracted. Incontinence care provided. Clean linens provided. Awaiting MD flores. Safety maintained.

## 2023-09-21 NOTE — ED PROVIDER NOTE - ATTENDING CONTRIBUTION TO CARE
78F alzheimers from [facility] for episode of hypotension.  EMS was told BP 80's systolic.  now /80s.  AAOx1.  No current complaints.  Reports cough.  Recent tx for UTI.  Exam contracted, lungs clear.  Abd ttp diffuse.  No LE edema.  Pt awake but noninteractive, mumbling, withdraws extrems to pain, appears chronically ill, bedbound and contracted.  Found to be anemic, having LG temp.  Str cath urine positive.  Rx abx.  Found to be anemic, rx PRBC.  Nontender abd.  Admit.  HD stable.  VS:  unremarkable except LG temp    GEN - Pt awake but noninteractive, mumbling, withdraws extrems to pain, appears chronically ill, bedbound and contracted.  Maintaining airway.  HEAD - NC/AT     ENT - PEERL, EOMI, mucous membranes   dry , no discharge      NECK: Neck supple, non-tender without lymphadenopathy, no masses, no JVD  PULM - CTA b/l,  symmetric breath sounds  COR -  normal heart sounds    ABD - , ND, NT, soft,  PEG site c/d/i   BACK - no CVA tenderness, nontender spine     EXTREMS - no edema, LE limb contractures, warm and well perfused    SKIN - no rash    or bruising    (+)LE, UE ulcers.    NEUROLOGIC - Pt awake but noninteractive, mumbling, withdraws extrems to pain    Upon my evaluation, this patient had a high probability of imminent or life-threatening deterioration due to acute anemia, UTI, which required my direct attention, intervention, and personal management.  The patient has a  medical condition that impairs one or more vital organ systems.  Frequent personal assessment and adjustment of medical interventions was performed.      I have personally provided 50 minutes of critical care time exclusive of time spent on separately billable procedures. Time includes review of laboratory data, radiology results, discussion with consultants, patient and family; monitoring for potential decompensation, as well as time spent retrieving data and reviewing the chart and documenting the visit. Interventions were performed as documented above.

## 2023-09-21 NOTE — ED PROVIDER NOTE - SKIN, MLM
Skin normal color for race, warm, dry and intact. No evidence of rash. Skin normal color for race, warm, dry and intact. +erythema to proximal RUE; small wound superior to right antecubital area with small amount purulent drainage

## 2023-09-21 NOTE — ED ADULT NURSE NOTE - CHIEF COMPLAINT QUOTE
hypotension    pt sent from [facility] for hypotension. as per ems, nh report pt bp was 87/56.  now 107/63.  unknown labs were sent and started on ivf and abx.  23 gauge to left upper arm by nh. 20 gauge right wrist by ems.  pt alert, oriented x1 at baseline.  past medical hiustory- Alzheimer's, htn, gerd.  pt has a molst/dnr

## 2023-09-21 NOTE — ED ADULT NURSE NOTE - NSFALLHARMRISKINTERV_ED_ALL_ED
Assistance OOB with selected safe patient handling equipment if applicable/Communicate risk of Fall with Harm to all staff, patient, and family/Provide patient with walking aids/Provide visual cue: red socks, yellow wristband, yellow gown, etc/Reinforce activity limits and safety measures with patient and family/Bed in lowest position, wheels locked, appropriate side rails in place/Call bell, personal items and telephone in reach/Instruct patient to call for assistance before getting out of bed/chair/stretcher/Non-slip footwear applied when patient is off stretcher/[place] to call system/Physically safe environment - no spills, clutter or unnecessary equipment/Purposeful Proactive Rounding/Room/bathroom lighting operational, light cord in reach

## 2023-09-21 NOTE — ED ADULT NURSE REASSESSMENT NOTE - NS ED NURSE REASSESS COMMENT FT1
Pt awake, confused, resting in stretcher. RR even and unlabored, pt satting 98% on RA. NSR on cardiac monitor. Labs collected and sent as per MD orders. Pt straight catheterized using sterile technique as per MD orders. approx 300ml of cloudy yellow urine drained. Urine collected and sent as per MD orders. Safety measures in place, pt stable upon exiting room.

## 2023-09-21 NOTE — ED PROVIDER NOTE - CLINICAL SUMMARY MEDICAL DECISION MAKING FREE TEXT BOX
78-year-old female, history of Alzheimer's, presenting from nursing home for hypotension.  Patient reports cough over the past 2 days, denies chest pain, shortness of breath, abdominal pain, fever.  Reports over a week ago had burning with urination.  Physical exam remarkable for contracted extremities, and diffuse abdominal tenderness.  Concern for infectious process.  Will check cbc, cmp, vbg, chest xray, lipase, ua, ct abd/pelv with contrast

## 2023-09-21 NOTE — ED PROVIDER NOTE - CARE PLAN
1 Principal Discharge DX:	Drop in hematocrit   Principal Discharge DX:	Drop in hematocrit  Secondary Diagnosis:	UTI (urinary tract infection)

## 2023-09-22 DIAGNOSIS — M06.9 RHEUMATOID ARTHRITIS, UNSPECIFIED: ICD-10-CM

## 2023-09-22 DIAGNOSIS — I83.009 VARICOSE VEINS OF UNSPECIFIED LOWER EXTREMITY WITH ULCER OF UNSPECIFIED SITE: ICD-10-CM

## 2023-09-22 DIAGNOSIS — E87.1 HYPO-OSMOLALITY AND HYPONATREMIA: ICD-10-CM

## 2023-09-22 DIAGNOSIS — D64.9 ANEMIA, UNSPECIFIED: ICD-10-CM

## 2023-09-22 DIAGNOSIS — C90.00 MULTIPLE MYELOMA NOT HAVING ACHIEVED REMISSION: ICD-10-CM

## 2023-09-22 DIAGNOSIS — Z29.9 ENCOUNTER FOR PROPHYLACTIC MEASURES, UNSPECIFIED: ICD-10-CM

## 2023-09-22 DIAGNOSIS — G30.9 ALZHEIMER'S DISEASE, UNSPECIFIED: ICD-10-CM

## 2023-09-22 DIAGNOSIS — Z87.19 PERSONAL HISTORY OF OTHER DISEASES OF THE DIGESTIVE SYSTEM: ICD-10-CM

## 2023-09-22 DIAGNOSIS — R71.0 PRECIPITOUS DROP IN HEMATOCRIT: ICD-10-CM

## 2023-09-22 DIAGNOSIS — A41.9 SEPSIS, UNSPECIFIED ORGANISM: ICD-10-CM

## 2023-09-22 PROBLEM — F03.90 UNSPECIFIED DEMENTIA WITHOUT BEHAVIORAL DISTURBANCE: Chronic | Status: ACTIVE | Noted: 2023-08-02

## 2023-09-22 PROBLEM — E44.1 MILD PROTEIN-CALORIE MALNUTRITION: Chronic | Status: ACTIVE | Noted: 2023-08-02

## 2023-09-22 PROBLEM — I27.0 PRIMARY PULMONARY HYPERTENSION: Chronic | Status: ACTIVE | Noted: 2023-08-02

## 2023-09-22 PROBLEM — M19.90 UNSPECIFIED OSTEOARTHRITIS, UNSPECIFIED SITE: Chronic | Status: ACTIVE | Noted: 2023-08-02

## 2023-09-22 PROBLEM — F32.9 MAJOR DEPRESSIVE DISORDER, SINGLE EPISODE, UNSPECIFIED: Chronic | Status: ACTIVE | Noted: 2023-08-02

## 2023-09-22 PROBLEM — H26.9 UNSPECIFIED CATARACT: Chronic | Status: ACTIVE | Noted: 2023-08-02

## 2023-09-22 LAB
ALBUMIN SERPL ELPH-MCNC: 2 G/DL — LOW (ref 3.3–5)
ALP SERPL-CCNC: 257 U/L — HIGH (ref 40–120)
ALT FLD-CCNC: 64 U/L — HIGH (ref 4–33)
ANION GAP SERPL CALC-SCNC: 10 MMOL/L — SIGNIFICANT CHANGE UP (ref 7–14)
ANION GAP SERPL CALC-SCNC: 12 MMOL/L — SIGNIFICANT CHANGE UP (ref 7–14)
ANION GAP SERPL CALC-SCNC: 8 MMOL/L — SIGNIFICANT CHANGE UP (ref 7–14)
APPEARANCE UR: ABNORMAL
APTT BLD: 24.4 SEC — LOW (ref 24.5–35.6)
AST SERPL-CCNC: 82 U/L — HIGH (ref 4–32)
BACTERIA # UR AUTO: ABNORMAL /HPF
BASOPHILS # BLD AUTO: 0 K/UL — SIGNIFICANT CHANGE UP (ref 0–0.2)
BASOPHILS NFR BLD AUTO: 0 % — SIGNIFICANT CHANGE UP (ref 0–2)
BILIRUB SERPL-MCNC: 1.2 MG/DL — SIGNIFICANT CHANGE UP (ref 0.2–1.2)
BILIRUB UR-MCNC: NEGATIVE — SIGNIFICANT CHANGE UP
BLD GP AB SCN SERPL QL: NEGATIVE — SIGNIFICANT CHANGE UP
BUN SERPL-MCNC: 15 MG/DL — SIGNIFICANT CHANGE UP (ref 7–23)
BUN SERPL-MCNC: 18 MG/DL — SIGNIFICANT CHANGE UP (ref 7–23)
BUN SERPL-MCNC: 18 MG/DL — SIGNIFICANT CHANGE UP (ref 7–23)
CALCIUM SERPL-MCNC: 7.4 MG/DL — LOW (ref 8.4–10.5)
CALCIUM SERPL-MCNC: 7.4 MG/DL — LOW (ref 8.4–10.5)
CALCIUM SERPL-MCNC: 7.5 MG/DL — LOW (ref 8.4–10.5)
CAST: 19 /LPF — HIGH (ref 0–4)
CHLORIDE SERPL-SCNC: 90 MMOL/L — LOW (ref 98–107)
CHLORIDE SERPL-SCNC: 96 MMOL/L — LOW (ref 98–107)
CHLORIDE SERPL-SCNC: 99 MMOL/L — SIGNIFICANT CHANGE UP (ref 98–107)
CO2 SERPL-SCNC: 22 MMOL/L — SIGNIFICANT CHANGE UP (ref 22–31)
CO2 SERPL-SCNC: 23 MMOL/L — SIGNIFICANT CHANGE UP (ref 22–31)
CO2 SERPL-SCNC: 26 MMOL/L — SIGNIFICANT CHANGE UP (ref 22–31)
COLOR SPEC: YELLOW — SIGNIFICANT CHANGE UP
CREAT SERPL-MCNC: 0.44 MG/DL — LOW (ref 0.5–1.3)
CREAT SERPL-MCNC: 0.48 MG/DL — LOW (ref 0.5–1.3)
CREAT SERPL-MCNC: 0.54 MG/DL — SIGNIFICANT CHANGE UP (ref 0.5–1.3)
DIFF PNL FLD: ABNORMAL
EGFR: 100 ML/MIN/1.73M2 — SIGNIFICANT CHANGE UP
EGFR: 95 ML/MIN/1.73M2 — SIGNIFICANT CHANGE UP
EGFR: 97 ML/MIN/1.73M2 — SIGNIFICANT CHANGE UP
EOSINOPHIL # BLD AUTO: 0 K/UL — SIGNIFICANT CHANGE UP (ref 0–0.5)
EOSINOPHIL NFR BLD AUTO: 0 % — SIGNIFICANT CHANGE UP (ref 0–6)
FERRITIN SERPL-MCNC: 397 NG/ML — HIGH (ref 13–330)
GLUCOSE SERPL-MCNC: 80 MG/DL — SIGNIFICANT CHANGE UP (ref 70–99)
GLUCOSE SERPL-MCNC: 91 MG/DL — SIGNIFICANT CHANGE UP (ref 70–99)
GLUCOSE SERPL-MCNC: 97 MG/DL — SIGNIFICANT CHANGE UP (ref 70–99)
GLUCOSE UR QL: NEGATIVE MG/DL — SIGNIFICANT CHANGE UP
GRAM STN FLD: SIGNIFICANT CHANGE UP
HCT VFR BLD CALC: 20.1 % — CRITICAL LOW (ref 34.5–45)
HCT VFR BLD CALC: 21.4 % — LOW (ref 34.5–45)
HCT VFR BLD CALC: 25.3 % — LOW (ref 34.5–45)
HGB BLD-MCNC: 6.2 G/DL — CRITICAL LOW (ref 11.5–15.5)
HGB BLD-MCNC: 6.8 G/DL — CRITICAL LOW (ref 11.5–15.5)
HGB BLD-MCNC: 8.6 G/DL — LOW (ref 11.5–15.5)
IRON SATN MFR SERPL: 64 % — HIGH (ref 14–50)
IRON SATN MFR SERPL: 78 UG/DL — SIGNIFICANT CHANGE UP (ref 30–160)
KETONES UR-MCNC: NEGATIVE MG/DL — SIGNIFICANT CHANGE UP
LACTATE SERPL-SCNC: 1.5 MMOL/L — SIGNIFICANT CHANGE UP (ref 0.5–2)
LEUKOCYTE ESTERASE UR-ACNC: ABNORMAL
LIDOCAIN IGE QN: 121 U/L — HIGH (ref 7–60)
LYMPHOCYTES # BLD AUTO: 0.24 K/UL — LOW (ref 1–3.3)
LYMPHOCYTES # BLD AUTO: 1.8 % — LOW (ref 13–44)
MAGNESIUM SERPL-MCNC: 1.9 MG/DL — SIGNIFICANT CHANGE UP (ref 1.6–2.6)
MAGNESIUM SERPL-MCNC: 1.9 MG/DL — SIGNIFICANT CHANGE UP (ref 1.6–2.6)
MCHC RBC-ENTMCNC: 19.5 PG — LOW (ref 27–34)
MCHC RBC-ENTMCNC: 20.9 PG — LOW (ref 27–34)
MCHC RBC-ENTMCNC: 23.2 PG — LOW (ref 27–34)
MCHC RBC-ENTMCNC: 30.8 GM/DL — LOW (ref 32–36)
MCHC RBC-ENTMCNC: 31.8 GM/DL — LOW (ref 32–36)
MCHC RBC-ENTMCNC: 34 GM/DL — SIGNIFICANT CHANGE UP (ref 32–36)
MCV RBC AUTO: 63.2 FL — LOW (ref 80–100)
MCV RBC AUTO: 65.8 FL — LOW (ref 80–100)
MCV RBC AUTO: 68.2 FL — LOW (ref 80–100)
METHOD TYPE: SIGNIFICANT CHANGE UP
MONOCYTES # BLD AUTO: 0.59 K/UL — SIGNIFICANT CHANGE UP (ref 0–0.9)
MONOCYTES NFR BLD AUTO: 4.4 % — SIGNIFICANT CHANGE UP (ref 2–14)
MSSA DNA SPEC QL NAA+PROBE: SIGNIFICANT CHANGE UP
NEUTROPHILS # BLD AUTO: 12.39 K/UL — HIGH (ref 1.8–7.4)
NEUTROPHILS NFR BLD AUTO: 85.8 % — HIGH (ref 43–77)
NITRITE UR-MCNC: NEGATIVE — SIGNIFICANT CHANGE UP
NRBC # BLD: 0 /100 WBCS — SIGNIFICANT CHANGE UP (ref 0–0)
NRBC # BLD: 0 /100 WBCS — SIGNIFICANT CHANGE UP (ref 0–0)
NRBC # FLD: 0 K/UL — SIGNIFICANT CHANGE UP (ref 0–0)
NRBC # FLD: 0.02 K/UL — HIGH (ref 0–0)
OSMOLALITY SERPL: 275 MOSM/KG — SIGNIFICANT CHANGE UP (ref 275–295)
OSMOLALITY UR: 136 MOSM/KG — SIGNIFICANT CHANGE UP (ref 50–1200)
PH UR: 7 — SIGNIFICANT CHANGE UP (ref 5–8)
PHOSPHATE SERPL-MCNC: 2.1 MG/DL — LOW (ref 2.5–4.5)
PHOSPHATE SERPL-MCNC: 2.4 MG/DL — LOW (ref 2.5–4.5)
PLATELET # BLD AUTO: 182 K/UL — SIGNIFICANT CHANGE UP (ref 150–400)
PLATELET # BLD AUTO: 277 K/UL — SIGNIFICANT CHANGE UP (ref 150–400)
PLATELET # BLD AUTO: 283 K/UL — SIGNIFICANT CHANGE UP (ref 150–400)
POTASSIUM SERPL-MCNC: 3.9 MMOL/L — SIGNIFICANT CHANGE UP (ref 3.5–5.3)
POTASSIUM SERPL-MCNC: 4.5 MMOL/L — SIGNIFICANT CHANGE UP (ref 3.5–5.3)
POTASSIUM SERPL-MCNC: 4.8 MMOL/L — SIGNIFICANT CHANGE UP (ref 3.5–5.3)
POTASSIUM SERPL-SCNC: 3.9 MMOL/L — SIGNIFICANT CHANGE UP (ref 3.5–5.3)
POTASSIUM SERPL-SCNC: 4.5 MMOL/L — SIGNIFICANT CHANGE UP (ref 3.5–5.3)
POTASSIUM SERPL-SCNC: 4.8 MMOL/L — SIGNIFICANT CHANGE UP (ref 3.5–5.3)
PROT SERPL-MCNC: 6 G/DL — SIGNIFICANT CHANGE UP (ref 6–8.3)
PROT UR-MCNC: 100 MG/DL
RBC # BLD: 3.18 M/UL — LOW (ref 3.8–5.2)
RBC # BLD: 3.25 M/UL — LOW (ref 3.8–5.2)
RBC # BLD: 3.71 M/UL — LOW (ref 3.8–5.2)
RBC # FLD: 18.5 % — HIGH (ref 10.3–14.5)
RBC # FLD: 22.3 % — HIGH (ref 10.3–14.5)
RBC # FLD: 24.5 % — HIGH (ref 10.3–14.5)
RBC CASTS # UR COMP ASSIST: 1 /HPF — SIGNIFICANT CHANGE UP (ref 0–4)
REVIEW: SIGNIFICANT CHANGE UP
RH IG SCN BLD-IMP: POSITIVE — SIGNIFICANT CHANGE UP
SODIUM SERPL-SCNC: 124 MMOL/L — LOW (ref 135–145)
SODIUM SERPL-SCNC: 130 MMOL/L — LOW (ref 135–145)
SODIUM SERPL-SCNC: 132 MMOL/L — LOW (ref 135–145)
SODIUM UR-SCNC: <20 MMOL/L — SIGNIFICANT CHANGE UP
SP GR SPEC: 1.01 — SIGNIFICANT CHANGE UP (ref 1–1.03)
SPECIMEN SOURCE: SIGNIFICANT CHANGE UP
SQUAMOUS # UR AUTO: 5 /HPF — SIGNIFICANT CHANGE UP (ref 0–5)
TIBC SERPL-MCNC: 122 UG/DL — LOW (ref 220–430)
UIBC SERPL-MCNC: 44 UG/DL — LOW (ref 110–370)
UROBILINOGEN FLD QL: 1 MG/DL — SIGNIFICANT CHANGE UP (ref 0.2–1)
WBC # BLD: 11.28 K/UL — HIGH (ref 3.8–10.5)
WBC # BLD: 12.28 K/UL — HIGH (ref 3.8–10.5)
WBC # BLD: 13.34 K/UL — HIGH (ref 3.8–10.5)
WBC # FLD AUTO: 11.28 K/UL — HIGH (ref 3.8–10.5)
WBC # FLD AUTO: 12.28 K/UL — HIGH (ref 3.8–10.5)
WBC # FLD AUTO: 13.34 K/UL — HIGH (ref 3.8–10.5)
WBC UR QL: 2921 /HPF — HIGH (ref 0–5)

## 2023-09-22 PROCEDURE — 99223 1ST HOSP IP/OBS HIGH 75: CPT

## 2023-09-22 RX ORDER — FERROUS SULFATE 325(65) MG
220 TABLET ORAL EVERY 12 HOURS
Refills: 0 | Status: DISCONTINUED | OUTPATIENT
Start: 2023-09-22 | End: 2023-10-13

## 2023-09-22 RX ORDER — PANTOPRAZOLE SODIUM 20 MG/1
40 TABLET, DELAYED RELEASE ORAL
Refills: 0 | Status: DISCONTINUED | OUTPATIENT
Start: 2023-09-22 | End: 2023-09-22

## 2023-09-22 RX ORDER — MAGNESIUM HYDROXIDE 400 MG/1
5 TABLET, CHEWABLE ORAL DAILY
Refills: 0 | Status: DISCONTINUED | OUTPATIENT
Start: 2023-09-22 | End: 2023-10-13

## 2023-09-22 RX ORDER — FERROUS SULFATE 325(65) MG
1 TABLET ORAL
Qty: 0 | Refills: 0 | DISCHARGE

## 2023-09-22 RX ORDER — CEFTRIAXONE 500 MG/1
1000 INJECTION, POWDER, FOR SOLUTION INTRAMUSCULAR; INTRAVENOUS EVERY 24 HOURS
Refills: 0 | Status: DISCONTINUED | OUTPATIENT
Start: 2023-09-22 | End: 2023-09-23

## 2023-09-22 RX ORDER — MIRTAZAPINE 45 MG/1
1 TABLET, ORALLY DISINTEGRATING ORAL
Qty: 0 | Refills: 0 | DISCHARGE

## 2023-09-22 RX ORDER — PIPERACILLIN AND TAZOBACTAM 4; .5 G/20ML; G/20ML
3.38 INJECTION, POWDER, LYOPHILIZED, FOR SOLUTION INTRAVENOUS ONCE
Refills: 0 | Status: COMPLETED | OUTPATIENT
Start: 2023-09-22 | End: 2023-09-22

## 2023-09-22 RX ORDER — PANTOPRAZOLE SODIUM 20 MG/1
40 TABLET, DELAYED RELEASE ORAL DAILY
Refills: 0 | Status: DISCONTINUED | OUTPATIENT
Start: 2023-09-22 | End: 2023-10-13

## 2023-09-22 RX ORDER — MICONAZOLE NITRATE 2 %
1 CREAM (GRAM) TOPICAL
Refills: 0 | DISCHARGE

## 2023-09-22 RX ORDER — MIRTAZAPINE 45 MG/1
1 TABLET, ORALLY DISINTEGRATING ORAL
Refills: 0 | DISCHARGE

## 2023-09-22 RX ORDER — VANCOMYCIN HCL 1 G
750 VIAL (EA) INTRAVENOUS EVERY 12 HOURS
Refills: 0 | Status: DISCONTINUED | OUTPATIENT
Start: 2023-09-22 | End: 2023-09-23

## 2023-09-22 RX ORDER — VANCOMYCIN HCL 1 G
1000 VIAL (EA) INTRAVENOUS ONCE
Refills: 0 | Status: COMPLETED | OUTPATIENT
Start: 2023-09-22 | End: 2023-09-22

## 2023-09-22 RX ORDER — MIRTAZAPINE 45 MG/1
15 TABLET, ORALLY DISINTEGRATING ORAL AT BEDTIME
Refills: 0 | Status: DISCONTINUED | OUTPATIENT
Start: 2023-09-22 | End: 2023-10-13

## 2023-09-22 RX ORDER — OMEPRAZOLE 10 MG/1
1 CAPSULE, DELAYED RELEASE ORAL
Refills: 0 | DISCHARGE

## 2023-09-22 RX ORDER — ACETAMINOPHEN 500 MG
650 TABLET ORAL EVERY 6 HOURS
Refills: 0 | Status: DISCONTINUED | OUTPATIENT
Start: 2023-09-22 | End: 2023-09-28

## 2023-09-22 RX ORDER — FERROUS FUMARATE 350(115)MG
220 TABLET ORAL
Refills: 0 | DISCHARGE

## 2023-09-22 RX ORDER — SODIUM CHLORIDE 9 MG/ML
1000 INJECTION INTRAMUSCULAR; INTRAVENOUS; SUBCUTANEOUS
Refills: 0 | Status: DISCONTINUED | OUTPATIENT
Start: 2023-09-22 | End: 2023-09-22

## 2023-09-22 RX ORDER — ACETAMINOPHEN 500 MG
1000 TABLET ORAL ONCE
Refills: 0 | Status: COMPLETED | OUTPATIENT
Start: 2023-09-22 | End: 2023-09-22

## 2023-09-22 RX ORDER — MEGESTROL ACETATE 40 MG/ML
1 SUSPENSION ORAL
Qty: 0 | Refills: 0 | DISCHARGE

## 2023-09-22 RX ADMIN — MAGNESIUM HYDROXIDE 5 MILLILITER(S): 400 TABLET, CHEWABLE ORAL at 18:53

## 2023-09-22 RX ADMIN — SODIUM CHLORIDE 75 MILLILITER(S): 9 INJECTION INTRAMUSCULAR; INTRAVENOUS; SUBCUTANEOUS at 13:50

## 2023-09-22 RX ADMIN — MIRTAZAPINE 15 MILLIGRAM(S): 45 TABLET, ORALLY DISINTEGRATING ORAL at 22:13

## 2023-09-22 RX ADMIN — Medication 400 MILLIGRAM(S): at 03:35

## 2023-09-22 RX ADMIN — Medication 250 MILLIGRAM(S): at 07:23

## 2023-09-22 RX ADMIN — PANTOPRAZOLE SODIUM 40 MILLIGRAM(S): 20 TABLET, DELAYED RELEASE ORAL at 18:51

## 2023-09-22 RX ADMIN — Medication 1 APPLICATION(S): at 18:37

## 2023-09-22 RX ADMIN — PIPERACILLIN AND TAZOBACTAM 200 GRAM(S): 4; .5 INJECTION, POWDER, LYOPHILIZED, FOR SOLUTION INTRAVENOUS at 06:42

## 2023-09-22 RX ADMIN — Medication 250 MILLIGRAM(S): at 19:04

## 2023-09-22 RX ADMIN — Medication 220 MILLIGRAM(S): at 18:50

## 2023-09-22 RX ADMIN — Medication 1000 MILLIGRAM(S): at 06:28

## 2023-09-22 RX ADMIN — CEFTRIAXONE 100 MILLIGRAM(S): 500 INJECTION, POWDER, FOR SOLUTION INTRAMUSCULAR; INTRAVENOUS at 13:43

## 2023-09-22 RX ADMIN — SODIUM CHLORIDE 75 MILLILITER(S): 9 INJECTION INTRAMUSCULAR; INTRAVENOUS; SUBCUTANEOUS at 11:38

## 2023-09-22 NOTE — H&P ADULT - NSHPLABSRESULTS_GEN_ALL_CORE
LABS:                        6.2    13.34 )-----------( 283      ( 21 Sep 2023 23:35 )             20.1         124<L>  |  90<L>  |  18  ----------------------------<  97  4.8   |  22  |  0.48<L>    Ca    7.4<L>      21 Sep 2023 23:35    TPro  6.0  /  Alb  2.0<L>  /  TBili  1.2  /  DBili  x   /  AST  82<H>  /  ALT  64<H>  /  AlkPhos  257<H>            Urinalysis Basic - ( 21 Sep 2023 23:35 )    Color: Yellow / Appearance: Turbid / S.010 / pH: x  Gluc: 97 mg/dL / Ketone: Negative mg/dL  / Bili: Negative / Urobili: 1.0 mg/dL   Blood: x / Protein: 100 mg/dL / Nitrite: Negative   Leuk Esterase: Large / RBC: 1 /HPF / WBC 2921 /HPF   Sq Epi: x / Non Sq Epi: 5 /HPF / Bacteria: Many /HPF        SARS-CoV-2: NotDetec (06 Aug 2023 18:05)  SARS-CoV-2: NotDetec (01 Aug 2023 19:30)      RADIOLOGY & ADDITIONAL TESTS:  New Imaging Personally Reviewed Today: Yes  New Electrocardiogram Personally Reviewed Today: Yes  Other Results Reviewed Today: Yes  Prior or Outpatient Records Reviewed Today with Summary: Yes

## 2023-09-22 NOTE — H&P ADULT - PROBLEM SELECTOR PLAN 4
-would obtain collateral on baseline mental status, attempted to call emergency contact tomeka high was full  -patient is alert, conversant, although A&Ox1 believes she is in [facility]

## 2023-09-22 NOTE — H&P ADULT - NSHPPHYSICALEXAM_GEN_ALL_CORE
Vital Signs Last 24 Hrs  T(C): 37.4 (22 Sep 2023 09:00), Max: 37.9 (21 Sep 2023 23:40)  T(F): 99.3 (22 Sep 2023 09:00), Max: 100.3 (21 Sep 2023 23:40)  HR: 80 (22 Sep 2023 09:00) (75 - 91)  BP: 92/56 (22 Sep 2023 09:00) (92/51 - 127/74)  BP(mean): 64 (22 Sep 2023 09:00) (64 - 75)  RR: 18 (22 Sep 2023 09:00) (16 - 26)  SpO2: 98% (22 Sep 2023 09:00) (97% - 100%)    Parameters below as of 22 Sep 2023 09:00  Patient On (Oxygen Delivery Method): room air        CONSTITUTIONAL: Well-groomed, in no apparent distress  EYES: No conjunctival or scleral injection, non-icteric;   ENMT: No external nasal lesions; MMM  NECK: Trachea midline without palpable neck mass; thyroid not enlarged and non-tender  RESPIRATORY: Breathing comfortably; no dullness to percussion; coarse breath sounds b/l, no wheezing  CARDIOVASCULAR: +S1S2, RRR, no M/G/R; pedal pulses full and symmetric; no lower extremity edema  GASTROINTESTINAL: PEG tube intact, abdomen nontender, nondistended, no rebound or guarding  LYMPHATIC: No cervical LAD or tenderness  SKIN: b/l lower extremity chronic venous stasis with b/l  medial lower extremity erythematous round ulcers 2 x 2 cm  NEUROLOGIC: CN II-XII intact; sensation intact in LEs b/l to light touch  PSYCHIATRIC: A+O x 1; alert and awake, confused

## 2023-09-22 NOTE — ED ADULT NURSE REASSESSMENT NOTE - NS ED NURSE REASSESS COMMENT FT1
Pt awake and alert resting in stretcher, RR even and unlabored. NAD noted. Pt repositioned for comfort. Safety in place , pt stable upon exiting room. Pending bed assignment

## 2023-09-22 NOTE — ED ADULT NURSE REASSESSMENT NOTE - NS ED NURSE REASSESS COMMENT FT1
Prbcs transfusion completed, pt tolerated well. Pt denies any chest pain, itchiness, SOB. RR even and unlabored, pt satting 100% on RA. VS as noted. Safety measures in place, pt stable upon exiting room, pt pending CT

## 2023-09-22 NOTE — ED ADULT NURSE REASSESSMENT NOTE - NS ED NURSE REASSESS COMMENT FT1
Critical lab result received of hgb 6.2. Pt awake and alert, A&Ox1 at this time. RR even and unlabored, NSR on cardiac monitor. Type and screen sent as per orders. Pt stable upon exiting room. Safety in place

## 2023-09-22 NOTE — H&P ADULT - HISTORY OF PRESENT ILLNESS
77 yo F PMH Alzheimer's dementia s/p PEG 8/2023, GE flap valve, grade B esophagitis, Multiple Myeloma (previously on Revlimid), cataracts, chronic venous ulcers b/l LE, OA knee, HTN, primary pulmonary hypertension, MDD sent from NH given hypotension documented at 87/56. Patient is presently A&Ox1 (only oriented to self, believes she is in [facility] and is unable to provide reliable history. Attempt made to contact emergency contact Ubaldo who provided consent for transfusion to ED. History as per chart. Patient experienced cough for two days (patient presently denies cough or phlegm, shortness of breath). Patient also experienced dysuria for 1 week and is presently on treatment with CTX at NH 9/21-9/25. Of note, patient was also on D5 1/2  cc/hr for ?hypotension at NH in addition to free water flushes. Patient also noted to have abdominal tenderness in the ED - patient presently denies abdominal pain, nausea, vomiting, diarrhea, fevers, chills. At [facility] BP 92/56 s/p 1L LR remains systolic 90-100s, T 100.3->99.3. Labs significant for Hgb 6.2, WBC 13.3, Na 124. In the ED Patient was administered 1u PRBC, vancomycin, and zosyn x1.

## 2023-09-22 NOTE — PATIENT PROFILE ADULT - FALL HARM RISK - RISK INTERVENTIONS

## 2023-09-22 NOTE — ED ADULT NURSE REASSESSMENT NOTE - NS ED NURSE REASSESS COMMENT FT1
Break RN: Pt is A&Ox1 to self, resting in stretcher with no complaints at this time. Respirations even and unlabored, chest rise equal b/l. Sinus rhythm noted on cardiac monitor. VS as noted in flow sheets. No acute distress noted. KERA Cuadra at bedside for blood verification. Safety maintained throughout. Will continue to monitor.

## 2023-09-22 NOTE — H&P ADULT - PROBLEM SELECTOR PLAN 1
-patient with fever to 100.3, leukocytosis to 13.3, hypotension 87/56-->92/56 concerning for sepsis 2/2 urinary source, less likely in setting of lower extremity wounds  -s/p vanc and zosyn in the ED, s/p 1L LR  -f/u BCx, UCx, UA turbid 100 protein moderate blood, large LE, WBC 2921 significant for UTI, per documentation patient has experienced dysuria  -will order ceftriaxone and vancomycin  -monitor BP -patient with fever to 100.3, leukocytosis to 13.3, hypotension 87/56-->92/56 concerning for sepsis 2/2 urinary source, less likely in setting of lower extremity wounds, lactic acid 2.4  -s/p vanc and zosyn in the ED, s/p 1L LR  -f/u BCx, UCx, UA turbid 100 protein moderate blood, large LE, WBC 2921 significant for UTI, per documentation patient has experienced dysuria  -will order ceftriaxone and vancomycin  -monitor BP, f/u repeat lactic acid -patient with fever to 100.3, leukocytosis to 13.3, hypotension 87/56-->92/56 concerning for sepsis 2/2 urinary source, less likely in setting of lower extremity wounds, lactic acid 2.4  -s/p vanc and zosyn in the ED, s/p 1L LR  -f/u BCx, UCx, UA turbid 100 protein moderate blood, large LE, WBC 2921 significant for UTI, per documentation patient has experienced dysuria  -CXR clear  -will order ceftriaxone and vancomycin  -monitor BP, f/u repeat lactic acid

## 2023-09-22 NOTE — H&P ADULT - ASSESSMENT
77 yo F PMH Alzheimer's dementia s/p PEG 8/2023, GE flap valve, grade B esophagitis, Multiple Myeloma (previously on Revlimid), cataracts, chronic venous ulcers b/l LE, OA knee, HTN, primary pulmonary hypertension, MDD sent from NH given hypotension documented at 87/56. Unable to obtain reliable history although per documentation patient has experienced cough, dysuria, and abdominal pain. Labs significant for anemia to 6.2 s/p PRBC without active bleeding and Na 124 likely iatrogenic as patient was on D5 1/2 NS and standing free water flushes at nursing home. Patient with fevers (T 100.3) and leukocytosis to 13.3 concerning for sepsis likely 2/2 urinary source - alternatively consider lower extremity wounds. Patient with hx hematoma following initial PEG placement, would f/u CT abdomen r/o expansion.

## 2023-09-22 NOTE — ADVANCED PRACTICE NURSE CONSULT - RECOMMEDATIONS
Keep RUE elevated.     Topical recommendations:   ---Coccyx, sacrum, bilateral lower medial legs: Cleanse with NS, pat dry. Apply Liquid barrier film to periwound skin (allow to dry). Cover with silicone foam with border. Change daily and PRN if soiled.   ---Perineum: Cleanse with skin cleanser, pat dry. Apply Moe moisture barrier cream twice a day and PRN with incontinent episodes.   ---Right lateral midfoot: Cleanse with NS, pat dry. Cover with silicone foam with border for protection. Change every other day and PRN if soiled.   ---Areas of dry flaky skin to BLE: apply sween24 moisturizer daily, avoid in between the toes.   ---PEG: Cleanse q shift with NS, apply liquid barrier film beneath carlos disc.  If redness noted under carlos disc bumper apply thin foam  dressing without border (Mepilex Lite)- cut to mid dressing with a 'Y' shape. Secondary securement to abdomen taping in 'H' fashion with Steri-strips.   ---Continue low air loss bed therapy, continue heel elevation, continue to turn & reposition per protocol, soft pillow between bony prominences, continue moisture management with barrier creams & single breathable pad, continue measures to decrease friction/shear/pressure. Continue with nutritional support as per dietary/orders.     Please contact Wound/Ostomy Care Service Line if we can be of further assistance (ext [phone]). Please reconsult if changes to tissue type noted.  Keep RUE elevated.     Topical recommendations:   ---Coccyx, sacrum, bilateral lower medial legs: Cleanse with NS, pat dry. Apply Liquid barrier film to periwound skin (allow to dry). Cover with silicone foam with border. Change daily and PRN if soiled.   ---Perineum: Cleanse with skin cleanser, pat dry. Apply Moe moisture barrier cream twice a day and PRN with incontinent episodes.   ---Right lateral midfoot: Cleanse with NS, pat dry. Cover with silicone foam with border for protection. Change every other day and PRN if soiled.   ---Areas of dry flaky skin to BLE: apply sween24 moisturizer daily, avoid in between the toes.   ---PEG: Cleanse q shift with NS, apply liquid barrier film beneath carlos disc.  If redness noted under carlos disc bumper apply thin foam  dressing without border (Mepilex Lite)- cut to mid dressing with a 'Y' shape. Secondary securement to abdomen taping in 'H' fashion with Steri-strips.   ---Continue low air loss bed therapy, continue heel elevation with complete CAIR boots, continue to turn & reposition per protocol, soft pillow between bony prominences, continue moisture management with barrier creams & single breathable pad, continue measures to decrease friction/shear/pressure. Continue with nutritional support as per dietary/orders.     Please contact Wound/Ostomy Care Service Line if we can be of further assistance (ext [phone]). Please reconsult if changes to tissue type noted.  Keep RUE elevated.     Topical recommendations:   ---Coccyx, sacrum, bilateral lower medial legs: Cleanse with NS, pat dry. Apply Liquid barrier film to periwound skin (allow to dry). Cover with silicone foam with border. Change daily and PRN if soiled.   ---Perineum: Cleanse with skin cleanser, pat dry. Apply Moe moisture barrier cream twice a day and PRN with incontinent episodes.   ---Right lateral midfoot: Cleanse with NS, pat dry. Cover with silicone foam with border for protection. Change every other day and PRN if soiled.   ---Areas of dry flaky skin to BLE: apply sween24 moisturizer daily, avoid in between the toes.   ---PEG: Cleanse q shift with NS, apply liquid barrier film beneath carlos disc.  If redness noted under carlos disc bumper apply thin foam  dressing without border (Mepilex Lite)- cut to mid dressing with a 'Y' shape. Secondary securement to abdomen taping in 'H' fashion with Steri-strips.   ---Continue low air loss bed therapy, continue heel elevation with complete CAIR boots, continue to turn & reposition per protocol, soft pillow between bony prominences, continue moisture management with barrier creams & single breathable pad, continue measures to decrease friction/shear/pressure. Continue with nutritional support as per dietary/orders.     Plan discussed with primary RN Marcello Hanson.  Please contact Wound/Ostomy Care Service Line if we can be of further assistance (ext [phone]). Please reconsult if changes to tissue type noted.

## 2023-09-22 NOTE — ED ADULT NURSE REASSESSMENT NOTE - NS ED NURSE REASSESS COMMENT FT1
Report received from ron Aguilar. Pt resting in stretcher, RR even and unlabored. Prbcs transfusing as per MD orders. Pt in NAD. NSR on cardiac monitor. Safety measures in place, pt stable upon exiting room

## 2023-09-22 NOTE — H&P ADULT - PROBLEM SELECTOR PLAN 8
-DVT ppx: patient with documented hx hematoma after initial PEG placement, SCD for now, will hold pharmacologic ppx -DVT ppx: patient with documented hx hematoma after initial PEG placement, SCD for now, will hold pharmacologic ppx  Diet: f/u nutrition consult for tube feeds -DVT ppx: patient with documented hx hematoma after initial PEG placement, SCD for now, will hold pharmacologic ppx  Diet: f/u nutrition consult for tube feeds, patient on Jevity at facility -DVT ppx: patient with documented hx hematoma after initial PEG placement, SCD for now, will hold pharmacologic ppx  -Diet: f/u nutrition consult for tube feeds, patient on Jevity at facility -patient with grade B esophagitis on EGD 8/2023, also with Hill grade 3 gastroesophageal flap, patient was pending ?outpatient w/u with EUS  -home omeprazole not on formulary will order pantoprazole daily

## 2023-09-22 NOTE — ADVANCED PRACTICE NURSE CONSULT - ASSESSMENT
General: A&Ox3, bedbound, turned with 1 assist, incontinent of urine and stool, female external urine management system in place. Skin warm, dry with increased moisture in intertriginous folds, thin fragile skin, scattered areas of hyperpigmentation and hypopigmentation, scattered areas of ecchymosis (without hematoma) on bilateral upper extremities. RUE with +2 edema, +radial pulses, patient denies pain. RUQ PEG, peritubular skin intact.    Coccyx towards left lower back- evolving DTPI measuring 6frp6dna5.2cm, 50% purple maroon discoloration, 50% slippage of skin exposing deep red friable dermis, small serosanguinous drainage, no odor. Periwound with blanchable erythema, no increased warmth, no edema, no induration, no fluctuance no signs or symptoms of overt skin infection.   Distally at 6 o clock at [facility] there is a DTPI, area of purple maroon discoloration over bony prominence measuring 0.5cmx0.2amr9aq. no drainage, no odor, no o/f of acute skin infection.  Goals of care: offload pressure, protect from friction and shear, monitor for tissue type changes.       Bilateral lower extremities with hemosiderin staining, Dry, flaky skin. Thickened-yellow hyperpigmented overgrown toenails. Multiple wounds present. Increased coolness from midfoot to distal toes. Capillary refill <3 seconds. +1 palpable DP/PT pulses, no s/s of acute ischemia.     Right lateral midfoot - DTPI, area of purple maroon discoloration measuring 0.5cmx0.9ycx9el, periwound with 1cm blanchable erythema circumferentially, no drainage, no odor, no s/s of acute skin infection. Goals of care: offload pressure, protect from friction and shear, monitor for tissue type changes.     BLE with venous ulcers:  Right lower medial leg- 5qec3fku1.2cm  Left lower medial leg- 7aqo7mau9.2cm  Both ulcers with 100% dark red friable dermis, small serosanguinous drainage, no odor. Periwounds with increased moisture, hyperpigmentation, no erythema, no increased warmth, no edema, no induration, no fluctuance no signs or symptoms of overt skin infection. Goals of care: contain small drainage, protect periwound skin, monitor for tissue type changes.      General: A&Ox3, bedbound, turned with 1 assist, incontinent of urine and stool, female external urine management system in place. Skin warm, dry with increased moisture in intertriginous folds, thin fragile skin, scattered areas of hyperpigmentation and hypopigmentation, scattered areas of ecchymosis (without hematoma) on bilateral upper extremities. RUE with +2 edema, +1 radial pulses, patient denies pain. RUQ PEG, peritubular skin intact.    Coccyx towards left lower back- evolving DTPI measuring 3dlg5tki6.2cm, 50% purple maroon discoloration, 50% slippage of skin exposing deep red friable dermis, small serosanguinous drainage, no odor. Periwound with blanchable erythema, no increased warmth, no edema, no induration, no fluctuance no signs or symptoms of overt skin infection.   Distally at 6 o clock at [facility] there is a DTPI, area of purple maroon discoloration over bony prominence measuring 0.5cmx0.1cdp9ha. no drainage, no odor, no o/f of acute skin infection.  Goals of care: offload pressure, protect from friction and shear, monitor for tissue type changes.       Bilateral lower extremities with hemosiderin staining, Dry, flaky skin. Thickened-yellow hyperpigmented overgrown toenails. Multiple wounds present. Increased coolness from midfoot to distal toes. Capillary refill <3 seconds. +1 palpable DP/PT pulses, no s/s of acute ischemia.     Right lateral midfoot - DTPI, area of purple maroon discoloration measuring 0.5cmx0.7aiv1xx, periwound with 1cm blanchable erythema circumferentially, no drainage, no odor, no s/s of acute skin infection. Goals of care: offload pressure, protect from friction and shear, monitor for tissue type changes.     BLE with venous ulcers:  Right lower medial leg- 2krz5ttg6.2cm  Left lower medial leg- 7lhd8qnl2.2cm  Both ulcers with 100% dark red friable dermis, small serosanguinous drainage, no odor. Periwounds with increased moisture, hyperpigmentation, no erythema, no increased warmth, no edema, no induration, no fluctuance no signs or symptoms of overt skin infection. Goals of care: contain small drainage, protect periwound skin, monitor for tissue type changes.

## 2023-09-22 NOTE — H&P ADULT - PROBLEM SELECTOR PLAN 3
-Hgb 6.6  -s/p 1u PRBC in ED, f/u repeat CBC  -f/u iron, ferritin, TIBC  -monitor CBC, maintain active type and screen, transfuse for Hgb<7.0  -patient with hx hematoma following initial PEG placement, f/u CT A/P consider expansion -Hgb 6.6  -s/p 1u PRBC in ED, f/u repeat CBC, monitor CBC q8h  -f/u iron, ferritin, TIBC  -monitor CBC, maintain active type and screen, transfuse for Hgb<7.0  -patient with hx hematoma following initial PEG placement, f/u CT A/P consider expansion -Hgb 6.6  -s/p 1u PRBC in ED, f/u repeat CBC, monitor CBC q8h  -f/u iron, ferritin, TIBC  -continue home iron  -monitor CBC, maintain active type and screen, transfuse for Hgb<7.0  -patient with hx hematoma following initial PEG placement, f/u CT A/P consider expansion

## 2023-09-22 NOTE — CHART NOTE - NSCHARTNOTEFT_GEN_A_CORE
CHIEF COMPLAINT:    SUBJECTIVE:     REVIEW OF SYSTEMS:    CONSTITUTIONAL: (  )  weakness,  (  ) fevers or chills  EYES/ENT: (  )visual changes;     NECK: (  ) pain or stiffness  RESPIRATORY:   (  )cough, wheezing, hemoptysis;  (  ) shortness of breath  CARDIOVASCULAR:  (  )chest pain or palpitations  GASTROINTESTINAL:   (  )abdominal or epigastric pain.  (  ) nausea, vomiting, or hematemesis;   (   ) diarrhea or constipation.   GENITOURINARY:   (    ) dysuria, frequency or hematuria  NEUROLOGICAL:  (   ) numbness or weakness   All other review of systems is negative unless indicated above    Vital Signs Last 24 Hrs  T(C): 37.1 (22 Sep 2023 05:57), Max: 37.9 (21 Sep 2023 23:40)  T(F): 98.8 (22 Sep 2023 05:57), Max: 100.3 (21 Sep 2023 23:40)  HR: 81 (22 Sep 2023 05:57) (81 - 91)  BP: 97/65 (22 Sep 2023 05:57) (97/65 - 127/74)  BP(mean): 75 (22 Sep 2023 05:57) (75 - 75)  RR: 18 (22 Sep 2023 05:57) (16 - 26)  SpO2: 100% (22 Sep 2023 05:57) (98% - 100%)    Parameters below as of 22 Sep 2023 05:57  Patient On (Oxygen Delivery Method): room air        I&O's Summary      CAPILLARY BLOOD GLUCOSE          PHYSICAL EXAM:    Constitutional:  (   ) NAD,   (   )awake and alert  HEENT: PERR, EOMI,    Neck: Soft and supple, No LAD, No JVD  Respiratory:  (    Breath sounds are clear bilaterally,    (   ) wheezing, rales or rhonchi  Cardiovascular:     (   )S1 and S2, regular rate and rhythm, no Murmurs, gallops or rubs  Gastrointestinal:  (   )Bowel Sounds present, soft,   (  )nontender, nondistended,    Extremities:    (  ) peripheral edema  Vascular: 2+ peripheral pulses  Neurological:    (    )A/O x 3,   (  ) focal deficits  Musculoskeletal:    (   )  normal strength b/l upper  (     ) normal  lower extremities  Skin: No rashes    MEDICATIONS:  MEDICATIONS  (STANDING):  vancomycin  IVPB. 1000 milliGRAM(s) IV Intermittent once      LABS: All Labs Reviewed:                        6.2    13.34 )-----------( 283      ( 21 Sep 2023 23:35 )             20.1     09-21    124<L>  |  90<L>  |  18  ----------------------------<  97  4.8   |  22  |  0.48<L>    Ca    7.4<L>      21 Sep 2023 23:35    TPro  6.0  /  Alb  2.0<L>  /  TBili  1.2  /  DBili  x   /  AST  82<H>  /  ALT  64<H>  /  AlkPhos  257<H>  09-21          Blood Culture:   Urine Culture      RADIOLOGY/EKG:    ASSESSMENT AND PLAN:    DVT PPX:    ADVANCED DIRECTIVE:    DISPOSITION: CHIEF COMPLAINT:  Reason for Admission: Hypotension  History of Present Illness:   79 yo F PMH Alzheimer's dementia s/p PEG 8/2023, GE flap valve, grade B esophagitis, Multiple Myeloma (previously on Revlimid), cataracts, chronic venous ulcers b/l LE, OA knee, HTN, primary pulmonary hypertension, MDD sent from NH given hypotension documented at 87/56. Patient is presently A&Ox1 (only oriented to self, believes she is in [facility] and is unable to provide reliable history. Attempt made to contact emergency contact Tomeka who provided consent for transfusion to ED. History as per chart. Patient experienced cough for two days (patient presently denies cough or phlegm, shortness of breath). Patient also experienced dysuria for 1 week and is presently on treatment with CTX at NH 9/21-9/25. Of note, patient was also on D5 1/2  cc/hr for ?hypotension at NH in addition to free water flushes. Patient also noted to have abdominal tenderness in the ED - patient presently denies abdominal pain, nausea, vomiting, diarrhea, fevers, chills. At [facility] BP 92/56 s/p 1L LR remains systolic 90-100s, T 100.3->99.3. Labs significant for Hgb 6.2, WBC 13.3, Na 124. In the ED Patient was administered 1u PRBC, vancomycin, and zosyn x1.  SUBJECTIVE:     REVIEW OF SYSTEMS:    CONSTITUTIONAL: (  x)  weakness,  (  ) fevers or chills  EYES/ENT: (  )visual changes;     NECK: (  ) pain or stiffness  RESPIRATORY:   (  )cough, wheezing, hemoptysis;  (  ) shortness of breath  CARDIOVASCULAR:  (  )chest pain or palpitations  GASTROINTESTINAL:   (  )abdominal or epigastric pain.  (  ) nausea, vomiting, or hematemesis;   (   ) diarrhea or constipation.   GENITOURINARY:   (    ) dysuria, frequency or hematuria  NEUROLOGICAL:  (   ) numbness or weakness   All other review of systems is negative unless indicated above    Vital Signs Last 24 Hrs  T(C): 37.1 (22 Sep 2023 05:57), Max: 37.9 (21 Sep 2023 23:40)  T(F): 98.8 (22 Sep 2023 05:57), Max: 100.3 (21 Sep 2023 23:40)  HR: 81 (22 Sep 2023 05:57) (81 - 91)  BP: 97/65 (22 Sep 2023 05:57) (97/65 - 127/74)  BP(mean): 75 (22 Sep 2023 05:57) (75 - 75)  RR: 18 (22 Sep 2023 05:57) (16 - 26)  SpO2: 100% (22 Sep 2023 05:57) (98% - 100%)    Parameters below as of 22 Sep 2023 05:57  Patient On (Oxygen Delivery Method): room air        I&O's Summary      CAPILLARY BLOOD GLUCOSE          PHYSICAL EXAM:    Constitutional:  (  x ) NAD,   ( x  )awake  verbal but confused  HEENT: PERR, EOMI,    Neck: Soft and supple, No LAD, No JVD  Respiratory:  (  x  Breath sounds are clear bilaterally,    (   ) wheezing, rales or rhonchi  Cardiovascular:     (  x )S1 and S2, regular rate and rhythm, no Murmurs, gallops or rubs  Gastrointestinal:  (  x )Bowel Sounds present, soft,   (  )nontender, nondistended,    Extremities:    (  ) peripheral edema  Vascular: 2+ peripheral pulses  Neurological:    (   x )A/O x  1   (  ) focal deficits  Musculoskeletal:    (   )  normal strength b/l upper  (     ) normal  lower extremities  Skin: No rashes    MEDICATIONS:  MEDICATIONS  (STANDING):  vancomycin  IVPB. 1000 milliGRAM(s) IV Intermittent once      LABS: All Labs Reviewed:                        6.2    13.34 )-----------( 283      ( 21 Sep 2023 23:35 )             20.1     09-21    124<L>  |  90<L>  |  18  ----------------------------<  97  4.8   |  22  |  0.48<L>    Ca    7.4<L>      21 Sep 2023 23:35    TPro  6.0  /  Alb  2.0<L>  /  TBili  1.2  /  DBili  x   /  AST  82<H>  /  ALT  64<H>  /  AlkPhos  257<H>  09-21          Blood Culture:   Urine Culture      RADIOLOGY/EKG:    ASSESSMENT AND PLAN:  79 yo F PMH Alzheimer's dementia s/p PEG 8/2023, GE flap valve, grade B esophagitis, Multiple Myeloma (previously on Revlimid), cataracts, chronic venous ulcers b/l LE, OA knee, HTN, primary pulmonary hypertension, MDD sent from NH given hypotension documented at 87/56. Unable to obtain reliable history although per documentation patient has experienced cough, dysuria, and abdominal pain. Labs significant for anemia to 6.2 s/p PRBC without active bleeding and Na 124 likely iatrogenic as patient was on D5 1/2 NS and standing free water flushes at nursing home. Patient with fevers (T 100.3) and leukocytosis to 13.3 concerning for sepsis likely 2/2 urinary source - alternatively consider lower extremity wounds. Patient with hx hematoma following initial PEG placement, would f/u CT abdomen r/o expansion.      Problem/Plan - 1:  ·  Problem: Sepsis.   ·  Plan: -patient with fever to 100.3, leukocytosis to 13.3, hypotension 87/56-->92/56 concerning for sepsis 2/2 urinary source, less likely in setting of lower extremity wounds, lactic acid 2.4  -s/p vanc and zosyn in the ED, s/p 1L LR  -f/u BCx, UCx, UA turbid 100 protein moderate blood, large LE, WBC 2921 significant for UTI, per documentation patient has experienced dysuria  -CXR clear  -will order ceftriaxone and vancomycin  -monitor BP, f/u repeat lactic acid.    Problem/Plan - 2:  ·  Problem: Hyponatremia.   ·  Plan: -Na 124  -likely iatrogenic given patient was on D5 1/2 NS and free water flushes at facility  -will hold D5 NS and free water flushes  -f/u urine sodium urine osm, serum osm  -monitor BMP q8h.    Problem/Plan - 3:  ·  Problem: Anemia.   ·  Plan: -Hgb 6.6  -s/p 1u PRBC in ED, f/u repeat CBC, monitor CBC q8h  -f/u iron, ferritin, TIBC  -continue home iron  -monitor CBC, maintain active type and screen, transfuse for Hgb<7.0  -patient with hx hematoma following initial PEG placement, f/u CT A/P consider expansion.    Problem/Plan - 4:  ·  Problem: Alzheimers disease.   ·  Plan: -would obtain collateral on baseline mental status, attempted to call emergency contact tomeka mendozamail was full  -patient is alert, conversant, although A&Ox1 believes she is in [facility].    Problem/Plan - 5:  ·  Problem: Multiple myeloma.   ·  Plan: -patient previously on revlimid per documentation  -would obtain records, patient follows with Dr. Vasques PCP, unclear if patient follows with oncologist.    Problem/Plan - 6:  ·  Problem: Rheumatoid arthritis.   ·  Plan: -patient with ulnar deviation of b/l UE digits, documented RA  -would obtain records.    Problem/Plan - 7:  ·  Problem: Chronic cutaneous venous stasis ulcer.   ·  Plan: -patient with b/l LE ulcers, in setting of chronic venous stasis  -f/u wound care eval.    Problem/Plan - 8:  ·  Problem: History of esophagitis.   ·  Plan: -patient with grade B esophagitis on EGD 8/2023, also with Hill grade 3 gastroesophageal flap, patient was pending ?outpatient w/u with EUS  -home omeprazole not on formulary will order pantoprazole daily.    Problem/Plan - 9:  ·  Problem: Need for prophylactic measure.   ·  Plan: -DVT ppx: patient with documented hx hematoma after initial PEG placement, SCD for now, will hold pharmacologic ppx  -Diet: f/u nutrition consult for tube feeds, patient on Jevity at facility.      DVT PPX:    ADVANCED DIRECTIVE:    DISPOSITION:

## 2023-09-22 NOTE — H&P ADULT - PROBLEM SELECTOR PLAN 2
-Na 124  -likely iatrogenic given patient was on D5 1/2 NS and free water flushes at facility  -will hold D5 NS and free water flushes  -f/u urine sodium urine osm, serum osm  -monitor BMP -Na 124  -likely iatrogenic given patient was on D5 1/2 NS and free water flushes at facility  -will hold D5 NS and free water flushes  -f/u urine sodium urine osm, serum osm  -monitor BMP q8h

## 2023-09-22 NOTE — H&P ADULT - PROBLEM SELECTOR PLAN 5
-patient previously on revlimid per documentation  -would obtain records, patient follows with Dr. Vasques PCP, unclear if patient follows with oncologist

## 2023-09-22 NOTE — H&P ADULT - PROBLEM SELECTOR PLAN 9
-DVT ppx: patient with documented hx hematoma after initial PEG placement, SCD for now, will hold pharmacologic ppx  -Diet: f/u nutrition consult for tube feeds, patient on Jevity at facility

## 2023-09-23 LAB
CCP IGG SERPL-ACNC: <8 UNITS — SIGNIFICANT CHANGE UP
GLUCOSE BLDC GLUCOMTR-MCNC: 139 MG/DL — HIGH (ref 70–99)
GLUCOSE BLDC GLUCOMTR-MCNC: 144 MG/DL — HIGH (ref 70–99)
GRAM STN FLD: SIGNIFICANT CHANGE UP
RF+CCP IGG SER-IMP: NEGATIVE — SIGNIFICANT CHANGE UP
SPECIMEN SOURCE: SIGNIFICANT CHANGE UP

## 2023-09-23 PROCEDURE — 99222 1ST HOSP IP/OBS MODERATE 55: CPT | Mod: GC

## 2023-09-23 PROCEDURE — 74176 CT ABD & PELVIS W/O CONTRAST: CPT | Mod: 26

## 2023-09-23 PROCEDURE — 71045 X-RAY EXAM CHEST 1 VIEW: CPT | Mod: 26

## 2023-09-23 RX ORDER — CEFAZOLIN SODIUM 1 G
2000 VIAL (EA) INJECTION EVERY 8 HOURS
Refills: 0 | Status: DISCONTINUED | OUTPATIENT
Start: 2023-09-23 | End: 2023-10-13

## 2023-09-23 RX ORDER — ASCORBIC ACID 60 MG
500 TABLET,CHEWABLE ORAL DAILY
Refills: 0 | Status: DISCONTINUED | OUTPATIENT
Start: 2023-09-23 | End: 2023-10-13

## 2023-09-23 RX ADMIN — Medication 1 APPLICATION(S): at 17:20

## 2023-09-23 RX ADMIN — Medication 1 APPLICATION(S): at 05:39

## 2023-09-23 RX ADMIN — Medication 250 MILLIGRAM(S): at 05:39

## 2023-09-23 RX ADMIN — MIRTAZAPINE 15 MILLIGRAM(S): 45 TABLET, ORALLY DISINTEGRATING ORAL at 21:39

## 2023-09-23 RX ADMIN — MAGNESIUM HYDROXIDE 5 MILLILITER(S): 400 TABLET, CHEWABLE ORAL at 12:56

## 2023-09-23 RX ADMIN — Medication 220 MILLIGRAM(S): at 05:39

## 2023-09-23 RX ADMIN — Medication 220 MILLIGRAM(S): at 17:21

## 2023-09-23 RX ADMIN — Medication 100 MILLIGRAM(S): at 12:59

## 2023-09-23 RX ADMIN — PANTOPRAZOLE SODIUM 40 MILLIGRAM(S): 20 TABLET, DELAYED RELEASE ORAL at 12:55

## 2023-09-23 RX ADMIN — Medication 100 MILLIGRAM(S): at 21:41

## 2023-09-23 NOTE — DIETITIAN INITIAL EVALUATION ADULT - PERTINENT MEDS FT
MEDICATIONS  (STANDING):  ceFAZolin   IVPB 2000 milliGRAM(s) IV Intermittent every 8 hours  ferrous    sulfate Liquid 220 milliGRAM(s) Oral every 12 hours  magnesium hydroxide Suspension 5 milliLiter(s) Oral daily  mirtazapine 15 milliGRAM(s) Oral at bedtime  pantoprazole   Suspension 40 milliGRAM(s) Oral daily  silver sulfADIAZINE 1% Cream 1 Application(s) Topical every 12 hours    MEDICATIONS  (PRN):  acetaminophen     Tablet .. 650 milliGRAM(s) Oral every 6 hours PRN Temp greater or equal to 38C (100.4F), Mild Pain (1 - 3)

## 2023-09-23 NOTE — PHYSICAL THERAPY INITIAL EVALUATION ADULT - PERTINENT HX OF CURRENT PROBLEM, REHAB EVAL
Patient is a 78 year old female with PMHx of Alzheimer's dementia s/p PEG 8/2023, GE flap valve, grade B esophagitis, Multiple Myeloma (previously on Revlimid), cataracts, chronic venous ulcers b/l LE, OA knee, HTN, primary pulmonary hypertension, MDD sent from NH given hypotension documented at 87/56. Patient is presently A&Ox1 (only oriented to self, believes she is in [facility] and is unable to provide reliable history. Patient experienced cough for two days (patient presently denies cough or phlegm, shortness of breath). Chest x-ray unremarkable

## 2023-09-23 NOTE — PHYSICAL THERAPY INITIAL EVALUATION ADULT - NSPTDISCHREC_GEN_A_CORE
pt. not placed on skilled PT services at this time secondary to pt. presenting with confusion and unable to actively participate in PT services. Please reconsult if appropriate/feasible. Patient is at functional baseline unable to show progress towards rehabilitation goals. Propranolol Counseling:  I discussed with the patient the risks of propranolol including but not limited to low heart rate, low blood pressure, low blood sugar, restlessness and increased cold sensitivity. They should call the office if they experience any of these side effects.

## 2023-09-23 NOTE — PHYSICAL THERAPY INITIAL EVALUATION ADULT - PASSIVE RANGE OF MOTION EXAMINATION, REHAB EVAL
patient with bilateral hip and knee contractures, unable to passively move hips and knees past 90 before discomfort and pain

## 2023-09-23 NOTE — DIETITIAN INITIAL EVALUATION ADULT - NSFNSPHYEXAMSKINFT_GEN_A_CORE
As per wound RN note:  Pressure Injury 1: cocoyx towards left lower back, Suspected deep tissue injury  Pressure Injury 2: sacrum, Suspected deep tissue injury  Pressure Injury 3: right lateral midfoot, Suspected deep tissue injury

## 2023-09-23 NOTE — OCCUPATIONAL THERAPY INITIAL EVALUATION ADULT - PERTINENT HX OF CURRENT PROBLEM, REHAB EVAL
78 year old female with Alzheimer's dementia s/p PEG 8/2023, GE flap valve, grade B esophagitis, Multiple Myeloma, cataracts, chronic venous ulcers bilateral LE, OA knee, HTN, primary pulmonary hypertension, MDD sent from NH given hypotension documented at 87/56. S/p anemia, s/p sepsis. History of hematoma following initial PEG placement.

## 2023-09-23 NOTE — OCCUPATIONAL THERAPY INITIAL EVALUATION ADULT - LEVEL OF INDEPENDENCE: SIT/SUPINE, REHAB EVAL
Patient dependent in supine to sit. Patient has lower body contractures./unable to perform Patient dependent in supine to sit. Patient has bilateral LE contractures./unable to perform

## 2023-09-23 NOTE — DIETITIAN INITIAL EVALUATION ADULT - PROBLEM SELECTOR PLAN 4
-would obtain collateral on baseline mental status, attempted to call emergency contact tomeka high was full  -patient is alert, conversant, although A&Ox1 believes she is in [facility]

## 2023-09-23 NOTE — PROVIDER CONTACT NOTE (CRITICAL VALUE NOTIFICATION) - ACTION/TREATMENT ORDERED:
ACP made aware. IV antibiotics adjusted as per ID recs. Patient planned for IR procedure for removal of R ACW port.
ACP notified

## 2023-09-23 NOTE — OCCUPATIONAL THERAPY INITIAL EVALUATION ADULT - NSOTDISCHREC_GEN_A_CORE
Patient is dependent for ADLs and functional mobility. Patient has no OT needs at this time and will not be placed on OT program.

## 2023-09-23 NOTE — CONSULT NOTE ADULT - ASSESSMENT
Interventional Radiology    Evaluate for Procedure: right port removal    HPI: 79 yo female with Alzheimer’s, s/p PEG 8/23, multiple myeloma, HTN, primary pulmonary HTN, p/w UTI and bacteremia. IR consulted for right chest port removal as per ID recs.      Allergies: No Known Allergies    Medications (Abx/Cardiac/Anticoagulation/Blood Products)    ceFAZolin   IVPB: 100 mL/Hr IV Intermittent (09-23 @ 12:59)  cefTRIAXone   IVPB: 100 mL/Hr IV Intermittent (09-22 @ 13:43)  piperacillin/tazobactam IVPB...: 200 mL/Hr IV Intermittent (09-22 @ 06:42)  vancomycin  IVPB: 250 mL/Hr IV Intermittent (09-23 @ 05:39)  vancomycin  IVPB.: 250 mL/Hr IV Intermittent (09-22 @ 07:23)    Data:    47  T(C): 36.8  HR: 91  BP: 132/69  RR: 18  SpO2: 99%    -WBC 12.28 / HgB 8.6 / Hct 25.3 / Plt 182  -Na 132 / Cl 99 / BUN 15 / Glucose 80  -K 4.5 / CO2 23 / Cr 0.44  -ALT -- / Alk Phos -- / T.Bili --  -INR -- / PTT 24.4      Radiology: CXR reviewed    Assessment/Plan:   79 yo female with Alzheimer’s, s/p PEG 8/23, multiple myeloma, HTN, primary pulmonary HTN, p/w UTI and bacteremia. IR consulted for right chest port removal as per ID recs.    -- IR will plan to perform right chest port removal, likely mon monday 9/25 (NPO at midnight on sunday)  -- may possible perform right chest port removal tomorrow 9/24, schedule permitting & depending on patient's clinical status, please make patient NPO at midnight tonight for possible intervention tomorrow and IR will coordinate with team)  -- please complete IR pre-procedure note  -- please place IR procedure request order under Dr. Dash    --  Bang Mckeon DO/TURNER, PGY-5  Vascular and Interventional Radiology   Available on [employer] Teams    - Non-emergent consults: Place IR consult order in [place]  - Emergent issues (pager): [facility] [phone]; [facility] [phone]; [phone]  - Scheduling questions: [place] [address] [phone]; [facility] [phone]  - Clinic/outpatient booking: [facility] [phone]; [facility] [phone]

## 2023-09-23 NOTE — DIETITIAN INITIAL EVALUATION ADULT - PERTINENT LABORATORY DATA
09-22    132<L>  |  99  |  15  ----------------------------<  80  4.5   |  23  |  0.44<L>    Ca    7.4<L>      22 Sep 2023 21:36  Phos  2.4     09-22  Mg     1.90     09-22    TPro  6.0  /  Alb  2.0<L>  /  TBili  1.2  /  DBili  x   /  AST  82<H>  /  ALT  64<H>  /  AlkPhos  257<H>  09-21  A1C with Estimated Average Glucose Result: 4.8 % (08-02-23 @ 05:40)

## 2023-09-23 NOTE — PHYSICAL THERAPY INITIAL EVALUATION ADULT - GENERAL OBSERVATIONS, REHAB EVAL
Patient found semi-reclined in bed, NAD, confused, A&Ox1, +cardiac monitor, HR 92, /69, no complaints of pain, patient agreeable to participate in skilled PT evaluation

## 2023-09-23 NOTE — PROVIDER CONTACT NOTE (CRITICAL VALUE NOTIFICATION) - SITUATION
Blood cultures 9/21 growth aerobic staph aureus, anaerobic gram positive cocci in clusters
Gram + cocci in clusters

## 2023-09-23 NOTE — PROVIDER CONTACT NOTE (CRITICAL VALUE NOTIFICATION) - BACKGROUND
(Admit Diagnosis) Precipitous drop in hematocrit  (PMH) MDD (major depressive disorder)  (PMH) Cataract  (PMH) Osteoarthritis
Patient admitted for anemia and sepsis. PMH dementia, hyponatremia, cataracts. PSH of PEG placement. s/p 2 PRBCs transfusions 9/21 & 9/22.

## 2023-09-23 NOTE — DIETITIAN INITIAL EVALUATION ADULT - OTHER INFO
As per chart, patient is a 77 yo F PMH Alzheimer's dementia s/p PEG 8/2023, GE flap valve, grade B esophagitis, Multiple Myeloma (previously on Revlimid), cataracts, chronic venous ulcers b/l LE, OA knee, HTN, primary pulmonary hypertension, MDD sent from NH given hypotension documented at 87/56. Unable to obtain reliable history although per documentation patient has experienced cough, dysuria, and abdominal pain. Labs significant for anemia to 6.2 s/p PRBC without active bleeding and Na 124 likely iatrogenic as patient was on D5 1/2 NS and standing free water flushes at nursing home. Patient with fevers (T 100.3) and leukocytosis to 13.3 concerning for sepsis likely 2/2 urinary source - alternatively consider lower extremity wounds. Patient with hx hematoma following initial PEG placement, would f/u CT abdomen r/o expansion.     Patient is A&O x 1 . Information was obtained from RN and comprehensive chart review. Patient's TF was running at goal rate 45mL/hr at time of visit. Patient did not eat breakfast. Noted appetite stimulant in use. Patient stated with GI discomfort. Unable to specific, likely related to AD. RN made aware. As per flow sheet, last BM was 9/23. Patient is noted with DTPIs as per wound RN note (9/22), please see below for detail. Noted with 3+ edema on right arm. Patient is noted with discrete weight on HIE, 61.6 kg (9/22), admission weight 50 kg (9/22). Requested RN to re-weigh patient today, 47 kg as per flow sheet. HIE weight likely to be an error. Please see below for TF/diet recommendation.

## 2023-09-23 NOTE — DIETITIAN INITIAL EVALUATION ADULT - ORAL INTAKE PTA/DIET HISTORY
Patient was admitted from NH. As per medical chart, she was receiving Jevity 1.5, 50mL/hr, infused until 900 mL completes, which provides 1350 kcal, 57 g protein- not meeting needs. Water flush 125 mL before and after feeding, auto flush 35mL/hr. Received pressure feed in ground texture. She was on multivitamin, magnesium and iron supplement. Ht is 65" as per previous RDN note (8/3). Patient was dx with severe malnutrition related to fat/muscle mass loss (8/3).

## 2023-09-23 NOTE — OCCUPATIONAL THERAPY INITIAL EVALUATION ADULT - GENERAL OBSERVATIONS, REHAB EVAL
Patient received semisupine in bed in NAD. +Catheter +PEG. /69mmHg, HR 92bpm. Patient agreed to participate in OT Eval. Patient left semisupine in bed in NAD.

## 2023-09-23 NOTE — PHARMACOTHERAPY INTERVENTION NOTE - COMMENTS
Pt present from NH w/ 1 week of dysuria, fever, and leukocytosis who was started on ceftriaxone and vancomycin. BCx came back positive for MSSA. Reached out to provider and recommended d/c antibiotics and switching to cefazolin 2 g Q 8 hours for MSSA bacteremia and ID consultation and repeat BCx. Cefazolin should treat the UTI as well (based on our antibiogram for Gram-negatives with cefazolin).

## 2023-09-23 NOTE — PROVIDER CONTACT NOTE (CRITICAL VALUE NOTIFICATION) - ASSESSMENT
Patient A&Ox1, VS as documented, NSR on tele. Patient on RA. TF running at 50 mL/hr through PEG tube. Blood  glucose monitored q6h. Repeated BC 9/22 sent. Patient currently on ceftriaxone IV Q8H
Pt asymptomatic, no s.s of distress, safety maintained. Will continue to monitor.

## 2023-09-23 NOTE — PHYSICAL THERAPY INITIAL EVALUATION ADULT - FOLLOWS COMMANDS/ANSWERS QUESTIONS, REHAB EVAL
25% of the time/able to follow single-step instructions/unable to follow multi-step instructions/unable to answer questions

## 2023-09-23 NOTE — DIETITIAN INITIAL EVALUATION ADULT - PROBLEM SELECTOR PLAN 3
-Hgb 6.6  -s/p 1u PRBC in ED, f/u repeat CBC, monitor CBC q8h  -f/u iron, ferritin, TIBC  -continue home iron  -monitor CBC, maintain active type and screen, transfuse for Hgb<7.0  -patient with hx hematoma following initial PEG placement, f/u CT A/P consider expansion

## 2023-09-23 NOTE — PHYSICAL THERAPY INITIAL EVALUATION ADULT - MANUAL MUSCLE TESTING RESULTS, REHAB EVAL
Patients lower extremity strength grossly 1/5, right upper extremity 2/5, left uppe extremity 1/5, upon functional assessments

## 2023-09-23 NOTE — PROVIDER CONTACT NOTE (CRITICAL VALUE NOTIFICATION) - TEST AND RESULT REPORTED:
Blood cultures 9/21 growth aerobic staph aureus, anarobic gram positive cocci in clusters
Gram + cocci in clusters

## 2023-09-23 NOTE — DIETITIAN INITIAL EVALUATION ADULT - PROBLEM SELECTOR PLAN 8
-patient with grade B esophagitis on EGD 8/2023, also with Hill grade 3 gastroesophageal flap, patient was pending ?outpatient w/u with EUS  -home omeprazole not on formulary will order pantoprazole daily

## 2023-09-23 NOTE — CHART NOTE - NSCHARTNOTEFT_GEN_A_CORE
PRE-INTERVENTIONAL RADIOLOGY PROCEDURE NOTE    Patient Age: 78  Patient Gender:  Female     Procedure (including site / side if known): right chest wall port removal     Diagnosis / Indication: MSSA bacteremia     Interventional Radiology Attending Physician: Dr Dash    Ordering Attending Physician: Dr Vasques     Pertinent medical history: multiple myeloma, here is MSSA bacteremia     Pertinent labs:                       8.6    12.28 )-----------( 182      ( 22 Sep 2023 21:36 )             25.3   09-22    132<L>  |  99  |  15  ----------------------------<  80  4.5   |  23  |  0.44<L>    Ca    7.4<L>      22 Sep 2023 21:36  Phos  2.4     09-22  Mg     1.90     09-22    TPro  6.0  /  Alb  2.0<L>  /  TBili  1.2  /  DBili  x   /  AST  82<H>  /  ALT  64<H>  /  AlkPhos  257<H>  09-21  PTT - ( 22 Sep 2023 12:55 )  PTT:24.4 sec    Patient and Family aware? Yes          Contact #: _______[id number]______________

## 2023-09-23 NOTE — DIETITIAN INITIAL EVALUATION ADULT - NS FNS DIET ORDER
Diet, Regular:   Tube Feeding Modality: Gastrostomy  Jevity 1.5 Vignesh (JEVITY1.5RTH)  Total Volume for 24 Hours (mL): 1080  Continuous  Starting Tube Feed Rate {mL per Hour}: 20  Increase Tube Feed Rate by (mL): 10     Every 4 hours  Until Goal Tube Feed Rate (mL per Hour): 45  Tube Feed Duration (in Hours): 24  Tube Feed Start Time: 17:30 (09-22-23 @ 17:32)

## 2023-09-23 NOTE — DIETITIAN INITIAL EVALUATION ADULT - PROBLEM SELECTOR PLAN 2
-Na 124  -likely iatrogenic given patient was on D5 1/2 NS and free water flushes at facility  -will hold D5 NS and free water flushes  -f/u urine sodium urine osm, serum osm  -monitor BMP q8h

## 2023-09-23 NOTE — DIETITIAN INITIAL EVALUATION ADULT - ADD RECOMMEND
1. Recommend reevaluating patient's chewing/swallowing if continue with pressure feeding.   2. Recommend multivitamin, ascorbic acid and zinc for wound healing.  3. Continue to monitor TF tolerance, GI distress, fluid status and skin integrity.

## 2023-09-23 NOTE — DIETITIAN INITIAL EVALUATION ADULT - ENTERAL
Recommend increase Jevity 1.5 via PEG tube to 50 ml/hr, 20 hrs to provide 1000 ml formula/day, 1500 hilary/day, 64 gm protein/day, 760 ml free water.

## 2023-09-23 NOTE — CONSULT NOTE ADULT - SUBJECTIVE AND OBJECTIVE BOX
Patient is a 78y old  Female who presents with a chief complaint of Hypotension (22 Sep 2023 10:51)    HPI:  77 yo F PMH Alzheimer's dementia s/p PEG 8/2023, GE flap valve, grade B esophagitis, Multiple Myeloma (previously on Revlimid), cataracts, chronic venous ulcers b/l LE, OA knee, HTN, primary pulmonary hypertension, MDD sent from NH given hypotension documented at 87/56. Patient is presently A&Ox1 (only oriented to self, believes she is in [facility] and is unable to provide reliable history. Attempt made to contact emergency contact Ubaldo who provided consent for transfusion to ED. History as per chart. Patient experienced cough for two days (patient presently denies cough or phlegm, shortness of breath). Patient also experienced dysuria for 1 week and is presently on treatment with CTX at NH 9/21-9/25. Of note, patient was also on D5 1/2  cc/hr for ?hypotension at NH in addition to free water flushes. Patient also noted to have abdominal tenderness in the ED - patient presently denies abdominal pain, nausea, vomiting, diarrhea, fevers, chills. At [facility] BP 92/56 s/p 1L LR remains systolic 90-100s, T 100.3->99.3. Labs significant for Hgb 6.2, WBC 13.3, Na 124. In the ED Patient was administered 1u PRBC, vancomycin, and zosyn x1. (22 Sep 2023 10:51)       REVIEW OF SYSTEMS  Constitutional: No fevers, chills, weight loss or fatigue   Skin: No rash, no phlebitis	  Eyes: No discharge	  ENMT: No sore throat, oral thrush, ulcers or exudate  Respiratory: No cough, no SOB  Cardiovascular:  No chest pain, palpitations or edema   Gastrointestinal: No pain, nausea, vomiting, diarrhea or constipation	  Genitourinary: No dysuria, discharge or flank pain  MSK: No arthralgias or back pain   Neurological: No HA, no weakness, no seizures, no AMS       prior hospital charts reviewed [V]  primary team notes reviewed [V]  other consultant notes reviewed [V]    PAST MEDICAL & SURGICAL HISTORY:  Multiple myeloma      Dementia      Rheumatoid arthritis      Mild protein-calorie malnutrition      Primary pulmonary hypertension      Osteoarthritis      Cataract      MDD (major depressive disorder)      H/O left knee surgery          SOCIAL HISTORY:  - Denied smoking/vaping/alcohol/recreational drug use    FAMILY HISTORY:      Allergies  No Known Allergies        ANTIMICROBIALS:  ceFAZolin   IVPB 2000 every 8 hours      ANTIMICROBIALS (past 90 days):  MEDICATIONS  (STANDING):    cefTRIAXone   IVPB   100 mL/Hr IV Intermittent (09-22-23 @ 13:43)    piperacillin/tazobactam IVPB...   200 mL/Hr IV Intermittent (09-22-23 @ 06:42)    vancomycin  IVPB   250 mL/Hr IV Intermittent (09-23-23 @ 05:39)   250 mL/Hr IV Intermittent (09-22-23 @ 19:04)    vancomycin  IVPB.   250 mL/Hr IV Intermittent (09-22-23 @ 07:23)        OTHER MEDS:   MEDICATIONS  (STANDING):  acetaminophen     Tablet .. 650 every 6 hours PRN  magnesium hydroxide Suspension 5 daily  mirtazapine 15 at bedtime  pantoprazole   Suspension 40 daily      VITALS:  Vital Signs Last 24 Hrs  T(F): 98 (09-23-23 @ 05:59), Max: 100.3 (09-21-23 @ 23:40)    Vital Signs Last 24 Hrs  HR: 87 (09-23-23 @ 05:59) (72 - 87)  BP: 123/73 (09-23-23 @ 05:59) (100/67 - 123/73)  RR: 18 (09-23-23 @ 05:59)  SpO2: 98% (09-23-23 @ 05:59) (98% - 100%)  Wt(kg): --    EXAM:  General: Patient in no acute distress   HEENT: NCAT, EOMI, PERRL, no oral lesions  CV: S1+S2, no m/r/g appreciated   Lungs: No respiratory distress, CTAB  Abd: Soft, nontender, no guarding, no rebound tenderness, + bowel sounds   Ext: No cyanosis, no edema  Neuro: Alert and oriented, no focal deficits, CN II-XII grossly intact   Skin: No rash   IV: No phlebitis      Labs:                        8.6    12.28 )-----------( 182      ( 22 Sep 2023 21:36 )             25.3     09-22    132<L>  |  99  |  15  ----------------------------<  80  4.5   |  23  |  0.44<L>    Ca    7.4<L>      22 Sep 2023 21:36  Phos  2.4     09-22  Mg     1.90     09-22    TPro  6.0  /  Alb  2.0<L>  /  TBili  1.2  /  DBili  x   /  AST  82<H>  /  ALT  64<H>  /  AlkPhos  257<H>  09-21      WBC Trend:  WBC Count: 12.28 (09-22-23 @ 21:36)  WBC Count: 11.28 (09-22-23 @ 12:55)  WBC Count: 13.34 (09-21-23 @ 23:35)      Auto Neutrophil #: 12.39 K/uL (09-21-23 @ 23:35)  Band Neutrophils %: 7.1 % (09-21-23 @ 23:35)  Auto Neutrophil #: 4.17 K/uL (08-09-23 @ 04:15)  Auto Neutrophil #: 6.00 K/uL (08-08-23 @ 17:30)  Auto Neutrophil #: 4.14 K/uL (08-08-23 @ 03:52)      Creatine Trend:  Creatinine: 0.44 (09-22)  Creatinine: 0.54 (09-22)  Creatinine: 0.48 (09-21)      Liver Biochemical Testing Trend:  Alanine Aminotransferase (ALT/SGPT): 64 *H* (09-21)  Alanine Aminotransferase (ALT/SGPT): 15 (08-09)  Alanine Aminotransferase (ALT/SGPT): 14 (08-08)  Alanine Aminotransferase (ALT/SGPT): 17 (08-07)  Alanine Aminotransferase (ALT/SGPT): 15 (08-06)  Aspartate Aminotransferase (AST/SGOT): 82 (09-21-23 @ 23:35)  Aspartate Aminotransferase (AST/SGOT): 15 (08-09-23 @ 04:15)  Aspartate Aminotransferase (AST/SGOT): 20 (08-08-23 @ 05:17)  Aspartate Aminotransferase (AST/SGOT): 18 (08-07-23 @ 01:40)  Aspartate Aminotransferase (AST/SGOT): 22 (08-06-23 @ 07:00)  Bilirubin Total: 1.2 (09-21)  Bilirubin Total: 0.5 (08-09)  Bilirubin Total: 0.6 (08-08)  Bilirubin Total: 0.4 (08-07)  Bilirubin Total: 0.7 (08-06)    Auto Eosinophil %: 0.0 % (09-21-23 @ 23:35)      Urinalysis Basic - ( 22 Sep 2023 21:36 )    Color: x / Appearance: x / SG: x / pH: x  Gluc: 80 mg/dL / Ketone: x  / Bili: x / Urobili: x   Blood: x / Protein: x / Nitrite: x   Leuk Esterase: x / RBC: x / WBC x   Sq Epi: x / Non Sq Epi: x / Bacteria: x        MICROBIOLOGY:    Culture - Blood (collected 21 Sep 2023 23:30)  Source: .Blood Blood-Peripheral  Preliminary Report:    Growth in aerobic bottle: Gram Positive Cocci in Clusters    Direct identification is available within approximately 3-5    hours either by Blood Panel Multiplexed PCR or Direct    MALDI-TOF. Details: [email or web address]  Organism: Blood Culture PCR  Organism: Blood Culture PCR    Sensitivities:      Method Type: PCR      -  Methicillin SENSITIVE Staphylococcus aureus (MSSA): Detec Any isolate of Staphylococcus aureus from a blood culture is NOT considered a contaminant.    Culture - Blood (collected 21 Sep 2023 23:15)  Source: .Blood Blood-Peripheral  Preliminary Report:    Growth in aerobic bottle: Gram Positive Cocci in Clusters    Growth in anaerobic bottle: Gram Positive Cocci in Clusters    Culture - Urine (collected 01 Aug 2023 23:00)  Source: Clean Catch Clean Catch (Midstream)  Final Report:    <10,000 CFU/mL Normal Urogenital Indy    Culture - Blood (collected 01 Aug 2023 18:15)  Source: .Blood Blood-Peripheral  Final Report:    No growth at 5 days    Culture - Blood (collected 01 Aug 2023 18:00)  Source: .Blood Blood-Peripheral  Final Report:    No growth at 5 days    Ferritin: 397 (09-22)    Lactate, Blood: 1.5 (09-22 @ 12:55)  Blood Gas Venous - Lactate: 2.3 (09-21 @ 23:35)    A1C with Estimated Average Glucose Result: 4.8 % (08-02-23 @ 05:40)      RADIOLOGY:  imaging below personally reviewed    < from: Xray Chest 1 View- PORTABLE-Urgent (Xray Chest 1 View- PORTABLE-Urgent .) (09.21.23 @ 23:52) >  FINDINGS:  Support Devices/Implanted Hardware: Right chest port with distal catheter   tip at the superior cavoatrial junction.  Heart: Heart size cannot accurately assessed in this projection.  Pulmonary: No focal consolidations. No effusions or pneumothorax.  Bones: No acute bony pathology.  Miscellaneous: Surgical clips in the right upper quadrant.    IMPRESSION: Clear lungs.    < end of copied text > Patient is a 78y old  Female who presents with a chief complaint of Hypotension (22 Sep 2023 10:51)    HPI:  77 yo F PMH Alzheimer's dementia s/p PEG 8/2023, GE flap valve, grade B esophagitis, Multiple Myeloma (previously on Revlimid), cataracts, chronic venous ulcers b/l LE, OA knee, HTN, primary pulmonary hypertension, MDD sent from NH given hypotension documented at 87/56. Patient is presently A&Ox1 (only oriented to self, believes she is in [facility] and is unable to provide reliable history. Attempt made to contact emergency contact Ubaldo who provided consent for transfusion to ED. History as per chart. Patient experienced cough for two days (patient presently denies cough or phlegm, shortness of breath). Patient also experienced dysuria for 1 week and is presently on treatment with CTX at NH 9/21-9/25. Of note, patient was also on D5 1/2  cc/hr for ?hypotension at NH in addition to free water flushes. Patient also noted to have abdominal tenderness in the ED - patient presently denies abdominal pain, nausea, vomiting, diarrhea, fevers, chills. At [facility] BP 92/56 s/p 1L LR remains systolic 90-100s, T 100.3->99.3. Labs significant for Hgb 6.2, WBC 13.3, Na 124. In the ED Patient was administered 1u PRBC, vancomycin, and zosyn x1. (22 Sep 2023 10:51)       REVIEW OF SYSTEMS  Limited ROS due to dementia:  Constitutional: +Fevers, chills  Skin: No rash, no phlebitis	  Eyes: No discharge	  ENMT: No sore throat, oral thrush, ulcers or exudate  Respiratory: No cough, no SOB  Cardiovascular:  No chest pain, palpitations or edema   Gastrointestinal: +Abdominal pain  Genitourinary: +Painful urination  MSK:  +pain in elbows and knees  Neurological: No HA, no weakness, no seizures, no AMS       prior hospital charts reviewed [V]  primary team notes reviewed [V]  other consultant notes reviewed [V]    PAST MEDICAL & SURGICAL HISTORY:  Multiple myeloma      Dementia      Rheumatoid arthritis      Mild protein-calorie malnutrition      Primary pulmonary hypertension      Osteoarthritis      Cataract      MDD (major depressive disorder)      H/O left knee surgery          SOCIAL HISTORY:  - Lives at NH  - Limited by mental status    FAMILY HISTORY:      Allergies  No Known Allergies        ANTIMICROBIALS:  ceFAZolin   IVPB 2000 every 8 hours      ANTIMICROBIALS (past 90 days):  MEDICATIONS  (STANDING):    cefTRIAXone   IVPB   100 mL/Hr IV Intermittent (09-22-23 @ 13:43)    piperacillin/tazobactam IVPB...   200 mL/Hr IV Intermittent (09-22-23 @ 06:42)    vancomycin  IVPB   250 mL/Hr IV Intermittent (09-23-23 @ 05:39)   250 mL/Hr IV Intermittent (09-22-23 @ 19:04)    vancomycin  IVPB.   250 mL/Hr IV Intermittent (09-22-23 @ 07:23)        OTHER MEDS:   MEDICATIONS  (STANDING):  acetaminophen     Tablet .. 650 every 6 hours PRN  magnesium hydroxide Suspension 5 daily  mirtazapine 15 at bedtime  pantoprazole   Suspension 40 daily      VITALS:  Vital Signs Last 24 Hrs  T(F): 98 (09-23-23 @ 05:59), Max: 100.3 (09-21-23 @ 23:40)    Vital Signs Last 24 Hrs  HR: 87 (09-23-23 @ 05:59) (72 - 87)  BP: 123/73 (09-23-23 @ 05:59) (100/67 - 123/73)  RR: 18 (09-23-23 @ 05:59)  SpO2: 98% (09-23-23 @ 05:59) (98% - 100%)  Wt(kg): --    EXAM:  General: Patient in no acute distress   HEENT: no oral lesions  CV: S1+S2, no m/r/g appreciated   Lungs: No respiratory distress, CTAB. R chest wall port present non-tender or erythematous  Abd: Soft, diffuse tenderness however when distracted she does not have pain. +PEG tube  Ext: No cyanosis, no edema  Neuro: Alert and oriented x1  IV: No phlebitis      Labs:                        8.6    12.28 )-----------( 182      ( 22 Sep 2023 21:36 )             25.3     09-22    132<L>  |  99  |  15  ----------------------------<  80  4.5   |  23  |  0.44<L>    Ca    7.4<L>      22 Sep 2023 21:36  Phos  2.4     09-22  Mg     1.90     09-22    TPro  6.0  /  Alb  2.0<L>  /  TBili  1.2  /  DBili  x   /  AST  82<H>  /  ALT  64<H>  /  AlkPhos  257<H>  09-21      WBC Trend:  WBC Count: 12.28 (09-22-23 @ 21:36)  WBC Count: 11.28 (09-22-23 @ 12:55)  WBC Count: 13.34 (09-21-23 @ 23:35)      Auto Neutrophil #: 12.39 K/uL (09-21-23 @ 23:35)  Band Neutrophils %: 7.1 % (09-21-23 @ 23:35)  Auto Neutrophil #: 4.17 K/uL (08-09-23 @ 04:15)  Auto Neutrophil #: 6.00 K/uL (08-08-23 @ 17:30)  Auto Neutrophil #: 4.14 K/uL (08-08-23 @ 03:52)      Creatine Trend:  Creatinine: 0.44 (09-22)  Creatinine: 0.54 (09-22)  Creatinine: 0.48 (09-21)      Liver Biochemical Testing Trend:  Alanine Aminotransferase (ALT/SGPT): 64 *H* (09-21)  Alanine Aminotransferase (ALT/SGPT): 15 (08-09)  Alanine Aminotransferase (ALT/SGPT): 14 (08-08)  Alanine Aminotransferase (ALT/SGPT): 17 (08-07)  Alanine Aminotransferase (ALT/SGPT): 15 (08-06)  Aspartate Aminotransferase (AST/SGOT): 82 (09-21-23 @ 23:35)  Aspartate Aminotransferase (AST/SGOT): 15 (08-09-23 @ 04:15)  Aspartate Aminotransferase (AST/SGOT): 20 (08-08-23 @ 05:17)  Aspartate Aminotransferase (AST/SGOT): 18 (08-07-23 @ 01:40)  Aspartate Aminotransferase (AST/SGOT): 22 (08-06-23 @ 07:00)  Bilirubin Total: 1.2 (09-21)  Bilirubin Total: 0.5 (08-09)  Bilirubin Total: 0.6 (08-08)  Bilirubin Total: 0.4 (08-07)  Bilirubin Total: 0.7 (08-06)    Auto Eosinophil %: 0.0 % (09-21-23 @ 23:35)      Urinalysis Basic - ( 22 Sep 2023 21:36 )    Color: x / Appearance: x / SG: x / pH: x  Gluc: 80 mg/dL / Ketone: x  / Bili: x / Urobili: x   Blood: x / Protein: x / Nitrite: x   Leuk Esterase: x / RBC: x / WBC x   Sq Epi: x / Non Sq Epi: x / Bacteria: x        MICROBIOLOGY:    Culture - Blood (collected 21 Sep 2023 23:30)  Source: .Blood Blood-Peripheral  Preliminary Report:    Growth in aerobic bottle: Gram Positive Cocci in Clusters    Direct identification is available within approximately 3-5    hours either by Blood Panel Multiplexed PCR or Direct    MALDI-TOF. Details: [email or web address]  Organism: Blood Culture PCR  Organism: Blood Culture PCR    Sensitivities:      Method Type: PCR      -  Methicillin SENSITIVE Staphylococcus aureus (MSSA): Detec Any isolate of Staphylococcus aureus from a blood culture is NOT considered a contaminant.    Culture - Blood (collected 21 Sep 2023 23:15)  Source: .Blood Blood-Peripheral  Preliminary Report:    Growth in aerobic bottle: Gram Positive Cocci in Clusters    Growth in anaerobic bottle: Gram Positive Cocci in Clusters    Culture - Urine (collected 01 Aug 2023 23:00)  Source: Clean Catch Clean Catch (Midstream)  Final Report:    <10,000 CFU/mL Normal Urogenital Indy    Culture - Blood (collected 01 Aug 2023 18:15)  Source: .Blood Blood-Peripheral  Final Report:    No growth at 5 days    Culture - Blood (collected 01 Aug 2023 18:00)  Source: .Blood Blood-Peripheral  Final Report:    No growth at 5 days    Ferritin: 397 (09-22)    Lactate, Blood: 1.5 (09-22 @ 12:55)  Blood Gas Venous - Lactate: 2.3 (09-21 @ 23:35)    A1C with Estimated Average Glucose Result: 4.8 % (08-02-23 @ 05:40)      RADIOLOGY:  imaging below personally reviewed    < from: Xray Chest 1 View- PORTABLE-Urgent (Xray Chest 1 View- PORTABLE-Urgent .) (09.21.23 @ 23:52) >  FINDINGS:  Support Devices/Implanted Hardware: Right chest port with distal catheter   tip at the superior cavoatrial junction.  Heart: Heart size cannot accurately assessed in this projection.  Pulmonary: No focal consolidations. No effusions or pneumothorax.  Bones: No acute bony pathology.  Miscellaneous: Surgical clips in the right upper quadrant.    IMPRESSION: Clear lungs.    < end of copied text > Patient is a 78y old  Female who presents with a chief complaint of Hypotension (22 Sep 2023 10:51)    HPI:  77 yo F PMH Alzheimer's dementia s/p PEG 8/2023, GE flap valve, grade B esophagitis, Multiple Myeloma (previously on Revlimid), cataracts, chronic venous ulcers b/l LE, OA knee, HTN, primary pulmonary hypertension, MDD sent from NH given hypotension documented at 87/56. Patient is presently A&Ox1 (only oriented to self, believes she is in [facility] and is unable to provide reliable history. Attempt made to contact emergency contact Ubaldo who provided consent for transfusion to ED. History as per chart. Patient experienced cough for two days (patient presently denies cough or phlegm, shortness of breath). Patient also experienced dysuria for 1 week and is presently on treatment with CTX at NH 9/21-9/25. Of note, patient was also on D5 1/2  cc/hr for ?hypotension at NH in addition to free water flushes. Patient also noted to have abdominal tenderness in the ED - patient presently denies abdominal pain, nausea, vomiting, diarrhea, fevers, chills. At [facility] BP 92/56 s/p 1L LR remains systolic 90-100s, T 100.3->99.3. Labs significant for Hgb 6.2, WBC 13.3, Na 124. In the ED Patient was administered 1u PRBC, vancomycin, and zosyn x1. (22 Sep 2023 10:51)    ID- asked to evaluate for MSSA bacteremia        REVIEW OF SYSTEMS  Limited ROS due to dementia:  Constitutional: +Fevers, chills  Skin: No rash, no phlebitis	  Eyes: No discharge	  ENMT: No sore throat, oral thrush, ulcers or exudate  Respiratory: No cough, no SOB  Cardiovascular:  No chest pain, palpitations or edema   Gastrointestinal: +Abdominal pain  Genitourinary: +Painful urination  MSK:  +pain in elbows and knees  Neurological: No HA, no weakness, no seizures, no AMS       prior hospital charts reviewed [V]  primary team notes reviewed [V]  other consultant notes reviewed [V]    PAST MEDICAL & SURGICAL HISTORY:  Multiple myeloma      Dementia      Rheumatoid arthritis      Mild protein-calorie malnutrition      Primary pulmonary hypertension      Osteoarthritis      Cataract      MDD (major depressive disorder)      H/O left knee surgery          SOCIAL HISTORY:  - Lives at NH  - Limited by mental status    FAMILY HISTORY:      Allergies  No Known Allergies        ANTIMICROBIALS:  ceFAZolin   IVPB 2000 every 8 hours      ANTIMICROBIALS (past 90 days):  MEDICATIONS  (STANDING):    cefTRIAXone   IVPB   100 mL/Hr IV Intermittent (09-22-23 @ 13:43)    piperacillin/tazobactam IVPB...   200 mL/Hr IV Intermittent (09-22-23 @ 06:42)    vancomycin  IVPB   250 mL/Hr IV Intermittent (09-23-23 @ 05:39)   250 mL/Hr IV Intermittent (09-22-23 @ 19:04)    vancomycin  IVPB.   250 mL/Hr IV Intermittent (09-22-23 @ 07:23)        OTHER MEDS:   MEDICATIONS  (STANDING):  acetaminophen     Tablet .. 650 every 6 hours PRN  magnesium hydroxide Suspension 5 daily  mirtazapine 15 at bedtime  pantoprazole   Suspension 40 daily      VITALS:  Vital Signs Last 24 Hrs  T(F): 98 (09-23-23 @ 05:59), Max: 100.3 (09-21-23 @ 23:40)    Vital Signs Last 24 Hrs  HR: 87 (09-23-23 @ 05:59) (72 - 87)  BP: 123/73 (09-23-23 @ 05:59) (100/67 - 123/73)  RR: 18 (09-23-23 @ 05:59)  SpO2: 98% (09-23-23 @ 05:59) (98% - 100%)  Wt(kg): --    EXAM:  General: Patient in no acute distress, weak appearing   HEENT: no oral lesions  CV: S1+S2, no m/r/g appreciated   Lungs: No respiratory distress, CTAB. R chest wall port present non-tender or erythematous  Abd: Soft, diffuse tenderness however when distracted she does not have pain. +PEG tube  Ext: arhritic changes fingers   Neuro: Alert and oriented x1  IV: No phlebitis        Labs:                        8.6    12.28 )-----------( 182      ( 22 Sep 2023 21:36 )             25.3     09-22    132<L>  |  99  |  15  ----------------------------<  80  4.5   |  23  |  0.44<L>    Ca    7.4<L>      22 Sep 2023 21:36  Phos  2.4     09-22  Mg     1.90     09-22    TPro  6.0  /  Alb  2.0<L>  /  TBili  1.2  /  DBili  x   /  AST  82<H>  /  ALT  64<H>  /  AlkPhos  257<H>  09-21      WBC Trend:  WBC Count: 12.28 (09-22-23 @ 21:36)  WBC Count: 11.28 (09-22-23 @ 12:55)  WBC Count: 13.34 (09-21-23 @ 23:35)      Auto Neutrophil #: 12.39 K/uL (09-21-23 @ 23:35)  Band Neutrophils %: 7.1 % (09-21-23 @ 23:35)  Auto Neutrophil #: 4.17 K/uL (08-09-23 @ 04:15)  Auto Neutrophil #: 6.00 K/uL (08-08-23 @ 17:30)  Auto Neutrophil #: 4.14 K/uL (08-08-23 @ 03:52)      Creatine Trend:  Creatinine: 0.44 (09-22)  Creatinine: 0.54 (09-22)  Creatinine: 0.48 (09-21)      Liver Biochemical Testing Trend:  Alanine Aminotransferase (ALT/SGPT): 64 *H* (09-21)  Alanine Aminotransferase (ALT/SGPT): 15 (08-09)  Alanine Aminotransferase (ALT/SGPT): 14 (08-08)  Alanine Aminotransferase (ALT/SGPT): 17 (08-07)  Alanine Aminotransferase (ALT/SGPT): 15 (08-06)  Aspartate Aminotransferase (AST/SGOT): 82 (09-21-23 @ 23:35)  Aspartate Aminotransferase (AST/SGOT): 15 (08-09-23 @ 04:15)  Aspartate Aminotransferase (AST/SGOT): 20 (08-08-23 @ 05:17)  Aspartate Aminotransferase (AST/SGOT): 18 (08-07-23 @ 01:40)  Aspartate Aminotransferase (AST/SGOT): 22 (08-06-23 @ 07:00)  Bilirubin Total: 1.2 (09-21)  Bilirubin Total: 0.5 (08-09)  Bilirubin Total: 0.6 (08-08)  Bilirubin Total: 0.4 (08-07)  Bilirubin Total: 0.7 (08-06)    Auto Eosinophil %: 0.0 % (09-21-23 @ 23:35)      Urinalysis Basic - ( 22 Sep 2023 21:36 )    Color: x / Appearance: x / SG: x / pH: x  Gluc: 80 mg/dL / Ketone: x  / Bili: x / Urobili: x   Blood: x / Protein: x / Nitrite: x   Leuk Esterase: x / RBC: x / WBC x   Sq Epi: x / Non Sq Epi: x / Bacteria: x        MICROBIOLOGY:    Culture - Blood (collected 21 Sep 2023 23:30)  Source: .Blood Blood-Peripheral  Preliminary Report:    Growth in aerobic bottle: Gram Positive Cocci in Clusters    Direct identification is available within approximately 3-5    hours either by Blood Panel Multiplexed PCR or Direct    MALDI-TOF. Details: [email or web address]  Organism: Blood Culture PCR  Organism: Blood Culture PCR    Sensitivities:      Method Type: PCR      -  Methicillin SENSITIVE Staphylococcus aureus (MSSA): Detec Any isolate of Staphylococcus aureus from a blood culture is NOT considered a contaminant.    Culture - Blood (collected 21 Sep 2023 23:15)  Source: .Blood Blood-Peripheral  Preliminary Report:    Growth in aerobic bottle: Gram Positive Cocci in Clusters    Growth in anaerobic bottle: Gram Positive Cocci in Clusters    Culture - Urine (collected 01 Aug 2023 23:00)  Source: Clean Catch Clean Catch (Midstream)  Final Report:    <10,000 CFU/mL Normal Urogenital Indy    Culture - Blood (collected 01 Aug 2023 18:15)  Source: .Blood Blood-Peripheral  Final Report:    No growth at 5 days    Culture - Blood (collected 01 Aug 2023 18:00)  Source: .Blood Blood-Peripheral  Final Report:    No growth at 5 days    Ferritin: 397 (09-22)    Lactate, Blood: 1.5 (09-22 @ 12:55)  Blood Gas Venous - Lactate: 2.3 (09-21 @ 23:35)    A1C with Estimated Average Glucose Result: 4.8 % (08-02-23 @ 05:40)      RADIOLOGY:  imaging below personally reviewed    < from: Xray Chest 1 View- PORTABLE-Urgent (Xray Chest 1 View- PORTABLE-Urgent .) (09.21.23 @ 23:52) >  FINDINGS:  Support Devices/Implanted Hardware: Right chest port with distal catheter   tip at the superior cavoatrial junction.  Heart: Heart size cannot accurately assessed in this projection.  Pulmonary: No focal consolidations. No effusions or pneumothorax.  Bones: No acute bony pathology.  Miscellaneous: Surgical clips in the right upper quadrant.    IMPRESSION: Clear lungs.    < end of copied text >

## 2023-09-23 NOTE — PHYSICAL THERAPY INITIAL EVALUATION ADULT - ADDITIONAL COMMENTS
Unable to obtain accurate social history secondary to pt. presenting with confusion during subjective history and presenting with difficulty following commands, limited social history obtained through past medical documents, please refer to social work for more attainable social history. Patient came from a nursing home.     Patient left semi-reclined in bed, NAD, all lines and tubes intact, bed alarm on, call ricks within reach, RN Shalom made aware.

## 2023-09-23 NOTE — PROGRESS NOTE ADULT - SUBJECTIVE AND OBJECTIVE BOX
CHIEF COMPLAINT:   patient condition improving    Reason for Admission: Hypotension  History of Present Illness:   79 yo F PMH Alzheimer's dementia s/p PEG 8/2023, GE flap valve, grade B esophagitis, Multiple Myeloma (previously on Revlimid), cataracts, chronic venous ulcers b/l LE, OA knee, HTN, primary pulmonary hypertension, MDD sent from NH given hypotension documented at 87/56. Patient is presently A&Ox1 (only oriented to self, believes she is in [facility] and is unable to provide reliable history. Attempt made to contact emergency contact Tomeka who provided consent for transfusion to ED. History as per chart. Patient experienced cough for two days (patient presently denies cough or phlegm, shortness of breath). Patient also experienced dysuria for 1 week and is presently on treatment with CTX at NH 9/21-9/25. Of note, patient was also on D5 1/2  cc/hr for ?hypotension at NH in addition to free water flushes. Patient also noted to have abdominal tenderness in the ED - patient presently denies abdominal pain, nausea, vomiting, diarrhea, fevers, chills. At [facility] BP 92/56 s/p 1L LR remains systolic 90-100s, T 100.3->99.3. Labs significant for Hgb 6.2, WBC 13.3, Na 124. In the ED Patient was administered 1u PRBC, vancomycin, and zosyn x1.    SUBJECTIVE:     REVIEW OF SYSTEMS:xawake verbal and appears to confused at the baseline    CONSTITUTIONAL: ( x )  weakness,  (  ) fevers or chills  EYES/ENT: (  )visual changes;     NECK: (  ) pain or stiffness  RESPIRATORY:   (  )cough, wheezing, hemoptysis;  (  ) shortness of breath  CARDIOVASCULAR:  (  )chest pain or palpitations  GASTROINTESTINAL:   (  )abdominal or epigastric pain.  (  ) nausea, vomiting, or hematemesis;   (   ) diarrhea or constipation.   GENITOURINARY:   (    ) dysuria, frequency or hematuria  NEUROLOGICAL:  (   ) numbness or weakness   All other review of systems is negative unless indicated above    Vital Signs Last 24 Hrs  T(C): 36.8 (23 Sep 2023 11:42), Max: 36.8 (23 Sep 2023 11:42)  T(F): 98.2 (23 Sep 2023 11:42), Max: 98.2 (23 Sep 2023 11:42)  HR: 91 (23 Sep 2023 11:42) (87 - 91)  BP: 132/69 (23 Sep 2023 11:42) (123/73 - 132/69)  BP(mean): --  RR: 18 (23 Sep 2023 11:42) (18 - 18)  SpO2: 99% (23 Sep 2023 11:42) (98% - 99%)    Parameters below as of 23 Sep 2023 11:42  Patient On (Oxygen Delivery Method): room air        I&O's Summary      CAPILLARY BLOOD GLUCOSE      POCT Blood Glucose.: 144 mg/dL (23 Sep 2023 18:01)      PHYSICAL EXAM:    Constitutional:  ( x  ) NAD,   (x   )awake    HEENT: PERR, EOMI,    Neck: Soft and supple, No LAD, No JVD  Respiratory:  (  x  Breath sounds are clear bilaterally,    (   ) wheezing, rales or rhonchi  Cardiovascular:     ( x  )S1 and S2, regular rate and rhythm, no Murmurs, gallops or rubs  Gastrointestinal:  (  x )Bowel Sounds present, soft,   (  )nontender, nondistended, + PEG   Extremities:    (  ) peripheral edema  Vascular: 2+ peripheral pulses  Neurological:    (   x )A/O x 1-2  (  ) focal deficits  Musculoskeletal:    (   )  normal strength b/l upper  (     ) normal  lower extremities  Skin: No rashes    MEDICATIONS:  MEDICATIONS  (STANDING):  ascorbic acid 500 milliGRAM(s) Oral daily  ceFAZolin   IVPB 2000 milliGRAM(s) IV Intermittent every 8 hours  ferrous    sulfate Liquid 220 milliGRAM(s) Oral every 12 hours  magnesium hydroxide Suspension 5 milliLiter(s) Oral daily  mirtazapine 15 milliGRAM(s) Oral at bedtime  pantoprazole   Suspension 40 milliGRAM(s) Oral daily  silver sulfADIAZINE 1% Cream 1 Application(s) Topical every 12 hours      LABS: All Labs Reviewed:                        8.6    12.28 )-----------( 182      ( 22 Sep 2023 21:36 )             25.3     09-22    132<L>  |  99  |  15  ----------------------------<  80  4.5   |  23  |  0.44<L>    Ca    7.4<L>      22 Sep 2023 21:36  Phos  2.4     09-22  Mg     1.90     09-22    TPro  6.0  /  Alb  2.0<L>  /  TBili  1.2  /  DBili  x   /  AST  82<H>  /  ALT  64<H>  /  AlkPhos  257<H>  09-21    PTT - ( 22 Sep 2023 12:55 )  PTT:24.4 sec      Blood Culture: 09-21 @ 23:30  Organism Blood Culture PCR  Gram Stain Blood -- Gram Stain   Growth in aerobic and anaerobic bottles: Gram Positive Cocci in Clusters  Specimen Source .Blood Blood-Peripheral  Culture-Blood --    09-21 @ 23:15  Organism --  Gram Stain Blood -- Gram Stain   Growth in aerobic bottle: Gram Positive Cocci in Clusters  Growth in anaerobic bottle: Gram Positive Cocci in Clusters  Specimen Source .Blood Blood-Peripheral  Culture-Blood --      Urine Culture             6.2    13.34 )-----------( 283      ( 21 Sep 2023 23:35 )             20.1     09-21    124<L>  |  90<L>  |  18  ----------------------------<  97  4.8   |  22  |  0.48<L>    RADIOLOGY/EKG:    ASSESSMENT AND PLAN:  79 yo F PMH Alzheimer's dementia s/p PEG 8/2023, GE flap valve, grade B esophagitis, Multiple Myeloma (previously on Revlimid), cataracts, chronic venous ulcers b/l LE, OA knee, HTN, primary pulmonary hypertension, MDD sent from NH given hypotension documented at 87/56. Unable to obtain reliable history although per documentation patient has experienced cough, dysuria, and abdominal pain. Labs significant for anemia to 6.2 s/p PRBC without active bleeding and Na 124 likely iatrogenic as patient was on D5 1/2 NS and standing free water flushes at nursing home. Patient with fevers (T 100.3) and leukocytosis to 13.3 concerning for sepsis likely 2/2 urinary source - alternatively consider lower extremity wounds. Patient with hx hematoma following initial PEG placement, would f/u CT abdomen r/o expansion.      Problem/Plan - 1:  ·  Problem: Sepsis.   ·  Plan: -patient with fever to 100.3, leukocytosis to 13.3, hypotension 87/56-->92/56 concerning for sepsis 2/2 urinary source, less likely in setting of lower extremity wounds, lactic acid 2.4  -s/p vanc and zosyn in the ED, s/p 1L LR  -f/u BCx, UCx, UA turbid 100 protein moderate blood, large LE, WBC 2921 significant for UTI, per documentation patient has experienced dysuria  -CXR clear  -will order ceftriaxone and vancomycin  -monitor BP, f/u repeat lactic acid.    Problem/Plan - 2:  ·  Problem: Hyponatremia.   ·  Plan: -Na 124  -likely iatrogenic given patient was on D5 1/2 NS and free water flushes at facility  -will hold D5 NS and free water flushes  -f/u urine sodium urine osm, serum osm  -monitor BMP q8h.    Problem/Plan - 3:  ·  Problem: Anemia.   ·  Plan: -Hgb 6.6  -s/p 1u PRBC in ED, f/u repeat CBC, monitor CBC q8h  -f/u iron, ferritin, TIBC  -continue home iron  -monitor CBC, maintain active type and screen, transfuse for Hgb<7.0  -patient with hx hematoma following initial PEG placement, f/u CT A/P consider expansion.    Problem/Plan - 4:  ·  Problem: Alzheimers disease.   ·  Plan: -would obtain collateral on baseline mental status, attempted to call emergency contact tomeka high was full  -patient is alert, conversant, although A&Ox1 believes she is in [facility].    Problem/Plan - 5:  ·  Problem: Multiple myeloma.   ·  Plan: -patient previously on revlimid per documentation  -would obtain records, patient follows with Dr. Vasques PCP, unclear if patient follows with oncologist.    Problem/Plan - 6:  ·  Problem: Rheumatoid arthritis.   ·  Plan: -patient with ulnar deviation of b/l UE digits, documented RA  -would obtain records.    Problem/Plan - 7:  ·  Problem: Chronic cutaneous venous stasis ulcer.   ·  Plan: -patient with b/l LE ulcers, in setting of chronic venous stasis  -f/u wound care eval.    Problem/Plan - 8:  ·  Problem: History of esophagitis.   ·  Plan: -patient with grade B esophagitis on EGD 8/2023, also with Hill grade 3 gastroesophageal flap, patient was pending ?outpatient w/u with EUS  -home omeprazole not on formulary will order pantoprazole daily.    Problem/Plan - 9:  ·  Problem: Need for prophylactic measure.   ·  Plan: -DVT ppx: patient with documented hx hematoma after initial PEG placement, SCD for now, will hold pharmacologic ppx  -Diet: f/u nutrition consult for tube feeds, patient on Jevity at facility.    -9/23/2023 ID and IR note and consult appreciated due to positive blood culture recommended to remove right upper port antibody was changed to Ancef as per ID recommendation  DVT PPX:    ADVANCED DIRECTIVE:    DISPOSITION:

## 2023-09-23 NOTE — OCCUPATIONAL THERAPY INITIAL EVALUATION ADULT - RANGE OF MOTION EXAMINATION, UPPER EXTREMITY
Right shoulder Passive ROM ~0-90 degrees; Right elbow passive ROM WFL; Right MCP contracted in flexion and passive ROM to neurtral. Left shoulder and elbow active assistive ROM was WFL; Left digits 2-4 swan neck deformities Right shoulder Passive ROM ~0-90 degrees; Right elbow passive ROM WFL; Right digit MCPs contracted in flexion and passive ROM to neutral. Left shoulder and elbow active assistive ROM was WFL; Left digits 2-4 swan neck deformities

## 2023-09-23 NOTE — DIETITIAN INITIAL EVALUATION ADULT - REASON
Unable to get consent at this time, however noted w/ overt signs of severe muscle loss in temporal region and severe fat loss in orbital and buccal region per observation.

## 2023-09-23 NOTE — DIETITIAN INITIAL EVALUATION ADULT - PROBLEM SELECTOR PLAN 1
-patient with fever to 100.3, leukocytosis to 13.3, hypotension 87/56-->92/56 concerning for sepsis 2/2 urinary source, less likely in setting of lower extremity wounds, lactic acid 2.4  -s/p vanc and zosyn in the ED, s/p 1L LR  -f/u BCx, UCx, UA turbid 100 protein moderate blood, large LE, WBC 2921 significant for UTI, per documentation patient has experienced dysuria  -CXR clear  -will order ceftriaxone and vancomycin  -monitor BP, f/u repeat lactic acid

## 2023-09-23 NOTE — OCCUPATIONAL THERAPY INITIAL EVALUATION ADULT - SITTING BALANCE: STATIC
Unable to assess due to patient being dependent in supine to sit. Patient has lower body contractures. Unable to assess due to patient being dependent in supine to sit. Patient has bilateral LE flexion contractures.

## 2023-09-24 LAB
-  AMPICILLIN/SULBACTAM: SIGNIFICANT CHANGE UP
-  CEFAZOLIN: SIGNIFICANT CHANGE UP
-  CLINDAMYCIN: SIGNIFICANT CHANGE UP
-  ERYTHROMYCIN: SIGNIFICANT CHANGE UP
-  GENTAMICIN: SIGNIFICANT CHANGE UP
-  OXACILLIN: SIGNIFICANT CHANGE UP
-  RIFAMPIN: SIGNIFICANT CHANGE UP
-  TETRACYCLINE: SIGNIFICANT CHANGE UP
-  TRIMETHOPRIM/SULFAMETHOXAZOLE: SIGNIFICANT CHANGE UP
-  VANCOMYCIN: SIGNIFICANT CHANGE UP
ANION GAP SERPL CALC-SCNC: 15 MMOL/L — HIGH (ref 7–14)
BUN SERPL-MCNC: 16 MG/DL — SIGNIFICANT CHANGE UP (ref 7–23)
CALCIUM SERPL-MCNC: 8.1 MG/DL — LOW (ref 8.4–10.5)
CHLORIDE SERPL-SCNC: 99 MMOL/L — SIGNIFICANT CHANGE UP (ref 98–107)
CO2 SERPL-SCNC: 22 MMOL/L — SIGNIFICANT CHANGE UP (ref 22–31)
CREAT SERPL-MCNC: 0.4 MG/DL — LOW (ref 0.5–1.3)
CULTURE RESULTS: SIGNIFICANT CHANGE UP
CULTURE RESULTS: SIGNIFICANT CHANGE UP
EGFR: 102 ML/MIN/1.73M2 — SIGNIFICANT CHANGE UP
GLUCOSE BLDC GLUCOMTR-MCNC: 110 MG/DL — HIGH (ref 70–99)
GLUCOSE BLDC GLUCOMTR-MCNC: 139 MG/DL — HIGH (ref 70–99)
GLUCOSE BLDC GLUCOMTR-MCNC: 94 MG/DL — SIGNIFICANT CHANGE UP (ref 70–99)
GLUCOSE SERPL-MCNC: 126 MG/DL — HIGH (ref 70–99)
GRAM STN FLD: SIGNIFICANT CHANGE UP
HCT VFR BLD CALC: 30 % — LOW (ref 34.5–45)
HGB BLD-MCNC: 10 G/DL — LOW (ref 11.5–15.5)
MAGNESIUM SERPL-MCNC: 2 MG/DL — SIGNIFICANT CHANGE UP (ref 1.6–2.6)
MCHC RBC-ENTMCNC: 22.9 PG — LOW (ref 27–34)
MCHC RBC-ENTMCNC: 33.3 GM/DL — SIGNIFICANT CHANGE UP (ref 32–36)
MCV RBC AUTO: 68.8 FL — LOW (ref 80–100)
METHOD TYPE: SIGNIFICANT CHANGE UP
NRBC # BLD: 0 /100 WBCS — SIGNIFICANT CHANGE UP (ref 0–0)
NRBC # FLD: 0 K/UL — SIGNIFICANT CHANGE UP (ref 0–0)
ORGANISM # SPEC MICROSCOPIC CNT: SIGNIFICANT CHANGE UP
PHOSPHATE SERPL-MCNC: 2.3 MG/DL — LOW (ref 2.5–4.5)
PLATELET # BLD AUTO: 363 K/UL — SIGNIFICANT CHANGE UP (ref 150–400)
POTASSIUM SERPL-MCNC: 4.6 MMOL/L — SIGNIFICANT CHANGE UP (ref 3.5–5.3)
POTASSIUM SERPL-SCNC: 4.6 MMOL/L — SIGNIFICANT CHANGE UP (ref 3.5–5.3)
RBC # BLD: 4.36 M/UL — SIGNIFICANT CHANGE UP (ref 3.8–5.2)
RBC # FLD: 25.3 % — HIGH (ref 10.3–14.5)
SODIUM SERPL-SCNC: 136 MMOL/L — SIGNIFICANT CHANGE UP (ref 135–145)
SPECIMEN SOURCE: SIGNIFICANT CHANGE UP
SPECIMEN SOURCE: SIGNIFICANT CHANGE UP
WBC # BLD: 12.35 K/UL — HIGH (ref 3.8–10.5)
WBC # FLD AUTO: 12.35 K/UL — HIGH (ref 3.8–10.5)

## 2023-09-24 RX ADMIN — PANTOPRAZOLE SODIUM 40 MILLIGRAM(S): 20 TABLET, DELAYED RELEASE ORAL at 13:16

## 2023-09-24 RX ADMIN — Medication 100 MILLIGRAM(S): at 05:51

## 2023-09-24 RX ADMIN — Medication 1 APPLICATION(S): at 05:52

## 2023-09-24 RX ADMIN — MIRTAZAPINE 15 MILLIGRAM(S): 45 TABLET, ORALLY DISINTEGRATING ORAL at 21:55

## 2023-09-24 RX ADMIN — Medication 1 APPLICATION(S): at 17:12

## 2023-09-24 RX ADMIN — Medication 500 MILLIGRAM(S): at 13:15

## 2023-09-24 RX ADMIN — Medication 100 MILLIGRAM(S): at 13:19

## 2023-09-24 RX ADMIN — Medication 220 MILLIGRAM(S): at 05:52

## 2023-09-24 RX ADMIN — Medication 100 MILLIGRAM(S): at 21:56

## 2023-09-24 RX ADMIN — Medication 220 MILLIGRAM(S): at 17:11

## 2023-09-24 RX ADMIN — MAGNESIUM HYDROXIDE 5 MILLILITER(S): 400 TABLET, CHEWABLE ORAL at 13:16

## 2023-09-24 NOTE — PROGRESS NOTE ADULT - SUBJECTIVE AND OBJECTIVE BOX
CHIEF COMPLAINT:  patient condition remained able no event overnight as per nursing  scheduled for removal of the right-sided chest port postpone for 9/25/2023  SUBJECTIVE:     REVIEW OF SYSTEMS:    CONSTITUTIONAL: (x)  weakness,  (  ) fevers or chills  EYES/ENT: (  )visual changes;     NECK: (  ) pain or stiffness  RESPIRATORY:   (  )cough, wheezing, hemoptysis;  (  ) shortness of breath  CARDIOVASCULAR:  (  )chest pain or palpitations  GASTROINTESTINAL:   (  )abdominal or epigastric pain.  (  ) nausea, vomiting, or hematemesis;   (   ) diarrhea or constipation.   GENITOURINARY:   (    ) dysuria, frequency or hematuria  NEUROLOGICAL:  (   ) numbness or weakness   All other review of systems is negative unless indicated above    Vital Signs Last 24 Hrs  T(C): 36.2 (24 Sep 2023 12:23), Max: 37.1 (23 Sep 2023 22:00)  T(F): 97.1 (24 Sep 2023 12:23), Max: 98.7 (23 Sep 2023 22:00)  HR: 70 (24 Sep 2023 12:23) (70 - 99)  BP: 159/65 (24 Sep 2023 12:23) (129/53 - 159/65)  BP(mean): --  RR: 17 (24 Sep 2023 12:23) (16 - 18)  SpO2: 100% (24 Sep 2023 12:23) (98% - 100%)    Parameters below as of 24 Sep 2023 12:23  Patient On (Oxygen Delivery Method): room air        I&O's Summary      CAPILLARY BLOOD GLUCOSE      POCT Blood Glucose.: 110 mg/dL (24 Sep 2023 12:42)  POCT Blood Glucose.: 139 mg/dL (24 Sep 2023 05:59)  POCT Blood Glucose.: 139 mg/dL (23 Sep 2023 23:50)  POCT Blood Glucose.: 144 mg/dL (23 Sep 2023 18:01)      PHYSICAL EXAM:    Constitutional:  (  x ) NAD,   (  x )awake    HEENT: PERR, EOMI,    Neck: Soft and supple, No LAD, No JVD  Respiratory:  (  x  Breath sounds are clear bilaterally,    (   ) wheezing, rales or rhonchi  Cardiovascular:     ( x  )S1 and S2, regular rate and rhythm, no Murmurs, gallops or rubs  Gastrointestinal:  (  x )Bowel Sounds present, soft,   (  )nontender, nondistended,    Extremities:    (  ) peripheral edema  Vascular: 2+ peripheral pulses  Neurological:    ( x   )A/O x 1,   (  ) focal deficits  Musculoskeletal:    (   )  normal strength b/l upper  (     ) normal  lower extremities  Skin: No rashes    MEDICATIONS:  MEDICATIONS  (STANDING):  ascorbic acid 500 milliGRAM(s) Oral daily  ceFAZolin   IVPB 2000 milliGRAM(s) IV Intermittent every 8 hours  ferrous    sulfate Liquid 220 milliGRAM(s) Oral every 12 hours  magnesium hydroxide Suspension 5 milliLiter(s) Oral daily  mirtazapine 15 milliGRAM(s) Oral at bedtime  pantoprazole   Suspension 40 milliGRAM(s) Oral daily  silver sulfADIAZINE 1% Cream 1 Application(s) Topical every 12 hours      LABS: All Labs Reviewed:                        10.0   12.35 )-----------( 363      ( 24 Sep 2023 05:55 )             30.0     09-24    136  |  99  |  16  ----------------------------<  126<H>  4.6   |  22  |  0.40<L>    Ca    8.1<L>      24 Sep 2023 05:55  Phos  2.3     09-24  Mg     2.00     09-24            Blood Culture: 09-23 @ 10:15  Organism --  Gram Stain Blood -- Gram Stain --  Specimen Source .Blood Blood-Peripheral  Culture-Blood --    09-23 @ 09:49  Organism --  Gram Stain Blood -- Gram Stain   Growth in anaerobic bottle: Gram Positive Cocci in Clusters  Specimen Source .Blood Blood-Venous  Culture-Blood --    09-21 @ 23:30  Organism Blood Culture PCR  Gram Stain Blood -- Gram Stain   Growth in aerobic and anaerobic bottles: Gram Positive Cocci in Clusters  Specimen Source .Blood Blood-Peripheral  Culture-Blood --    09-21 @ 23:15  Organism --  Gram Stain Blood -- Gram Stain   Growth in aerobic bottle: Gram Positive Cocci in Clusters  Growth in anaerobic bottle: Gram Positive Cocci in Clusters  Specimen Source .Blood Blood-Peripheral  Culture-Blood --      Urine Culture      RADIOLOGY/EKG:    ASSESSMENT AND PLAN:  77 yo F PMH Alzheimer's dementia s/p PEG 8/2023, GE flap valve, grade B esophagitis, Multiple Myeloma (previously on Revlimid), cataracts, chronic venous ulcers b/l LE, OA knee, HTN, primary pulmonary hypertension, MDD sent from NH given hypotension documented at 87/56. Unable to obtain reliable history although per documentation patient has experienced cough, dysuria, and abdominal pain. Labs significant for anemia to 6.2 s/p PRBC without active bleeding and Na 124 likely iatrogenic as patient was on D5 1/2 NS and standing free water flushes at nursing home. Patient with fevers (T 100.3) and leukocytosis to 13.3 concerning for sepsis likely 2/2 urinary source - alternatively consider lower extremity wounds. Patient with hx hematoma following initial PEG placement, would f/u CT abdomen r/o expansion.      Problem/Plan - 1:  ·  Problem: Sepsis.   ·  Plan: -patient with fever to 100.3, leukocytosis to 13.3, hypotension 87/56-->92/56 concerning for sepsis 2/2 urinary source, less likely in setting of lower extremity wounds, lactic acid 2.4  -s/p vanc and zosyn in the ED, s/p 1L LR  -f/u BCx, UCx, UA turbid 100 protein moderate blood, large LE, WBC 2921 significant for UTI, per documentation patient has experienced dysuria  -CXR clear  -will order ceftriaxone and vancomycin  -monitor BP, f/u repeat lactic acid.    Problem/Plan - 2:  ·  Problem: Hyponatremia.   ·  Plan: -Na 124  -likely iatrogenic given patient was on D5 1/2 NS and free water flushes at facility  -will hold D5 NS and free water flushes  -f/u urine sodium urine osm, serum osm  -monitor BMP q8h.    Problem/Plan - 3:  ·  Problem: Anemia.   ·  Plan: -Hgb 6.6  -s/p 1u PRBC in ED, f/u repeat CBC, monitor CBC q8h  -f/u iron, ferritin, TIBC  -continue home iron  -monitor CBC, maintain active type and screen, transfuse for Hgb<7.0  -patient with hx hematoma following initial PEG placement, f/u CT A/P consider expansion.    Problem/Plan - 4:  ·  Problem: Alzheimers disease.   ·  Plan: -would obtain collateral on baseline mental status, attempted to call emergency contact tomeka mendozamail was full  -patient is alert, conversant, although A&Ox1 believes she is in [facility].    Problem/Plan - 5:  ·  Problem: Multiple myeloma.   ·  Plan: -patient previously on revlimid per documentation  -would obtain records, patient follows with Dr. Vasques PCP, unclear if patient follows with oncologist.    Problem/Plan - 6:  ·  Problem: Rheumatoid arthritis.   ·  Plan: -patient with ulnar deviation of b/l UE digits, documented RA  -would obtain records.    Problem/Plan - 7:  ·  Problem: Chronic cutaneous venous stasis ulcer.   ·  Plan: -patient with b/l LE ulcers, in setting of chronic venous stasis  -f/u wound care eval.    Problem/Plan - 8:  ·  Problem: History of esophagitis.   ·  Plan: -patient with grade B esophagitis on EGD 8/2023, also with Hill grade 3 gastroesophageal flap, patient was pending ?outpatient w/u with EUS  -home omeprazole not on formulary will order pantoprazole daily.    Problem/Plan - 9:  ·  Problem: Need for prophylactic measure.   ·  Plan: -DVT ppx: patient with documented hx hematoma after initial PEG placement, SCD for now, will hold pharmacologic ppx  -Diet: f/u nutrition consult for tube feeds, patient on Jevity at facility.    -9/23/2023 ID and IR note and consult appreciated due to positive blood culture recommended to remove right upper port   her antibiotic was changed to Ancef grams every 8 hours as per ID recommendation  -9/24/2023 condition stable waiting for removal of right upper chest sided port    DVT PPX:    ADVANCED DIRECTIVE:    DISPOSITION:

## 2023-09-25 LAB
-  AMPICILLIN/SULBACTAM: SIGNIFICANT CHANGE UP
-  CEFAZOLIN: SIGNIFICANT CHANGE UP
-  GENTAMICIN: SIGNIFICANT CHANGE UP
-  OXACILLIN: SIGNIFICANT CHANGE UP
-  RIFAMPIN: SIGNIFICANT CHANGE UP
-  TETRACYCLINE: SIGNIFICANT CHANGE UP
-  TRIMETHOPRIM/SULFAMETHOXAZOLE: SIGNIFICANT CHANGE UP
-  VANCOMYCIN: SIGNIFICANT CHANGE UP
ANION GAP SERPL CALC-SCNC: 13 MMOL/L — SIGNIFICANT CHANGE UP (ref 7–14)
BUN SERPL-MCNC: 16 MG/DL — SIGNIFICANT CHANGE UP (ref 7–23)
CALCIUM SERPL-MCNC: 8 MG/DL — LOW (ref 8.4–10.5)
CHLORIDE SERPL-SCNC: 98 MMOL/L — SIGNIFICANT CHANGE UP (ref 98–107)
CO2 SERPL-SCNC: 27 MMOL/L — SIGNIFICANT CHANGE UP (ref 22–31)
CREAT SERPL-MCNC: 0.44 MG/DL — LOW (ref 0.5–1.3)
CULTURE RESULTS: SIGNIFICANT CHANGE UP
CULTURE RESULTS: SIGNIFICANT CHANGE UP
EGFR: 100 ML/MIN/1.73M2 — SIGNIFICANT CHANGE UP
GLUCOSE BLDC GLUCOMTR-MCNC: 108 MG/DL — HIGH (ref 70–99)
GLUCOSE BLDC GLUCOMTR-MCNC: 108 MG/DL — HIGH (ref 70–99)
GLUCOSE BLDC GLUCOMTR-MCNC: 73 MG/DL — SIGNIFICANT CHANGE UP (ref 70–99)
GLUCOSE BLDC GLUCOMTR-MCNC: 84 MG/DL — SIGNIFICANT CHANGE UP (ref 70–99)
GLUCOSE SERPL-MCNC: 88 MG/DL — SIGNIFICANT CHANGE UP (ref 70–99)
HCT VFR BLD CALC: 28.6 % — LOW (ref 34.5–45)
HGB BLD-MCNC: 9.2 G/DL — LOW (ref 11.5–15.5)
MAGNESIUM SERPL-MCNC: 2 MG/DL — SIGNIFICANT CHANGE UP (ref 1.6–2.6)
MCHC RBC-ENTMCNC: 22.8 PG — LOW (ref 27–34)
MCHC RBC-ENTMCNC: 32.2 GM/DL — SIGNIFICANT CHANGE UP (ref 32–36)
MCV RBC AUTO: 71 FL — LOW (ref 80–100)
METHOD TYPE: SIGNIFICANT CHANGE UP
NRBC # BLD: 0 /100 WBCS — SIGNIFICANT CHANGE UP (ref 0–0)
NRBC # FLD: 0 K/UL — SIGNIFICANT CHANGE UP (ref 0–0)
ORGANISM # SPEC MICROSCOPIC CNT: SIGNIFICANT CHANGE UP
ORGANISM # SPEC MICROSCOPIC CNT: SIGNIFICANT CHANGE UP
PHOSPHATE SERPL-MCNC: 2.4 MG/DL — LOW (ref 2.5–4.5)
PLATELET # BLD AUTO: 378 K/UL — SIGNIFICANT CHANGE UP (ref 150–400)
POTASSIUM SERPL-MCNC: 4.1 MMOL/L — SIGNIFICANT CHANGE UP (ref 3.5–5.3)
POTASSIUM SERPL-SCNC: 4.1 MMOL/L — SIGNIFICANT CHANGE UP (ref 3.5–5.3)
RBC # BLD: 4.03 M/UL — SIGNIFICANT CHANGE UP (ref 3.8–5.2)
RBC # FLD: 26.3 % — HIGH (ref 10.3–14.5)
SODIUM SERPL-SCNC: 138 MMOL/L — SIGNIFICANT CHANGE UP (ref 135–145)
SPECIMEN SOURCE: SIGNIFICANT CHANGE UP
SPECIMEN SOURCE: SIGNIFICANT CHANGE UP
WBC # BLD: 10.75 K/UL — HIGH (ref 3.8–10.5)
WBC # FLD AUTO: 10.75 K/UL — HIGH (ref 3.8–10.5)

## 2023-09-25 PROCEDURE — 36590 REMOVAL TUNNELED CV CATH: CPT

## 2023-09-25 RX ORDER — POTASSIUM PHOSPHATE, MONOBASIC POTASSIUM PHOSPHATE, DIBASIC 236; 224 MG/ML; MG/ML
15 INJECTION, SOLUTION INTRAVENOUS ONCE
Refills: 0 | Status: COMPLETED | OUTPATIENT
Start: 2023-09-25 | End: 2023-09-25

## 2023-09-25 RX ADMIN — POTASSIUM PHOSPHATE, MONOBASIC POTASSIUM PHOSPHATE, DIBASIC 62.5 MILLIMOLE(S): 236; 224 INJECTION, SOLUTION INTRAVENOUS at 17:07

## 2023-09-25 RX ADMIN — Medication 100 MILLIGRAM(S): at 05:25

## 2023-09-25 RX ADMIN — Medication 1 APPLICATION(S): at 05:25

## 2023-09-25 RX ADMIN — Medication 220 MILLIGRAM(S): at 05:25

## 2023-09-25 RX ADMIN — Medication 1 APPLICATION(S): at 17:08

## 2023-09-25 RX ADMIN — Medication 100 MILLIGRAM(S): at 12:39

## 2023-09-25 RX ADMIN — Medication 100 MILLIGRAM(S): at 21:40

## 2023-09-25 NOTE — CHART NOTE - NSCHARTNOTEFT_GEN_A_CORE
Informed by RN that PEG tube not functioning, RN unable to push anything through PEG tube. GI consult emailed. Will follow up recommendations.

## 2023-09-25 NOTE — PRE PROCEDURE NOTE - PRE PROCEDURE EVALUATION
Interventional Radiology    HPI: 79 yo female with Alzheimer’s, s/p PEG 8/23, multiple myeloma, HTN, primary pulmonary HTN, p/w UTI and bacteremia. IR to perform right chest port removal.      Allergies: No Known Allergies    Medications (Abx/Cardiac/Anticoagulation/Blood Products)  ceFAZolin   IVPB: 100 mL/Hr IV Intermittent (09-25 @ 12:39)    Data:    T(C): 36.8  HR: 82  BP: 130/78  RR: 18  SpO2: 100%    Exam  General: No acute distress  Chest: Non labored breathing  Abdomen: Non-distended  Extremities: No swelling, warm    -WBC 10.75 / HgB 9.2 / Hct 28.6 / Plt 378  -Na 138 / Cl 98 / BUN 16 / Glucose 88  -K 4.1 / CO2 27 / Cr 0.44  -ALT -- / Alk Phos -- / T.Bili --    Imaging: CXR reviewed    Plan:   79 yo female with Alzheimer’s, s/p PEG 8/23, multiple myeloma, HTN, primary pulmonary HTN, p/w UTI and bacteremia. IR to perform right chest port removal.    -- Relevant imaging and labs were reviewed.   -- Risks, benefits, and alternatives were explained to the patient and informed consent was obtained.

## 2023-09-25 NOTE — PROGRESS NOTE ADULT - SUBJECTIVE AND OBJECTIVE BOX
CHIEF COMPLAINT:  patient was seen then in the bed complaining of pain in the site of removal of right-sided chest port  SUBJECTIVE:     REVIEW OF SYSTEMS:    CONSTITUTIONAL: ( x )  weakness,  (  ) fevers or chills  EYES/ENT: (  )visual changes;     NECK: (  ) pain or stiffness  RESPIRATORY:   (  )cough, wheezing, hemoptysis;  (  ) shortness of breath  CARDIOVASCULAR:  (  )chest pain or palpitations  GASTROINTESTINAL:   (  )abdominal or epigastric pain.  (  ) nausea, vomiting, or hematemesis;   (   ) diarrhea or constipation.   GENITOURINARY:   (    ) dysuria, frequency or hematuria  NEUROLOGICAL:  (   ) numbness or weakness   All other review of systems is negative unless indicated above    Vital Signs Last 24 Hrs  T(C): 37.1 (25 Sep 2023 16:48), Max: 37.1 (25 Sep 2023 16:48)  T(F): 98.7 (25 Sep 2023 16:48), Max: 98.7 (25 Sep 2023 16:48)  HR: 94 (25 Sep 2023 16:48) (82 - 94)  BP: 135/90 (25 Sep 2023 16:48) (130/78 - 135/90)  BP(mean): --  RR: 17 (25 Sep 2023 16:48) (17 - 18)  SpO2: 99% (25 Sep 2023 16:48) (99% - 100%)    Parameters below as of 25 Sep 2023 16:48  Patient On (Oxygen Delivery Method): room air        I&O's Summary      CAPILLARY BLOOD GLUCOSE      POCT Blood Glucose.: 84 mg/dL (25 Sep 2023 12:24)  POCT Blood Glucose.: 108 mg/dL (25 Sep 2023 07:15)  POCT Blood Glucose.: 108 mg/dL (25 Sep 2023 00:15)      PHYSICAL EXAM:    Constitutional:  ( x  ) NAD,   ( x  )awake  and verbal  HEENT: PERR, EOMI,    Neck: Soft and supple, No LAD, No JVD  Respiratory:  (   x Breath sounds are clear bilaterally,    (   ) wheezing, rales or rhonchi  Cardiovascular:     ( x  )S1 and S2, regular rate and rhythm, no Murmurs, gallops or rubs  Gastrointestinal:  (  x )Bowel Sounds present, soft,   (  )nontender, nondistended,  + PEG  Extremities:    (  ) peripheral edema  Vascular: 2+ peripheral pulses  Neurological:    (  x  )A/O x 1   (  ) focal deficits  Musculoskeletal:    (x   )  normal strength b/l upper  (     ) normal  lower extremities  Skin: No rashes    MEDICATIONS:  MEDICATIONS  (STANDING):  ascorbic acid 500 milliGRAM(s) Oral daily  ceFAZolin   IVPB 2000 milliGRAM(s) IV Intermittent every 8 hours  ferrous    sulfate Liquid 220 milliGRAM(s) Oral every 12 hours  magnesium hydroxide Suspension 5 milliLiter(s) Oral daily  mirtazapine 15 milliGRAM(s) Oral at bedtime  pantoprazole   Suspension 40 milliGRAM(s) Oral daily  silver sulfADIAZINE 1% Cream 1 Application(s) Topical every 12 hours  viokace 46381 units 1 Tablet(s) 1 Tablet(s) Enteral Tube once      LABS: All Labs Reviewed:                        9.2    10.75 )-----------( 378      ( 25 Sep 2023 07:48 )             28.6     09-25    138  |  98  |  16  ----------------------------<  88  4.1   |  27  |  0.44<L>    Ca    8.0<L>      25 Sep 2023 07:48  Phos  2.4     09-25  Mg     2.00     09-25                 10.0   12.35 )-----------( 363      ( 24 Sep 2023 05:55 )             30.0     09-24    136  |  99  |  16  ----------------------------<  126<H>  4.6   |  22  |  0.40<L>    Ca    8.1<L>      24 Sep 2023 05:55  Phos  2.3     09-24  Mg     2.00     09-24      Blood Culture: 09-23 @ 10:15  Organism --  Gram Stain Blood -- Gram Stain --  Specimen Source .Blood Blood-Peripheral  Culture-Blood --    09-23 @ 09:49  Organism --  Gram Stain Blood -- Gram Stain   Growth in anaerobic bottle: Gram Positive Cocci in Clusters  Specimen Source .Blood Blood-Venous  Culture-Blood --    09-21 @ 23:35  Organism Staphylococcus aureus  Gram Stain Blood -- Gram Stain --  Specimen Source Clean Catch Clean Catch (Midstream)  Culture-Blood --    09-21 @ 23:30  Organism Blood Culture PCR  Gram Stain Blood -- Gram Stain   Growth in aerobic and anaerobic bottles: Gram Positive Cocci in Clusters  Specimen Source .Blood Blood-Peripheral  Culture-Blood --    09-21 @ 23:15  Organism --  Gram Stain Blood -- Gram Stain   Growth in aerobic bottle: Gram Positive Cocci in Clusters  Growth in anaerobic bottle: Gram Positive Cocci in Clusters  Specimen Source .Blood Blood-Peripheral  Culture-Blood --      Urine Culture      RADIOLOGY/EKG:    ASSESSMENT AND PLAN:  77 yo F PMH Alzheimer's dementia s/p PEG 8/2023, GE flap valve, grade B esophagitis, Multiple Myeloma (previously on Revlimid), cataracts, chronic venous ulcers b/l LE, OA knee, HTN, primary pulmonary hypertension, MDD sent from NH given hypotension documented at 87/56. Unable to obtain reliable history although per documentation patient has experienced cough, dysuria, and abdominal pain. Labs significant for anemia to 6.2 s/p PRBC without active bleeding and Na 124 likely iatrogenic as patient was on D5 1/2 NS and standing free water flushes at nursing home. Patient with fevers (T 100.3) and leukocytosis to 13.3 concerning for sepsis likely 2/2 urinary source - alternatively consider lower extremity wounds. Patient with hx hematoma following initial PEG placement, would f/u CT abdomen r/o expansion.      Problem/Plan - 1:  ·  Problem: Sepsis.   ·  Plan: -patient with fever to 100.3, leukocytosis to 13.3, hypotension 87/56-->92/56 concerning for sepsis 2/2 urinary source, less likely in setting of lower extremity wounds, lactic acid 2.4  -s/p vanc and zosyn in the ED, s/p 1L LR  -f/u BCx, UCx, UA turbid 100 protein moderate blood, large LE, WBC 2921 significant for UTI, per documentation patient has experienced dysuria  -CXR clear  -will order ceftriaxone and vancomycin  -monitor BP, f/u repeat lactic acid.    Problem/Plan - 2:  ·  Problem: Hyponatremia.   ·  Plan: -Na 124  -likely iatrogenic given patient was on D5 1/2 NS and free water flushes at facility  -will hold D5 NS and free water flushes  -f/u urine sodium urine osm, serum osm  -monitor BMP q8h.    Problem/Plan - 3:  ·  Problem: Anemia.   ·  Plan: -Hgb 6.6  -s/p 1u PRBC in ED, f/u repeat CBC, monitor CBC q8h  -f/u iron, ferritin, TIBC  -continue home iron  -monitor CBC, maintain active type and screen, transfuse for Hgb<7.0  -patient with hx hematoma following initial PEG placement, f/u CT A/P consider expansion.    Problem/Plan - 4:  ·  Problem: Alzheimers disease.   ·  Plan: -would obtain collateral on baseline mental status, attempted to call emergency contact tomeka anila was full  -patient is alert, conversant, although A&Ox1 believes she is in [facility].    Problem/Plan - 5:  ·  Problem: Multiple myeloma.   ·  Plan: -patient previously on revlimid per documentation  -would obtain records, patient follows with Dr. Vasques PCP, unclear if patient follows with oncologist.    Problem/Plan - 6:  ·  Problem: Rheumatoid arthritis.   ·  Plan: -patient with ulnar deviation of b/l UE digits, documented RA  -would obtain records.    Problem/Plan - 7:  ·  Problem: Chronic cutaneous venous stasis ulcer.   ·  Plan: -patient with b/l LE ulcers, in setting of chronic venous stasis  -f/u wound care eval.    Problem/Plan - 8:  ·  Problem: History of esophagitis.   ·  Plan: -patient with grade B esophagitis on EGD 8/2023, also with Hill grade 3 gastroesophageal flap, patient was pending ?outpatient w/u with EUS  -home omeprazole not on formulary will order pantoprazole daily.    Problem/Plan - 9:  ·  Problem: Need for prophylactic measure.   ·  Plan: -DVT ppx: patient with documented hx hematoma after initial PEG placement, SCD for now, will hold pharmacologic ppx  -Diet: f/u nutrition consult for tube feeds, patient on Jevity at facility.    -9/23/2023 ID and IR note and consult appreciated due to positive blood culture recommended to remove right upper port   her antibiotic was changed to Ancef grams every 8 hours as per ID recommendation  -9/24/2023 condition stable waiting for removal of right upper chest sided port  -9/25/2023 right-sided chest port was removed by interventional radiology condition stable continue Ancef 2 g every 8 hours  DVT PPX:    ADVANCED DIRECTIVE:    DISPOSITION:

## 2023-09-26 LAB
ANION GAP SERPL CALC-SCNC: 10 MMOL/L — SIGNIFICANT CHANGE UP (ref 7–14)
APTT BLD: 27.3 SEC — SIGNIFICANT CHANGE UP (ref 24.5–35.6)
BLD GP AB SCN SERPL QL: NEGATIVE — SIGNIFICANT CHANGE UP
BUN SERPL-MCNC: 18 MG/DL — SIGNIFICANT CHANGE UP (ref 7–23)
CALCIUM SERPL-MCNC: 8.1 MG/DL — LOW (ref 8.4–10.5)
CHLORIDE SERPL-SCNC: 102 MMOL/L — SIGNIFICANT CHANGE UP (ref 98–107)
CO2 SERPL-SCNC: 26 MMOL/L — SIGNIFICANT CHANGE UP (ref 22–31)
CREAT SERPL-MCNC: 0.45 MG/DL — LOW (ref 0.5–1.3)
EGFR: 99 ML/MIN/1.73M2 — SIGNIFICANT CHANGE UP
GLUCOSE BLDC GLUCOMTR-MCNC: 68 MG/DL — LOW (ref 70–99)
GLUCOSE BLDC GLUCOMTR-MCNC: 69 MG/DL — LOW (ref 70–99)
GLUCOSE BLDC GLUCOMTR-MCNC: 69 MG/DL — LOW (ref 70–99)
GLUCOSE BLDC GLUCOMTR-MCNC: 72 MG/DL — SIGNIFICANT CHANGE UP (ref 70–99)
GLUCOSE BLDC GLUCOMTR-MCNC: 74 MG/DL — SIGNIFICANT CHANGE UP (ref 70–99)
GLUCOSE BLDC GLUCOMTR-MCNC: 74 MG/DL — SIGNIFICANT CHANGE UP (ref 70–99)
GLUCOSE BLDC GLUCOMTR-MCNC: 75 MG/DL — SIGNIFICANT CHANGE UP (ref 70–99)
GLUCOSE BLDC GLUCOMTR-MCNC: 84 MG/DL — SIGNIFICANT CHANGE UP (ref 70–99)
GLUCOSE SERPL-MCNC: 67 MG/DL — LOW (ref 70–99)
HCT VFR BLD CALC: 30 % — LOW (ref 34.5–45)
HGB BLD-MCNC: 9.4 G/DL — LOW (ref 11.5–15.5)
INR BLD: 1.18 RATIO — SIGNIFICANT CHANGE UP (ref 0.85–1.18)
MAGNESIUM SERPL-MCNC: 2.1 MG/DL — SIGNIFICANT CHANGE UP (ref 1.6–2.6)
MCHC RBC-ENTMCNC: 22.4 PG — LOW (ref 27–34)
MCHC RBC-ENTMCNC: 31.3 GM/DL — LOW (ref 32–36)
MCV RBC AUTO: 71.6 FL — LOW (ref 80–100)
NRBC # BLD: 0 /100 WBCS — SIGNIFICANT CHANGE UP (ref 0–0)
NRBC # FLD: 0 K/UL — SIGNIFICANT CHANGE UP (ref 0–0)
PHOSPHATE SERPL-MCNC: 3 MG/DL — SIGNIFICANT CHANGE UP (ref 2.5–4.5)
PLATELET # BLD AUTO: 390 K/UL — SIGNIFICANT CHANGE UP (ref 150–400)
POTASSIUM SERPL-MCNC: 4.3 MMOL/L — SIGNIFICANT CHANGE UP (ref 3.5–5.3)
POTASSIUM SERPL-SCNC: 4.3 MMOL/L — SIGNIFICANT CHANGE UP (ref 3.5–5.3)
PROTHROM AB SERPL-ACNC: 13.2 SEC — HIGH (ref 9.5–13)
RBC # BLD: 4.19 M/UL — SIGNIFICANT CHANGE UP (ref 3.8–5.2)
RBC # FLD: 27.6 % — HIGH (ref 10.3–14.5)
RH IG SCN BLD-IMP: POSITIVE — SIGNIFICANT CHANGE UP
SODIUM SERPL-SCNC: 138 MMOL/L — SIGNIFICANT CHANGE UP (ref 135–145)
WBC # BLD: 9.43 K/UL — SIGNIFICANT CHANGE UP (ref 3.8–10.5)
WBC # FLD AUTO: 9.43 K/UL — SIGNIFICANT CHANGE UP (ref 3.8–10.5)

## 2023-09-26 PROCEDURE — 99222 1ST HOSP IP/OBS MODERATE 55: CPT | Mod: GC

## 2023-09-26 PROCEDURE — 99232 SBSQ HOSP IP/OBS MODERATE 35: CPT

## 2023-09-26 PROCEDURE — 93306 TTE W/DOPPLER COMPLETE: CPT | Mod: 26

## 2023-09-26 RX ORDER — ACETAMINOPHEN 500 MG
650 TABLET ORAL ONCE
Refills: 0 | Status: COMPLETED | OUTPATIENT
Start: 2023-09-26 | End: 2023-09-26

## 2023-09-26 RX ORDER — SODIUM CHLORIDE 9 MG/ML
300 INJECTION, SOLUTION INTRAVENOUS
Refills: 0 | Status: DISCONTINUED | OUTPATIENT
Start: 2023-09-26 | End: 2023-09-27

## 2023-09-26 RX ORDER — PANTOPRAZOLE SODIUM 20 MG/1
40 TABLET, DELAYED RELEASE ORAL ONCE
Refills: 0 | Status: COMPLETED | OUTPATIENT
Start: 2023-09-26 | End: 2023-09-26

## 2023-09-26 RX ADMIN — Medication 100 MILLIGRAM(S): at 21:04

## 2023-09-26 RX ADMIN — Medication 220 MILLIGRAM(S): at 17:18

## 2023-09-26 RX ADMIN — Medication 1 APPLICATION(S): at 17:17

## 2023-09-26 RX ADMIN — Medication 650 MILLIGRAM(S): at 20:46

## 2023-09-26 RX ADMIN — Medication 100 MILLIGRAM(S): at 05:07

## 2023-09-26 RX ADMIN — Medication 650 MILLIGRAM(S): at 21:45

## 2023-09-26 RX ADMIN — Medication 1 APPLICATION(S): at 05:07

## 2023-09-26 RX ADMIN — Medication 100 MILLIGRAM(S): at 13:57

## 2023-09-26 RX ADMIN — SODIUM CHLORIDE 50 MILLILITER(S): 9 INJECTION, SOLUTION INTRAVENOUS at 21:43

## 2023-09-26 RX ADMIN — MIRTAZAPINE 15 MILLIGRAM(S): 45 TABLET, ORALLY DISINTEGRATING ORAL at 21:04

## 2023-09-26 RX ADMIN — PANTOPRAZOLE SODIUM 40 MILLIGRAM(S): 20 TABLET, DELAYED RELEASE ORAL at 13:57

## 2023-09-26 NOTE — CONSULT NOTE ADULT - ASSESSMENT
79yo F w/ PMHx Alzheimer's Dementia s/p 20 Fr externally removable PEG 8/10/2023, Multiple Myeloma, cataracts, chronic venous ulcers b/l LE, OA knee, HTN, pulmonary hypertension, MDD sent from NH for HoTN. While admitted patient found with MSSA bacteremia, UTI, hypoNa, anemia. GI consulted for clogged PEG tube management.      INCOMPLETE 77yo F w/ PMHx Alzheimer's Dementia s/p 20 Fr externally removable PEG 8/10/2023, Multiple Myeloma, cataracts, chronic venous ulcers b/l LE, OA knee, HTN, pulmonary hypertension, MDD sent from NH for HoTN. While admitted patient found with MSSA bacteremia, UTI, hypoNa, anemia. GI consulted for clogged PEG tube management.      #Alzheimer's Dementia  #20 Fr externally removable PEG Tube placement 8/10/23    Recommendations:  -will reattempt unclogging tube today  -if unable to unclog, will replace with either 20 Fr or 18 Fr balloon PEG at bedside      All recommendations preliminary until note signed by service attending.    Thank you for involving us in the care of this patient. Please contact should any concern or questions arise.    Farhan Chavez MD   Gastroenterology/Hepatology Fellow PGY-5  Available on [employer] Teams 7am - 5pm  [email or web address]    NON-URGENT CONSULTS:  Please email:  [email or web address]   OR  [email or web address]    After 5pm, please contact the on-call GI fellow. [phone]    AT NIGHT AND ON WEEKENDS:  Contact on-call GI fellow via answering service ([phone]) from 5pm-8am and on weekends/holidays

## 2023-09-26 NOTE — PROGRESS NOTE ADULT - SUBJECTIVE AND OBJECTIVE BOX
ANESTHESIA POSTOP CHECK    78y Female POSTOP DAY 1 S/P     Vital Signs Last 24 Hrs  T(C): 36.3 (26 Sep 2023 06:09), Max: 37.1 (25 Sep 2023 16:48)  T(F): 97.3 (26 Sep 2023 06:09), Max: 98.7 (25 Sep 2023 16:48)  HR: 93 (26 Sep 2023 06:09) (91 - 94)  BP: 134/77 (26 Sep 2023 06:09) (124/70 - 135/90)  BP(mean): --  RR: 17 (26 Sep 2023 06:09) (17 - 17)  SpO2: 98% (26 Sep 2023 06:09) (98% - 99%)    Parameters below as of 26 Sep 2023 06:09  Patient On (Oxygen Delivery Method): room air      I&O's Summary    26 Sep 2023 07:01  -  26 Sep 2023 10:39  --------------------------------------------------------  IN: 0 mL / OUT: 1000 mL / NET: -1000 mL        [x ] NO APPARENT ANESTHESIA COMPLICATIONS      Comments:

## 2023-09-26 NOTE — PROGRESS NOTE ADULT - SUBJECTIVE AND OBJECTIVE BOX
Follow Up:      Interval History/ROS:   port removed  blood cultures 9/25 no growth so far       GI evaluating for PEG dysfunction     patient states feels better      Allergies  No Known Allergies        ANTIMICROBIALS:  ceFAZolin   IVPB 2000 every 8 hours      OTHER MEDS:  acetaminophen     Tablet .. 650 milliGRAM(s) Oral every 6 hours PRN  ascorbic acid 500 milliGRAM(s) Oral daily  ferrous    sulfate Liquid 220 milliGRAM(s) Oral every 12 hours  magnesium hydroxide Suspension 5 milliLiter(s) Oral daily  mirtazapine 15 milliGRAM(s) Oral at bedtime  pantoprazole   Suspension 40 milliGRAM(s) Oral daily  silver sulfADIAZINE 1% Cream 1 Application(s) Topical every 12 hours  viokace 72261 units 1 Tablet(s) 1 Tablet(s) Enteral Tube once      Vital Signs Last 24 Hrs  T(C): 36.4 (26 Sep 2023 14:17), Max: 36.6 (25 Sep 2023 22:52)  T(F): 97.5 (26 Sep 2023 14:17), Max: 97.8 (25 Sep 2023 22:52)  HR: 101 (26 Sep 2023 14:17) (91 - 101)  BP: 136/85 (26 Sep 2023 14:17) (124/70 - 140/75)  BP(mean): --  RR: 17 (26 Sep 2023 14:17) (17 - 17)  SpO2: 100% (26 Sep 2023 14:17) (98% - 100%)    Parameters below as of 26 Sep 2023 06:09  Patient On (Oxygen Delivery Method): room air        PHYSICAL EXAM:  Constitutional: Not in acute distress, frail, more verbal today   Eyes: No icterus.  Oral cavity: Clear, no lesions  RS: no respiratory distress RA  CVS: dressing right chest (prior port site)  Extremities: arthritic changes                             9.4    9.43  )-----------( 390      ( 26 Sep 2023 08:06 )             30.0       09-26    138  |  102  |  18  ----------------------------<  67<L>  4.3   |  26  |  0.45<L>    Ca    8.1<L>      26 Sep 2023 08:06  Phos  3.0     09-26  Mg     2.10     09-26          MICROBIOLOGY:  v  .Blood Blood-Peripheral  09-25-23   No growth at 24 hours  --  --      .Blood Blood-Venous  09-25-23   No growth at 24 hours  --  --      .Blood Blood-Peripheral  09-23-23   No growth at 72 Hours  --  --      .Blood Blood-Venous  09-23-23   Growth in anaerobic bottle: Staphylococcus aureus  See previous culture [id number]  --    Growth in anaerobic bottle: Gram Positive Cocci in Clusters      Clean Catch Clean Catch (Midstream)  09-21-23   >100,000 CFU/ml Staphylococcus aureus  --  Staphylococcus aureus      .Blood Blood-Peripheral  09-21-23   Growth in aerobic bottle: Staphylococcus aureus  Growth in anaerobic bottle: Staphylococcus capitis  Coagulase Negative Staphylococci isolated from a single blood culture set  may represent contamination.  Contact the Microbiology Department at [phone] if susceptibility  testing is clinically indicated.  Direct identification is available within approximately 3-5  hours either by Blood Panel Multiplexed PCR or Direct  MALDI-TOF. Details: [email or web address]  --  Blood Culture PCR  Staphylococcus aureus      .Blood Blood-Peripheral  09-21-23   Growth in aerobic and anaerobic bottles: Staphylococcus aureus  See previous culture [id number]  --    Growth in aerobic bottle: Gram Positive Cocci in Clusters  Growth in anaerobic bottle: Gram Positive Cocci in Clusters        RADIOLOGY:    ra< from: Xray Chest 1 View- PORTABLE-Urgent (Xray Chest 1 View- PORTABLE-Urgent .) (09.23.23 @ 14:29) >  IMPRESSION:    No radiographic evidence of acute cardiopulmonary disease.    --- End of Report ---          RICK CHANDLER DO; Resident Radiologist  This document has been electronically signed.  WALTER HALE MD; Attending Radiologist  This document has been electronically signed. Sep 24 20    < end of copied text >  < from: CT Abdomen and Pelvis No Cont (09.23.23 @ 08:57) >    IMPRESSION:  No abdominal wall hematoma or subcutaneous soft tissue swelling at the   PEG tube insertion site.    No hematoma elsewhere in the abdomen or pelvis.    Ascending and descending thoracic aortic dilation.        --- End of Report ---    < end of copied text >      < from: TTE W or WO Ultrasound Enhancing Agent (09.26.23 @ 12:08) >    TRANSTHORACIC ECHOCARDIOGRAM REPORT  ________________________________________________________________________________                                      _______       Pt. Name:       MAXIMILIANO ESTEVEZ Study Date:    9/26/2023  MRN:            [id number]     YOB: 1945  Accession #:    [id number]     Age:           78 years  Account#:       [id number]      Gender:        F  Heart Rate:                   Height:        67.00 in (170.18 cm)  Rhythm:                       Weight:        103.00 lb (46.72 kg)  Blood Pressure: 140/75 mmHg   BSA/BMI:       1.53 m² / 16.13 kg/m²  ________________________________________________________________________________________  Referring Physician:    [id number] Linda Vasques  Interpreting Physician: Chelsea Valentino  Primary Sonographer:    Clementine Delaney RDCS    CPT:               ECHO TTE WO CON COMP W DOPP - 47840.m  Indication(s):     Abnormal electrocardiogram ECG/EKG - R94.31  Procedure:         Transthoracic echocardiogram with 2-D, M-mode and complete                     spectral and color flow Doppler.  Ordering Location: [facility]  Admission Status:  Inpatient  Study Information: Image quality for this study is good.    _______________________________________________________________________________________     CONCLUSIONS:      1. The left ventricular cavity is normal size. Left ventricular wall thickness is normal. Left ventricular systolic function is normal with an ejection fraction of 67 % by Millan's method of disks.   2.Right ventricular cavity is normal in size, normal wall thickness and normal systolic function.   3. The aortic valve is tricuspid with normal structure without stenosis with reduced systolic excursion. There is calcification of the aortic valve leaflets. There is moderate aortic stenosis. The peak transaortic velocity is 1.98 m/s, peak transaortic gradient is 15.7 mmHg and mean transaortic gradient is 8.0 mmHg with an LVOT/aortic valve VTI ratio of 0.44. The aortic valve area is estimated at 1.38 cm² by the continuity equation. There is mild aortic regurgitation.   4. Structurally normal mitral valve with normal leaflet excursion. There is calcification of the mitral valve annulus. There is moderate to severe mitral regurgitation.   5. Estimated pulmonary artery systolic pressure is 48 mmHg.   6. No pericardial effusion seen.    _________________________________________________    < end of copied text >  < from: TTE W or WO Ultrasound Enhancing Agent (09.26.23 @ 12:08) >    TRANSTHORACIC ECHOCARDIOGRAM REPORT  ________________________________________________________________________________                                      _______       Pt. Name:       MAXIMILIANO ESTEVEZ Study Date:    9/26/2023  MRN:            [id number]     YOB: 1945  Accession #:    [id number]     Age:           78 years  Account#:       [id number]      Gender:        F  Heart Rate:                   Height:        67.00 in (170.18 cm)  Rhythm:                       Weight:        103.00 lb (46.72 kg)  Blood Pressure: 140/75 mmHg   BSA/BMI:       1.53 m² / 16.13 kg/m²  ________________________________________________________________________________________  Referring Physician:    [id number] Linda Vasques  Interpreting Physician: Chelsea Valentino  Primary Sonographer:    Clementine Delaney RDCS    CPT:               ECHO TTE WO CON COMP W DOPP - 01157.m  Indication(s):     Abnormal electrocardiogram ECG/EKG - R94.31  Procedure:         Transthoracic echocardiogram with 2-D, M-mode and complete                     spectral and color flow Doppler.  Ordering Location: [facility]  Admission Status:  Inpatient  Study Information: Image quality for this study is good.    _______________________________________________________________________________________     CONCLUSIONS:      1. The left ventricular cavity is normal size. Left ventricular wall thickness is normal. Left ventricular systolic function is normal with an ejection fraction of 67 % by Millan's method of disks.   2.Right ventricular cavity is normal in size, normal wall thickness and normal systolic function.   3. The aortic valve is tricuspid with normal structure without stenosis with reduced systolic excursion. There is calcification of the aortic valve leaflets. There is moderate aortic stenosis. The peak transaortic velocity is 1.98 m/s, peak transaortic gradient is 15.7 mmHg and mean transaortic gradient is 8.0 mmHg with an LVOT/aortic valve VTI ratio of 0.44. The aortic valve area is estimated at 1.38 cm² by the continuity equation. There is mild aortic regurgitation.   4. Structurally normal mitral valve with normal leaflet excursion. There is calcification of the mitral valve annulus. There is moderate to severe mitral regurgitation.   5. Estimated pulmonary artery systolic pressure is 48 mmHg.   6. No pericardial effusion seen.    _________________________________________________    < end of copied text >

## 2023-09-26 NOTE — PROGRESS NOTE ADULT - SUBJECTIVE AND OBJECTIVE BOX
CHIEF COMPLAINT:    SUBJECTIVE:     REVIEW OF SYSTEMS:    CONSTITUTIONAL: (  )  weakness,  (  ) fevers or chills  EYES/ENT: (  )visual changes;     NECK: (  ) pain or stiffness  RESPIRATORY:   (  )cough, wheezing, hemoptysis;  (  ) shortness of breath  CARDIOVASCULAR:  (  )chest pain or palpitations  GASTROINTESTINAL:   (  )abdominal or epigastric pain.  (  ) nausea, vomiting, or hematemesis;   (   ) diarrhea or constipation.   GENITOURINARY:   (    ) dysuria, frequency or hematuria  NEUROLOGICAL:  (   ) numbness or weakness   All other review of systems is negative unless indicated above    Vital Signs Last 24 Hrs  T(C): 36.3 (26 Sep 2023 06:09), Max: 37.1 (25 Sep 2023 16:48)  T(F): 97.3 (26 Sep 2023 06:09), Max: 98.7 (25 Sep 2023 16:48)  HR: 93 (26 Sep 2023 06:09) (91 - 94)  BP: 134/77 (26 Sep 2023 06:09) (124/70 - 135/90)  BP(mean): --  RR: 17 (26 Sep 2023 06:09) (17 - 17)  SpO2: 98% (26 Sep 2023 06:09) (98% - 99%)    Parameters below as of 26 Sep 2023 06:09  Patient On (Oxygen Delivery Method): room air        I&O's Summary    26 Sep 2023 07:01  -  26 Sep 2023 08:54  --------------------------------------------------------  IN: 0 mL / OUT: 1000 mL / NET: -1000 mL        CAPILLARY BLOOD GLUCOSE      POCT Blood Glucose.: 72 mg/dL (26 Sep 2023 08:21)  POCT Blood Glucose.: 69 mg/dL (26 Sep 2023 08:20)  POCT Blood Glucose.: 74 mg/dL (26 Sep 2023 06:53)  POCT Blood Glucose.: 69 mg/dL (26 Sep 2023 06:52)  POCT Blood Glucose.: 73 mg/dL (25 Sep 2023 23:38)  POCT Blood Glucose.: 84 mg/dL (25 Sep 2023 12:24)      PHYSICAL EXAM:    Constitutional:  (   ) NAD,   (   )awake and alert  HEENT: PERR, EOMI,    Neck: Soft and supple, No LAD, No JVD  Respiratory:  (    Breath sounds are clear bilaterally,    (   ) wheezing, rales or rhonchi  Cardiovascular:     (   )S1 and S2, regular rate and rhythm, no Murmurs, gallops or rubs  Gastrointestinal:  (   )Bowel Sounds present, soft,   (  )nontender, nondistended,    Extremities:    (  ) peripheral edema  Vascular: 2+ peripheral pulses  Neurological:    (    )A/O x 3,   (  ) focal deficits  Musculoskeletal:    (   )  normal strength b/l upper  (     ) normal  lower extremities  Skin: No rashes    MEDICATIONS:  MEDICATIONS  (STANDING):  ascorbic acid 500 milliGRAM(s) Oral daily  ceFAZolin   IVPB 2000 milliGRAM(s) IV Intermittent every 8 hours  ferrous    sulfate Liquid 220 milliGRAM(s) Oral every 12 hours  magnesium hydroxide Suspension 5 milliLiter(s) Oral daily  mirtazapine 15 milliGRAM(s) Oral at bedtime  pantoprazole   Suspension 40 milliGRAM(s) Oral daily  silver sulfADIAZINE 1% Cream 1 Application(s) Topical every 12 hours  viokace 64744 units 1 Tablet(s) 1 Tablet(s) Enteral Tube once      LABS: All Labs Reviewed:                        9.4    9.43  )-----------( 390      ( 26 Sep 2023 08:06 )             30.0     09-26    138  |  102  |  18  ----------------------------<  67<L>  4.3   |  26  |  0.45<L>    Ca    8.1<L>      26 Sep 2023 08:06  Phos  3.0     09-26  Mg     2.10     09-26      PT/INR - ( 26 Sep 2023 08:06 )   PT: 13.2 sec;   INR: 1.18 ratio         PTT - ( 26 Sep 2023 08:06 )  PTT:27.3 sec      Blood Culture: 09-23 @ 10:15  Organism --  Gram Stain Blood -- Gram Stain --  Specimen Source .Blood Blood-Peripheral  Culture-Blood --    09-23 @ 09:49  Organism --  Gram Stain Blood -- Gram Stain   Growth in anaerobic bottle: Gram Positive Cocci in Clusters  Specimen Source .Blood Blood-Venous  Culture-Blood --    09-21 @ 23:35  Organism Staphylococcus aureus  Gram Stain Blood -- Gram Stain --  Specimen Source Clean Catch Clean Catch (Midstream)  Culture-Blood --    09-21 @ 23:30  Organism Blood Culture PCR  Gram Stain Blood -- Gram Stain   Growth in aerobic and anaerobic bottles: Gram Positive Cocci in Clusters  Specimen Source .Blood Blood-Peripheral  Culture-Blood --    09-21 @ 23:15  Organism --  Gram Stain Blood -- Gram Stain   Growth in aerobic bottle: Gram Positive Cocci in Clusters  Growth in anaerobic bottle: Gram Positive Cocci in Clusters  Specimen Source .Blood Blood-Peripheral  Culture-Blood --      Urine Culture      RADIOLOGY/EKG:    ASSESSMENT AND PLAN:    DVT PPX:    ADVANCED DIRECTIVE:    DISPOSITION: CHIEF COMPLAINT:  patient condition remained stable waiting for the GI consult due to functional PEG  SUBJECTIVE:     REVIEW OF SYSTEMS:    CONSTITUTIONAL: ( x )  weakness,  (  ) fevers or chills  EYES/ENT: (  )visual changes;     NECK: (  ) pain or stiffness  RESPIRATORY:   (  )cough, wheezing, hemoptysis;  (  ) shortness of breath  CARDIOVASCULAR:  (  )chest pain or palpitations  GASTROINTESTINAL:   (  )abdominal or epigastric pain.  (  ) nausea, vomiting, or hematemesis;   (   ) diarrhea or constipation.   GENITOURINARY:   (    ) dysuria, frequency or hematuria  NEUROLOGICAL:  (   ) numbness or weakness   All other review of systems is negative unless indicated above    Vital Signs Last 24 Hrs  T(C): 36.3 (26 Sep 2023 06:09), Max: 37.1 (25 Sep 2023 16:48)  T(F): 97.3 (26 Sep 2023 06:09), Max: 98.7 (25 Sep 2023 16:48)  HR: 93 (26 Sep 2023 06:09) (91 - 94)  BP: 134/77 (26 Sep 2023 06:09) (124/70 - 135/90)  BP(mean): --  RR: 17 (26 Sep 2023 06:09) (17 - 17)  SpO2: 98% (26 Sep 2023 06:09) (98% - 99%)    Parameters below as of 26 Sep 2023 06:09  Patient On (Oxygen Delivery Method): room air        I&O's Summary    26 Sep 2023 07:01  -  26 Sep 2023 08:54  --------------------------------------------------------  IN: 0 mL / OUT: 1000 mL / NET: -1000 mL        CAPILLARY BLOOD GLUCOSE      POCT Blood Glucose.: 72 mg/dL (26 Sep 2023 08:21)  POCT Blood Glucose.: 69 mg/dL (26 Sep 2023 08:20)  POCT Blood Glucose.: 74 mg/dL (26 Sep 2023 06:53)  POCT Blood Glucose.: 69 mg/dL (26 Sep 2023 06:52)  POCT Blood Glucose.: 73 mg/dL (25 Sep 2023 23:38)  POCT Blood Glucose.: 84 mg/dL (25 Sep 2023 12:24)      PHYSICAL EXAM:    Constitutional:  ( x  ) NAD,   ( x  )awake    HEENT: PERR, EOMI,    Neck: Soft and supple, No LAD, No JVD  Respiratory:  (  x  Breath sounds are clear bilaterally,    (   ) wheezing, rales or rhonchi  Cardiovascular:     ( x  )S1 and S2, regular rate and rhythm, no Murmurs, gallops or rubs  Gastrointestinal:  ( x  )Bowel Sounds present, soft,   (  )nontender, nondistended,    Extremities:    (  ) peripheral edema  Vascular: 2+ peripheral pulses  Neurological:    (  x  )A/O x  1blood,   (  ) focal deficits  Musculoskeletal:    (   )  normal strength b/l upper  (     ) normal  lower extremities  Skin: No rashes    MEDICATIONS:  MEDICATIONS  (STANDING):  ascorbic acid 500 milliGRAM(s) Oral daily  ceFAZolin   IVPB 2000 milliGRAM(s) IV Intermittent every 8 hours  ferrous    sulfate Liquid 220 milliGRAM(s) Oral every 12 hours  magnesium hydroxide Suspension 5 milliLiter(s) Oral daily  mirtazapine 15 milliGRAM(s) Oral at bedtime  pantoprazole   Suspension 40 milliGRAM(s) Oral daily  silver sulfADIAZINE 1% Cream 1 Application(s) Topical every 12 hours  viokace 48119 units 1 Tablet(s) 1 Tablet(s) Enteral Tube once      LABS: All Labs Reviewed:                        9.4    9.43  )-----------( 390      ( 26 Sep 2023 08:06 )             30.0     09-26    138  |  102  |  18  ----------------------------<  67<L>  4.3   |  26  |  0.45<L>    Ca    8.1<L>      26 Sep 2023 08:06  Phos  3.0     09-26  Mg     2.10     09-26      PT/INR - ( 26 Sep 2023 08:06 )   PT: 13.2 sec;   INR: 1.18 ratio         PTT - ( 26 Sep 2023 08:06 )  PTT:27.3 sec      Blood Culture: 09-23 @ 10:15  Organism --  Gram Stain Blood -- Gram Stain --  Specimen Source .Blood Blood-Peripheral  Culture-Blood --    09-23 @ 09:49  Organism --  Gram Stain Blood -- Gram Stain   Growth in anaerobic bottle: Gram Positive Cocci in Clusters  Specimen Source .Blood Blood-Venous  Culture-Blood --    09-21 @ 23:35  Organism Staphylococcus aureus  Gram Stain Blood -- Gram Stain --  Specimen Source Clean Catch Clean Catch (Midstream)  Culture-Blood --    09-21 @ 23:30  Organism Blood Culture PCR  Gram Stain Blood -- Gram Stain   Growth in aerobic and anaerobic bottles: Gram Positive Cocci in Clusters  Specimen Source .Blood Blood-Peripheral  Culture-Blood --    09-21 @ 23:15  Organism --  Gram Stain Blood -- Gram Stain   Growth in aerobic bottle: Gram Positive Cocci in Clusters  Growth in anaerobic bottle: Gram Positive Cocci in Clusters  Specimen Source .Blood Blood-Peripheral  Culture-Blood --      Urine Culture      RADIOLOGY/EKG:    ASSESSMENT AND PLAN:  77 yo F PMH Alzheimer's dementia s/p PEG 8/2023, GE flap valve, grade B esophagitis, Multiple Myeloma (previously on Revlimid), cataracts, chronic venous ulcers b/l LE, OA knee, HTN, primary pulmonary hypertension, MDD sent from NH given hypotension documented at 87/56. Unable to obtain reliable history although per documentation patient has experienced cough, dysuria, and abdominal pain. Labs significant for anemia to 6.2 s/p PRBC without active bleeding and Na 124 likely iatrogenic as patient was on D5 1/2 NS and standing free water flushes at nursing home. Patient with fevers (T 100.3) and leukocytosis to 13.3 concerning for sepsis likely 2/2 urinary source - alternatively consider lower extremity wounds. Patient with hx hematoma following initial PEG placement, would f/u CT abdomen r/o expansion.      Problem/Plan - 1:  ·  Problem: Sepsis.   ·  Plan: -patient with fever to 100.3, leukocytosis to 13.3, hypotension 87/56-->92/56 concerning for sepsis 2/2 urinary source, less likely in setting of lower extremity wounds, lactic acid 2.4  -s/p vanc and zosyn in the ED, s/p 1L LR  -f/u BCx, UCx, UA turbid 100 protein moderate blood, large LE, WBC 2921 significant for UTI, per documentation patient has experienced dysuria  -CXR clear  -will order ceftriaxone and vancomycin  -monitor BP, f/u repeat lactic acid.    Problem/Plan - 2:  ·  Problem: Hyponatremia.   ·  Plan: -Na 124  -likely iatrogenic given patient was on D5 1/2 NS and free water flushes at facility  -will hold D5 NS and free water flushes  -f/u urine sodium urine osm, serum osm  -monitor BMP q8h.    Problem/Plan - 3:  ·  Problem: Anemia.   ·  Plan: -Hgb 6.6  -s/p 1u PRBC in ED, f/u repeat CBC, monitor CBC q8h  -f/u iron, ferritin, TIBC  -continue home iron  -monitor CBC, maintain active type and screen, transfuse for Hgb<7.0  -patient with hx hematoma following initial PEG placement, f/u CT A/P consider expansion.    Problem/Plan - 4:  ·  Problem: Alzheimers disease.   ·  Plan: -would obtain collateral on baseline mental status, attempted to call emergency contact tomeka high was full  -patient is alert, conversant, although A&Ox1 believes she is in [facility].    Problem/Plan - 5:  ·  Problem: Multiple myeloma.   ·  Plan: -patient previously on revlimid per documentation  -would obtain records, patient follows with Dr. Vasques PCP, unclear if patient follows with oncologist.    Problem/Plan - 6:  ·  Problem: Rheumatoid arthritis.   ·  Plan: -patient with ulnar deviation of b/l UE digits, documented RA  -would obtain records.    Problem/Plan - 7:  ·  Problem: Chronic cutaneous venous stasis ulcer.   ·  Plan: -patient with b/l LE ulcers, in setting of chronic venous stasis  -f/u wound care eval.    Problem/Plan - 8:  ·  Problem: History of esophagitis.   ·  Plan: -patient with grade B esophagitis on EGD 8/2023, also with Hill grade 3 gastroesophageal flap, patient was pending ?outpatient w/u with EUS  -home omeprazole not on formulary will order pantoprazole daily.    Problem/Plan - 9:  ·  Problem: Need for prophylactic measure.   ·  Plan: -DVT ppx: patient with documented hx hematoma after initial PEG placement, SCD for now, will hold pharmacologic ppx  -Diet: f/u nutrition consult for tube feeds, patient on Jevity at facility.    -9/23/2023 ID and IR note and consult appreciated due to positive blood culture recommended to remove right upper port   her antibiotic was changed to Ancef grams every 8 hours as per ID recommendation  -9/24/2023 condition stable waiting for removal of right upper chest sided port  -9/25/2023 right-sided chest port was removed by interventional radiology condition stable continue Ancef 2 g every 8 hours  -9/26/2023 continue Ancef 2 g with Hours for the next 4 weeks patient needs PICC line/midline after negative blood culture    DVT PPX:    ADVANCED DIRECTIVE:    DISPOSITION:

## 2023-09-26 NOTE — CONSULT NOTE ADULT - SUBJECTIVE AND OBJECTIVE BOX
Chief Complaint:  Patient is a 78y old  Female who presents with a chief complaint of Hypotension (26 Sep 2023 08:53)      HPI:    79yo F w/ PMHx Alzheimer's Dementia s/p 20 Fr externally removable PEG 8/10/2023, Multiple Myeloma, cataracts, chronic venous ulcers b/l LE, OA knee, HTN, pulmonary hypertension, MDD sent from NH for HoTN. While admitted patient found with MSSA bacteremia, UTI, hypoNa, anemia. GI consulted for clogged PEG tube management. Patient Patient receiving TF up until 2 days ago, RN noted tube with a lot of resistance to flushing. Despite cytobrush and further flushing attempts, unable to use tube.    Allergies:  No Known Allergies      Hospital Medications:  acetaminophen     Tablet .. 650 milliGRAM(s) Oral every 6 hours PRN  ascorbic acid 500 milliGRAM(s) Oral daily  ceFAZolin   IVPB 2000 milliGRAM(s) IV Intermittent every 8 hours  ferrous    sulfate Liquid 220 milliGRAM(s) Oral every 12 hours  magnesium hydroxide Suspension 5 milliLiter(s) Oral daily  mirtazapine 15 milliGRAM(s) Oral at bedtime  pantoprazole   Suspension 40 milliGRAM(s) Oral daily  silver sulfADIAZINE 1% Cream 1 Application(s) Topical every 12 hours  viokace 96303 units 1 Tablet(s) 1 Tablet(s) Enteral Tube once      PMHX/PSHX:  Multiple myeloma    Dementia    Rheumatoid arthritis    Mild protein-calorie malnutrition    Primary pulmonary hypertension    Osteoarthritis    Cataract    MDD (major depressive disorder)    H/O left knee surgery        Family history:  No pertinent family history in first degree relatives      Social History:   unable to obtain    ROS:   unable to obtain    PHYSICAL EXAM:   GENERAL:  No acute distress  HEENT:  Normocephalic/atraumatic, no scleral icterus  CHEST:  No accessory muscle use, breathing room air  HEART:  Regular rate and rhythm  ABDOMEN:  Soft, non-tender, non-distended, normoactive bowel sounds,  no masses. current PEG (placed 8/10), unable to flush  EXTREMITIES: no LE edema  SKIN:  No rash  NEURO:  Alert and oriented x 0-1    Vital Signs:  Vital Signs Last 24 Hrs  T(C): 36.3 (26 Sep 2023 06:09), Max: 37.1 (25 Sep 2023 16:48)  T(F): 97.3 (26 Sep 2023 06:09), Max: 98.7 (25 Sep 2023 16:48)  HR: 93 (26 Sep 2023 06:09) (91 - 94)  BP: 134/77 (26 Sep 2023 06:09) (124/70 - 135/90)  BP(mean): --  RR: 17 (26 Sep 2023 06:09) (17 - 17)  SpO2: 98% (26 Sep 2023 06:09) (98% - 99%)    Parameters below as of 26 Sep 2023 06:09  Patient On (Oxygen Delivery Method): room air      Daily     Daily     LABS:                        9.4    9.43  )-----------( 390      ( 26 Sep 2023 08:06 )             30.0     Mean Cell Volume: 71.6 fL (09-26-23 @ 08:06)    09-26    138  |  102  |  18  ----------------------------<  67<L>  4.3   |  26  |  0.45<L>    Ca    8.1<L>      26 Sep 2023 08:06  Phos  3.0     09-26  Mg     2.10     09-26        PT/INR - ( 26 Sep 2023 08:06 )   PT: 13.2 sec;   INR: 1.18 ratio         PTT - ( 26 Sep 2023 08:06 )  PTT:27.3 sec  Urinalysis Basic - ( 26 Sep 2023 08:06 )    Color: x / Appearance: x / SG: x / pH: x  Gluc: 67 mg/dL / Ketone: x  / Bili: x / Urobili: x   Blood: x / Protein: x / Nitrite: x   Leuk Esterase: x / RBC: x / WBC x   Sq Epi: x / Non Sq Epi: x / Bacteria: x                              9.4    9.43  )-----------( 390      ( 26 Sep 2023 08:06 )             30.0                         9.2    10.75 )-----------( 378      ( 25 Sep 2023 07:48 )             28.6                         10.0   12.35 )-----------( 363      ( 24 Sep 2023 05:55 )             30.0       Imaging:  < from: CT Abdomen and Pelvis No Cont (09.23.23 @ 08:57) >  ACC: [id number] EXAM:  CT ABDOMEN AND PELVIS   ORDERED BY: ELDA PRICE     PROCEDURE DATE:  09/23/2023          INTERPRETATION:  CLINICAL INFORMATION: Patient with Alzheimer's dementia,   multiple myeloma with recent PEG placement, evaluate for hematoma.    COMPARISON: None.    CONTRAST/COMPLICATIONS:  IV Contrast: None  Oral Contrast: None  Complications: None    PROCEDURE:  CT of the Abdomen and Pelvis was performed.  Sagittal and coronal reformats were performed.    FINDINGS:  Motion degraded study    LOWER CHEST: Partially visualized right chest port, with tip terminating   in the lower SVC. Ascending aortic dilation measuring up to 4.2 cm in   diameter. Descending thoracic aorta at the level of T10 measures up to   3.7 cm in diameter. Small bilateral pleural effusions with associated   passive atelectasis and groundglass opacities. Coronary artery and aortic   calcifications. Mild cardiomegaly.    LIVER: Punctate calcifications in the right hepatic lobe.  BILE DUCTS: Normal caliber.  GALLBLADDER: Cholecystectomy.  SPLEEN: Within normal limits.  PANCREAS: Within normal limits.  ADRENALS: Within normal limits.  KIDNEYS/URETERS: Large left renal cyst. No hydronephrosis.    BLADDER: Within normal limits.  REPRODUCTIVE ORGANS: Uterus and adnexa within normal limits.    BOWEL: No bowel obstruction. Appendix is normal. PEG tube with tip in the   stomach lumen.  PERITONEUM: No ascites.  VESSELS: Atherosclerotic changes.  RETROPERITONEUM/LYMPH NODES: No lymphadenopathy.  ABDOMINAL WALL: Anterior abdominal wall PEG tube, no associated   subcutaneous soft tissue swelling or hematoma visualized.  BONES: Degenerative changes. Mild anterior compression fracture and   retrolisthesis of L2. Mild compression fracture of T11. Severe   degenerative changes of the left hip.    IMPRESSION:  No abdominal wall hematoma or subcutaneous soft tissue swelling at the   PEG tube insertion site.    No hematoma elsewhere in the abdomen or pelvis.    Ascending and descending thoracic aortic dilation.        --- End of Report ---          MARTHA KANG MD; Resident Radiologist  This document has been electronically signed.  ROSETTE CHANDLER MD; Attending Radiologist  This document has been electronically signed. Sep 23 2023  1:31PM    < end of copied text >           Chief Complaint:  Patient is a 78y old  Female who presents with a chief complaint of Hypotension (26 Sep 2023 08:53)      HPI:    79yo F w/ PMHx Alzheimer's Dementia s/p 20 Fr externally removable PEG 8/10/2023, Multiple Myeloma, cataracts, chronic venous ulcers b/l LE, OA knee, HTN, pulmonary hypertension, MDD sent from NH for HoTN. While admitted patient found with MSSA bacteremia, UTI, hypoNa, anemia. GI consulted for clogged PEG tube management. Patient receiving TF up until 2 days ago, RN noted tube with resistance to flushing. Despite cytobrush and further flushing attempts, unable to use tube.    Allergies:  No Known Allergies      Hospital Medications:  acetaminophen     Tablet .. 650 milliGRAM(s) Oral every 6 hours PRN  ascorbic acid 500 milliGRAM(s) Oral daily  ceFAZolin   IVPB 2000 milliGRAM(s) IV Intermittent every 8 hours  ferrous    sulfate Liquid 220 milliGRAM(s) Oral every 12 hours  magnesium hydroxide Suspension 5 milliLiter(s) Oral daily  mirtazapine 15 milliGRAM(s) Oral at bedtime  pantoprazole   Suspension 40 milliGRAM(s) Oral daily  silver sulfADIAZINE 1% Cream 1 Application(s) Topical every 12 hours  viokace 03310 units 1 Tablet(s) 1 Tablet(s) Enteral Tube once      PMHX/PSHX:  Multiple myeloma    Dementia    Rheumatoid arthritis    Mild protein-calorie malnutrition    Primary pulmonary hypertension    Osteoarthritis    Cataract    MDD (major depressive disorder)    H/O left knee surgery        Family history:  No pertinent family history in first degree relatives      Social History:   unable to obtain    ROS:   unable to obtain    PHYSICAL EXAM:   GENERAL:  No acute distress  HEENT:  Normocephalic/atraumatic, no scleral icterus  CHEST:  No accessory muscle use, breathing room air  HEART:  Regular rate and rhythm  ABDOMEN:  Soft, non-tender, non-distended, normoactive bowel sounds,  no masses. current PEG (placed 8/10), unable to flush  EXTREMITIES: no LE edema  SKIN:  No rash  NEURO:  Alert and oriented x 0-1    Vital Signs:  Vital Signs Last 24 Hrs  T(C): 36.3 (26 Sep 2023 06:09), Max: 37.1 (25 Sep 2023 16:48)  T(F): 97.3 (26 Sep 2023 06:09), Max: 98.7 (25 Sep 2023 16:48)  HR: 93 (26 Sep 2023 06:09) (91 - 94)  BP: 134/77 (26 Sep 2023 06:09) (124/70 - 135/90)  BP(mean): --  RR: 17 (26 Sep 2023 06:09) (17 - 17)  SpO2: 98% (26 Sep 2023 06:09) (98% - 99%)    Parameters below as of 26 Sep 2023 06:09  Patient On (Oxygen Delivery Method): room air      Daily     Daily     LABS:                        9.4    9.43  )-----------( 390      ( 26 Sep 2023 08:06 )             30.0     Mean Cell Volume: 71.6 fL (09-26-23 @ 08:06)    09-26    138  |  102  |  18  ----------------------------<  67<L>  4.3   |  26  |  0.45<L>    Ca    8.1<L>      26 Sep 2023 08:06  Phos  3.0     09-26  Mg     2.10     09-26        PT/INR - ( 26 Sep 2023 08:06 )   PT: 13.2 sec;   INR: 1.18 ratio         PTT - ( 26 Sep 2023 08:06 )  PTT:27.3 sec  Urinalysis Basic - ( 26 Sep 2023 08:06 )    Color: x / Appearance: x / SG: x / pH: x  Gluc: 67 mg/dL / Ketone: x  / Bili: x / Urobili: x   Blood: x / Protein: x / Nitrite: x   Leuk Esterase: x / RBC: x / WBC x   Sq Epi: x / Non Sq Epi: x / Bacteria: x                              9.4    9.43  )-----------( 390      ( 26 Sep 2023 08:06 )             30.0                         9.2    10.75 )-----------( 378      ( 25 Sep 2023 07:48 )             28.6                         10.0   12.35 )-----------( 363      ( 24 Sep 2023 05:55 )             30.0       Imaging:  < from: CT Abdomen and Pelvis No Cont (09.23.23 @ 08:57) >  ACC: [id number] EXAM:  CT ABDOMEN AND PELVIS   ORDERED BY: ELDA HOOKMAN     PROCEDURE DATE:  09/23/2023          INTERPRETATION:  CLINICAL INFORMATION: Patient with Alzheimer's dementia,   multiple myeloma with recent PEG placement, evaluate for hematoma.    COMPARISON: None.    CONTRAST/COMPLICATIONS:  IV Contrast: None  Oral Contrast: None  Complications: None    PROCEDURE:  CT of the Abdomen and Pelvis was performed.  Sagittal and coronal reformats were performed.    FINDINGS:  Motion degraded study    LOWER CHEST: Partially visualized right chest port, with tip terminating   in the lower SVC. Ascending aortic dilation measuring up to 4.2 cm in   diameter. Descending thoracic aorta at the level of T10 measures up to   3.7 cm in diameter. Small bilateral pleural effusions with associated   passive atelectasis and groundglass opacities. Coronary artery and aortic   calcifications. Mild cardiomegaly.    LIVER: Punctate calcifications in the right hepatic lobe.  BILE DUCTS: Normal caliber.  GALLBLADDER: Cholecystectomy.  SPLEEN: Within normal limits.  PANCREAS: Within normal limits.  ADRENALS: Within normal limits.  KIDNEYS/URETERS: Large left renal cyst. No hydronephrosis.    BLADDER: Within normal limits.  REPRODUCTIVE ORGANS: Uterus and adnexa within normal limits.    BOWEL: No bowel obstruction. Appendix is normal. PEG tube with tip in the   stomach lumen.  PERITONEUM: No ascites.  VESSELS: Atherosclerotic changes.  RETROPERITONEUM/LYMPH NODES: No lymphadenopathy.  ABDOMINAL WALL: Anterior abdominal wall PEG tube, no associated   subcutaneous soft tissue swelling or hematoma visualized.  BONES: Degenerative changes. Mild anterior compression fracture and   retrolisthesis of L2. Mild compression fracture of T11. Severe   degenerative changes of the left hip.    IMPRESSION:  No abdominal wall hematoma or subcutaneous soft tissue swelling at the   PEG tube insertion site.    No hematoma elsewhere in the abdomen or pelvis.    Ascending and descending thoracic aortic dilation.        --- End of Report ---          MARTHA KANG MD; Resident Radiologist  This document has been electronically signed.  ROSETTE CHANDLER MD; Attending Radiologist  This document has been electronically signed. Sep 23 2023  1:31PM    < end of copied text >           Chief Complaint:  Patient is a 78y old  Female who presents with a chief complaint of Hypotension (26 Sep 2023 08:53)      HPI:    77yo F w/ PMHx Alzheimer's Dementia s/p 20 Fr externally removable PEG 8/10/2023, Multiple Myeloma, cataracts, chronic venous ulcers b/l LE, OA knee, HTN, pulmonary hypertension, MDD sent from NH for HoTN. While admitted patient found with MSSA bacteremia, UTI, hypoNa, anemia. GI consulted for clogged PEG tube management. Patient receiving TF up until 2 days ago, RN noted tube with resistance to flushing. Despite cytobrush and further flushing attempts, unable to use tube.    Allergies:  No Known Allergies      Hospital Medications:  acetaminophen     Tablet .. 650 milliGRAM(s) Oral every 6 hours PRN  ascorbic acid 500 milliGRAM(s) Oral daily  ceFAZolin   IVPB 2000 milliGRAM(s) IV Intermittent every 8 hours  ferrous    sulfate Liquid 220 milliGRAM(s) Oral every 12 hours  magnesium hydroxide Suspension 5 milliLiter(s) Oral daily  mirtazapine 15 milliGRAM(s) Oral at bedtime  pantoprazole   Suspension 40 milliGRAM(s) Oral daily  silver sulfADIAZINE 1% Cream 1 Application(s) Topical every 12 hours  viokace 47729 units 1 Tablet(s) 1 Tablet(s) Enteral Tube once      PMHX/PSHX:  Multiple myeloma    Dementia    Rheumatoid arthritis    Mild protein-calorie malnutrition    Primary pulmonary hypertension    Osteoarthritis    Cataract    MDD (major depressive disorder)    H/O left knee surgery        Family history:  No pertinent family history in first degree relatives      Social History:   unable to obtain    ROS:   unable to obtain    PHYSICAL EXAM:   GENERAL:  No acute distress  HEENT:  Normocephalic/atraumatic, no scleral icterus  NECK: supple  CHEST:  No accessory muscle use, breathing room air  HEART:  Regular rate and rhythm  ABDOMEN:  Soft, non-tender, non-distended, normoactive bowel sounds,  no masses. current PEG (placed 8/10), unable to flush  EXTREMITIES: no LE edema  SKIN:  No rash  NEURO:  Alert and oriented x 0-1  PSYCH: calm, unable to assess insight    Vital Signs:  Vital Signs Last 24 Hrs  T(C): 36.3 (26 Sep 2023 06:09), Max: 37.1 (25 Sep 2023 16:48)  T(F): 97.3 (26 Sep 2023 06:09), Max: 98.7 (25 Sep 2023 16:48)  HR: 93 (26 Sep 2023 06:09) (91 - 94)  BP: 134/77 (26 Sep 2023 06:09) (124/70 - 135/90)  BP(mean): --  RR: 17 (26 Sep 2023 06:09) (17 - 17)  SpO2: 98% (26 Sep 2023 06:09) (98% - 99%)    Parameters below as of 26 Sep 2023 06:09  Patient On (Oxygen Delivery Method): room air      Daily     Daily     LABS:                        9.4    9.43  )-----------( 390      ( 26 Sep 2023 08:06 )             30.0     Mean Cell Volume: 71.6 fL (09-26-23 @ 08:06)    09-26    138  |  102  |  18  ----------------------------<  67<L>  4.3   |  26  |  0.45<L>    Ca    8.1<L>      26 Sep 2023 08:06  Phos  3.0     09-26  Mg     2.10     09-26        PT/INR - ( 26 Sep 2023 08:06 )   PT: 13.2 sec;   INR: 1.18 ratio         PTT - ( 26 Sep 2023 08:06 )  PTT:27.3 sec  Urinalysis Basic - ( 26 Sep 2023 08:06 )    Color: x / Appearance: x / SG: x / pH: x  Gluc: 67 mg/dL / Ketone: x  / Bili: x / Urobili: x   Blood: x / Protein: x / Nitrite: x   Leuk Esterase: x / RBC: x / WBC x   Sq Epi: x / Non Sq Epi: x / Bacteria: x                              9.4    9.43  )-----------( 390      ( 26 Sep 2023 08:06 )             30.0                         9.2    10.75 )-----------( 378      ( 25 Sep 2023 07:48 )             28.6                         10.0   12.35 )-----------( 363      ( 24 Sep 2023 05:55 )             30.0       Imaging:  < from: CT Abdomen and Pelvis No Cont (09.23.23 @ 08:57) >  ACC: [id number] EXAM:  CT ABDOMEN AND PELVIS   ORDERED BY: ELDA PRICE     PROCEDURE DATE:  09/23/2023          INTERPRETATION:  CLINICAL INFORMATION: Patient with Alzheimer's dementia,   multiple myeloma with recent PEG placement, evaluate for hematoma.    COMPARISON: None.    CONTRAST/COMPLICATIONS:  IV Contrast: None  Oral Contrast: None  Complications: None    PROCEDURE:  CT of the Abdomen and Pelvis was performed.  Sagittal and coronal reformats were performed.    FINDINGS:  Motion degraded study    LOWER CHEST: Partially visualized right chest port, with tip terminating   in the lower SVC. Ascending aortic dilation measuring up to 4.2 cm in   diameter. Descending thoracic aorta at the level of T10 measures up to   3.7 cm in diameter. Small bilateral pleural effusions with associated   passive atelectasis and groundglass opacities. Coronary artery and aortic   calcifications. Mild cardiomegaly.    LIVER: Punctate calcifications in the right hepatic lobe.  BILE DUCTS: Normal caliber.  GALLBLADDER: Cholecystectomy.  SPLEEN: Within normal limits.  PANCREAS: Within normal limits.  ADRENALS: Within normal limits.  KIDNEYS/URETERS: Large left renal cyst. No hydronephrosis.    BLADDER: Within normal limits.  REPRODUCTIVE ORGANS: Uterus and adnexa within normal limits.    BOWEL: No bowel obstruction. Appendix is normal. PEG tube with tip in the   stomach lumen.  PERITONEUM: No ascites.  VESSELS: Atherosclerotic changes.  RETROPERITONEUM/LYMPH NODES: No lymphadenopathy.  ABDOMINAL WALL: Anterior abdominal wall PEG tube, no associated   subcutaneous soft tissue swelling or hematoma visualized.  BONES: Degenerative changes. Mild anterior compression fracture and   retrolisthesis of L2. Mild compression fracture of T11. Severe   degenerative changes of the left hip.    IMPRESSION:  No abdominal wall hematoma or subcutaneous soft tissue swelling at the   PEG tube insertion site.    No hematoma elsewhere in the abdomen or pelvis.    Ascending and descending thoracic aortic dilation.        --- End of Report ---          MARTHA KANG MD; Resident Radiologist  This document has been electronically signed.  ROSETTE CHANDLER MD; Attending Radiologist  This document has been electronically signed. Sep 23 2023  1:31PM    < end of copied text >

## 2023-09-27 LAB
ANION GAP SERPL CALC-SCNC: 14 MMOL/L — SIGNIFICANT CHANGE UP (ref 7–14)
BUN SERPL-MCNC: 20 MG/DL — SIGNIFICANT CHANGE UP (ref 7–23)
CALCIUM SERPL-MCNC: 8.2 MG/DL — LOW (ref 8.4–10.5)
CHLORIDE SERPL-SCNC: 100 MMOL/L — SIGNIFICANT CHANGE UP (ref 98–107)
CO2 SERPL-SCNC: 25 MMOL/L — SIGNIFICANT CHANGE UP (ref 22–31)
CREAT SERPL-MCNC: 0.54 MG/DL — SIGNIFICANT CHANGE UP (ref 0.5–1.3)
EGFR: 95 ML/MIN/1.73M2 — SIGNIFICANT CHANGE UP
GLUCOSE BLDC GLUCOMTR-MCNC: 106 MG/DL — HIGH (ref 70–99)
GLUCOSE BLDC GLUCOMTR-MCNC: 117 MG/DL — HIGH (ref 70–99)
GLUCOSE BLDC GLUCOMTR-MCNC: 117 MG/DL — HIGH (ref 70–99)
GLUCOSE BLDC GLUCOMTR-MCNC: 147 MG/DL — HIGH (ref 70–99)
GLUCOSE SERPL-MCNC: 99 MG/DL — SIGNIFICANT CHANGE UP (ref 70–99)
HCT VFR BLD CALC: 30.8 % — LOW (ref 34.5–45)
HGB BLD-MCNC: 9.5 G/DL — LOW (ref 11.5–15.5)
MAGNESIUM SERPL-MCNC: 2.1 MG/DL — SIGNIFICANT CHANGE UP (ref 1.6–2.6)
MCHC RBC-ENTMCNC: 22.6 PG — LOW (ref 27–34)
MCHC RBC-ENTMCNC: 30.8 GM/DL — LOW (ref 32–36)
MCV RBC AUTO: 73.2 FL — LOW (ref 80–100)
NRBC # BLD: 0 /100 WBCS — SIGNIFICANT CHANGE UP (ref 0–0)
NRBC # FLD: 0 K/UL — SIGNIFICANT CHANGE UP (ref 0–0)
PHOSPHATE SERPL-MCNC: 2 MG/DL — LOW (ref 2.5–4.5)
PLATELET # BLD AUTO: 425 K/UL — HIGH (ref 150–400)
POTASSIUM SERPL-MCNC: 3.6 MMOL/L — SIGNIFICANT CHANGE UP (ref 3.5–5.3)
POTASSIUM SERPL-SCNC: 3.6 MMOL/L — SIGNIFICANT CHANGE UP (ref 3.5–5.3)
RBC # BLD: 4.21 M/UL — SIGNIFICANT CHANGE UP (ref 3.8–5.2)
RBC # FLD: 27.5 % — HIGH (ref 10.3–14.5)
SODIUM SERPL-SCNC: 139 MMOL/L — SIGNIFICANT CHANGE UP (ref 135–145)
WBC # BLD: 10.52 K/UL — HIGH (ref 3.8–10.5)
WBC # FLD AUTO: 10.52 K/UL — HIGH (ref 3.8–10.5)

## 2023-09-27 PROCEDURE — 99232 SBSQ HOSP IP/OBS MODERATE 35: CPT

## 2023-09-27 RX ORDER — ACETAMINOPHEN 500 MG
650 TABLET ORAL ONCE
Refills: 0 | Status: COMPLETED | OUTPATIENT
Start: 2023-09-27 | End: 2023-09-27

## 2023-09-27 RX ADMIN — MAGNESIUM HYDROXIDE 5 MILLILITER(S): 400 TABLET, CHEWABLE ORAL at 13:22

## 2023-09-27 RX ADMIN — Medication 650 MILLIGRAM(S): at 22:40

## 2023-09-27 RX ADMIN — Medication 100 MILLIGRAM(S): at 21:25

## 2023-09-27 RX ADMIN — Medication 1 APPLICATION(S): at 17:54

## 2023-09-27 RX ADMIN — Medication 220 MILLIGRAM(S): at 17:56

## 2023-09-27 RX ADMIN — Medication 500 MILLIGRAM(S): at 13:22

## 2023-09-27 RX ADMIN — Medication 100 MILLIGRAM(S): at 13:29

## 2023-09-27 RX ADMIN — Medication 1 APPLICATION(S): at 06:00

## 2023-09-27 RX ADMIN — Medication 100 MILLIGRAM(S): at 06:01

## 2023-09-27 RX ADMIN — PANTOPRAZOLE SODIUM 40 MILLIGRAM(S): 20 TABLET, DELAYED RELEASE ORAL at 13:22

## 2023-09-27 RX ADMIN — MIRTAZAPINE 15 MILLIGRAM(S): 45 TABLET, ORALLY DISINTEGRATING ORAL at 21:25

## 2023-09-27 RX ADMIN — Medication 650 MILLIGRAM(S): at 21:25

## 2023-09-27 RX ADMIN — Medication 220 MILLIGRAM(S): at 06:00

## 2023-09-27 NOTE — DISCHARGE NOTE PROVIDER - NSDCCPCAREPLAN_GEN_ALL_CORE_FT
PRINCIPAL DISCHARGE DIAGNOSIS  Diagnosis: Sepsis  Assessment and Plan of Treatment: You were found to have a wide spread infection in your blood stream. The bacteria that was identified was Staph Aureus. The infectious disease doctors follow you throughout your visit. In order to prevent worsening infection, it was decided to removal your chest wall port. You were treated with IV antibiotics, you should continue these antibiotics for a total of 4 weeks. Please follow up with your PCP within 2 weeks of discharge for furter evaluation and recommendation.      SECONDARY DISCHARGE DIAGNOSES  Diagnosis: Hyponatremia  Assessment and Plan of Treatment: You had low sodium while admitted to the hospital. This was likely due to the water and food you were being fed with at your nursing home. Your sodium improved with admitted. Please follow up with your PCP within 1 week for further evaluation and monitoring of your sodium levels.    Diagnosis: UTI (urinary tract infection)  Assessment and Plan of Treatment:      PRINCIPAL DISCHARGE DIAGNOSIS  Diagnosis: Sepsis  Assessment and Plan of Treatment: You were found to have a wide spread infection in your blood stream. The bacteria that was identified was Staph Aureus. The infectious disease doctors follow you throughout your visit. In order to prevent worsening infection, it was decided to removal your chest wall port. You were treated with IV antibiotics, you should continue these antibiotics for a total of 4 weeks. Please follow up with your PCP within 2 weeks of discharge for furter evaluation and recommendation.      SECONDARY DISCHARGE DIAGNOSES  Diagnosis: Hyponatremia  Assessment and Plan of Treatment: You had low sodium while admitted to the hospital. This was likely due to the water and food you were being fed with at your nursing home. Your sodium improved with admitted. Please follow up with your PCP within 1 week for further evaluation and monitoring of your sodium levels.    Diagnosis: Hypertension  Assessment and Plan of Treatment: Coreg was discontinued while in hospital for hypotension likely caused by infection, please monitor Blood pressure if SBP > 150 notify Dr. caballero     PRINCIPAL DISCHARGE DIAGNOSIS  Diagnosis: Sepsis  Assessment and Plan of Treatment: You were found to have a wide spread infection in your blood stream. The bacteria that was identified was Staph Aureus. The infectious disease doctors follow you throughout your visit. In order to prevent worsening infection, it was decided to removal your chest wall port. You were treated with IV antibiotics, you should continue these antibiotics for a total of 4 weeks, completion date is 10/22. Please follow up with your PCP within 2 weeks of discharge for furter evaluation and recommendation.      SECONDARY DISCHARGE DIAGNOSES  Diagnosis: Hyponatremia  Assessment and Plan of Treatment: You had low sodium while admitted to the hospital. This was likely due to the water and food you were being fed with at your nursing home. Your sodium improved with admitted. Please follow up with your PCP within 1 week for further evaluation and monitoring of your sodium levels.    Diagnosis: Hypertension  Assessment and Plan of Treatment: Coreg was discontinued while in hospital for hypotension likely caused by infection, please monitor Blood pressure if SBP > 150 notify Dr. caballero

## 2023-09-27 NOTE — DISCHARGE NOTE PROVIDER - CARE PROVIDER_API CALL
Linda Vasques  Internal Medicine  [id number] [place] [address]  Phone: [phone]  Fax: [phone]  Follow Up Time:

## 2023-09-27 NOTE — DISCHARGE NOTE PROVIDER - NSDCMRMEDTOKEN_GEN_ALL_CORE_FT
cefTRIAXone 1 g injection: 1 gram(s) intravenously every 24 hours  Coreg 6.25 mg oral tablet: 1 tab(s) by gastrostomy tube 2 times a day  ferrous fumarate 100 mg/5 mL oral suspension: 220 milliliter(s) by PEG tube 2 times a day  magnesium hydroxide 400 mg oral tablet, chewable: by PEG tube 3 times a week  miconazole 2% topical powder: Apply topically to affected area 2 times a day apply to coccyx and groin  omeprazole 20 mg oral delayed release tablet: 1 tab(s) by gastrostomy tube once a day  Remeron 15 mg oral tablet: 1 tab(s) by PEG tube once a day (at bedtime)  Silvadene 1% topical cream: Apply topically to affected area every 12 hours apply to R  medial shin q shift   ascorbic acid 500 mg oral tablet: 1 tab(s) orally once a day  ceFAZolin 2 g intravenous injection: 2 gram(s) intravenous every 8 hours  ferrous fumarate 100 mg/5 mL oral suspension: 220 milliliter(s) by PEG tube 2 times a day  magnesium hydroxide 8% oral suspension: 5 milliliter(s) orally once a day  omeprazole 20 mg oral delayed release tablet: 1 tab(s) by gastrostomy tube once a day  Remeron 15 mg oral tablet: 1 tab(s) by PEG tube once a day (at bedtime)  silver sulfADIAZINE 1% topical cream: 1 Apply topically to affected area every 12 hours   ascorbic acid 500 mg oral tablet: 1 tab(s) orally once a day  ceFAZolin 2 g intravenous injection: 2 gram(s) intravenous every 8 hours until 10/22  collagenase 250 units/g topical ointment: 1 Apply topically to affected area 2 times a day  ferrous fumarate 100 mg/5 mL oral suspension: 220 milliliter(s) by PEG tube 2 times a day  magnesium hydroxide 8% oral suspension: 5 milliliter(s) orally once a day  omeprazole 20 mg oral delayed release tablet: 1 tab(s) by gastrostomy tube once a day  Remeron 15 mg oral tablet: 1 tab(s) by PEG tube once a day (at bedtime)  silver sulfADIAZINE 1% topical cream: 1 Apply topically to affected area every 12 hours  sodium hypochlorite 0.125% topical solution: 1 Apply topically to affected area 2 times a day

## 2023-09-27 NOTE — DISCHARGE NOTE PROVIDER - NSDCFUADDAPPT_GEN_ALL_CORE_FT
Continue Cefazolin until 10/22 to complete 4 weeks of treatment  Continue Cefazolin until 10/22 to complete 4 weeks of treatment.     Follow up with Dr. Vasques within 1-2 weeks upon discharge.

## 2023-09-27 NOTE — DISCHARGE NOTE PROVIDER - NSDCFUADDINST_GEN_ALL_CORE_FT
wound care: Topical recommendations:   ----Areas of dry flaky skin to BLE: apply sween24 moisturizer daily, avoid in between the toes.   ---Coccyx, sacrum, bilateral lower medial legs: Cleanse with NS, pat dry. Apply Liquid barrier film to periwound skin (allow to dry). Cover with silicone foam with border. Change daily and PRN if soiled.   ---Perineum: Cleanse with skin cleanser, pat dry. Apply Moe moisture barrier cream twice a day and PRN with incontinent episodes.   ---Right lateral midfoot: Cleanse with NS, pat dry. Cover with silicone foam with border for protection. Change every other day and PRN if soiled.     ---PEG: Cleanse q shift with NS, apply liquid barrier film beneath carlos disc.  If redness noted under carlos disc bumper apply thin foam  dressing without border (Mepilex Lite)- cut to mid dressing with a 'Y' shape. Secondary securement to abdomen taping in 'H' fashion with Steri-strips.     ---Continue low air loss bed therapy, continue heel elevation with complete CAIR boots, continue to turn & reposition per protocol, soft pillow between bony prominences, continue moisture management with barrier creams & single breathable pad, continue measures to decrease friction/shear/pressure. Continue with nutritional support as per dietary/orders. wound care: Topical recommendations:   ---Right lower back- Cleanse with NS, pat dry. Apply Liquid barrier film to periwound skin (allow to dry). Apply collagenase (Santyl), nickel-coin thickness, to entire base of wound. Lightly pack wound with 0.125% Dakins moistened gauze, cover with ABD (combine pad) and secure with paper tape. Change twice a  day.   ---Left sacrum- Cleanse with NS, pat dry. Apply Liquid barrier film to periwound skin (allow to dry). Cover with silicone foam with border. Change daily, monitor for tissue type changes.   ---Left inner thigh and left hip- Apply LBF to site, cover with silicone foam with border, change every other day.   ---Perineum: Cleanse with skin cleanser, pat dry. Apply Moe moisture barrier cream twice a day and PRN with incontinent episodes.   ---Right lateral midfoot and right lateral malleolus: Apply LBF, place gauze, wrap with kerlix, monitor for tissue type changes. Change every other day.   ---Left inner thigh- Cover with silicone foam with border, Change daily.   ---Areas of dry flaky skin to BLE: apply sween24 moisturizer daily, avoid in between the toes.   ---PEG: Cleanse q shift with NS, apply liquid barrier film beneath carlos disc.  If redness noted under carlos disc bumper apply thin foam  dressing without border (Mepilex Lite)- cut to mid dressing with a 'Y' shape. Secondary securement to abdomen taping in 'H' fashion with Steri-strips.   ---Continue low air loss bed therapy, continue heel elevation ,continue to turn & reposition per protocol, soft pillow between bony prominences, continue moisture management with barrier creams & single breathable pad, continue measures to decrease friction/shear/pressure. Continue with nutritional support as per dietary/orders.

## 2023-09-27 NOTE — PROGRESS NOTE ADULT - SUBJECTIVE AND OBJECTIVE BOX
CHIEF COMPLAINT:    SUBJECTIVE:     REVIEW OF SYSTEMS:    CONSTITUTIONAL: (  )  weakness,  (  ) fevers or chills  EYES/ENT: (  )visual changes;     NECK: (  ) pain or stiffness  RESPIRATORY:   (  )cough, wheezing, hemoptysis;  (  ) shortness of breath  CARDIOVASCULAR:  (  )chest pain or palpitations  GASTROINTESTINAL:   (  )abdominal or epigastric pain.  (  ) nausea, vomiting, or hematemesis;   (   ) diarrhea or constipation.   GENITOURINARY:   (    ) dysuria, frequency or hematuria  NEUROLOGICAL:  (   ) numbness or weakness   All other review of systems is negative unless indicated above    Vital Signs Last 24 Hrs  T(C): 36.7 (27 Sep 2023 06:00), Max: 37.5 (26 Sep 2023 20:45)  T(F): 98.1 (27 Sep 2023 06:00), Max: 99.5 (26 Sep 2023 20:45)  HR: 82 (27 Sep 2023 06:00) (82 - 107)  BP: 116/73 (27 Sep 2023 06:00) (116/73 - 144/84)  BP(mean): --  RR: 18 (27 Sep 2023 06:00) (17 - 18)  SpO2: 99% (27 Sep 2023 06:00) (98% - 100%)    Parameters below as of 27 Sep 2023 06:00  Patient On (Oxygen Delivery Method): room air        I&O's Summary    26 Sep 2023 07:01  -  27 Sep 2023 07:00  --------------------------------------------------------  IN: 0 mL / OUT: 1000 mL / NET: -1000 mL        CAPILLARY BLOOD GLUCOSE      POCT Blood Glucose.: 106 mg/dL (27 Sep 2023 06:10)  POCT Blood Glucose.: 84 mg/dL (26 Sep 2023 23:35)  POCT Blood Glucose.: 74 mg/dL (26 Sep 2023 17:44)  POCT Blood Glucose.: 75 mg/dL (26 Sep 2023 14:31)  POCT Blood Glucose.: 68 mg/dL (26 Sep 2023 14:30)      PHYSICAL EXAM:    Constitutional:  (   ) NAD,   (   )awake and alert  HEENT: PERR, EOMI,    Neck: Soft and supple, No LAD, No JVD  Respiratory:  (    Breath sounds are clear bilaterally,    (   ) wheezing, rales or rhonchi  Cardiovascular:     (   )S1 and S2, regular rate and rhythm, no Murmurs, gallops or rubs  Gastrointestinal:  (   )Bowel Sounds present, soft,   (  )nontender, nondistended,    Extremities:    (  ) peripheral edema  Vascular: 2+ peripheral pulses  Neurological:    (    )A/O x 3,   (  ) focal deficits  Musculoskeletal:    (   )  normal strength b/l upper  (     ) normal  lower extremities  Skin: No rashes    MEDICATIONS:  MEDICATIONS  (STANDING):  ascorbic acid 500 milliGRAM(s) Oral daily  ceFAZolin   IVPB 2000 milliGRAM(s) IV Intermittent every 8 hours  ferrous    sulfate Liquid 220 milliGRAM(s) Oral every 12 hours  magnesium hydroxide Suspension 5 milliLiter(s) Oral daily  mirtazapine 15 milliGRAM(s) Oral at bedtime  pantoprazole   Suspension 40 milliGRAM(s) Oral daily  silver sulfADIAZINE 1% Cream 1 Application(s) Topical every 12 hours  viokace 81449 units 1 Tablet(s) 1 Tablet(s) Enteral Tube once      LABS: All Labs Reviewed:                        9.5    10.52 )-----------( 425      ( 27 Sep 2023 05:34 )             30.8     09-27    139  |  100  |  20  ----------------------------<  99  3.6   |  25  |  0.54    Ca    8.2<L>      27 Sep 2023 05:34  Phos  2.0     09-27  Mg     2.10     09-27      PT/INR - ( 26 Sep 2023 08:06 )   PT: 13.2 sec;   INR: 1.18 ratio         PTT - ( 26 Sep 2023 08:06 )  PTT:27.3 sec      Blood Culture: 09-25 @ 12:11  Organism --  Gram Stain Blood -- Gram Stain --  Specimen Source .Blood Blood-Peripheral  Culture-Blood --    09-25 @ 12:00  Organism --  Gram Stain Blood -- Gram Stain --  Specimen Source .Blood Blood-Venous  Culture-Blood --    09-23 @ 10:15  Organism --  Gram Stain Blood -- Gram Stain --  Specimen Source .Blood Blood-Peripheral  Culture-Blood --    09-23 @ 09:49  Organism --  Gram Stain Blood -- Gram Stain   Growth in anaerobic bottle: Gram Positive Cocci in Clusters  Specimen Source .Blood Blood-Venous  Culture-Blood --      Urine Culture      RADIOLOGY/EKG:    ASSESSMENT AND PLAN:    DVT PPX:    ADVANCED DIRECTIVE:    DISPOSITION: CHIEF COMPLAINT:  patient condition remained stable discussed with the ACT team to place PICC line a.m. and discharge patient back to rehab/SNF for completion of IV antibiotic  SUBJECTIVE:     REVIEW OF SYSTEMS:    CONSTITUTIONAL: (x  )  weakness,  (  ) fevers or chills  EYES/ENT: (  )visual changes;     NECK: (  ) pain or stiffness  RESPIRATORY:   (  )cough, wheezing, hemoptysis;  (  ) shortness of breath  CARDIOVASCULAR:  (  )chest pain or palpitations  GASTROINTESTINAL:   (  )abdominal or epigastric pain.  (  ) nausea, vomiting, or hematemesis;   (   ) diarrhea or constipation.   GENITOURINARY:   (    ) dysuria, frequency or hematuria  NEUROLOGICAL:  (   ) numbness or weakness   All other review of systems is negative unless indicated above    Vital Signs Last 24 Hrs  T(C): 36.7 (27 Sep 2023 06:00), Max: 37.5 (26 Sep 2023 20:45)  T(F): 98.1 (27 Sep 2023 06:00), Max: 99.5 (26 Sep 2023 20:45)  HR: 82 (27 Sep 2023 06:00) (82 - 107)  BP: 116/73 (27 Sep 2023 06:00) (116/73 - 144/84)  BP(mean): --  RR: 18 (27 Sep 2023 06:00) (17 - 18)  SpO2: 99% (27 Sep 2023 06:00) (98% - 100%)    Parameters below as of 27 Sep 2023 06:00  Patient On (Oxygen Delivery Method): room air        I&O's Summary    26 Sep 2023 07:01  -  27 Sep 2023 07:00  --------------------------------------------------------  IN: 0 mL / OUT: 1000 mL / NET: -1000 mL        CAPILLARY BLOOD GLUCOSE      POCT Blood Glucose.: 106 mg/dL (27 Sep 2023 06:10)  POCT Blood Glucose.: 84 mg/dL (26 Sep 2023 23:35)  POCT Blood Glucose.: 74 mg/dL (26 Sep 2023 17:44)  POCT Blood Glucose.: 75 mg/dL (26 Sep 2023 14:31)  POCT Blood Glucose.: 68 mg/dL (26 Sep 2023 14:30)      PHYSICAL EXAM:    Constitutional:  ( x  ) NAD,   ( x  )awake and  verbal confused at  the baseline  HEENT: PERR, EOMI,    Neck: Soft and supple, No LAD, No JVD  Respiratory:  (   x Breath sounds are clear bilaterally,    (   ) wheezing, rales or rhonchi  Cardiovascular:     (  x )S1 and S2, regular rate and rhythm, no Murmurs, gallops or rubs  Gastrointestinal:  ( x  )Bowel Sounds present, soft,   (  )nontender, nondistended,    Extremities:    (  ) peripheral edema  Vascular: 2+ peripheral pulses  Neurological:    (  x  )A/O x  1,   (  ) focal deficits  Musculoskeletal:    (  x )  normal strength b/l upper  (     ) normal  lower extremities  Skin: No rashes    MEDICATIONS:  MEDICATIONS  (STANDING):  ascorbic acid 500 milliGRAM(s) Oral daily  ceFAZolin   IVPB 2000 milliGRAM(s) IV Intermittent every 8 hours  ferrous    sulfate Liquid 220 milliGRAM(s) Oral every 12 hours  magnesium hydroxide Suspension 5 milliLiter(s) Oral daily  mirtazapine 15 milliGRAM(s) Oral at bedtime  pantoprazole   Suspension 40 milliGRAM(s) Oral daily  silver sulfADIAZINE 1% Cream 1 Application(s) Topical every 12 hours  viokace 13290 units 1 Tablet(s) 1 Tablet(s) Enteral Tube once      LABS: All Labs Reviewed:                        9.5    10.52 )-----------( 425      ( 27 Sep 2023 05:34 )             30.8     09-27    139  |  100  |  20  ----------------------------<  99  3.6   |  25  |  0.54    Ca    8.2<L>      27 Sep 2023 05:34  Phos  2.0     09-27  Mg     2.10     09-27      PT/INR - ( 26 Sep 2023 08:06 )   PT: 13.2 sec;   INR: 1.18 ratio         PTT - ( 26 Sep 2023 08:06 )  PTT:27.3 sec      Blood Culture: 09-25 @ 12:11  Organism --  Gram Stain Blood -- Gram Stain --  Specimen Source .Blood Blood-Peripheral  Culture-Blood --    09-25 @ 12:00  Organism --  Gram Stain Blood -- Gram Stain --  Specimen Source .Blood Blood-Venous  Culture-Blood --    09-23 @ 10:15  Organism --  Gram Stain Blood -- Gram Stain --  Specimen Source .Blood Blood-Peripheral  Culture-Blood --    09-23 @ 09:49  Organism --  Gram Stain Blood -- Gram Stain   Growth in anaerobic bottle: Gram Positive Cocci in Clusters  Specimen Source .Blood Blood-Venous  Culture-Blood --      Urine Culture      RADIOLOGY/EKG:    ASSESSMENT AND PLAN:  77 yo F PMH Alzheimer's dementia s/p PEG 8/2023, GE flap valve, grade B esophagitis, Multiple Myeloma (previously on Revlimid), cataracts, chronic venous ulcers b/l LE, OA knee, HTN, primary pulmonary hypertension, MDD sent from NH given hypotension documented at 87/56. Unable to obtain reliable history although per documentation patient has experienced cough, dysuria, and abdominal pain. Labs significant for anemia to 6.2 s/p PRBC without active bleeding and Na 124 likely iatrogenic as patient was on D5 1/2 NS and standing free water flushes at nursing home. Patient with fevers (T 100.3) and leukocytosis to 13.3 concerning for sepsis likely 2/2 urinary source - alternatively consider lower extremity wounds. Patient with hx hematoma following initial PEG placement, would f/u CT abdomen r/o expansion.      Problem/Plan - 1:  ·  Problem: Sepsis.   ·  Plan: -patient with fever to 100.3, leukocytosis to 13.3, hypotension 87/56-->92/56 concerning for sepsis 2/2 urinary source, less likely in setting of lower extremity wounds, lactic acid 2.4  -s/p vanc and zosyn in the ED, s/p 1L LR  -f/u BCx, UCx, UA turbid 100 protein moderate blood, large LE, WBC 2921 significant for UTI, per documentation patient has experienced dysuria  -CXR clear  -will order ceftriaxone and vancomycin  -monitor BP, f/u repeat lactic acid.    Problem/Plan - 2:  ·  Problem: Hyponatremia.   ·  Plan: -Na 124  -likely iatrogenic given patient was on D5 1/2 NS and free water flushes at facility  -will hold D5 NS and free water flushes  -f/u urine sodium urine osm, serum osm  -monitor BMP q8h.    Problem/Plan - 3:  ·  Problem: Anemia.   ·  Plan: -Hgb 6.6  -s/p 1u PRBC in ED, f/u repeat CBC, monitor CBC q8h  -f/u iron, ferritin, TIBC  -continue home iron  -monitor CBC, maintain active type and screen, transfuse for Hgb<7.0  -patient with hx hematoma following initial PEG placement, f/u CT A/P consider expansion.    Problem/Plan - 4:  ·  Problem: Alzheimers disease.   ·  Plan: -would obtain collateral on baseline mental status, attempted to call emergency contact tomeka mendozajace was full  -patient is alert, conversant, although A&Ox1 believes she is in [facility].    Problem/Plan - 5:  ·  Problem: Multiple myeloma.   ·  Plan: -patient previously on revlimid per documentation  -would obtain records, patient follows with Dr. Vasques PCP, unclear if patient follows with oncologist.    Problem/Plan - 6:  ·  Problem: Rheumatoid arthritis.   ·  Plan: -patient with ulnar deviation of b/l UE digits, documented RA  -would obtain records.    Problem/Plan - 7:  ·  Problem: Chronic cutaneous venous stasis ulcer.   ·  Plan: -patient with b/l LE ulcers, in setting of chronic venous stasis  -f/u wound care eval.    Problem/Plan - 8:  ·  Problem: History of esophagitis.   ·  Plan: -patient with grade B esophagitis on EGD 8/2023, also with Hill grade 3 gastroesophageal flap, patient was pending ?outpatient w/u with EUS  -home omeprazole not on formulary will order pantoprazole daily.    Problem/Plan - 9:  ·  Problem: Need for prophylactic measure.   ·  Plan: -DVT ppx: patient with documented hx hematoma after initial PEG placement, SCD for now, will hold pharmacologic ppx  -Diet: f/u nutrition consult for tube feeds, patient on Jevity at facility.    -9/23/2023 ID and IR note and consult appreciated due to positive blood culture recommended to remove right upper port   her antibiotic was changed to Ancef grams every 8 hours as per ID recommendation  -9/24/2023 condition stable waiting for removal of right upper chest sided port  -9/25/2023 right-sided chest port was removed by interventional radiology condition stable continue Ancef 2 g every 8 hours  -9/26/2023 continue Ancef 2 g with Hours for the next 4 weeks patient needs PICC line/midline after negative blood culture echo result noted mild aortic stenosis and severe MR no other  invasivel intervention need to be done due to her general condition and terminal dementia  -9/27/2023 blood culture from 9/25 /2023 Negative Pl. PICC line in a.m. and discharge plan discussed with a ACP  Team discussed with ID about the plan  DVT PPX:    ADVANCED DIRECTIVE:    DISPOSITION:

## 2023-09-27 NOTE — DISCHARGE NOTE PROVIDER - HOSPITAL COURSE
77 yo F PMH Alzheimer's dementia s/p PEG 8/2023, GE flap valve, grade B esophagitis, Multiple Myeloma (previously on Revlimid), cataracts, chronic venous ulcers b/l LE, OA knee, HTN, primary pulmonary hypertension, MDD sent from NH given hypotension documented at 87/56. Unable to obtain reliable history although per documentation patient has experienced cough, dysuria, and abdominal pain. Labs significant for anemia to 6.2 s/p PRBC without active bleeding and Na 124 likely iatrogenic as patient was on D5 1/2 NS and standing free water flushes at nursing home.     Sepsis 2/2 MSSA Bacteremia  - lactic acid 2.4, repeat WNL  - s/p vanc and zosyn in the ED, s/p 1L LR  - UA turbid 100 protein moderate blood, large LE, WBC 2921 significant for UTI, per documentation patient has experienced dysuria, 9/21 UCx >100 Staph aureus  - 9/21 MSSA bacteremia  - Rpt 9/23 staph areus  - S/p ceftriaxone and vancomycin  - ID consulted- switched to Cefazolin x 4 weeks per ID recommendations  - rpt Bcx 9/25 NGTD x 48 hours  - S/p chest wall port removal by IR 9/25 per ID recs  - 9/26 TTE: EF 67%,  moderate AS, mod-severe MR--> nothing to do per Dr. MCKEE on 9/26    FTT  - PEG tube placed previous admission 8/2023  - PEG tube not functioning 9/25- GI cs emailed  - Declogged by GI 9/26    Hyponatremia.   -Na 124  -likely iatrogenic given patient was on D5 1/2 NS and free water flushes at facility, held while admitted    Anemia.   - Hgb 6.6  - s/p 2u PRBC   - Ferritin elevated, TIBC low  - Patient with hx hematoma following initial PEG placement  - CT A/P w/out hematoma in PEG site       Alzheimers disease.   - Patient is alert, conversant, although A&Ox1 believes she is in [facility].    Multiple myeloma.   -patient previously on revlimid per documentation  -would obtain records, patient follows with Dr. Vasques PCP, unclear if patient follows with oncologist.    Rheumatoid arthritis.   - patient with ulnar deviation of b/l UE digits, documented RA    Chronic cutaneous venous stasis ulcer.   -patient with b/l LE ulcers, in setting of chronic venous stasis  -wound care eval recs appreciated    Need for prophylactic measure.   -DVT ppx: patient with documented hx hematoma after initial PEG placement, SCD for now, will hold pharmacologic ppx    Case discussed with  *****. Reviewed discharge medications with patient; All new medications requiring new prescription sent to pharmacy of patients choice. Reviewed need for prescription for previous home medication and new prescriptions sent if requested. Patient in agreement and understands.    77 yo F PMH Alzheimer's dementia s/p PEG 8/2023, GE flap valve, grade B esophagitis, Multiple Myeloma (previously on Revlimid), cataracts, chronic venous ulcers b/l LE, OA knee, HTN, primary pulmonary hypertension, MDD sent from NH given hypotension documented at 87/56. Unable to obtain reliable history although per documentation patient has experienced cough, dysuria, and abdominal pain. Labs significant for anemia to 6.2 s/p PRBC without active bleeding and Na 124 likely iatrogenic as patient was on D5 1/2 NS and standing free water flushes at nursing home.     Sepsis 2/2 MSSA Bacteremia  - lactic acid 2.4, repeat WNL  - s/p vanc and zosyn in the ED, s/p 1L LR  - UA turbid 100 protein moderate blood, large LE, WBC 2921 significant for UTI, per documentation patient has experienced dysuria, 9/21 UCx >100 Staph aureus  - 9/21 MSSA bacteremia  - Rpt 9/23 staph areus  - S/p ceftriaxone and vancomycin  - ID consulted- switched to Cefazolin x 4 weeks per ID recommendations  - rpt Bcx 9/25 NGTD x 48 hours  - S/p chest wall port removal by IR 9/25 per ID recs  - 9/26 TTE: EF 67%,  moderate AS, mod-severe MR--> nothing to do per Dr. MCKEE on 9/26    FTT  - PEG tube placed previous admission 8/2023  - PEG tube not functioning 9/25- GI cs emailed  - Declogged by GI 9/26    Hyponatremia.   -Na 124  -likely iatrogenic given patient was on D5 1/2 NS and free water flushes at facility, held while admitted    Anemia.   - Hgb 6.6  - s/p 2u PRBC   - Ferritin elevated, TIBC low  - Patient with hx hematoma following initial PEG placement  - CT A/P w/out hematoma in PEG site       Alzheimers disease.   - Patient is alert, conversant, although A&Ox1 believes she is in [facility].    Multiple myeloma.   -patient previously on revlimid per documentation  -would obtain records, patient follows with Dr. Vasques PCP, unclear if patient follows with oncologist.    Rheumatoid arthritis.   - patient with ulnar deviation of b/l UE digits, documented RA    Chronic cutaneous venous stasis ulcer.   -patient with b/l LE ulcers, in setting of chronic venous stasis  -wound care eval recs appreciated    Need for prophylactic measure.   -DVT ppx: patient with documented hx hematoma after initial PEG placement, SCD for now, will hold pharmacologic ppx    Case discussed with Dr. Vasques on 9/29. Reviewed discharge medications with patient; All new medications requiring new prescription sent to pharmacy of patients choice. Reviewed need for prescription for previous home medication and new prescriptions sent if requested. Patient in agreement and understands.    77 yo F PMH Alzheimer's dementia s/p PEG 8/2023, GE flap valve, grade B esophagitis, Multiple Myeloma (previously on Revlimid), cataracts, chronic venous ulcers b/l LE, OA knee, HTN, primary pulmonary hypertension, MDD sent from NH given hypotension documented at 87/56. Unable to obtain reliable history although per documentation patient has experienced cough, dysuria, and abdominal pain. Labs significant for anemia to 6.2 s/p PRBC without active bleeding and Na 124 likely iatrogenic as patient was on D5 1/2 NS and standing free water flushes at nursing home.     Sepsis 2/2 MSSA Bacteremia  - lactic acid 2.4, repeat WNL  - s/p vanc and zosyn in the ED, s/p 1L LR  - UA turbid 100 protein moderate blood, large LE, WBC 2921 significant for UTI, per documentation patient has experienced dysuria, 9/21 UCx >100 Staph aureus  - 9/21 MSSA bacteremia  - Rpt 9/23 staph areus  - S/p ceftriaxone and vancomycin  - ID consulted- switched to Cefazolin x 4 weeks per ID recommendations, Continue Cefazolin until 10/22 to complete 4 weeks of treatment   - rpt Bcx 9/25 NGTD x 48 hours  - S/p chest wall port removal by IR 9/25 per ID recs  - 9/26 TTE: EF 67%,  moderate AS, mod-severe MR--> nothing to do per Dr. MCKEE on 9/26    FTT  - PEG tube placed previous admission 8/2023  - PEG tube not functioning 9/25- GI cs emailed  - Declogged by GI 9/26    Hyponatremia.   -Na 124  -likely iatrogenic given patient was on D5 1/2 NS and free water flushes at facility, held while admitted    Anemia.   - Hgb 6.6  - s/p 2u PRBC   - Ferritin elevated, TIBC low  - Patient with hx hematoma following initial PEG placement  - CT A/P w/out hematoma in PEG site       Alzheimers disease.   - Patient is alert, conversant, although A&Ox1 believes she is in [facility].    Multiple myeloma.   -patient previously on revlimid per documentation  -would obtain records, patient follows with Dr. Vasques PCP, unclear if patient follows with oncologist.    Rheumatoid arthritis.   - patient with ulnar deviation of b/l UE digits, documented RA    Chronic cutaneous venous stasis ulcer.   -patient with b/l LE ulcers, in setting of chronic venous stasis  -wound care eval recs appreciated    Need for prophylactic measure.   -DVT ppx: patient with documented hx hematoma after initial PEG placement, SCD for now, will hold pharmacologic ppx    Case discussed with Dr. Vasques on 9/29. Reviewed discharge medications with patient; All new medications requiring new prescription sent to pharmacy of patients choice. Reviewed need for prescription for previous home medication and new prescriptions sent if requested. Patient in agreement and understands.    79 yo F PMH Alzheimer's dementia s/p PEG 8/2023, GE flap valve, grade B esophagitis, Multiple Myeloma (previously on Revlimid), cataracts, chronic venous ulcers b/l LE, OA knee, HTN, primary pulmonary hypertension, MDD sent from NH given hypotension documented at 87/56. Unable to obtain reliable history although per documentation patient has experienced cough, dysuria, and abdominal pain. Labs significant for anemia to 6.2 s/p PRBC without active bleeding and Na 124 likely iatrogenic as patient was on D5 1/2 NS and standing free water flushes at nursing home.     Sepsis 2/2 MSSA Bacteremia  - lactic acid 2.4, repeat WNL  - s/p vanc and zosyn in the ED, s/p 1L LR  - UA turbid 100 protein moderate blood, large LE, WBC 2921 significant for UTI, per documentation patient has experienced dysuria, 9/21 UCx >100 Staph aureus  - 9/21 MSSA bacteremia  - Rpt 9/23 staph areus  - S/p ceftriaxone and vancomycin  - ID consulted- switched to Cefazolin x 4 weeks per ID recommendations, Continue Cefazolin until 10/22 to complete 4 weeks of treatment   - rpt Bcx 9/25 NGTD x 48 hours, rpt bcx 9/29 and 10/6 negative   - S/p chest wall port removal by IR 9/25 per ID recs  - 9/26 TTE: EF 67%,  moderate AS, mod-severe MR--> nothing to do per Dr. MCKEE on 9/26  - ***COVID POSITIVE and FEBRILE 10/6*** -- rpt covid pcr positive 10/11 - temp stable     FTT  - PEG tube placed previous admission 8/2023  - PEG tube not functioning 9/25- GI cs emailed  - Declogged by GI 9/26  - Declogged by ACP 10/2 ginger ale     Anemia  - Hgb 6.6 now stable 8.3  - s/p 3u PRBC   - Ferritin elevated, TIBC low  - Patient with hx hematoma following initial PEG placement  - CT A/P w/out hematoma in PEG site   - ** new episode melena ovn 10/12 - monitor CBC - stable hgb; melena now resolved 10/13 **    RUE ACUTE DVT  - s/p midline removal and hep ggt, stopped d/t acute anemia  - rpt RUE duplex with no DVT   - per attending ok to place another midline and DC back to NH with IV abx   - Heme following: transfuse for hg < 7.0 and platelets < 10k, < 20k if febrile and < 50k if bleeding    Alzheimers disease.   - Patient is alert, conversant, although A&Ox1 believes she is in [facility].    Multiple myeloma.   -patient previously on revlimid per documentation  -would obtain records, patient follows with Dr. Vasques PCP, unclear if patient follows with oncologist.    Rheumatoid arthritis.   - patient with ulnar deviation of b/l UE digits, documented RA    Chronic cutaneous venous stasis ulcer.   -patient with b/l LE ulcers, in setting of chronic venous stasis  -wound care eval recs appreciated    On 10/13/23 this case was reviewed with Dr. Vasques. The patient is medically stable and optimized for discharge. All medications were reviewed and prescriptions were sent to mutually agreed upon pharmacy.

## 2023-09-27 NOTE — DISCHARGE NOTE PROVIDER - INSTRUCTIONS
Tube feeds Tube feeds-  Jevity 1.5 Vignesh via PEG @ 50mL/hr x20hrs (provides 1000mL formula, 1500kcal, 63.8gms protein, 760mL free water)

## 2023-09-27 NOTE — PROGRESS NOTE ADULT - SUBJECTIVE AND OBJECTIVE BOX
Follow Up:      Interval History/ROS:   port removed  blood cultures 9/25 no growth so far           Allergies  No Known Allergies        ANTIMICROBIALS:  ceFAZolin   IVPB 2000 every 8 hours        OTHER MEDS:  acetaminophen     Tablet .. 650 milliGRAM(s) Oral every 6 hours PRN  ascorbic acid 500 milliGRAM(s) Oral daily  ferrous    sulfate Liquid 220 milliGRAM(s) Oral every 12 hours  magnesium hydroxide Suspension 5 milliLiter(s) Oral daily  mirtazapine 15 milliGRAM(s) Oral at bedtime  pantoprazole   Suspension 40 milliGRAM(s) Oral daily  silver sulfADIAZINE 1% Cream 1 Application(s) Topical every 12 hours  viokace 64713 units 1 Tablet(s) 1 Tablet(s) Enteral Tube once      Vital Signs Last 24 Hrs  T(F): 98.1 (09-27-23 @ 06:00), Max: 99.5 (09-26-23 @ 20:45)  HR: 108 (09-27-23 @ 12:55)  BP: 132/86 (09-27-23 @ 12:55)  RR: 17 (09-27-23 @ 12:55)  SpO2: 99% (09-27-23 @ 12:55) (99% - 100%)      PHYSICAL EXAM:  Constitutional: Not in acute distress, frail, verbal   Eyes: No icterus.  Oral cavity: Clear, no lesions  RS: no respiratory distress RA  CVS: dressing right chest (prior port site)  Extremities: arthritic changes  tender left knee no erythema                             9.5    10.52 )-----------( 425      ( 27 Sep 2023 05:34 )             30.8 09-27    139  |  100  |  20  ----------------------------<  99  3.6   |  25  |  0.54  Ca    8.2      27 Sep 2023 05:34Phos  2.0     09-27Mg     2.10     09-27          MICROBIOLOGY:  v  .Blood Blood-Peripheral  09-25-23   No growth at 48 hours  --  --      .Blood Blood-Venous  09-25-23   No growth at 48 hours  --  --      .Blood Blood-Peripheral  09-23-23   No growth at 72 Hours  --  --      .Blood Blood-Venous  09-23-23   Growth in anaerobic bottle: Staphylococcus aureus  See previous culture [id number]  --    Growth in anaerobic bottle: Gram Positive Cocci in Clusters      Clean Catch Clean Catch (Midstream)  09-21-23   >100,000 CFU/ml Staphylococcus aureus  --  Staphylococcus aureus      .Blood Blood-Peripheral  09-21-23   Growth in aerobic bottle: Staphylococcus aureus  Growth in anaerobic bottle: Staphylococcus capitis  Coagulase Negative Staphylococci isolated from a single blood culture set  may represent contamination.  Contact the Microbiology Department at [phone] if susceptibility  testing is clinically indicated.  Direct identification is available within approximately 3-5  hours either by Blood Panel Multiplexed PCR or Direct  MALDI-TOF. Details: [email or web address]  --  Blood Culture PCR  Staphylococcus aureus      .Blood Blood-Peripheral  09-21-23   Growth in aerobic and anaerobic bottles: Staphylococcus aureus  See previous culture [id number]  --    Growth in aerobic bottle: Gram Positive Cocci in Clusters  Growth in anaerobic bottle: Gram Positive Cocci in Clusters        RADIOLOGY:    ra< from: Xray Chest 1 View- PORTABLE-Urgent (Xray Chest 1 View- PORTABLE-Urgent .) (09.23.23 @ 14:29) >  IMPRESSION:    No radiographic evidence of acute cardiopulmonary disease.    --- End of Report ---          RICK CHANDLER DO; Resident Radiologist  This document has been electronically signed.  WALTER HALE MD; Attending Radiologist  This document has been electronically signed. Sep 24 20    < end of copied text >  < from: CT Abdomen and Pelvis No Cont (09.23.23 @ 08:57) >    IMPRESSION:  No abdominal wall hematoma or subcutaneous soft tissue swelling at the   PEG tube insertion site.    No hematoma elsewhere in the abdomen or pelvis.    Ascending and descending thoracic aortic dilation.        --- End of Report ---    < end of copied text >      < from: TTE W or WO Ultrasound Enhancing Agent (09.26.23 @ 12:08) >    TRANSTHORACIC ECHOCARDIOGRAM REPORT  ________________________________________________________________________________                                      _______       Pt. Name:       MAXIMILIANO CHAMPION HERB Study Date:    9/26/2023  MRN:            [id number]     YOB: 1945  Accession #:    [id number]     Age:           78 years  Account#:       [id number]      Gender:        F  Heart Rate:                   Height:        67.00 in (170.18 cm)  Rhythm:                       Weight:        103.00 lb (46.72 kg)  Blood Pressure: 140/75 mmHg   BSA/BMI:       1.53 m² / 16.13 kg/m²  ________________________________________________________________________________________  Referring Physician:    [id number] Linda Vasques  Interpreting Physician: Chelsea Valentino  Primary Sonographer:    Clementine Delaney CS    CPT:               ECHO TTE WO CON COMP W DOPP - 85455.m  Indication(s):     Abnormal electrocardiogram ECG/EKG - R94.31  Procedure:         Transthoracic echocardiogram with 2-D, M-mode and complete                     spectral and color flow Doppler.  Ordering Location: [facility]  Admission Status:  Inpatient  Study Information: Image quality for this study is good.    _______________________________________________________________________________________     CONCLUSIONS:      1. The left ventricular cavity is normal size. Left ventricular wall thickness is normal. Left ventricular systolic function is normal with an ejection fraction of 67 % by Millan's method of disks.   2.Right ventricular cavity is normal in size, normal wall thickness and normal systolic function.   3. The aortic valve is tricuspid with normal structure without stenosis with reduced systolic excursion. There is calcification of the aortic valve leaflets. There is moderate aortic stenosis. The peak transaortic velocity is 1.98 m/s, peak transaortic gradient is 15.7 mmHg and mean transaortic gradient is 8.0 mmHg with an LVOT/aortic valve VTI ratio of 0.44. The aortic valve area is estimated at 1.38 cm² by the continuity equation. There is mild aortic regurgitation.   4. Structurally normal mitral valve with normal leaflet excursion. There is calcification of the mitral valve annulus. There is moderate to severe mitral regurgitation.   5. Estimated pulmonary artery systolic pressure is 48 mmHg.   6. No pericardial effusion seen.    _________________________________________________    < end of copied text >  < from: TTE W or WO Ultrasound Enhancing Agent (09.26.23 @ 12:08) >    TRANSTHORACIC ECHOCARDIOGRAM REPORT  ________________________________________________________________________________                                      _______       Pt. Name:       MAXIMILIANO ESTEVEZ Study Date:    9/26/2023  MRN:            [id number]     YOB: 1945  Accession #:    [id number]     Age:           78 years  Account#:       [id number]      Gender:        F  Heart Rate:                   Height:        67.00 in (170.18 cm)  Rhythm:                       Weight:        103.00 lb (46.72 kg)  Blood Pressure: 140/75 mmHg   BSA/BMI:       1.53 m² / 16.13 kg/m²  ________________________________________________________________________________________  Referring Physician:    [id number] Linda Vasques  Interpreting Physician: Chelsea Valentino  Primary Sonographer:    Clementine Delaney [facility]    CPT:               ECHO TTE WO CON COMP W DOPP - 53030.m  Indication(s):     Abnormal electrocardiogram ECG/EKG - R94.31  Procedure:         Transthoracic echocardiogram with 2-D, M-mode and complete                     spectral and color flow Doppler.  Ordering Location: [facility]  Admission Status:  Inpatient  Study Information: Image quality for this study is good.    _______________________________________________________________________________________     CONCLUSIONS:      1. The left ventricular cavity is normal size. Left ventricular wall thickness is normal. Left ventricular systolic function is normal with an ejection fraction of 67 % by Millan's method of disks.   2.Right ventricular cavity is normal in size, normal wall thickness and normal systolic function.   3. The aortic valve is tricuspid with normal structure without stenosis with reduced systolic excursion. There is calcification of the aortic valve leaflets. There is moderate aortic stenosis. The peak transaortic velocity is 1.98 m/s, peak transaortic gradient is 15.7 mmHg and mean transaortic gradient is 8.0 mmHg with an LVOT/aortic valve VTI ratio of 0.44. The aortic valve area is estimated at 1.38 cm² by the continuity equation. There is mild aortic regurgitation.   4. Structurally normal mitral valve with normal leaflet excursion. There is calcification of the mitral valve annulus. There is moderate to severe mitral regurgitation.   5. Estimated pulmonary artery systolic pressure is 48 mmHg.   6. No pericardial effusion seen.    _________________________________________________    < end of copied text >

## 2023-09-27 NOTE — DISCHARGE NOTE PROVIDER - DISCHARGE DIET
Minced and Moist Diet/Other Diet Instructions Minced and Moist Diet/Enteral, NPO with Tube Feeds/Other Diet Instructions

## 2023-09-27 NOTE — DISCHARGE NOTE PROVIDER - DETAILS OF MALNUTRITION DIAGNOSIS/DIAGNOSES
This patient has been assessed with a concern for Malnutrition and was treated during this hospitalization for the following Nutrition diagnosis/diagnoses:     -  09/23/2023: Severe protein-calorie malnutrition   -  09/23/2023: Underweight (BMI < 19)

## 2023-09-28 LAB
ANION GAP SERPL CALC-SCNC: 10 MMOL/L — SIGNIFICANT CHANGE UP (ref 7–14)
BUN SERPL-MCNC: 17 MG/DL — SIGNIFICANT CHANGE UP (ref 7–23)
CALCIUM SERPL-MCNC: 7.6 MG/DL — LOW (ref 8.4–10.5)
CHLORIDE SERPL-SCNC: 102 MMOL/L — SIGNIFICANT CHANGE UP (ref 98–107)
CO2 SERPL-SCNC: 25 MMOL/L — SIGNIFICANT CHANGE UP (ref 22–31)
CREAT SERPL-MCNC: 0.47 MG/DL — LOW (ref 0.5–1.3)
CULTURE RESULTS: SIGNIFICANT CHANGE UP
CULTURE RESULTS: SIGNIFICANT CHANGE UP
EGFR: 98 ML/MIN/1.73M2 — SIGNIFICANT CHANGE UP
GLUCOSE BLDC GLUCOMTR-MCNC: 110 MG/DL — HIGH (ref 70–99)
GLUCOSE BLDC GLUCOMTR-MCNC: 114 MG/DL — HIGH (ref 70–99)
GLUCOSE BLDC GLUCOMTR-MCNC: 122 MG/DL — HIGH (ref 70–99)
GLUCOSE SERPL-MCNC: 127 MG/DL — HIGH (ref 70–99)
HCT VFR BLD CALC: 28.8 % — LOW (ref 34.5–45)
HGB BLD-MCNC: 9 G/DL — LOW (ref 11.5–15.5)
MAGNESIUM SERPL-MCNC: 1.9 MG/DL — SIGNIFICANT CHANGE UP (ref 1.6–2.6)
MCHC RBC-ENTMCNC: 22.5 PG — LOW (ref 27–34)
MCHC RBC-ENTMCNC: 31.3 GM/DL — LOW (ref 32–36)
MCV RBC AUTO: 72 FL — LOW (ref 80–100)
NRBC # BLD: 0 /100 WBCS — SIGNIFICANT CHANGE UP (ref 0–0)
NRBC # FLD: 0 K/UL — SIGNIFICANT CHANGE UP (ref 0–0)
PHOSPHATE SERPL-MCNC: 1.4 MG/DL — LOW (ref 2.5–4.5)
PLATELET # BLD AUTO: 392 K/UL — SIGNIFICANT CHANGE UP (ref 150–400)
POTASSIUM SERPL-MCNC: 3.7 MMOL/L — SIGNIFICANT CHANGE UP (ref 3.5–5.3)
POTASSIUM SERPL-SCNC: 3.7 MMOL/L — SIGNIFICANT CHANGE UP (ref 3.5–5.3)
RBC # BLD: 4 M/UL — SIGNIFICANT CHANGE UP (ref 3.8–5.2)
RBC # FLD: 26.9 % — HIGH (ref 10.3–14.5)
SODIUM SERPL-SCNC: 137 MMOL/L — SIGNIFICANT CHANGE UP (ref 135–145)
SPECIMEN SOURCE: SIGNIFICANT CHANGE UP
WBC # BLD: 9.17 K/UL — SIGNIFICANT CHANGE UP (ref 3.8–10.5)
WBC # FLD AUTO: 9.17 K/UL — SIGNIFICANT CHANGE UP (ref 3.8–10.5)

## 2023-09-28 RX ORDER — ACETAMINOPHEN 500 MG
975 TABLET ORAL ONCE
Refills: 0 | Status: COMPLETED | OUTPATIENT
Start: 2023-09-28 | End: 2023-09-28

## 2023-09-28 RX ORDER — ACETAMINOPHEN 500 MG
650 TABLET ORAL EVERY 6 HOURS
Refills: 0 | Status: DISCONTINUED | OUTPATIENT
Start: 2023-09-28 | End: 2023-10-09

## 2023-09-28 RX ORDER — POTASSIUM PHOSPHATE, MONOBASIC POTASSIUM PHOSPHATE, DIBASIC 236; 224 MG/ML; MG/ML
30 INJECTION, SOLUTION INTRAVENOUS ONCE
Refills: 0 | Status: COMPLETED | OUTPATIENT
Start: 2023-09-28 | End: 2023-09-28

## 2023-09-28 RX ADMIN — Medication 975 MILLIGRAM(S): at 20:05

## 2023-09-28 RX ADMIN — Medication 100 MILLIGRAM(S): at 22:13

## 2023-09-28 RX ADMIN — MIRTAZAPINE 15 MILLIGRAM(S): 45 TABLET, ORALLY DISINTEGRATING ORAL at 22:13

## 2023-09-28 RX ADMIN — Medication 220 MILLIGRAM(S): at 05:27

## 2023-09-28 RX ADMIN — MAGNESIUM HYDROXIDE 5 MILLILITER(S): 400 TABLET, CHEWABLE ORAL at 13:04

## 2023-09-28 RX ADMIN — Medication 500 MILLIGRAM(S): at 13:04

## 2023-09-28 RX ADMIN — POTASSIUM PHOSPHATE, MONOBASIC POTASSIUM PHOSPHATE, DIBASIC 83.33 MILLIMOLE(S): 236; 224 INJECTION, SOLUTION INTRAVENOUS at 13:02

## 2023-09-28 RX ADMIN — Medication 100 MILLIGRAM(S): at 05:27

## 2023-09-28 RX ADMIN — Medication 1 APPLICATION(S): at 17:58

## 2023-09-28 RX ADMIN — Medication 100 MILLIGRAM(S): at 12:49

## 2023-09-28 RX ADMIN — Medication 975 MILLIGRAM(S): at 21:05

## 2023-09-28 RX ADMIN — Medication 220 MILLIGRAM(S): at 17:57

## 2023-09-28 RX ADMIN — PANTOPRAZOLE SODIUM 40 MILLIGRAM(S): 20 TABLET, DELAYED RELEASE ORAL at 13:04

## 2023-09-28 RX ADMIN — Medication 1 APPLICATION(S): at 05:26

## 2023-09-28 NOTE — PROGRESS NOTE ADULT - SUBJECTIVE AND OBJECTIVE BOX
CHIEF COMPLAINT:  patient condition remained stable discussed with the D/W ACP   team  discharge patient back to rehab/SNF for completion of IV antibiotic  SUBJECTIVE:     REVIEW OF SYSTEMS:    CONSTITUTIONAL: ( x )  weakness,  (  ) fevers or chills  EYES/ENT: (  )visual changes;     NECK: (  ) pain or stiffness  RESPIRATORY:   (  )cough, wheezing, hemoptysis;  (  ) shortness of breath  CARDIOVASCULAR:  (  )chest pain or palpitations  GASTROINTESTINAL:   (  )abdominal or epigastric pain.  (  ) nausea, vomiting, or hematemesis;   (   ) diarrhea or constipation.   GENITOURINARY:   (    ) dysuria, frequency or hematuria  NEUROLOGICAL:  (   ) numbness or weakness   All other review of systems is negative unless indicated above    Vital Signs Last 24 Hrs  T(C): 36.8 (28 Sep 2023 05:25), Max: 37.8 (27 Sep 2023 21:25)  T(F): 98.3 (28 Sep 2023 05:25), Max: 100 (27 Sep 2023 21:25)  HR: 89 (28 Sep 2023 05:25) (89 - 111)  BP: 118/73 (28 Sep 2023 05:25) (118/73 - 132/86)  BP(mean): --  RR: 18 (28 Sep 2023 05:25) (17 - 18)  SpO2: 98% (28 Sep 2023 05:25) (98% - 100%)    Parameters below as of 28 Sep 2023 05:25  Patient On (Oxygen Delivery Method): room air        I&O's Summary      CAPILLARY BLOOD GLUCOSE      POCT Blood Glucose.: 122 mg/dL (28 Sep 2023 06:04)  POCT Blood Glucose.: 147 mg/dL (27 Sep 2023 23:49)  POCT Blood Glucose.: 117 mg/dL (27 Sep 2023 17:45)  POCT Blood Glucose.: 117 mg/dL (27 Sep 2023 12:27)      PHYSICAL EXAM:    Constitutional:  ( x  ) NAD,   (  x )awake    HEENT: PERR, EOMI,    Neck: Soft and supple, No LAD, No JVD  Respiratory:  (   x Breath sounds are clear bilaterally,    (   ) wheezing, rales or rhonchi  Cardiovascular:     (  x )S1 and S2, regular rate and rhythm, no Murmurs, gallops or rubs  Gastrointestinal:  (  x )Bowel Sounds present, soft,   (  )nontender, nondistended,+ PEG    Extremities:    (  ) peripheral edema  Vascular: 2+ peripheral pulses  Neurological:    ( x   )A/O x 1   (  ) focal deficits  Musculoskeletal:    (   )  normal strength b/l upper  (     ) normal  lower extremities  Skin: No rashes    MEDICATIONS:  MEDICATIONS  (STANDING):  ascorbic acid 500 milliGRAM(s) Oral daily  ceFAZolin   IVPB 2000 milliGRAM(s) IV Intermittent every 8 hours  ferrous    sulfate Liquid 220 milliGRAM(s) Oral every 12 hours  magnesium hydroxide Suspension 5 milliLiter(s) Oral daily  mirtazapine 15 milliGRAM(s) Oral at bedtime  pantoprazole   Suspension 40 milliGRAM(s) Oral daily  potassium phosphate IVPB 30 milliMole(s) IV Intermittent once  silver sulfADIAZINE 1% Cream 1 Application(s) Topical every 12 hours  viokace 37842 units 1 Tablet(s) 1 Tablet(s) Enteral Tube once      LABS: All Labs Reviewed:                        9.0    9.17  )-----------( 392      ( 28 Sep 2023 06:07 )             28.8     09-28    137  |  102  |  17  ----------------------------<  127<H>  3.7   |  25  |  0.47<L>    Ca    7.6<L>      28 Sep 2023 06:42  Phos  1.4     09-28  Mg     1.90     09-28            Blood Culture: 09-25 @ 12:11  Organism --  Gram Stain Blood -- Gram Stain --  Specimen Source .Blood Blood-Peripheral  Culture-Blood --    09-25 @ 12:00  Organism --  Gram Stain Blood -- Gram Stain --  Specimen Source .Blood Blood-Venous  Culture-Blood --    09-23 @ 10:15  Organism --  Gram Stain Blood -- Gram Stain --  Specimen Source .Blood Blood-Peripheral  Culture-Blood --    09-23 @ 09:49  Organism --  Gram Stain Blood -- Gram Stain   Growth in anaerobic bottle: Gram Positive Cocci in Clusters  Specimen Source .Blood Blood-Venous  Culture-Blood --      Urine Culture      RADIOLOGY/EKG:    ASSESSMENT AND PLAN:  77 yo F PMH Alzheimer's dementia s/p PEG 8/2023, GE flap valve, grade B esophagitis, Multiple Myeloma (previously on Revlimid), cataracts, chronic venous ulcers b/l LE, OA knee, HTN, primary pulmonary hypertension, MDD sent from NH given hypotension documented at 87/56. Unable to obtain reliable history although per documentation patient has experienced cough, dysuria, and abdominal pain. Labs significant for anemia to 6.2 s/p PRBC without active bleeding and Na 124 likely iatrogenic as patient was on D5 1/2 NS and standing free water flushes at nursing home. Patient with fevers (T 100.3) and leukocytosis to 13.3 concerning for sepsis likely 2/2 urinary source - alternatively consider lower extremity wounds. Patient with hx hematoma following initial PEG placement, would f/u CT abdomen r/o expansion.      Problem/Plan - 1:  ·  Problem: Sepsis.   ·  Plan: -patient with fever to 100.3, leukocytosis to 13.3, hypotension 87/56-->92/56 concerning for sepsis 2/2 urinary source, less likely in setting of lower extremity wounds, lactic acid 2.4  -s/p vanc and zosyn in the ED, s/p 1L LR  -f/u BCx, UCx, UA turbid 100 protein moderate blood, large LE, WBC 2921 significant for UTI, per documentation patient has experienced dysuria  -CXR clear  -will order ceftriaxone and vancomycin  -monitor BP, f/u repeat lactic acid.    Problem/Plan - 2:  ·  Problem: Hyponatremia.   ·  Plan: -Na 124  -likely iatrogenic given patient was on D5 1/2 NS and free water flushes at facility  -will hold D5 NS and free water flushes  -f/u urine sodium urine osm, serum osm  -monitor BMP q8h.    Problem/Plan - 3:  ·  Problem: Anemia.   ·  Plan: -Hgb 6.6  -s/p 1u PRBC in ED, f/u repeat CBC, monitor CBC q8h  -f/u iron, ferritin, TIBC  -continue home iron  -monitor CBC, maintain active type and screen, transfuse for Hgb<7.0  -patient with hx hematoma following initial PEG placement, f/u CT A/P consider expansion.    Problem/Plan - 4:  ·  Problem: Alzheimers disease.   ·  Plan: -would obtain collateral on baseline mental status, attempted to call emergency contact tomeka mendozamail was full  -patient is alert, conversant, although A&Ox1 believes she is in [facility].    Problem/Plan - 5:  ·  Problem: Multiple myeloma.   ·  Plan: -patient previously on revlimid per documentation       Problem/Plan - 6:  ·  Problem: Rheumatoid arthritis.   ·  Plan: -patient with ulnar deviation of b/l UE digits, documented RA  -would obtain records.    Problem/Plan - 7:  ·  Problem: Chronic cutaneous venous stasis ulcer.   ·  Plan: -patient with b/l LE ulcers, in setting of chronic venous stasis  -f/u wound care eval.    Problem/Plan - 8:  ·  Problem: History of esophagitis.   ·  Plan: -patient with grade B esophagitis on EGD 8/2023, also with Hill grade 3 gastroesophageal flap, patient was pending ?outpatient w/u with EUS  -home omeprazole not on formulary will order pantoprazole daily.    Problem/Plan - 9:  ·  Problem: Need for prophylactic measure.   ·  Plan: -DVT ppx: patient with documented hx hematoma after initial PEG placement, SCD for now, will hold pharmacologic ppx  -Diet: f/u nutrition consult for tube feeds, patient on Jevity at facility.    -9/23/2023 ID and IR note and consult appreciated due to positive blood culture recommended to remove right upper port   her antibiotic was changed to Ancef grams every 8 hours as per ID recommendation  -9/24/2023 condition stable waiting for removal of right upper chest sided port  -9/25/2023 right-sided chest port was removed by interventional radiology condition stable continue Ancef 2 g every 8 hours  -9/26/2023 continue Ancef 2 g with Hours for the next 4 weeks patient needs PICC line/midline after negative blood culture echo result noted mild aortic stenosis and severe MR no other  invasivel intervention need to be done due to her general condition and terminal dementia  -9/27/2023 blood culture from 9/25 /2023 Negative Pl. PICC line in a.m. and discharge plan discussed with a ACP  Team discussed with ID about the plan  9/28/2023 D/C back to SNF   with midline  to complet IV antibiotic    DVT PPX:    ADVANCED DIRECTIVE:    DISPOSITION: CHIEF COMPLAINT:  patient condition remained stable discussed with the D/W ACP   team  discharge patient back to rehab/SNF for completion of IV antibiotic ID FU noted no need for INDIUM scan pt stale   SUBJECTIVE:     REVIEW OF SYSTEMS:    CONSTITUTIONAL: ( x )  weakness,  (  ) fevers or chills  EYES/ENT: (  )visual changes;     NECK: (  ) pain or stiffness  RESPIRATORY:   (  )cough, wheezing, hemoptysis;  (  ) shortness of breath  CARDIOVASCULAR:  (  )chest pain or palpitations  GASTROINTESTINAL:   (  )abdominal or epigastric pain.  (  ) nausea, vomiting, or hematemesis;   (   ) diarrhea or constipation.   GENITOURINARY:   (    ) dysuria, frequency or hematuria  NEUROLOGICAL:  (   ) numbness or weakness   All other review of systems is negative unless indicated above    Vital Signs Last 24 Hrs  T(C): 36.8 (28 Sep 2023 05:25), Max: 37.8 (27 Sep 2023 21:25)  T(F): 98.3 (28 Sep 2023 05:25), Max: 100 (27 Sep 2023 21:25)  HR: 89 (28 Sep 2023 05:25) (89 - 111)  BP: 118/73 (28 Sep 2023 05:25) (118/73 - 132/86)  BP(mean): --  RR: 18 (28 Sep 2023 05:25) (17 - 18)  SpO2: 98% (28 Sep 2023 05:25) (98% - 100%)    Parameters below as of 28 Sep 2023 05:25  Patient On (Oxygen Delivery Method): room air        I&O's Summary      CAPILLARY BLOOD GLUCOSE      POCT Blood Glucose.: 122 mg/dL (28 Sep 2023 06:04)  POCT Blood Glucose.: 147 mg/dL (27 Sep 2023 23:49)  POCT Blood Glucose.: 117 mg/dL (27 Sep 2023 17:45)  POCT Blood Glucose.: 117 mg/dL (27 Sep 2023 12:27)      PHYSICAL EXAM:    Constitutional:  ( x  ) NAD,   (  x )awake    HEENT: PERR, EOMI,    Neck: Soft and supple, No LAD, No JVD  Respiratory:  (   x Breath sounds are clear bilaterally,    (   ) wheezing, rales or rhonchi  Cardiovascular:     (  x )S1 and S2, regular rate and rhythm, no Murmurs, gallops or rubs  Gastrointestinal:  (  x )Bowel Sounds present, soft,   (  )nontender, nondistended,+ PEG    Extremities:    (  ) peripheral edema  Vascular: 2+ peripheral pulses  Neurological:    ( x   )A/O x 1   (  ) focal deficits  Musculoskeletal:    (   )  normal strength b/l upper  (     ) normal  lower extremities  Skin: No rashes    MEDICATIONS:  MEDICATIONS  (STANDING):  ascorbic acid 500 milliGRAM(s) Oral daily  ceFAZolin   IVPB 2000 milliGRAM(s) IV Intermittent every 8 hours  ferrous    sulfate Liquid 220 milliGRAM(s) Oral every 12 hours  magnesium hydroxide Suspension 5 milliLiter(s) Oral daily  mirtazapine 15 milliGRAM(s) Oral at bedtime  pantoprazole   Suspension 40 milliGRAM(s) Oral daily  potassium phosphate IVPB 30 milliMole(s) IV Intermittent once  silver sulfADIAZINE 1% Cream 1 Application(s) Topical every 12 hours  viokace 38529 units 1 Tablet(s) 1 Tablet(s) Enteral Tube once      LABS: All Labs Reviewed:                        9.0    9.17  )-----------( 392      ( 28 Sep 2023 06:07 )             28.8     09-28    137  |  102  |  17  ----------------------------<  127<H>  3.7   |  25  |  0.47<L>    Ca    7.6<L>      28 Sep 2023 06:42  Phos  1.4     09-28  Mg     1.90     09-28            Blood Culture: 09-25 @ 12:11  Organism --  Gram Stain Blood -- Gram Stain --  Specimen Source .Blood Blood-Peripheral  Culture-Blood --    09-25 @ 12:00  Organism --  Gram Stain Blood -- Gram Stain --  Specimen Source .Blood Blood-Venous  Culture-Blood --    09-23 @ 10:15  Organism --  Gram Stain Blood -- Gram Stain --  Specimen Source .Blood Blood-Peripheral  Culture-Blood --    09-23 @ 09:49  Organism --  Gram Stain Blood -- Gram Stain   Growth in anaerobic bottle: Gram Positive Cocci in Clusters  Specimen Source .Blood Blood-Venous  Culture-Blood --      Urine Culture      RADIOLOGY/EKG:    ASSESSMENT AND PLAN:  79 yo F PMH Alzheimer's dementia s/p PEG 8/2023, GE flap valve, grade B esophagitis, Multiple Myeloma (previously on Revlimid), cataracts, chronic venous ulcers b/l LE, OA knee, HTN, primary pulmonary hypertension, MDD sent from NH given hypotension documented at 87/56. Unable to obtain reliable history although per documentation patient has experienced cough, dysuria, and abdominal pain. Labs significant for anemia to 6.2 s/p PRBC without active bleeding and Na 124 likely iatrogenic as patient was on D5 1/2 NS and standing free water flushes at nursing home. Patient with fevers (T 100.3) and leukocytosis to 13.3 concerning for sepsis likely 2/2 urinary source - alternatively consider lower extremity wounds. Patient with hx hematoma following initial PEG placement, would f/u CT abdomen r/o expansion.      Problem/Plan - 1:  ·  Problem: Sepsis.   ·  Plan: -patient with fever to 100.3, leukocytosis to 13.3, hypotension 87/56-->92/56 concerning for sepsis 2/2 urinary source, less likely in setting of lower extremity wounds, lactic acid 2.4  -s/p vanc and zosyn in the ED, s/p 1L LR  -f/u BCx, UCx, UA turbid 100 protein moderate blood, large LE, WBC 2921 significant for UTI, per documentation patient has experienced dysuria  -CXR clear  -will order ceftriaxone and vancomycin  -monitor BP, f/u repeat lactic acid.    Problem/Plan - 2:  ·  Problem: Hyponatremia.   ·  Plan: -Na 124  -likely iatrogenic given patient was on D5 1/2 NS and free water flushes at facility  -will hold D5 NS and free water flushes  -f/u urine sodium urine osm, serum osm  -monitor BMP q8h.    Problem/Plan - 3:  ·  Problem: Anemia.   ·  Plan: -Hgb 6.6  -s/p 1u PRBC in ED, f/u repeat CBC, monitor CBC q8h  -f/u iron, ferritin, TIBC  -continue home iron  -monitor CBC, maintain active type and screen, transfuse for Hgb<7.0  -patient with hx hematoma following initial PEG placement, f/u CT A/P consider expansion.    Problem/Plan - 4:  ·  Problem: Alzheimers disease.   ·  Plan: -would obtain collateral on baseline mental status, attempted to call emergency contact tomeka mendozamail was full  -patient is alert, conversant, although A&Ox1 believes she is in [facility].    Problem/Plan - 5:  ·  Problem: Multiple myeloma.   ·  Plan: -patient previously on revlimid per documentation       Problem/Plan - 6:  ·  Problem: Rheumatoid arthritis.   ·  Plan: -patient with ulnar deviation of b/l UE digits, documented RA  -would obtain records.    Problem/Plan - 7:  ·  Problem: Chronic cutaneous venous stasis ulcer.   ·  Plan: -patient with b/l LE ulcers, in setting of chronic venous stasis  -f/u wound care eval.    Problem/Plan - 8:  ·  Problem: History of esophagitis.   ·  Plan: -patient with grade B esophagitis on EGD 8/2023, also with Hill grade 3 gastroesophageal flap, patient was pending ?outpatient w/u with EUS  -home omeprazole not on formulary will order pantoprazole daily.    Problem/Plan - 9:  ·  Problem: Need for prophylactic measure.   ·  Plan: -DVT ppx: patient with documented hx hematoma after initial PEG placement, SCD for now, will hold pharmacologic ppx  -Diet: f/u nutrition consult for tube feeds, patient on Jevity at facility.    -9/23/2023 ID and IR note and consult appreciated due to positive blood culture recommended to remove right upper port   her antibiotic was changed to Ancef grams every 8 hours as per ID recommendation  -9/24/2023 condition stable waiting for removal of right upper chest sided port  -9/25/2023 right-sided chest port was removed by interventional radiology condition stable continue Ancef 2 g every 8 hours  -9/26/2023 continue Ancef 2 g with Hours for the next 4 weeks patient needs PICC line/midline after negative blood culture echo result noted mild aortic stenosis and severe MR no other  invasivel intervention need to be done due to her general condition and terminal dementia  -9/27/2023 blood culture from 9/25 /2023 Negative Pl. PICC line in a.m. and discharge plan discussed with a ACP  Team discussed with ID about the plan  9/28/2023 D/C back to SNF   with midline  to complet IV antibiotic   ID FU  on 9/27/23noted no need for INDIUM scan pt stale can be done as out pt inf needed   DVT PPX:    ADVANCED DIRECTIVE:    DISPOSITION:

## 2023-09-28 NOTE — ADVANCED PRACTICE NURSE CONSULT - RECOMMEDATIONS
Topical recommendations:   ---Coccyx towards right buttock and bilateral lower medial legs: Cleanse with NS, pat dry. Apply Liquid barrier film to periwound skin (allow to dry). Apply Medihoney gel to base of wound, cover with silicone foam with border. Change daily.   ---Sacrum and left ischium- Cover with silicone foam with border, monitor for tissue type changes. Change every other day or PRN if soiled.   ---Perineum: Cleanse with skin cleanser, pat dry. Apply Moe moisture barrier cream twice a day and PRN with incontinent episodes.   ---Right lateral midfoot and right lateral malleolus: Apply LBF, place gauze, wrap with kerlix, monitor for tissue type changes. Change every other day.   ---Left inner thigh- Cover with silicone foam with border, Change daily.   ---Areas of dry flaky skin to BLE: apply sween24 moisturizer daily, avoid in between the toes.   ---PEG: Cleanse q shift with NS, apply liquid barrier film beneath carlos disc.  If redness noted under carlos disc bumper apply thin foam  dressing without border (Mepilex Lite)- cut to mid dressing with a 'Y' shape. Secondary securement to abdomen taping in 'H' fashion with Steri-strips.   ---Continue low air loss bed therapy, continue heel elevation ,continue to turn & reposition per protocol, soft pillow between bony prominences, continue moisture management with barrier creams & single breathable pad, continue measures to decrease friction/shear/pressure. Continue with nutritional support as per dietary/orders.     Plan discussed with ROSAL[facility] [place] provider.   Please contact Wound/Ostomy Care Service Line if we can be of further assistance (ext [phone]). Please reconsult if changes to tissue type noted.

## 2023-09-28 NOTE — ADVANCED PRACTICE NURSE CONSULT - REASON FOR CONSULT
Patient seen on skin care rounds after wound care referral received for reassessment of skin impairment including a new left thigh wound. Patient known to [facility] service line last seen on Sept 22nd. Chart reviewed: WBC 9.17, H/H 9.0/28.8, platelets 392,  Serum albumin 2.0, Irvin 14. Chart review since previous assessment- Rpt cx 9/23 staph areus, s/p chest wall port removal by IR 9/25, PEG tube not functioning 9/25- GI cs emailed- declogged by GI 9/26- TF resumed, patient with persistent anemia s/p 2u PRBCs. Patient continues to need 4 weeks of IV anbx pending PICC placement being followed by Infectious disease.

## 2023-09-28 NOTE — CHART NOTE - NSCHARTNOTEFT_GEN_A_CORE
Source: Patient [ ]    Family [ ]     other (RN, chart review) [X]    Medical Course: Patient is a 78y Female with PMH Alzheimer's dementia status post PEG 8/2023, GE flap valve, grade B esophagitis, Multiple Myeloma (previously on Revlimid), cataracts, chronic venous ulcers b/l LE, OA knee, HTN, primary pulmonary hypertension, MDD sent from NH given hypotension documented at 87/56. Unable to obtain reliable history although per documentation patient has experienced cough, dysuria, and abdominal pain. Labs significant for anemia to 6.2 status post PRBC without active bleeding and Na 124 likely iatrogenic as patient was on D5 1/2 NS and standing free water flushes at nursing home. Patient with fevers (T 100.3) and leukocytosis to 13.3 concerning for sepsis likely 2/2 urinary source - alternatively consider lower extremity wounds. Patient with history of hematoma following initial PEG placement.    Nutrition Course:   - Patient is PEG-dependent. Ordered for a PO diet and enteral nutrition via PEG concurrently.  - As per initial RD assessment 9/23, patient was ordered for pleasure feeds, ground texture prior to admission while at NH. Patient is currently ordered for a regular diet during admission. As per verbal discussion with RN, patient is not currently eating by mouth, 0% PO intake of meals during course of admission.  - As per verbal discussion with RN, patient is tolerating tube feeding regimen. No report of GI distress (nausea, vomiting, diarrhea, constipation).   - Writer observed enteral nutrition formula hanging at bedside. Patient is currently receiving tube feeding via PEG, order initiated 9/26/23. Tube feeding currently running at 50 mL/hr x20 hours.  Writer checked TF pump history x48 hours; patient received 632 mL formula. Compared to order x48 hours, patient received 32% of needs.     Diet : Diet, Regular:   Minced and Moist (MINCEDMOIST)  Tube Feeding Modality: Gastrostomy  Jevity 1.5 Vignesh (JEVITY1.5RTH)  Total Volume for 24 Hours (mL): 1000  Continuous  Starting Tube Feed Rate {mL per Hour}: 20  Increase Tube Feed Rate by (mL): 10     Every 4 hours  Until Goal Tube Feed Rate (mL per Hour): 50  Tube Feed Duration (in Hours): 20  Tube Feed Start Time: 17:30 (09-26-23 @ 17:04)    Current Weight: Weight (kg): 47 (09-23 @ 11:42)  % Weight Change: No weight changes since previous assessment.     Pertinent Medications: MEDICATIONS  (STANDING):  ascorbic acid 500 milliGRAM(s) Oral daily  ceFAZolin   IVPB 2000 milliGRAM(s) IV Intermittent every 8 hours  ferrous    sulfate Liquid 220 milliGRAM(s) Oral every 12 hours  magnesium hydroxide Suspension 5 milliLiter(s) Oral daily  mirtazapine 15 milliGRAM(s) Oral at bedtime  pantoprazole   Suspension 40 milliGRAM(s) Oral daily  silver sulfADIAZINE 1% Cream 1 Application(s) Topical every 12 hours  viokace [id number] units 1 Tablet(s) 1 Tablet(s) Enteral Tube once    MEDICATIONS  (PRN):  acetaminophen     Tablet .. 650 milliGRAM(s) Oral every 6 hours PRN Temp greater or equal to 38C (100.4F), Mild Pain (1 - 3)    Pertinent Labs:  09-28 Na137 mmol/L Glu 127 mg/dL<H> K+ 3.7 mmol/L Cr  0.47 mg/dL<L> BUN 17 mg/dL 09-28 Phos 1.4 mg/dL<L> 09-21 Alb 2.0 g/dL<L>    Skin: Venous ulcer (Right medial lower leg + left medial lower leg), suspected DTI (coccyx + Right lateral medial foot + left thigh) per RN flowsheet    Fluid: Edema 3+ (right arm, right wrist, right hand, right foot) per RN flowsheet    GI: Last BM 9/25/23 per RN flowsheet documentation.     Estimated Needs:   [X] no change since previous assessment  Weight used: ideal body weight 130lb/58.9kg  Estimated energy needs: 1472-1767kcal (based on 25-30kcal/kg)  Estimated protein needs: 70.68-82.46gms (based on 1.2-1.4gms/kg)    Previous Nutrition Diagnosis: Severe malnutrition    Nutrition Diagnosis is [X] ongoing    New Nutrition Diagnosis: Not applicable    Education: [X] Not warranted at present secondary to patient disoriented as per RN flowsheet, confused upon writer's encounter    Nutrition Recommendations  - Recommend SLP evaluation to optimize diet if plan to continue provision of PO diet, follow recommendations  - Continue current enteral nutrition order: Jevity 1.5 Vignesh via PEG @ 50mL/hr x20hrs (provides 1000mL formula, 1500kcal, 63.8gms protein, 760mL free water)  - Continue ascorbic acid, ferrous sulfate supplementation at medical team's discretion  - Monitor weights, labs, BM's, skin integrity, p.o. intake., tolerance to enteral nutrition    Monitoring and Evaluation:   [X] PO intake [X] Enteral nutrition [X] Tolerance to diet prescription [X] Weights [X] Follow-up per protocol    Donna Anaya MS RDN CDN  On [employer] Teams, Pager #[phone]

## 2023-09-28 NOTE — ADVANCED PRACTICE NURSE CONSULT - ASSESSMENT
General: Patient oriented to self, cachectic with muscle wasting, thin, fragile. Contracted lower extremities to chest, rigid unable to straighten, Carey neck deformity to fingers, LUQ PEG with small amount of dried serous drainage- secondary securement applied - reinforced with primary RN at bedside. Patient with pain upon movement. Ecchymosis to bilateral upper arms. Area of blanching erythema to left ischium reactive hyperemia?  Prominent bony prominences.    Coccyx towards right lower back- Unstageable pressure injury measuring 5.3jcl2kiz9.3cm (previously assessed as an evolving DTPI measuring 5sti0ppz5.2cm) , exposing 90% tan, yellow firmly attached slough and 10% pink moist dermis at edges, small serosanguinous drainage, no odor. Periwound with blanchable erythema extending 2.5cm circumferentially, no increased warmth, no edema, no induration, no fluctuance no signs or symptoms of overt skin infection.   Distally at 6 o clock at [place] there is a DTPI measuring without change from previous assessment.   Goals of care: offload pressure, protect from friction and shear, monitor for tissue type changes.     Bilateral lower extremities with hemosiderin staining, Dry, flaky skin. Thickened-yellow hyperpigmented overgrown toenails. Multiple wounds present. Increased coolness from midfoot to distal toes. Capillary refill <3 seconds. +1 palpable DP/PT pulses.     Left inner thigh- Deep tissue pressure injury from Peg tubing entire area measuring 8dvl4mhi8rh including area of non blanching erythema at edges, area of purple maroon discoloration measuring 4cmx1.2qcb5bh, palpable tissue  type changes. Satellite lesion 1.5cm away at 6oclock with an area of nonblanching erythema and a pinpoint area of purple maroon discoloration to center. No drainage. No induration, no increased warmth, no edema. Periwound skin otherwise intact. Goals of care: Monitor for tissue type changes, continue use of secondary securement to Peg.     Right lateral midfoot - DTPI, complicated by arterial insufficiency with an area of purple maroon discoloration measuring 0.5cmx0.8cae2xc(no change from previous assessment. Periwound with 1cm blanchable erythema circumferentially, no drainage, no odor, no s/s of acute skin infection. Goals of care: offload pressure, protect from friction and shear, monitor for tissue type changes.     Right lateral malleolus Deep tissue pressure injury complicated by arterial insufficiency measuring 0.8xvr4uql8lt exposing 100% purple maroon discoloration. No tissue type changes. No induration, no erythema, no increased warmth. Goals of care: Monitor for tissue type changes, continue to offload.      BLE with venous ulcers:  Right lower medial leg- 4nkl4hij7.2cm (same as previous assessment)  Left lower medial leg- 2.2mcm3puk9.3cm (previously 5lja6dsq2.2cm)   Both ulcers with 100% dark red friable dermis, small serosanguinous drainage, no odor. Periwound with hyperpigmentation, no erythema, no increased warmth, no edema, no induration, no fluctuance no signs or symptoms of overt skin infection. Goals of care: contain small drainage, protect periwound skin, monitor for tissue type changes.     Patient continues to be at high risk for skin breakdown secondary to multiple myeloma, contracted with immobility, functional quadriplegia, cachexia, anemia s/p 2u PRBCs, sepsis with leukocytosis/fever on IV anbx with hypotension and tachycardia. On assessment patient on a low air loss surface, heel offloading boots in place, Z-flow positioning pillow utilized, moisture management in place with use of one incontinence pad. Turning and positioning Q2hrs.

## 2023-09-28 NOTE — ADVANCED PRACTICE NURSE CONSULT - ASSESSMENT
Patient is A&Ox1. Midline insertion along with risks, benefits, possible complications and infection prevention explained to Lindsey on 9/28/23 @ [phone] who verbalized understanding. All questions addressed and Lindsey gave verbal consent to place midline. Right arm cleansed with CHG. Using sterile technique under ultra sound guidance, placed PowerGlide Pro Midline 20G /10cm into Right Basilic vein. Brisk blood return and flushed with 20Mls of normal saline. Minimal blood loss and patient tolerated procedure well. CHG dressing placed. All sharps accounted for. Report given to district RN and ordering provider. LOT#:[id number] , REF#: [id number] Patient is A&Ox1. Midline insertion along with risks, benefits, possible complications and infection prevention explained to Lindsey on 9/28/23 @ [phone] who verbalized understanding. All questions addressed and Lindsey gave verbal consent to place midline. Right arm cleansed with CHG. Using sterile technique under ultra sound guidance, placed PowerGlide Pro Midline 20G /10cm into Right Basilic vein. Brisk blood return and flushed with 20Mls of normal saline. Minimal blood loss and patient tolerated procedure well. CHG dressing placed. All sharps accounted for. Report given to district RN and ordering provider. LOT#:[id number] , REF#: [id number]      Right arm swollen below insertion site prior to insertion.

## 2023-09-29 LAB
ALBUMIN SERPL ELPH-MCNC: 2.1 G/DL — LOW (ref 3.3–5)
ALP SERPL-CCNC: 188 U/L — HIGH (ref 40–120)
ALT FLD-CCNC: 14 U/L — SIGNIFICANT CHANGE UP (ref 4–33)
ANION GAP SERPL CALC-SCNC: 13 MMOL/L — SIGNIFICANT CHANGE UP (ref 7–14)
AST SERPL-CCNC: 57 U/L — HIGH (ref 4–32)
BILIRUB SERPL-MCNC: 0.5 MG/DL — SIGNIFICANT CHANGE UP (ref 0.2–1.2)
BUN SERPL-MCNC: 15 MG/DL — SIGNIFICANT CHANGE UP (ref 7–23)
CA-I BLD-SCNC: 1.03 MMOL/L — LOW (ref 1.15–1.29)
CALCIUM SERPL-MCNC: 7.5 MG/DL — LOW (ref 8.4–10.5)
CHLORIDE SERPL-SCNC: 99 MMOL/L — SIGNIFICANT CHANGE UP (ref 98–107)
CO2 SERPL-SCNC: 24 MMOL/L — SIGNIFICANT CHANGE UP (ref 22–31)
CREAT SERPL-MCNC: 0.42 MG/DL — LOW (ref 0.5–1.3)
EGFR: 101 ML/MIN/1.73M2 — SIGNIFICANT CHANGE UP
GLUCOSE BLDC GLUCOMTR-MCNC: 100 MG/DL — HIGH (ref 70–99)
GLUCOSE BLDC GLUCOMTR-MCNC: 107 MG/DL — HIGH (ref 70–99)
GLUCOSE BLDC GLUCOMTR-MCNC: 116 MG/DL — HIGH (ref 70–99)
GLUCOSE SERPL-MCNC: 123 MG/DL — HIGH (ref 70–99)
HCT VFR BLD CALC: 27.8 % — LOW (ref 34.5–45)
HGB BLD-MCNC: 8.8 G/DL — LOW (ref 11.5–15.5)
MAGNESIUM SERPL-MCNC: 2 MG/DL — SIGNIFICANT CHANGE UP (ref 1.6–2.6)
MCHC RBC-ENTMCNC: 22.9 PG — LOW (ref 27–34)
MCHC RBC-ENTMCNC: 31.7 GM/DL — LOW (ref 32–36)
MCV RBC AUTO: 72.2 FL — LOW (ref 80–100)
NRBC # BLD: 0 /100 WBCS — SIGNIFICANT CHANGE UP (ref 0–0)
NRBC # FLD: 0 K/UL — SIGNIFICANT CHANGE UP (ref 0–0)
PHOSPHATE SERPL-MCNC: 1.9 MG/DL — LOW (ref 2.5–4.5)
PLATELET # BLD AUTO: 366 K/UL — SIGNIFICANT CHANGE UP (ref 150–400)
POTASSIUM SERPL-MCNC: 4.1 MMOL/L — SIGNIFICANT CHANGE UP (ref 3.5–5.3)
POTASSIUM SERPL-SCNC: 4.1 MMOL/L — SIGNIFICANT CHANGE UP (ref 3.5–5.3)
PROT SERPL-MCNC: 5.3 G/DL — LOW (ref 6–8.3)
RBC # BLD: 3.85 M/UL — SIGNIFICANT CHANGE UP (ref 3.8–5.2)
RBC # FLD: 27.7 % — HIGH (ref 10.3–14.5)
SODIUM SERPL-SCNC: 136 MMOL/L — SIGNIFICANT CHANGE UP (ref 135–145)
WBC # BLD: 10.38 K/UL — SIGNIFICANT CHANGE UP (ref 3.8–10.5)
WBC # FLD AUTO: 10.38 K/UL — SIGNIFICANT CHANGE UP (ref 3.8–10.5)

## 2023-09-29 PROCEDURE — 99232 SBSQ HOSP IP/OBS MODERATE 35: CPT

## 2023-09-29 RX ORDER — ASCORBIC ACID 60 MG
1 TABLET,CHEWABLE ORAL
Qty: 0 | Refills: 0 | DISCHARGE
Start: 2023-09-29

## 2023-09-29 RX ORDER — CEFAZOLIN SODIUM 1 G
2 VIAL (EA) INJECTION
Qty: 0 | Refills: 0 | DISCHARGE
Start: 2023-09-29

## 2023-09-29 RX ORDER — MAGNESIUM HYDROXIDE 400 MG/1
0 TABLET, CHEWABLE ORAL
Refills: 0 | DISCHARGE

## 2023-09-29 RX ORDER — POTASSIUM PHOSPHATE, MONOBASIC POTASSIUM PHOSPHATE, DIBASIC 236; 224 MG/ML; MG/ML
30 INJECTION, SOLUTION INTRAVENOUS ONCE
Refills: 0 | Status: COMPLETED | OUTPATIENT
Start: 2023-09-29 | End: 2023-09-29

## 2023-09-29 RX ORDER — CARVEDILOL PHOSPHATE 80 MG/1
1 CAPSULE, EXTENDED RELEASE ORAL
Qty: 0 | Refills: 0 | DISCHARGE

## 2023-09-29 RX ORDER — MICONAZOLE NITRATE 2 %
1 CREAM (GRAM) TOPICAL
Refills: 0 | DISCHARGE

## 2023-09-29 RX ORDER — CEFTRIAXONE 500 MG/1
1 INJECTION, POWDER, FOR SOLUTION INTRAMUSCULAR; INTRAVENOUS
Refills: 0 | DISCHARGE

## 2023-09-29 RX ORDER — MAGNESIUM HYDROXIDE 400 MG/1
5 TABLET, CHEWABLE ORAL
Qty: 0 | Refills: 0 | DISCHARGE
Start: 2023-09-29

## 2023-09-29 RX ADMIN — Medication 100 MILLIGRAM(S): at 06:09

## 2023-09-29 RX ADMIN — Medication 220 MILLIGRAM(S): at 18:38

## 2023-09-29 RX ADMIN — PANTOPRAZOLE SODIUM 40 MILLIGRAM(S): 20 TABLET, DELAYED RELEASE ORAL at 11:15

## 2023-09-29 RX ADMIN — POTASSIUM PHOSPHATE, MONOBASIC POTASSIUM PHOSPHATE, DIBASIC 83.33 MILLIMOLE(S): 236; 224 INJECTION, SOLUTION INTRAVENOUS at 10:11

## 2023-09-29 RX ADMIN — Medication 220 MILLIGRAM(S): at 06:08

## 2023-09-29 RX ADMIN — Medication 1 APPLICATION(S): at 06:09

## 2023-09-29 RX ADMIN — Medication 100 MILLIGRAM(S): at 14:16

## 2023-09-29 RX ADMIN — MIRTAZAPINE 15 MILLIGRAM(S): 45 TABLET, ORALLY DISINTEGRATING ORAL at 21:25

## 2023-09-29 RX ADMIN — Medication 100 MILLIGRAM(S): at 21:25

## 2023-09-29 RX ADMIN — MAGNESIUM HYDROXIDE 5 MILLILITER(S): 400 TABLET, CHEWABLE ORAL at 11:17

## 2023-09-29 RX ADMIN — Medication 500 MILLIGRAM(S): at 11:16

## 2023-09-29 RX ADMIN — Medication 1 APPLICATION(S): at 17:27

## 2023-09-29 NOTE — DISCHARGE NOTE NURSING/CASE MANAGEMENT/SOCIAL WORK - PATIENT PORTAL LINK FT
You can access the FollowMyHealth Patient Portal offered by [facility] by registering at the following website: [email or web address]. By joining [facility]’s FollowMyHealth portal, you will also be able to view your health information using other applications (apps) compatible with our system.

## 2023-09-29 NOTE — CHART NOTE - NSCHARTNOTEFT_GEN_A_CORE
Patient discharge cancelled at time of pickup vitals taken for transport and Temp noted to be 100.3 orally. Patient's  RUE swollen from midline site down to wrist, +erythema, +induration at midline site. D/w Dr. Lui Infectious Disease and Dr. Vasques made aware. Midline removed without complication. VA duplex RUE ordered to r/o DVT, repeat Bcx ordered, Indium scan ordered as per d/w ID and tylenol given, will continue to monitor. Patient discharge cancelled at time of pickup vitals taken for transport and Temp noted to be 100.3 orally. Patient's  RUE swollen from midline site down to wrist, +erythema, +induration at midline site. D/w Dr. Lui Infectious Disease and Dr. Vasques made aware. Midline removed without complication. VA duplex RUE ordered to r/o DVT, repeat Bcx ordered, Indium scan ordered as per d/w ID and tylenol given, will continue to monitor.  Medical attending  Patient was seen at 9 AM regarding her safe discharge planning at the time of visit there were  minimal erythema at the site of midline discussed with the nursing and also ACP to monitor the patient before discharge sent back to nursing home.  Back later I was called that the area is more swollen and tender and patient was febrile but discussed with ID will remove the midline and do blood culture indium scan and continue IV antibiotic through the peripheral line

## 2023-09-29 NOTE — DISCHARGE NOTE NURSING/CASE MANAGEMENT/SOCIAL WORK - NSDCCRNAME_GEN_ALL_CORE_FT
[facility] [address] [facility] [address]  Transportation [facility] [phone] trip #180A @ [facility]  [facility] [address] [facility] [address]  Transportation [facility] [phone] trip #199A @ [facility]

## 2023-09-29 NOTE — PROGRESS NOTE ADULT - SUBJECTIVE AND OBJECTIVE BOX
Follow Up:      Interval History/ROS:   planning d/c today  cancelled due to low grade fever   right arm swelling         Allergies  No Known Allergies        ANTIMICROBIALS:  ceFAZolin   IVPB 2000 every 8 hours    MEDICATIONS  (STANDING):  acetaminophen   Oral Liquid .. 650 every 6 hours PRN  magnesium hydroxide Suspension 5 daily  mirtazapine 15 at bedtime  pantoprazole   Suspension 40 daily      Vital Signs Last 24 Hrs  T(F): 100.5 (09-29-23 @ 17:03), Max: 100.5 (09-29-23 @ 17:03)  HR: 110 (09-29-23 @ 17:03)  BP: 120/69 (09-29-23 @ 17:03)  RR: 16 (09-29-23 @ 17:03)  SpO2: 100% (09-29-23 @ 17:03) (98% - 100%)    PHYSICAL EXAM:  Constitutional: Not in acute distress, frail, verbal   Eyes: No icterus.  Oral cavity: Clear, no lesions  RS: no respiratory distress RA  CVS: dressing right chest (prior port site)  Extremities: arthritic changes    right upper arm swelling warm near elbow                            8.8    10.38 )-----------( 366      ( 29 Sep 2023 05:45 )             27.8 09-29    136  |  99  |  15  ----------------------------<  123  4.1   |  24  |  0.42  Ca    7.5      29 Sep 2023 05:45Phos  1.9     09-29Mg     2.00     09-29  TPro  5.3  /  Alb  2.1  /  TBili  0.5  /  DBili  x   /  AST  57  /  ALT  14  /  AlkPhos  188  09-29          MICROBIOLOGY:    .Blood Blood-Peripheral  09-25-23   No growth at 4 days  --  --      .Blood Blood-Venous  09-25-23   No growth at 4 days   --  --      .Blood Blood-Peripheral  09-23-23   No growth at 5 days   --  --      .Blood Blood-Venous  09-23-23   Growth in anaerobic bottle: Staphylococcus aureus  See previous culture [id number]  --    Growth in anaerobic bottle: Gram Positive Cocci in Clusters      Clean Catch Clean Catch (Midstream)  09-21-23   >100,000 CFU/ml Staphylococcus aureus  --  Staphylococcus aureus      .Blood Blood-Peripheral  09-21-23   Growth in aerobic bottle: Staphylococcus aureus  Growth in anaerobic bottle: Staphylococcus capitis  Coagulase Negative Staphylococci isolated from a single blood culture set  may represent contamination.  Contact the Microbiology Department at [phone] if susceptibility  testing is clinically indicated.  Direct identification is available within approximately 3-5  hours either by Blood Panel Multiplexed PCR or Direct  MALDI-TOF. Details: [email or web address]  --  Blood Culture PCR  Staphylococcus aureus      .Blood Blood-Peripheral  09-21-23   Growth in aerobic and anaerobic bottles: Staphylococcus aureus  See previous culture [id number]  --    Growth in aerobic bottle: Gram Positive Cocci in Clusters  Growth in anaerobic bottle: Gram Positive Cocci in Clusters        RADIOLOGY:    ra< from: Xray Chest 1 View- PORTABLE-Urgent (Xray Chest 1 View- PORTABLE-Urgent .) (09.23.23 @ 14:29) >  IMPRESSION:    No radiographic evidence of acute cardiopulmonary disease.    --- End of Report ---          RICK CHANDLER DO; Resident Radiologist  This document has been electronically signed.  WALTER HALE MD; Attending Radiologist  This document has been electronically signed. Sep 24 20    < end of copied text >  < from: CT Abdomen and Pelvis No Cont (09.23.23 @ 08:57) >    IMPRESSION:  No abdominal wall hematoma or subcutaneous soft tissue swelling at the   PEG tube insertion site.    No hematoma elsewhere in the abdomen or pelvis.    Ascending and descending thoracic aortic dilation.        --- End of Report ---    < end of copied text >      < from: TTE W or WO Ultrasound Enhancing Agent (09.26.23 @ 12:08) >    TRANSTHORACIC ECHOCARDIOGRAM REPORT  ________________________________________________________________________________                                      _______       Pt. Name:       MAXIMILIANO ESTEVEZ Study Date:    9/26/2023  MRN:            [id number]     YOB: 1945  Accession #:    [id number]     Age:           78 years  Account#:       [id number]      Gender:        F  Heart Rate:                   Height:        67.00 in (170.18 cm)  Rhythm:                       Weight:        103.00 lb (46.72 kg)  Blood Pressure: 140/75 mmHg   BSA/BMI:       1.53 m² / 16.13 kg/m²  ________________________________________________________________________________________  Referring Physician:    [id number] Linda Vasques  Interpreting Physician: Chelsea Valentino  Primary Sonographer:    Clementine Delaney RDCS    CPT:               ECHO TTE WO CON COMP W DOPP - 06169.m  Indication(s):     Abnormal electrocardiogram ECG/EKG - R94.31  Procedure:         Transthoracic echocardiogram with 2-D, M-mode and complete                     spectral and color flow Doppler.  Ordering Location: [facility]  Admission Status:  Inpatient  Study Information: Image quality for this study is good.    _______________________________________________________________________________________     CONCLUSIONS:      1. The left ventricular cavity is normal size. Left ventricular wall thickness is normal. Left ventricular systolic function is normal with an ejection fraction of 67 % by Millan's method of disks.   2.Right ventricular cavity is normal in size, normal wall thickness and normal systolic function.   3. The aortic valve is tricuspid with normal structure without stenosis with reduced systolic excursion. There is calcification of the aortic valve leaflets. There is moderate aortic stenosis. The peak transaortic velocity is 1.98 m/s, peak transaortic gradient is 15.7 mmHg and mean transaortic gradient is 8.0 mmHg with an LVOT/aortic valve VTI ratio of 0.44. The aortic valve area is estimated at 1.38 cm² by the continuity equation. There is mild aortic regurgitation.   4. Structurally normal mitral valve with normal leaflet excursion. There is calcification of the mitral valve annulus. There is moderate to severe mitral regurgitation.   5. Estimated pulmonary artery systolic pressure is 48 mmHg.   6. No pericardial effusion seen.    _________________________________________________    < end of copied text >  < from: TTE W or WO Ultrasound Enhancing Agent (09.26.23 @ 12:08) >    TRANSTHORACIC ECHOCARDIOGRAM REPORT  ________________________________________________________________________________                                      _______       Pt. Name:       MAXIMILIANO ESTEVEZ Study Date:    9/26/2023  MRN:            [id number]     YOB: 1945  Accession #:    [id number]     Age:           78 years  Account#:       [id number]      Gender:        F  Heart Rate:                   Height:        67.00 in (170.18 cm)  Rhythm:                       Weight:        103.00 lb (46.72 kg)  Blood Pressure: 140/75 mmHg   BSA/BMI:       1.53 m² / 16.13 kg/m²  ________________________________________________________________________________________  Referring Physician:    [id number] Linda Vasques  Interpreting Physician: Chelsea Valentino  Primary Sonographer:    Clementine Delaney [facility]    CPT:               ECHO TTE WO CON COMP W DOPP - 99872.m  Indication(s):     Abnormal electrocardiogram ECG/EKG - R94.31  Procedure:         Transthoracic echocardiogram with 2-D, M-mode and complete                     spectral and color flow Doppler.  Ordering Location: [facility]  Admission Status:  Inpatient  Study Information: Image quality for this study is good.    _______________________________________________________________________________________     CONCLUSIONS:      1. The left ventricular cavity is normal size. Left ventricular wall thickness is normal. Left ventricular systolic function is normal with an ejection fraction of 67 % by Millan's method of disks.   2.Right ventricular cavity is normal in size, normal wall thickness and normal systolic function.   3. The aortic valve is tricuspid with normal structure without stenosis with reduced systolic excursion. There is calcification of the aortic valve leaflets. There is moderate aortic stenosis. The peak transaortic velocity is 1.98 m/s, peak transaortic gradient is 15.7 mmHg and mean transaortic gradient is 8.0 mmHg with an LVOT/aortic valve VTI ratio of 0.44. The aortic valve area is estimated at 1.38 cm² by the continuity equation. There is mild aortic regurgitation.   4. Structurally normal mitral valve with normal leaflet excursion. There is calcification of the mitral valve annulus. There is moderate to severe mitral regurgitation.   5. Estimated pulmonary artery systolic pressure is 48 mmHg.   6. No pericardial effusion seen.    _________________________________________________    < end of copied text >

## 2023-09-30 LAB
ANION GAP SERPL CALC-SCNC: 8 MMOL/L — SIGNIFICANT CHANGE UP (ref 7–14)
APTT BLD: 27.2 SEC — SIGNIFICANT CHANGE UP (ref 24.5–35.6)
APTT BLD: 58.7 SEC — HIGH (ref 24.5–35.6)
BUN SERPL-MCNC: 15 MG/DL — SIGNIFICANT CHANGE UP (ref 7–23)
CALCIUM SERPL-MCNC: 7.7 MG/DL — LOW (ref 8.4–10.5)
CHLORIDE SERPL-SCNC: 104 MMOL/L — SIGNIFICANT CHANGE UP (ref 98–107)
CO2 SERPL-SCNC: 25 MMOL/L — SIGNIFICANT CHANGE UP (ref 22–31)
CREAT SERPL-MCNC: 0.47 MG/DL — LOW (ref 0.5–1.3)
CULTURE RESULTS: SIGNIFICANT CHANGE UP
CULTURE RESULTS: SIGNIFICANT CHANGE UP
EGFR: 98 ML/MIN/1.73M2 — SIGNIFICANT CHANGE UP
GLUCOSE BLDC GLUCOMTR-MCNC: 101 MG/DL — HIGH (ref 70–99)
GLUCOSE BLDC GLUCOMTR-MCNC: 101 MG/DL — HIGH (ref 70–99)
GLUCOSE BLDC GLUCOMTR-MCNC: 107 MG/DL — HIGH (ref 70–99)
GLUCOSE BLDC GLUCOMTR-MCNC: 125 MG/DL — HIGH (ref 70–99)
GLUCOSE SERPL-MCNC: 90 MG/DL — SIGNIFICANT CHANGE UP (ref 70–99)
HCT VFR BLD CALC: 26.1 % — LOW (ref 34.5–45)
HCT VFR BLD CALC: 27.8 % — LOW (ref 34.5–45)
HGB BLD-MCNC: 8.2 G/DL — LOW (ref 11.5–15.5)
HGB BLD-MCNC: 8.4 G/DL — LOW (ref 11.5–15.5)
MAGNESIUM SERPL-MCNC: 2 MG/DL — SIGNIFICANT CHANGE UP (ref 1.6–2.6)
MCHC RBC-ENTMCNC: 22.3 PG — LOW (ref 27–34)
MCHC RBC-ENTMCNC: 23 PG — LOW (ref 27–34)
MCHC RBC-ENTMCNC: 30.2 GM/DL — LOW (ref 32–36)
MCHC RBC-ENTMCNC: 31.4 GM/DL — LOW (ref 32–36)
MCV RBC AUTO: 73.3 FL — LOW (ref 80–100)
MCV RBC AUTO: 73.9 FL — LOW (ref 80–100)
NRBC # BLD: 0 /100 WBCS — SIGNIFICANT CHANGE UP (ref 0–0)
NRBC # BLD: 0 /100 WBCS — SIGNIFICANT CHANGE UP (ref 0–0)
NRBC # FLD: 0 K/UL — SIGNIFICANT CHANGE UP (ref 0–0)
NRBC # FLD: 0 K/UL — SIGNIFICANT CHANGE UP (ref 0–0)
PHOSPHATE SERPL-MCNC: 2.4 MG/DL — LOW (ref 2.5–4.5)
PLATELET # BLD AUTO: 389 K/UL — SIGNIFICANT CHANGE UP (ref 150–400)
PLATELET # BLD AUTO: 395 K/UL — SIGNIFICANT CHANGE UP (ref 150–400)
POTASSIUM SERPL-MCNC: 4.6 MMOL/L — SIGNIFICANT CHANGE UP (ref 3.5–5.3)
POTASSIUM SERPL-SCNC: 4.6 MMOL/L — SIGNIFICANT CHANGE UP (ref 3.5–5.3)
RBC # BLD: 3.56 M/UL — LOW (ref 3.8–5.2)
RBC # BLD: 3.76 M/UL — LOW (ref 3.8–5.2)
RBC # FLD: 27.8 % — HIGH (ref 10.3–14.5)
RBC # FLD: 28.4 % — HIGH (ref 10.3–14.5)
SODIUM SERPL-SCNC: 137 MMOL/L — SIGNIFICANT CHANGE UP (ref 135–145)
SPECIMEN SOURCE: SIGNIFICANT CHANGE UP
SPECIMEN SOURCE: SIGNIFICANT CHANGE UP
WBC # BLD: 10.43 K/UL — SIGNIFICANT CHANGE UP (ref 3.8–10.5)
WBC # BLD: 10.64 K/UL — HIGH (ref 3.8–10.5)
WBC # FLD AUTO: 10.43 K/UL — SIGNIFICANT CHANGE UP (ref 3.8–10.5)
WBC # FLD AUTO: 10.64 K/UL — HIGH (ref 3.8–10.5)

## 2023-09-30 PROCEDURE — 93971 EXTREMITY STUDY: CPT | Mod: 26

## 2023-09-30 RX ORDER — HEPARIN SODIUM 5000 [USP'U]/ML
INJECTION INTRAVENOUS; SUBCUTANEOUS
Qty: 25000 | Refills: 0 | Status: DISCONTINUED | OUTPATIENT
Start: 2023-09-30 | End: 2023-10-03

## 2023-09-30 RX ORDER — HEPARIN SODIUM 5000 [USP'U]/ML
4000 INJECTION INTRAVENOUS; SUBCUTANEOUS EVERY 6 HOURS
Refills: 0 | Status: DISCONTINUED | OUTPATIENT
Start: 2023-09-30 | End: 2023-10-03

## 2023-09-30 RX ORDER — HEPARIN SODIUM 5000 [USP'U]/ML
4000 INJECTION INTRAVENOUS; SUBCUTANEOUS ONCE
Refills: 0 | Status: DISCONTINUED | OUTPATIENT
Start: 2023-09-30 | End: 2023-09-30

## 2023-09-30 RX ORDER — HEPARIN SODIUM 5000 [USP'U]/ML
2000 INJECTION INTRAVENOUS; SUBCUTANEOUS EVERY 6 HOURS
Refills: 0 | Status: DISCONTINUED | OUTPATIENT
Start: 2023-09-30 | End: 2023-10-03

## 2023-09-30 RX ADMIN — Medication 100 MILLIGRAM(S): at 21:17

## 2023-09-30 RX ADMIN — Medication 500 MILLIGRAM(S): at 12:02

## 2023-09-30 RX ADMIN — HEPARIN SODIUM 900 UNIT(S)/HR: 5000 INJECTION INTRAVENOUS; SUBCUTANEOUS at 19:08

## 2023-09-30 RX ADMIN — PANTOPRAZOLE SODIUM 40 MILLIGRAM(S): 20 TABLET, DELAYED RELEASE ORAL at 12:02

## 2023-09-30 RX ADMIN — Medication 1 APPLICATION(S): at 05:21

## 2023-09-30 RX ADMIN — Medication 100 MILLIGRAM(S): at 12:02

## 2023-09-30 RX ADMIN — MIRTAZAPINE 15 MILLIGRAM(S): 45 TABLET, ORALLY DISINTEGRATING ORAL at 21:20

## 2023-09-30 RX ADMIN — Medication 220 MILLIGRAM(S): at 17:18

## 2023-09-30 RX ADMIN — HEPARIN SODIUM 900 UNIT(S)/HR: 5000 INJECTION INTRAVENOUS; SUBCUTANEOUS at 12:17

## 2023-09-30 RX ADMIN — Medication 1 APPLICATION(S): at 17:17

## 2023-09-30 RX ADMIN — Medication 100 MILLIGRAM(S): at 05:21

## 2023-09-30 RX ADMIN — HEPARIN SODIUM 4000 UNIT(S): 5000 INJECTION INTRAVENOUS; SUBCUTANEOUS at 12:21

## 2023-09-30 RX ADMIN — MAGNESIUM HYDROXIDE 5 MILLILITER(S): 400 TABLET, CHEWABLE ORAL at 12:02

## 2023-09-30 RX ADMIN — HEPARIN SODIUM 900 UNIT(S)/HR: 5000 INJECTION INTRAVENOUS; SUBCUTANEOUS at 19:26

## 2023-09-30 RX ADMIN — Medication 220 MILLIGRAM(S): at 05:21

## 2023-09-30 NOTE — PROGRESS NOTE ADULT - SUBJECTIVE AND OBJECTIVE BOX
CHIEF COMPLAINT:    SUBJECTIVE:     REVIEW OF SYSTEMS:    CONSTITUTIONAL: (  )  weakness,  (  ) fevers or chills  EYES/ENT: (  )visual changes;     NECK: (  ) pain or stiffness  RESPIRATORY:   (  )cough, wheezing, hemoptysis;  (  ) shortness of breath  CARDIOVASCULAR:  (  )chest pain or palpitations  GASTROINTESTINAL:   (  )abdominal or epigastric pain.  (  ) nausea, vomiting, or hematemesis;   (   ) diarrhea or constipation.   GENITOURINARY:   (    ) dysuria, frequency or hematuria  NEUROLOGICAL:  (   ) numbness or weakness   All other review of systems is negative unless indicated above    Vital Signs Last 24 Hrs  T(C): 37.2 (30 Sep 2023 12:00), Max: 38.1 (29 Sep 2023 17:03)  T(F): 99 (30 Sep 2023 12:00), Max: 100.5 (29 Sep 2023 17:03)  HR: 111 (30 Sep 2023 12:00) (108 - 111)  BP: 111/44 (30 Sep 2023 12:00) (106/62 - 120/69)  BP(mean): --  RR: 16 (30 Sep 2023 12:00) (15 - 18)  SpO2: 100% (30 Sep 2023 12:00) (100% - 100%)    Parameters below as of 30 Sep 2023 12:00  Patient On (Oxygen Delivery Method): room air        I&O's Summary    29 Sep 2023 07:01  -  30 Sep 2023 07:00  --------------------------------------------------------  IN: 550 mL / OUT: 620 mL / NET: -70 mL        CAPILLARY BLOOD GLUCOSE      POCT Blood Glucose.: 125 mg/dL (30 Sep 2023 12:47)  POCT Blood Glucose.: 101 mg/dL (30 Sep 2023 06:39)  POCT Blood Glucose.: 107 mg/dL (30 Sep 2023 00:48)  POCT Blood Glucose.: 100 mg/dL (29 Sep 2023 18:32)      PHYSICAL EXAM:    Constitutional:  (   ) NAD,   (   )awake and alert  HEENT: PERR, EOMI,    Neck: Soft and supple, No LAD, No JVD  Respiratory:  (    Breath sounds are clear bilaterally,    (   ) wheezing, rales or rhonchi  Cardiovascular:     (   )S1 and S2, regular rate and rhythm, no Murmurs, gallops or rubs  Gastrointestinal:  (   )Bowel Sounds present, soft,   (  )nontender, nondistended,    Extremities:    (  ) peripheral edema  Vascular: 2+ peripheral pulses  Neurological:    (    )A/O x 3,   (  ) focal deficits  Musculoskeletal:    (   )  normal strength b/l upper  (     ) normal  lower extremities  Skin: No rashes    MEDICATIONS:  MEDICATIONS  (STANDING):  ascorbic acid 500 milliGRAM(s) Oral daily  ceFAZolin   IVPB 2000 milliGRAM(s) IV Intermittent every 8 hours  ferrous    sulfate Liquid 220 milliGRAM(s) Oral every 12 hours  heparin  Infusion.  Unit(s)/Hr (9 mL/Hr) IV Continuous <Continuous>  magnesium hydroxide Suspension 5 milliLiter(s) Oral daily  mirtazapine 15 milliGRAM(s) Oral at bedtime  pantoprazole   Suspension 40 milliGRAM(s) Oral daily  silver sulfADIAZINE 1% Cream 1 Application(s) Topical every 12 hours  viokace 92306 units 1 Tablet(s) 1 Tablet(s) Enteral Tube once      LABS: All Labs Reviewed:                        8.4    10.64 )-----------( 389      ( 30 Sep 2023 05:41 )             27.8     09-30    137  |  104  |  15  ----------------------------<  90  4.6   |  25  |  0.47<L>    Ca    7.7<L>      30 Sep 2023 05:41  Phos  2.4     09-30  Mg     2.00     09-30    TPro  5.3<L>  /  Alb  2.1<L>  /  TBili  0.5  /  DBili  x   /  AST  57<H>  /  ALT  14  /  AlkPhos  188<H>  09-29    PTT - ( 30 Sep 2023 11:17 )  PTT:27.2 sec      Blood Culture:   Urine Culture      RADIOLOGY/EKG:    ASSESSMENT AND PLAN:    DVT PPX:    ADVANCED DIRECTIVE:    DISPOSITION: CHIEF COMPLAINT:  Patient laying in the bed result of right opportunity Doppler noted patient with thrombotic event started on heparin drip and discharge planning was canceled  SUBJECTIVE:     REVIEW OF SYSTEMS:    CONSTITUTIONAL: (x  )  weakness,  (x  ) fevers or chills  EYES/ENT: (  )visual changes;     NECK: (  ) pain or stiffness  RESPIRATORY:   (  )cough, wheezing, hemoptysis;  (  ) shortness of breath  CARDIOVASCULAR:  (  )chest pain or palpitations  GASTROINTESTINAL:   (  )abdominal or epigastric pain.  (  ) nausea, vomiting, or hematemesis;   (   ) diarrhea or constipation.   GENITOURINARY:   (    ) dysuria, frequency or hematuria  NEUROLOGICAL:  (   ) numbness or weakness   All other review of systems is negative unless indicated above    Vital Signs Last 24 Hrs  T(C): 37.2 (30 Sep 2023 12:00), Max: 38.1 (29 Sep 2023 17:03)  T(F): 99 (30 Sep 2023 12:00), Max: 100.5 (29 Sep 2023 17:03)  HR: 111 (30 Sep 2023 12:00) (108 - 111)  BP: 111/44 (30 Sep 2023 12:00) (106/62 - 120/69)  BP(mean): --  RR: 16 (30 Sep 2023 12:00) (15 - 18)  SpO2: 100% (30 Sep 2023 12:00) (100% - 100%)    Parameters below as of 30 Sep 2023 12:00  Patient On (Oxygen Delivery Method): room air        I&O's Summary    29 Sep 2023 07:01  -  30 Sep 2023 07:00  --------------------------------------------------------  IN: 550 mL / OUT: 620 mL / NET: -70 mL        CAPILLARY BLOOD GLUCOSE      POCT Blood Glucose.: 125 mg/dL (30 Sep 2023 12:47)  POCT Blood Glucose.: 101 mg/dL (30 Sep 2023 06:39)  POCT Blood Glucose.: 107 mg/dL (30 Sep 2023 00:48)  POCT Blood Glucose.: 100 mg/dL (29 Sep 2023 18:32)      PHYSICAL EXAM:    Constitutional:  ( x  ) NAD,   ( x  )awake and  verbal  HEENT: PERR, EOMI,    Neck: Soft and supple, No LAD, No JVD  Respiratory:  (   x Breath sounds are clear bilaterally,    (   ) wheezing, rales or rhonchi  Cardiovascular:     ( x  )S1 and S2, regular rate and rhythm, no Murmurs, gallops or rubs  Gastrointestinal:  ( x  )Bowel Sounds present, soft,   (  )nontender, nondistended,    Extremities:    ( x ) redness and tenderness of right upper arm  Vascular: 2+ peripheral pulses  Neurological:    (  x  )A/O x  0,   (  ) focal deficits  Musculoskeletal:    (   )  normal strength b/l upper  (     ) normal  lower extremities  Skin: No rashes    MEDICATIONS:  MEDICATIONS  (STANDING):  ascorbic acid 500 milliGRAM(s) Oral daily  ceFAZolin   IVPB 2000 milliGRAM(s) IV Intermittent every 8 hours  ferrous    sulfate Liquid 220 milliGRAM(s) Oral every 12 hours  heparin  Infusion.  Unit(s)/Hr (9 mL/Hr) IV Continuous <Continuous>  magnesium hydroxide Suspension 5 milliLiter(s) Oral daily  mirtazapine 15 milliGRAM(s) Oral at bedtime  pantoprazole   Suspension 40 milliGRAM(s) Oral daily  silver sulfADIAZINE 1% Cream 1 Application(s) Topical every 12 hours  viokace [id number] units 1 Tablet(s) 1 Tablet(s) Enteral Tube once      LABS: All Labs Reviewed:                        8.4    10.64 )-----------( 389      ( 30 Sep 2023 05:41 )             27.8     09-30    137  |  104  |  15  ----------------------------<  90  4.6   |  25  |  0.47<L>    Ca    7.7<L>      30 Sep 2023 05:41  Phos  2.4     09-30  Mg     2.00     09-30    TPro  5.3<L>  /  Alb  2.1<L>  /  TBili  0.5  /  DBili  x   /  AST  57<H>  /  ALT  14  /  AlkPhos  188<H>  09-29    PTT - ( 30 Sep 2023 11:17 )  PTT:27.2 sec      Blood Culture:   Urine Culture      RADIOLOGY/EKG:    ASSESSMENT AND PLAN:  77 yo F PMH Alzheimer's dementia s/p PEG 8/2023, GE flap valve, grade B esophagitis, Multiple Myeloma (previously on Revlimid), cataracts, chronic venous ulcers b/l LE, OA knee, HTN, primary pulmonary hypertension, MDD sent from NH given hypotension documented at 87/56. Unable to obtain reliable history although per documentation patient has experienced cough, dysuria, and abdominal pain. Labs significant for anemia to 6.2 s/p PRBC without active bleeding and Na 124 likely iatrogenic as patient was on D5 1/2 NS and standing free water flushes at nursing home. Patient with fevers (T 100.3) and leukocytosis to 13.3 concerning for sepsis likely 2/2 urinary source - alternatively consider lower extremity wounds. Patient with hx hematoma following initial PEG placement, would f/u CT abdomen r/o expansion.      Problem/Plan - 1:  ·  Problem: Sepsis.   ·  Plan: -patient with fever to 100.3, leukocytosis to 13.3, hypotension 87/56-->92/56 concerning for sepsis 2/2 urinary source, less likely in setting of lower extremity wounds, lactic acid 2.4  -s/p vanc and zosyn in the ED, s/p 1L LR  -f/u BCx, UCx, UA turbid 100 protein moderate blood, large LE, WBC 2921 significant for UTI, per documentation patient has experienced dysuria  -CXR clear  -will order ceftriaxone and vancomycin  -monitor BP, f/u repeat lactic acid.    Problem/Plan - 2:  ·  Problem: Hyponatremia.   ·  Plan: -Na 124  -likely iatrogenic given patient was on D5 1/2 NS and free water flushes at facility  -will hold D5 NS and free water flushes  -f/u urine sodium urine osm, serum osm  -monitor BMP q8h.    Problem/Plan - 3:  ·  Problem: Anemia.   ·  Plan: -Hgb 6.6  -s/p 1u PRBC in ED, f/u repeat CBC, monitor CBC q8h  -f/u iron, ferritin, TIBC  -continue home iron  -monitor CBC, maintain active type and screen, transfuse for Hgb<7.0  -patient with hx hematoma following initial PEG placement, f/u CT A/P consider expansion.    Problem/Plan - 4:  ·  Problem: Alzheimers disease.   ·  Plan: -would obtain collateral on baseline mental status, attempted to call emergency contact to[facility]ka high was full  -patient is alert, conversant, although A&Ox1 believes she is in [facility].    Problem/Plan - 5:  ·  Problem: Multiple myeloma.   ·  Plan: -patient previously on revlimid per documentation       Problem/Plan - 6:  ·  Problem: Rheumatoid arthritis.   ·  Plan: -patient with ulnar deviation of b/l UE digits, documented RA  -would obtain records.    Problem/Plan - 7:  ·  Problem: Chronic cutaneous venous stasis ulcer.   ·  Plan: -patient with b/l LE ulcers, in setting of chronic venous stasis  -f/u wound care eval.    Problem/Plan - 8:  ·  Problem: History of esophagitis.   ·  Plan: -patient with grade B esophagitis on EGD 8/2023, also with Hill grade 3 gastroesophageal flap, patient was pending ?outpatient w/u with EUS  -home omeprazole not on formulary will order pantoprazole daily.    Problem/Plan - 9:  ·  Problem: Need for prophylactic measure.   ·  Plan: -DVT ppx: patient with documented hx hematoma after initial PEG placement, SCD for now, will hold pharmacologic ppx  -Diet: f/u nutrition consult for tube feeds, patient on Jevity at facility.    -9/23/2023 ID and IR note and consult appreciated due to positive blood culture recommended to remove right upper port   her antibiotic was changed to Ancef grams every 8 hours as per ID recommendation  -9/24/2023 condition stable waiting for removal of right upper chest sided port  -9/25/2023 right-sided chest port was removed by interventional radiology condition stable continue Ancef 2 g every 8 hours  -9/26/2023 continue Ancef 2 g with Hours for the next 4 weeks patient needs PICC line/midline after negative blood culture echo result noted mild aortic stenosis and severe MR no other  invasivel intervention need to be done due to her general condition and terminal dementia  -9/27/2023 blood culture from 9/25 /2023 Negative Pl. PICC line in a.m. and discharge plan discussed with a ACP  Team discussed with ID about the plan  9/28/2023 D/C back to SNF   with midline  to complet IV antibiotic    -9/30/2023 patient with right upper extremity DVT due to midline start on heparin continue Ancef 2 g every 8 hours discussed with AC P Team   DVT PPX:    ADVANCED DIRECTIVE:    DISPOSITION:

## 2023-09-30 NOTE — CHART NOTE - NSCHARTNOTEFT_GEN_A_CORE
Per Vascular tech prelim report, pt with occlusive RUE DVT, Will Start full AC and await Radiologist official report, Medical attending notified Please sign order upon approval.

## 2023-10-01 LAB
ANION GAP SERPL CALC-SCNC: 9 MMOL/L — SIGNIFICANT CHANGE UP (ref 7–14)
APTT BLD: 107 SEC — HIGH (ref 24.5–35.6)
APTT BLD: 47 SEC — HIGH (ref 24.5–35.6)
APTT BLD: 50.1 SEC — HIGH (ref 24.5–35.6)
BUN SERPL-MCNC: 14 MG/DL — SIGNIFICANT CHANGE UP (ref 7–23)
CALCIUM SERPL-MCNC: 8.1 MG/DL — LOW (ref 8.4–10.5)
CHLORIDE SERPL-SCNC: 102 MMOL/L — SIGNIFICANT CHANGE UP (ref 98–107)
CO2 SERPL-SCNC: 25 MMOL/L — SIGNIFICANT CHANGE UP (ref 22–31)
CREAT SERPL-MCNC: 0.45 MG/DL — LOW (ref 0.5–1.3)
EGFR: 99 ML/MIN/1.73M2 — SIGNIFICANT CHANGE UP
GLUCOSE BLDC GLUCOMTR-MCNC: 109 MG/DL — HIGH (ref 70–99)
GLUCOSE BLDC GLUCOMTR-MCNC: 110 MG/DL — HIGH (ref 70–99)
GLUCOSE BLDC GLUCOMTR-MCNC: 112 MG/DL — HIGH (ref 70–99)
GLUCOSE BLDC GLUCOMTR-MCNC: 92 MG/DL — SIGNIFICANT CHANGE UP (ref 70–99)
GLUCOSE SERPL-MCNC: 101 MG/DL — HIGH (ref 70–99)
HCT VFR BLD CALC: 29 % — LOW (ref 34.5–45)
HGB BLD-MCNC: 8.8 G/DL — LOW (ref 11.5–15.5)
MAGNESIUM SERPL-MCNC: 2.3 MG/DL — SIGNIFICANT CHANGE UP (ref 1.6–2.6)
MCHC RBC-ENTMCNC: 22.7 PG — LOW (ref 27–34)
MCHC RBC-ENTMCNC: 30.3 GM/DL — LOW (ref 32–36)
MCV RBC AUTO: 74.7 FL — LOW (ref 80–100)
NRBC # BLD: 0 /100 WBCS — SIGNIFICANT CHANGE UP (ref 0–0)
NRBC # FLD: 0 K/UL — SIGNIFICANT CHANGE UP (ref 0–0)
PHOSPHATE SERPL-MCNC: 2.3 MG/DL — LOW (ref 2.5–4.5)
PLATELET # BLD AUTO: 377 K/UL — SIGNIFICANT CHANGE UP (ref 150–400)
POTASSIUM SERPL-MCNC: 4.6 MMOL/L — SIGNIFICANT CHANGE UP (ref 3.5–5.3)
POTASSIUM SERPL-SCNC: 4.6 MMOL/L — SIGNIFICANT CHANGE UP (ref 3.5–5.3)
RBC # BLD: 3.88 M/UL — SIGNIFICANT CHANGE UP (ref 3.8–5.2)
RBC # FLD: 28.1 % — HIGH (ref 10.3–14.5)
SODIUM SERPL-SCNC: 136 MMOL/L — SIGNIFICANT CHANGE UP (ref 135–145)
WBC # BLD: 9.27 K/UL — SIGNIFICANT CHANGE UP (ref 3.8–10.5)
WBC # FLD AUTO: 9.27 K/UL — SIGNIFICANT CHANGE UP (ref 3.8–10.5)

## 2023-10-01 RX ADMIN — MIRTAZAPINE 15 MILLIGRAM(S): 45 TABLET, ORALLY DISINTEGRATING ORAL at 21:01

## 2023-10-01 RX ADMIN — HEPARIN SODIUM 1000 UNIT(S)/HR: 5000 INJECTION INTRAVENOUS; SUBCUTANEOUS at 18:00

## 2023-10-01 RX ADMIN — HEPARIN SODIUM 2000 UNIT(S): 5000 INJECTION INTRAVENOUS; SUBCUTANEOUS at 02:30

## 2023-10-01 RX ADMIN — HEPARIN SODIUM 1000 UNIT(S)/HR: 5000 INJECTION INTRAVENOUS; SUBCUTANEOUS at 07:11

## 2023-10-01 RX ADMIN — Medication 1 APPLICATION(S): at 05:05

## 2023-10-01 RX ADMIN — MAGNESIUM HYDROXIDE 5 MILLILITER(S): 400 TABLET, CHEWABLE ORAL at 11:23

## 2023-10-01 RX ADMIN — HEPARIN SODIUM 1000 UNIT(S)/HR: 5000 INJECTION INTRAVENOUS; SUBCUTANEOUS at 19:29

## 2023-10-01 RX ADMIN — Medication 220 MILLIGRAM(S): at 05:05

## 2023-10-01 RX ADMIN — Medication 220 MILLIGRAM(S): at 16:48

## 2023-10-01 RX ADMIN — Medication 100 MILLIGRAM(S): at 12:51

## 2023-10-01 RX ADMIN — HEPARIN SODIUM 2000 UNIT(S): 5000 INJECTION INTRAVENOUS; SUBCUTANEOUS at 10:40

## 2023-10-01 RX ADMIN — Medication 1 APPLICATION(S): at 16:48

## 2023-10-01 RX ADMIN — PANTOPRAZOLE SODIUM 40 MILLIGRAM(S): 20 TABLET, DELAYED RELEASE ORAL at 11:23

## 2023-10-01 RX ADMIN — HEPARIN SODIUM 1100 UNIT(S)/HR: 5000 INJECTION INTRAVENOUS; SUBCUTANEOUS at 10:16

## 2023-10-01 RX ADMIN — Medication 500 MILLIGRAM(S): at 11:24

## 2023-10-01 RX ADMIN — Medication 100 MILLIGRAM(S): at 05:05

## 2023-10-01 RX ADMIN — Medication 100 MILLIGRAM(S): at 21:01

## 2023-10-01 RX ADMIN — HEPARIN SODIUM 1000 UNIT(S)/HR: 5000 INJECTION INTRAVENOUS; SUBCUTANEOUS at 02:29

## 2023-10-01 NOTE — PROGRESS NOTE ADULT - SUBJECTIVE AND OBJECTIVE BOX
CHIEF COMPLAINT:  patient condition improving less tenderness erythema in the  right upper extremity phlebitis  SUBJECTIVE:     REVIEW OF SYSTEMS:    CONSTITUTIONAL: ( x )  weakness,  (x  ) fevers or chills  EYES/ENT: (  )visual changes;     NECK: (  ) pain or stiffness  RESPIRATORY:   (  )cough, wheezing, hemoptysis;  (  ) shortness of breath  CARDIOVASCULAR:  (  )chest pain or palpitations  GASTROINTESTINAL:   (  )abdominal or epigastric pain.  (  ) nausea, vomiting, or hematemesis;   (   ) diarrhea or constipation.   GENITOURINARY:   (    ) dysuria, frequency or hematuria  NEUROLOGICAL:  (   ) numbness or weakness   All other review of systems is negative unless indicated above    Vital Signs Last 24 Hrs  T(C): 37.3 (01 Oct 2023 10:54), Max: 37.3 (01 Oct 2023 10:54)  T(F): 99.1 (01 Oct 2023 10:54), Max: 99.1 (01 Oct 2023 10:54)  HR: 92 (01 Oct 2023 10:54) (86 - 101)  BP: 131/60 (01 Oct 2023 10:54) (108/63 - 131/60)  BP(mean): --  RR: 16 (01 Oct 2023 10:54) (16 - 17)  SpO2: 92% (01 Oct 2023 10:54) (92% - 100%)    Parameters below as of 01 Oct 2023 05:03  Patient On (Oxygen Delivery Method): room air        I&O's Summary      CAPILLARY BLOOD GLUCOSE      POCT Blood Glucose.: 109 mg/dL (01 Oct 2023 17:36)  POCT Blood Glucose.: 112 mg/dL (01 Oct 2023 12:01)  POCT Blood Glucose.: 92 mg/dL (01 Oct 2023 05:29)  POCT Blood Glucose.: 110 mg/dL (30 Sep 2023 23:58)      PHYSICAL EXAM:    Constitutional:  ( x  ) NAD,   ( x  )awake and  verbal  HEENT: PERR, EOMI,    Neck: Soft and supple, No LAD, No JVD  Respiratory:  (  x  Breath sounds are clear bilaterally,    (   ) wheezing, rales or rhonchi  Cardiovascular:     ( x  )S1 and S2, regular rate and rhythm, no Murmurs, gallops or rubs  Gastrointestinal:  (  x )Bowel Sounds present, soft,   (  )nontender, nondistended,    Extremities:    (  ) peripheral edema  Vascular: 2+ peripheral pulses  Neurological:    (   x )A/O x  1,   (  ) focal deficits  Musculoskeletal:    ( x  )  normal strength b/l upper  (     ) normal  lower extremities  Skin:  decreasing in erythema and tenderness of right upper extremity phlebitis area    MEDICATIONS:  MEDICATIONS  (STANDING):  ascorbic acid 500 milliGRAM(s) Oral daily  ceFAZolin   IVPB 2000 milliGRAM(s) IV Intermittent every 8 hours  ferrous    sulfate Liquid 220 milliGRAM(s) Oral every 12 hours  heparin  Infusion.  Unit(s)/Hr (9 mL/Hr) IV Continuous <Continuous>  magnesium hydroxide Suspension 5 milliLiter(s) Oral daily  mirtazapine 15 milliGRAM(s) Oral at bedtime  pantoprazole   Suspension 40 milliGRAM(s) Oral daily  silver sulfADIAZINE 1% Cream 1 Application(s) Topical every 12 hours  viokace 44640 units 1 Tablet(s) 1 Tablet(s) Enteral Tube once      LABS: All Labs Reviewed:                        8.8    9.27  )-----------( 377      ( 01 Oct 2023 05:41 )             29.0     10-01    136  |  102  |  14  ----------------------------<  101<H>  4.6   |  25  |  0.45<L>    Ca    8.1<L>      01 Oct 2023 05:41  Phos  2.3     10-01  Mg     2.30     10-01      PTT - ( 01 Oct 2023 17:18 )  PTT:107.0 sec      Blood Culture: 09-29 @ 19:04  Organism --  Gram Stain Blood -- Gram Stain --  Specimen Source .Blood Blood-Peripheral  Culture-Blood --      Urine Culture      RADIOLOGY/EKG:    ASSESSMENT AND PLAN:  79 yo F PMH Alzheimer's dementia s/p PEG 8/2023, GE flap valve, grade B esophagitis, Multiple Myeloma (previously on Revlimid), cataracts, chronic venous ulcers b/l LE, OA knee, HTN, primary pulmonary hypertension, MDD sent from NH given hypotension documented at 87/56. Unable to obtain reliable history although per documentation patient has experienced cough, dysuria, and abdominal pain. Labs significant for anemia to 6.2 s/p PRBC without active bleeding and Na 124 likely iatrogenic as patient was on D5 1/2 NS and standing free water flushes at nursing home. Patient with fevers (T 100.3) and leukocytosis to 13.3 concerning for sepsis likely 2/2 urinary source - alternatively consider lower extremity wounds. Patient with hx hematoma following initial PEG placement, would f/u CT abdomen r/o expansion.      Problem/Plan - 1:  ·  Problem: Sepsis.   ·  Plan: -patient with fever to 100.3, leukocytosis to 13.3, hypotension 87/56-->92/56 concerning for sepsis 2/2 urinary source, less likely in setting of lower extremity wounds, lactic acid 2.4  -s/p vanc and zosyn in the ED, s/p 1L LR  -f/u BCx, UCx, UA turbid 100 protein moderate blood, large LE, WBC 2921 significant for UTI, per documentation patient has experienced dysuria  -CXR clear  -will order ceftriaxone and vancomycin  -monitor BP, f/u repeat lactic acid.    Problem/Plan - 2:  ·  Problem: Hyponatremia.   ·  Plan: -Na 124  -likely iatrogenic given patient was on D5 1/2 NS and free water flushes at facility  -will hold D5 NS and free water flushes  -f/u urine sodium urine osm, serum osm  -monitor BMP q8h.    Problem/Plan - 3:  ·  Problem: Anemia.   ·  Plan: -Hgb 6.6  -s/p 1u PRBC in ED, f/u repeat CBC, monitor CBC q8h  -f/u iron, ferritin, TIBC  -continue home iron  -monitor CBC, maintain active type and screen, transfuse for Hgb<7.0  -patient with hx hematoma following initial PEG placement, f/u CT A/P consider expansion.    Problem/Plan - 4:  ·  Problem: Alzheimers disease.   ·  Plan: -would obtain collateral on baseline mental status, attempted to call emergency contact [facility] was full  -patient is alert, conversant, although A&Ox1 believes she is in [facility].    Problem/Plan - 5:  ·  Problem: Multiple myeloma.   ·  Plan: -patient previously on revlimid per documentation       Problem/Plan - 6:  ·  Problem: Rheumatoid arthritis.   ·  Plan: -patient with ulnar deviation of b/l UE digits, documented RA  -would obtain records.    Problem/Plan - 7:  ·  Problem: Chronic cutaneous venous stasis ulcer.   ·  Plan: -patient with b/l LE ulcers, in setting of chronic venous stasis  -f/u wound care eval.    Problem/Plan - 8:  ·  Problem: History of esophagitis.   ·  Plan: -patient with grade B esophagitis on EGD 8/2023, also with Hill grade 3 gastroesophageal flap, patient was pending ?outpatient w/u with EUS  -home omeprazole not on formulary will order pantoprazole daily.    Problem/Plan - 9:  ·  Problem: Need for prophylactic measure.   ·  Plan: -DVT ppx: patient with documented hx hematoma after initial PEG placement, SCD for now, will hold pharmacologic ppx  -Diet: f/u nutrition consult for tube feeds, patient on Jevity at facility.    -9/23/2023 ID and IR note and consult appreciated due to positive blood culture recommended to remove right upper port   her antibiotic was changed to Ancef grams every 8 hours as per ID recommendation  -9/24/2023 condition stable waiting for removal of right upper chest sided port  -9/25/2023 right-sided chest port was removed by interventional radiology condition stable continue Ancef 2 g every 8 hours  -9/26/2023 continue Ancef 2 g with Hours for the next 4 weeks patient needs PICC line/midline after negative blood culture echo result noted mild aortic stenosis and severe MR no other  invasivel intervention need to be done due to her general condition and terminal dementia  -9/27/2023 blood culture from 9/25 /2023 Negative Pl. PICC line in a.m. and discharge plan discussed with a ACP  Team discussed with ID about the plan  9/28/2023 D/C back to SNF   with midline  to complet IV antibiotic    -9/30/2023 patient with right upper extremity DVT due to midline start on heparin continue Ancef 2 g every 8 hours discussed with AC P Team   -10/1/2023 condition improving with start  Eliquis in a.m. duration usually between 3-6 months will ask vascular recommendation    DVT PPX:    ADVANCED DIRECTIVE:    DISPOSITION:

## 2023-10-02 LAB
ANION GAP SERPL CALC-SCNC: 22 MMOL/L — HIGH (ref 7–14)
APTT BLD: 30.4 SEC — SIGNIFICANT CHANGE UP (ref 24.5–35.6)
APTT BLD: 69.5 SEC — HIGH (ref 24.5–35.6)
BUN SERPL-MCNC: 11 MG/DL — SIGNIFICANT CHANGE UP (ref 7–23)
CALCIUM SERPL-MCNC: 6.9 MG/DL — LOW (ref 8.4–10.5)
CHLORIDE SERPL-SCNC: 86 MMOL/L — LOW (ref 98–107)
CO2 SERPL-SCNC: 19 MMOL/L — LOW (ref 22–31)
CREAT SERPL-MCNC: 0.4 MG/DL — LOW (ref 0.5–1.3)
EGFR: 102 ML/MIN/1.73M2 — SIGNIFICANT CHANGE UP
GLUCOSE BLDC GLUCOMTR-MCNC: 108 MG/DL — HIGH (ref 70–99)
GLUCOSE BLDC GLUCOMTR-MCNC: 113 MG/DL — HIGH (ref 70–99)
GLUCOSE BLDC GLUCOMTR-MCNC: 72 MG/DL — SIGNIFICANT CHANGE UP (ref 70–99)
GLUCOSE BLDC GLUCOMTR-MCNC: 77 MG/DL — SIGNIFICANT CHANGE UP (ref 70–99)
GLUCOSE BLDC GLUCOMTR-MCNC: 82 MG/DL — SIGNIFICANT CHANGE UP (ref 70–99)
GLUCOSE SERPL-MCNC: 391 MG/DL — HIGH (ref 70–99)
HCT VFR BLD CALC: 29.1 % — LOW (ref 34.5–45)
HGB BLD-MCNC: 8.8 G/DL — LOW (ref 11.5–15.5)
MAGNESIUM SERPL-MCNC: 1.9 MG/DL — SIGNIFICANT CHANGE UP (ref 1.6–2.6)
MCHC RBC-ENTMCNC: 22.5 PG — LOW (ref 27–34)
MCHC RBC-ENTMCNC: 30.2 GM/DL — LOW (ref 32–36)
MCV RBC AUTO: 74.4 FL — LOW (ref 80–100)
NRBC # BLD: 0 /100 WBCS — SIGNIFICANT CHANGE UP (ref 0–0)
NRBC # FLD: 0 K/UL — SIGNIFICANT CHANGE UP (ref 0–0)
PHOSPHATE SERPL-MCNC: 2.7 MG/DL — SIGNIFICANT CHANGE UP (ref 2.5–4.5)
PLATELET # BLD AUTO: 337 K/UL — SIGNIFICANT CHANGE UP (ref 150–400)
POTASSIUM SERPL-MCNC: 5 MMOL/L — SIGNIFICANT CHANGE UP (ref 3.5–5.3)
POTASSIUM SERPL-SCNC: 5 MMOL/L — SIGNIFICANT CHANGE UP (ref 3.5–5.3)
RBC # BLD: 3.91 M/UL — SIGNIFICANT CHANGE UP (ref 3.8–5.2)
RBC # FLD: 27.7 % — HIGH (ref 10.3–14.5)
SODIUM SERPL-SCNC: 127 MMOL/L — LOW (ref 135–145)
WBC # BLD: 5.85 K/UL — SIGNIFICANT CHANGE UP (ref 3.8–10.5)
WBC # FLD AUTO: 5.85 K/UL — SIGNIFICANT CHANGE UP (ref 3.8–10.5)

## 2023-10-02 PROCEDURE — 99232 SBSQ HOSP IP/OBS MODERATE 35: CPT

## 2023-10-02 RX ADMIN — Medication 1 APPLICATION(S): at 05:06

## 2023-10-02 RX ADMIN — Medication 100 MILLIGRAM(S): at 16:28

## 2023-10-02 RX ADMIN — HEPARIN SODIUM 4000 UNIT(S): 5000 INJECTION INTRAVENOUS; SUBCUTANEOUS at 17:29

## 2023-10-02 RX ADMIN — HEPARIN SODIUM 1000 UNIT(S)/HR: 5000 INJECTION INTRAVENOUS; SUBCUTANEOUS at 01:11

## 2023-10-02 RX ADMIN — MAGNESIUM HYDROXIDE 5 MILLILITER(S): 400 TABLET, CHEWABLE ORAL at 17:30

## 2023-10-02 RX ADMIN — Medication 220 MILLIGRAM(S): at 17:31

## 2023-10-02 RX ADMIN — HEPARIN SODIUM 1200 UNIT(S)/HR: 5000 INJECTION INTRAVENOUS; SUBCUTANEOUS at 19:11

## 2023-10-02 RX ADMIN — PANTOPRAZOLE SODIUM 40 MILLIGRAM(S): 20 TABLET, DELAYED RELEASE ORAL at 17:29

## 2023-10-02 RX ADMIN — HEPARIN SODIUM 1200 UNIT(S)/HR: 5000 INJECTION INTRAVENOUS; SUBCUTANEOUS at 17:17

## 2023-10-02 RX ADMIN — Medication 1 APPLICATION(S): at 17:31

## 2023-10-02 RX ADMIN — Medication 100 MILLIGRAM(S): at 21:32

## 2023-10-02 RX ADMIN — Medication 100 MILLIGRAM(S): at 05:06

## 2023-10-02 RX ADMIN — Medication 500 MILLIGRAM(S): at 17:30

## 2023-10-02 RX ADMIN — HEPARIN SODIUM 1000 UNIT(S)/HR: 5000 INJECTION INTRAVENOUS; SUBCUTANEOUS at 07:22

## 2023-10-02 RX ADMIN — MIRTAZAPINE 15 MILLIGRAM(S): 45 TABLET, ORALLY DISINTEGRATING ORAL at 21:32

## 2023-10-02 NOTE — PROGRESS NOTE ADULT - SUBJECTIVE AND OBJECTIVE BOX
Follow Up:      Interval History/ROS:   feels ok  right arm less swelling          Allergies  No Known Allergies        ANTIMICROBIALS: ceFAZolin   IVPB 2000 every 8 hours    MEDICATIONS  (STANDING):  acetaminophen   Oral Liquid .. 650 every 6 hours PRN  heparin   Injectable 2000 every 6 hours PRN  heparin   Injectable 4000 every 6 hours PRN  heparin  Infusion.  <Continuous>  magnesium hydroxide Suspension 5 daily  mirtazapine 15 at bedtime  pantoprazole   Suspension 40 daily      Vital Signs Last 24 Hrs  T(F): 97.8 (10-02-23 @ 11:06), Max: 98 (10-01-23 @ 21:51)  HR: 83 (10-02-23 @ 11:06)  BP: 123/73 (10-02-23 @ 11:06)  RR: 17 (10-02-23 @ 11:06)  SpO2: 100% (10-02-23 @ 11:06) (100% - 100%)    PHYSICAL EXAM:  Constitutional: Not in acute distress, frail, verbal   Eyes: No icterus.  Oral cavity: Clear, no lesions  RS: no respiratory distress RA  CVS: dressing right chest (prior port site)  Extremities: arthritic changes    right upper arm less swelling less warm                             8.8    5.85  )-----------( 337      ( 02 Oct 2023 05:35 )             29.1 10-02    127  |  86  |  11  ----------------------------<  391  5.0   |  19  |  0.40  Ca    6.9      02 Oct 2023 05:35Phos  2.7     10-02Mg     1.90     10-02            MICROBIOLOGY:      cuCulture - Blood (09.29.23 @ 19:04)   Specimen Source: .Blood Blood-Venous  Culture Results:   No growth at 48 HoursCulture - Blood (09.29.23 @ 19:04)   Specimen Source: .Blood Blood-Peripheral  Culture Results:   No growth at 48 Hours    .Blood Blood-Peripheral  09-25-23   No growth at 5 days  --  --      .Blood Blood-Venous  09-25-23   No growth at 5 days   --  --      .Blood Blood-Peripheral  09-23-23   No growth at 5 days   --  --      .Blood Blood-Venous  09-23-23   Growth in anaerobic bottle: Staphylococcus aureus  See previous culture [id number]  --    Growth in anaerobic bottle: Gram Positive Cocci in Clusters      Clean Catch Clean Catch (Midstream)  09-21-23   >100,000 CFU/ml Staphylococcus aureus  --  Staphylococcus aureus      .Blood Blood-Peripheral  09-21-23   Growth in aerobic bottle: Staphylococcus aureus  Growth in anaerobic bottle: Staphylococcus capitis  Coagulase Negative Staphylococci isolated from a single blood culture set  may represent contamination.  Contact the Microbiology Department at [phone] if susceptibility  testing is clinically indicated.  Direct identification is available within approximately 3-5  hours either by Blood Panel Multiplexed PCR or Direct  MALDI-TOF. Details: [email or web address]  --  Blood Culture PCR  Staphylococcus aureus      .Blood Blood-Peripheral  09-21-23   Growth in aerobic and anaerobic bottles: Staphylococcus aureus  See previous culture [id number]  --    Growth in aerobic bottle: Gram Positive Cocci in Clusters  Growth in anaerobic bottle: Gram Positive Cocci in Clusters        RADIOLOGY:    ra< from: Xray Chest 1 View- PORTABLE-Urgent (Xray Chest 1 View- PORTABLE-Urgent .) (09.23.23 @ 14:29) >  IMPRESSION:    No radiographic evidence of acute cardiopulmonary disease.    --- End of Report ---          RICK CHANDLER DO; Resident Radiologist  This document has been electronically signed.  WALTER HALE MD; Attending Radiologist  This document has been electronically signed. Sep 24 20    < end of copied text >  < from: CT Abdomen and Pelvis No Cont (09.23.23 @ 08:57) >    IMPRESSION:  No abdominal wall hematoma or subcutaneous soft tissue swelling at the   PEG tube insertion site.    No hematoma elsewhere in the abdomen or pelvis.    Ascending and descending thoracic aortic dilation.        --- End of Report ---    < end of copied text >      < from: TTE W or WO Ultrasound Enhancing Agent (09.26.23 @ 12:08) >    TRANSTHORACIC ECHOCARDIOGRAM REPORT  ________________________________________________________________________________                                      _______       Pt. Name:       MAXIMILIANO MOOREAREZ Study Date:    9/26/2023  MRN:            [id number]     YOB: 1945  Accession #:    [id number]     Age:           78 years  Account#:       [id number]      Gender:        F  Heart Rate:                   Height:        67.00 in (170.18 cm)  Rhythm:                       Weight:        103.00 lb (46.72 kg)  Blood Pressure: 140/75 mmHg   BSA/BMI:       1.53 m² / 16.13 kg/m²  ________________________________________________________________________________________  Referring Physician:    [id number] Linda Vasques  Interpreting Physician: Chelsea Valentino  Primary Sonographer:    Clementine Delaney RDCS    CPT:               ECHO TTE WO CON COMP W DOPP - 54767.m  Indication(s):     Abnormal electrocardiogram ECG/EKG - R94.31  Procedure:         Transthoracic echocardiogram with 2-D, M-mode and complete                     spectral and color flow Doppler.  Ordering Location: [facility]  Admission Status:  Inpatient  Study Information: Image quality for this study is good.    _______________________________________________________________________________________     CONCLUSIONS:      1. The left ventricular cavity is normal size. Left ventricular wall thickness is normal. Left ventricular systolic function is normal with an ejection fraction of 67 % by Millan's method of disks.   2.Right ventricular cavity is normal in size, normal wall thickness and normal systolic function.   3. The aortic valve is tricuspid with normal structure without stenosis with reduced systolic excursion. There is calcification of the aortic valve leaflets. There is moderate aortic stenosis. The peak transaortic velocity is 1.98 m/s, peak transaortic gradient is 15.7 mmHg and mean transaortic gradient is 8.0 mmHg with an LVOT/aortic valve VTI ratio of 0.44. The aortic valve area is estimated at 1.38 cm² by the continuity equation. There is mild aortic regurgitation.   4. Structurally normal mitral valve with normal leaflet excursion. There is calcification of the mitral valve annulus. There is moderate to severe mitral regurgitation.   5. Estimated pulmonary artery systolic pressure is 48 mmHg.   6. No pericardial effusion seen.    _________________________________________________    < end of copied text >  < from: TTE W or WO Ultrasound Enhancing Agent (09.26.23 @ 12:08) >    TRANSTHORACIC ECHOCARDIOGRAM REPORT  ________________________________________________________________________________                                      _______       Pt. Name:       MAXIMILIANO ESTEVEZ Study Date:    9/26/2023  MRN:            [id number]     YOB: 1945  Accession #:    [id number]     Age:           78 years  Account#:       [id number]      Gender:        F  Heart Rate:                   Height:        67.00 in (170.18 cm)  Rhythm:                       Weight:        103.00 lb (46.72 kg)  Blood Pressure: 140/75 mmHg   BSA/BMI:       1.53 m² / 16.13 kg/m²  ________________________________________________________________________________________  Referring Physician:    [id number] Linda Vasques  Interpreting Physician: Chelsea Valentino  Primary Sonographer:    Clementine Delaney [facility]    CPT:               ECHO TTE WO CON COMP W DOPP - 44897.m  Indication(s):     Abnormal electrocardiogram ECG/EKG - R94.31  Procedure:         Transthoracic echocardiogram with 2-D, M-mode and complete                     spectral and color flow Doppler.  Ordering Location: [facility]  Admission Status:  Inpatient  Study Information: Image quality for this study is good.    _______________________________________________________________________________________     CONCLUSIONS:      1. The left ventricular cavity is normal size. Left ventricular wall thickness is normal. Left ventricular systolic function is normal with an ejection fraction of 67 % by Millan's method of disks.   2.Right ventricular cavity is normal in size, normal wall thickness and normal systolic function.   3. The aortic valve is tricuspid with normal structure without stenosis with reduced systolic excursion. There is calcification of the aortic valve leaflets. There is moderate aortic stenosis. The peak transaortic velocity is 1.98 m/s, peak transaortic gradient is 15.7 mmHg and mean transaortic gradient is 8.0 mmHg with an LVOT/aortic valve VTI ratio of 0.44. The aortic valve area is estimated at 1.38 cm² by the continuity equation. There is mild aortic regurgitation.   4. Structurally normal mitral valve with normal leaflet excursion. There is calcification of the mitral valve annulus. There is moderate to severe mitral regurgitation.   5. Estimated pulmonary artery systolic pressure is 48 mmHg.   6. No pericardial effusion seen.    _________________________________________________    < end of copied text >

## 2023-10-02 NOTE — PROGRESS NOTE ADULT - SUBJECTIVE AND OBJECTIVE BOX
CHIEF COMPLAINT:    SUBJECTIVE:     REVIEW OF SYSTEMS:    CONSTITUTIONAL: (  )  weakness,  (  ) fevers or chills  EYES/ENT: (  )visual changes;     NECK: (  ) pain or stiffness  RESPIRATORY:   (  )cough, wheezing, hemoptysis;  (  ) shortness of breath  CARDIOVASCULAR:  (  )chest pain or palpitations  GASTROINTESTINAL:   (  )abdominal or epigastric pain.  (  ) nausea, vomiting, or hematemesis;   (   ) diarrhea or constipation.   GENITOURINARY:   (    ) dysuria, frequency or hematuria  NEUROLOGICAL:  (   ) numbness or weakness   All other review of systems is negative unless indicated above    Vital Signs Last 24 Hrs  T(C): 36.7 (01 Oct 2023 21:51), Max: 37.3 (01 Oct 2023 10:54)  T(F): 98 (01 Oct 2023 21:51), Max: 99.1 (01 Oct 2023 10:54)  HR: 103 (01 Oct 2023 21:51) (92 - 103)  BP: 134/87 (01 Oct 2023 21:51) (131/60 - 134/87)  BP(mean): --  RR: 17 (01 Oct 2023 21:51) (16 - 17)  SpO2: 100% (01 Oct 2023 21:51) (92% - 100%)    Parameters below as of 01 Oct 2023 21:51  Patient On (Oxygen Delivery Method): room air        I&O's Summary      CAPILLARY BLOOD GLUCOSE      POCT Blood Glucose.: 72 mg/dL (02 Oct 2023 06:09)  POCT Blood Glucose.: 77 mg/dL (02 Oct 2023 00:53)  POCT Blood Glucose.: 109 mg/dL (01 Oct 2023 17:36)  POCT Blood Glucose.: 112 mg/dL (01 Oct 2023 12:01)      PHYSICAL EXAM:    Constitutional:  (   ) NAD,   (   )awake and alert  HEENT: PERR, EOMI,    Neck: Soft and supple, No LAD, No JVD  Respiratory:  (    Breath sounds are clear bilaterally,    (   ) wheezing, rales or rhonchi  Cardiovascular:     (   )S1 and S2, regular rate and rhythm, no Murmurs, gallops or rubs  Gastrointestinal:  (   )Bowel Sounds present, soft,   (  )nontender, nondistended,    Extremities:    (  ) peripheral edema  Vascular: 2+ peripheral pulses  Neurological:    (    )A/O x 3,   (  ) focal deficits  Musculoskeletal:    (   )  normal strength b/l upper  (     ) normal  lower extremities  Skin: No rashes    MEDICATIONS:  MEDICATIONS  (STANDING):  ascorbic acid 500 milliGRAM(s) Oral daily  ceFAZolin   IVPB 2000 milliGRAM(s) IV Intermittent every 8 hours  ferrous    sulfate Liquid 220 milliGRAM(s) Oral every 12 hours  heparin  Infusion.  Unit(s)/Hr (9 mL/Hr) IV Continuous <Continuous>  magnesium hydroxide Suspension 5 milliLiter(s) Oral daily  mirtazapine 15 milliGRAM(s) Oral at bedtime  pantoprazole   Suspension 40 milliGRAM(s) Oral daily  silver sulfADIAZINE 1% Cream 1 Application(s) Topical every 12 hours  viokace 17121 units 1 Tablet(s) 1 Tablet(s) Enteral Tube once      LABS: All Labs Reviewed:                        8.8    5.85  )-----------( 337      ( 02 Oct 2023 05:35 )             29.1     10-02    127<L>  |  86<L>  |  11  ----------------------------<  391<H>  5.0   |  19<L>  |  0.40<L>    Ca    6.9<L>      02 Oct 2023 05:35  Phos  2.7     10-02  Mg     1.90     10-02      PTT - ( 02 Oct 2023 00:43 )  PTT:69.5 sec      Blood Culture: 09-29 @ 19:04  Organism --  Gram Stain Blood -- Gram Stain --  Specimen Source .Blood Blood-Peripheral  Culture-Blood --      Urine Culture      RADIOLOGY/EKG:    ASSESSMENT AND PLAN:    DVT PPX:    ADVANCED DIRECTIVE:    DISPOSITION: CHIEF COMPLAINT:patient condition remain stable no event overnight    SUBJECTIVE:     REVIEW OF SYSTEMS:    CONSTITUTIONAL: (x  )  weakness,  (  ) fevers or chills  EYES/ENT: (  )visual changes;     NECK: (  ) pain or stiffness  RESPIRATORY:   (  )cough, wheezing, hemoptysis;  (  ) shortness of breath  CARDIOVASCULAR:  (  )chest pain or palpitations  GASTROINTESTINAL:   (  )abdominal or epigastric pain.  (  ) nausea, vomiting, or hematemesis;   (   ) diarrhea or constipation.   GENITOURINARY:   (    ) dysuria, frequency or hematuria  NEUROLOGICAL:  (   ) numbness or weakness   All other review of systems is negative unless indicated above    Vital Signs Last 24 Hrs  T(C): 36.7 (01 Oct 2023 21:51), Max: 37.3 (01 Oct 2023 10:54)  T(F): 98 (01 Oct 2023 21:51), Max: 99.1 (01 Oct 2023 10:54)  HR: 103 (01 Oct 2023 21:51) (92 - 103)  BP: 134/87 (01 Oct 2023 21:51) (131/60 - 134/87)  BP(mean): --  RR: 17 (01 Oct 2023 21:51) (16 - 17)  SpO2: 100% (01 Oct 2023 21:51) (92% - 100%)    Parameters below as of 01 Oct 2023 21:51  Patient On (Oxygen Delivery Method): room air        I&O's Summary      CAPILLARY BLOOD GLUCOSE      POCT Blood Glucose.: 72 mg/dL (02 Oct 2023 06:09)  POCT Blood Glucose.: 77 mg/dL (02 Oct 2023 00:53)  POCT Blood Glucose.: 109 mg/dL (01 Oct 2023 17:36)  POCT Blood Glucose.: 112 mg/dL (01 Oct 2023 12:01)      PHYSICAL EXAM:    Constitutional:  ( x  ) NAD,   ( x  )awake    HEENT: PERR, EOMI,    Neck: Soft and supple, No LAD, No JVD  Respiratory:  (  x  Breath sounds are clear bilaterally,    (   ) wheezing, rales or rhonchi  Cardiovascular:     (  x )S1 and S2, regular rate and rhythm, no Murmurs, gallops or rubs  Gastrointestinal:  ( x  )Bowel Sounds present, soft,   (  )nontender, nondistended, + PEG   Extremities:    (  ) peripheral edema  Vascular: 2+ peripheral pulses  Neurological:    (   x )A/O x  1,   (  ) focal deficits  Musculoskeletal:    (  x )  normal strength b/l upper  (     ) normal  lower extremities  Skin: multiple ecchymotic area and decrease in size of induration and tenderness of right upper extremity phlebitis and    MEDICATIONS:  MEDICATIONS  (STANDING):  ascorbic acid 500 milliGRAM(s) Oral daily  ceFAZolin   IVPB 2000 milliGRAM(s) IV Intermittent every 8 hours  ferrous    sulfate Liquid 220 milliGRAM(s) Oral every 12 hours  heparin  Infusion.  Unit(s)/Hr (9 mL/Hr) IV Continuous <Continuous>  magnesium hydroxide Suspension 5 milliLiter(s) Oral daily  mirtazapine 15 milliGRAM(s) Oral at bedtime  pantoprazole   Suspension 40 milliGRAM(s) Oral daily  silver sulfADIAZINE 1% Cream 1 Application(s) Topical every 12 hours  viokace 72557 units 1 Tablet(s) 1 Tablet(s) Enteral Tube once      LABS: All Labs Reviewed:                        8.8    5.85  )-----------( 337      ( 02 Oct 2023 05:35 )             29.1     10-02    127<L>  |  86<L>  |  11  ----------------------------<  391<H>  5.0   |  19<L>  |  0.40<L>    Ca    6.9<L>      02 Oct 2023 05:35  Phos  2.7     10-02  Mg     1.90     10-02      PTT - ( 02 Oct 2023 00:43 )  PTT:69.5 sec      Blood Culture: 09-29 @ 19:04  Organism --  Gram Stain Blood -- Gram Stain --  Specimen Source .Blood Blood-Peripheral  Culture-Blood --      Urine Culture      RADIOLOGY/EKG:    ASSESSMENT AND PLAN:  77 yo F PMH Alzheimer's dementia s/p PEG 8/2023, GE flap valve, grade B esophagitis, Multiple Myeloma (previously on Revlimid), cataracts, chronic venous ulcers b/l LE, OA knee, HTN, primary pulmonary hypertension, MDD sent from NH given hypotension documented at 87/56. Unable to obtain reliable history although per documentation patient has experienced cough, dysuria, and abdominal pain. Labs significant for anemia to 6.2 s/p PRBC without active bleeding and Na 124 likely iatrogenic as patient was on D5 1/2 NS and standing free water flushes at nursing home. Patient with fevers (T 100.3) and leukocytosis to 13.3 concerning for sepsis likely 2/2 urinary source - alternatively consider lower extremity wounds. Patient with hx hematoma following initial PEG placement, would f/u CT abdomen r/o expansion.      Problem/Plan - 1:  ·  Problem: Sepsis.   ·  Plan: -patient with fever to 100.3, leukocytosis to 13.3, hypotension 87/56-->92/56 concerning for sepsis 2/2 urinary source, less likely in setting of lower extremity wounds, lactic acid 2.4  -s/p vanc and zosyn in the ED, s/p 1L LR  -f/u BCx, UCx, UA turbid 100 protein moderate blood, large LE, WBC 2921 significant for UTI, per documentation patient has experienced dysuria  -CXR clear  -will order ceftriaxone and vancomycin  -monitor BP, f/u repeat lactic acid.    Problem/Plan - 2:  ·  Problem: Hyponatremia.   ·  Plan: -Na 124  -likely iatrogenic given patient was on D5 1/2 NS and free water flushes at facility  -will hold D5 NS and free water flushes  -f/u urine sodium urine osm, serum osm  -monitor BMP q8h.    Problem/Plan - 3:  ·  Problem: Anemia.   ·  Plan: -Hgb 6.6  -s/p 1u PRBC in ED, f/u repeat CBC, monitor CBC q8h  -f/u iron, ferritin, TIBC  -continue home iron  -monitor CBC, maintain active type and screen, transfuse for Hgb<7.0  -patient with hx hematoma following initial PEG placement, f/u CT A/P consider expansion.    Problem/Plan - 4:  ·  Problem: Alzheimers disease.   ·  Plan: -would obtain collateral on baseline mental status, attempted to call emergency contact tomeka mendozamail was full  -patient is alert, conversant, although A&Ox1 believes she is in [facility].    Problem/Plan - 5:  ·  Problem: Multiple myeloma.   ·  Plan: -patient previously on revlimid per documentation       Problem/Plan - 6:  ·  Problem: Rheumatoid arthritis.   ·  Plan: -patient with ulnar deviation of b/l UE digits, documented RA  -would obtain records.    Problem/Plan - 7:  ·  Problem: Chronic cutaneous venous stasis ulcer.   ·  Plan: -patient with b/l LE ulcers, in setting of chronic venous stasis  -f/u wound care eval.    Problem/Plan - 8:  ·  Problem: History of esophagitis.   ·  Plan: -patient with grade B esophagitis on EGD 8/2023, also with Hill grade 3 gastroesophageal flap, patient was pending ?outpatient w/u with EUS  -home omeprazole not on formulary will order pantoprazole daily.    Problem/Plan - 9:  ·  Problem: Need for prophylactic measure.   ·  Plan: -DVT ppx: patient with documented hx hematoma after initial PEG placement, SCD for now, will hold pharmacologic ppx  -Diet: f/u nutrition consult for tube feeds, patient on Jevity at facility.    -9/23/2023 ID and IR note and consult appreciated due to positive blood culture recommended to remove right upper port   her antibiotic was changed to Ancef grams every 8 hours as per ID recommendation  -9/24/2023 condition stable waiting for removal of right upper chest sided port  -9/25/2023 right-sided chest port was removed by interventional radiology condition stable continue Ancef 2 g every 8 hours  -9/26/2023 continue Ancef 2 g with Hours for the next 4 weeks patient needs PICC line/midline after negative blood culture echo result noted mild aortic stenosis and severe MR no other  invasivel intervention need to be done due to her general condition and terminal dementia  -9/27/2023 blood culture from 9/25 /2023 Negative Pl. PICC line in a.m. and discharge plan discussed with a ACP  Team discussed with ID about the plan  9/28/2023 D/C back to SNF   with midline  to complet IV antibiotic    -9/30/2023 patient with right upper extremity DVT due to midline start on heparin continue Ancef 2 g every 8 hours discussed with AC P Team   -10/1/2023 condition improving with start  Eliquis in a.m. duration usually between 3-6 months will ask vascular recommendation  10/2/2023   continue  IV Ancef   ID follow-up      ADVANCED DIRECTIVE:    DISPOSITION:

## 2023-10-03 LAB
ANION GAP SERPL CALC-SCNC: 7 MMOL/L — SIGNIFICANT CHANGE UP (ref 7–14)
ANION GAP SERPL CALC-SCNC: 9 MMOL/L — SIGNIFICANT CHANGE UP (ref 7–14)
APTT BLD: 80.5 SEC — HIGH (ref 24.5–35.6)
APTT BLD: 90 SEC — HIGH (ref 24.5–35.6)
BLD GP AB SCN SERPL QL: NEGATIVE — SIGNIFICANT CHANGE UP
BUN SERPL-MCNC: 14 MG/DL — SIGNIFICANT CHANGE UP (ref 7–23)
BUN SERPL-MCNC: 14 MG/DL — SIGNIFICANT CHANGE UP (ref 7–23)
CALCIUM SERPL-MCNC: 8.3 MG/DL — LOW (ref 8.4–10.5)
CALCIUM SERPL-MCNC: 8.5 MG/DL — SIGNIFICANT CHANGE UP (ref 8.4–10.5)
CHLORIDE SERPL-SCNC: 99 MMOL/L — SIGNIFICANT CHANGE UP (ref 98–107)
CHLORIDE SERPL-SCNC: 99 MMOL/L — SIGNIFICANT CHANGE UP (ref 98–107)
CO2 SERPL-SCNC: 26 MMOL/L — SIGNIFICANT CHANGE UP (ref 22–31)
CO2 SERPL-SCNC: 28 MMOL/L — SIGNIFICANT CHANGE UP (ref 22–31)
CREAT SERPL-MCNC: 0.43 MG/DL — LOW (ref 0.5–1.3)
CREAT SERPL-MCNC: 0.47 MG/DL — LOW (ref 0.5–1.3)
EGFR: 100 ML/MIN/1.73M2 — SIGNIFICANT CHANGE UP
EGFR: 98 ML/MIN/1.73M2 — SIGNIFICANT CHANGE UP
GLUCOSE BLDC GLUCOMTR-MCNC: 114 MG/DL — HIGH (ref 70–99)
GLUCOSE BLDC GLUCOMTR-MCNC: 135 MG/DL — HIGH (ref 70–99)
GLUCOSE BLDC GLUCOMTR-MCNC: 145 MG/DL — HIGH (ref 70–99)
GLUCOSE SERPL-MCNC: 105 MG/DL — HIGH (ref 70–99)
GLUCOSE SERPL-MCNC: 112 MG/DL — HIGH (ref 70–99)
HCT VFR BLD CALC: 19.6 % — CRITICAL LOW (ref 34.5–45)
HCT VFR BLD CALC: 22.9 % — LOW (ref 34.5–45)
HCT VFR BLD CALC: 28.9 % — LOW (ref 34.5–45)
HGB BLD-MCNC: 6 G/DL — CRITICAL LOW (ref 11.5–15.5)
HGB BLD-MCNC: 7.1 G/DL — LOW (ref 11.5–15.5)
HGB BLD-MCNC: 8.9 G/DL — LOW (ref 11.5–15.5)
MAGNESIUM SERPL-MCNC: 1.7 MG/DL — SIGNIFICANT CHANGE UP (ref 1.6–2.6)
MAGNESIUM SERPL-MCNC: 2.1 MG/DL — SIGNIFICANT CHANGE UP (ref 1.6–2.6)
MAGNESIUM SERPL-MCNC: 2.2 MG/DL — SIGNIFICANT CHANGE UP (ref 1.6–2.6)
MCHC RBC-ENTMCNC: 22.6 PG — LOW (ref 27–34)
MCHC RBC-ENTMCNC: 22.7 PG — LOW (ref 27–34)
MCHC RBC-ENTMCNC: 22.8 PG — LOW (ref 27–34)
MCHC RBC-ENTMCNC: 30.6 GM/DL — LOW (ref 32–36)
MCHC RBC-ENTMCNC: 30.8 GM/DL — LOW (ref 32–36)
MCHC RBC-ENTMCNC: 31 GM/DL — LOW (ref 32–36)
MCV RBC AUTO: 73.4 FL — LOW (ref 80–100)
MCV RBC AUTO: 73.6 FL — LOW (ref 80–100)
MCV RBC AUTO: 74.2 FL — LOW (ref 80–100)
NRBC # BLD: 0 /100 WBCS — SIGNIFICANT CHANGE UP (ref 0–0)
NRBC # FLD: 0 K/UL — SIGNIFICANT CHANGE UP (ref 0–0)
PHOSPHATE SERPL-MCNC: 1.7 MG/DL — LOW (ref 2.5–4.5)
PHOSPHATE SERPL-MCNC: 2.3 MG/DL — LOW (ref 2.5–4.5)
PHOSPHATE SERPL-MCNC: 2.5 MG/DL — SIGNIFICANT CHANGE UP (ref 2.5–4.5)
PLATELET # BLD AUTO: 243 K/UL — SIGNIFICANT CHANGE UP (ref 150–400)
PLATELET # BLD AUTO: 342 K/UL — SIGNIFICANT CHANGE UP (ref 150–400)
PLATELET # BLD AUTO: 380 K/UL — SIGNIFICANT CHANGE UP (ref 150–400)
POTASSIUM SERPL-MCNC: 3.8 MMOL/L — SIGNIFICANT CHANGE UP (ref 3.5–5.3)
POTASSIUM SERPL-MCNC: 4.2 MMOL/L — SIGNIFICANT CHANGE UP (ref 3.5–5.3)
POTASSIUM SERPL-SCNC: 3.8 MMOL/L — SIGNIFICANT CHANGE UP (ref 3.5–5.3)
POTASSIUM SERPL-SCNC: 4.2 MMOL/L — SIGNIFICANT CHANGE UP (ref 3.5–5.3)
RBC # BLD: 2.64 M/UL — LOW (ref 3.8–5.2)
RBC # BLD: 3.11 M/UL — LOW (ref 3.8–5.2)
RBC # BLD: 3.94 M/UL — SIGNIFICANT CHANGE UP (ref 3.8–5.2)
RBC # FLD: 26.5 % — HIGH (ref 10.3–14.5)
RBC # FLD: 27.1 % — HIGH (ref 10.3–14.5)
RBC # FLD: 27.5 % — HIGH (ref 10.3–14.5)
RH IG SCN BLD-IMP: POSITIVE — SIGNIFICANT CHANGE UP
SODIUM SERPL-SCNC: 134 MMOL/L — LOW (ref 135–145)
SODIUM SERPL-SCNC: 134 MMOL/L — LOW (ref 135–145)
WBC # BLD: 4.96 K/UL — SIGNIFICANT CHANGE UP (ref 3.8–10.5)
WBC # BLD: 8.57 K/UL — SIGNIFICANT CHANGE UP (ref 3.8–10.5)
WBC # BLD: 9.12 K/UL — SIGNIFICANT CHANGE UP (ref 3.8–10.5)
WBC # FLD AUTO: 4.96 K/UL — SIGNIFICANT CHANGE UP (ref 3.8–10.5)
WBC # FLD AUTO: 8.57 K/UL — SIGNIFICANT CHANGE UP (ref 3.8–10.5)
WBC # FLD AUTO: 9.12 K/UL — SIGNIFICANT CHANGE UP (ref 3.8–10.5)

## 2023-10-03 PROCEDURE — 86078 PHYS BLOOD BANK SERV REACTJ: CPT

## 2023-10-03 PROCEDURE — 99232 SBSQ HOSP IP/OBS MODERATE 35: CPT

## 2023-10-03 RX ORDER — ACETAMINOPHEN 500 MG
700 TABLET ORAL ONCE
Refills: 0 | Status: COMPLETED | OUTPATIENT
Start: 2023-10-03 | End: 2023-10-03

## 2023-10-03 RX ADMIN — Medication 100 MILLIGRAM(S): at 05:19

## 2023-10-03 RX ADMIN — HEPARIN SODIUM 1200 UNIT(S)/HR: 5000 INJECTION INTRAVENOUS; SUBCUTANEOUS at 07:25

## 2023-10-03 RX ADMIN — Medication 700 MILLIGRAM(S): at 19:31

## 2023-10-03 RX ADMIN — Medication 100 MILLIGRAM(S): at 21:22

## 2023-10-03 RX ADMIN — Medication 280 MILLIGRAM(S): at 18:47

## 2023-10-03 RX ADMIN — Medication 100 MILLIGRAM(S): at 15:49

## 2023-10-03 RX ADMIN — Medication 220 MILLIGRAM(S): at 05:04

## 2023-10-03 RX ADMIN — Medication 1 APPLICATION(S): at 05:20

## 2023-10-03 RX ADMIN — HEPARIN SODIUM 1200 UNIT(S)/HR: 5000 INJECTION INTRAVENOUS; SUBCUTANEOUS at 00:46

## 2023-10-03 RX ADMIN — Medication 1 APPLICATION(S): at 17:12

## 2023-10-03 NOTE — PROGRESS NOTE ADULT - SUBJECTIVE AND OBJECTIVE BOX
CHIEF COMPLAINT:    SUBJECTIVE:     REVIEW OF SYSTEMS:    CONSTITUTIONAL: (  )  weakness,  (  ) fevers or chills  EYES/ENT: (  )visual changes;     NECK: (  ) pain or stiffness  RESPIRATORY:   (  )cough, wheezing, hemoptysis;  (  ) shortness of breath  CARDIOVASCULAR:  (  )chest pain or palpitations  GASTROINTESTINAL:   (  )abdominal or epigastric pain.  (  ) nausea, vomiting, or hematemesis;   (   ) diarrhea or constipation.   GENITOURINARY:   (    ) dysuria, frequency or hematuria  NEUROLOGICAL:  (   ) numbness or weakness   All other review of systems is negative unless indicated above    Vital Signs Last 24 Hrs  T(C): 37.2 (03 Oct 2023 05:00), Max: 37.2 (02 Oct 2023 21:30)  T(F): 98.9 (03 Oct 2023 05:00), Max: 98.9 (02 Oct 2023 21:30)  HR: 102 (03 Oct 2023 05:00) (83 - 102)  BP: 116/62 (03 Oct 2023 05:00) (116/62 - 125/70)  BP(mean): --  RR: 17 (03 Oct 2023 05:00) (17 - 17)  SpO2: 100% (03 Oct 2023 05:00) (100% - 100%)    Parameters below as of 03 Oct 2023 05:00  Patient On (Oxygen Delivery Method): room air        I&O's Summary      CAPILLARY BLOOD GLUCOSE      POCT Blood Glucose.: 135 mg/dL (03 Oct 2023 06:56)  POCT Blood Glucose.: 113 mg/dL (02 Oct 2023 23:56)  POCT Blood Glucose.: 108 mg/dL (02 Oct 2023 18:02)  POCT Blood Glucose.: 82 mg/dL (02 Oct 2023 12:23)      PHYSICAL EXAM:    Constitutional:  (   ) NAD,   (   )awake and alert  HEENT: PERR, EOMI,    Neck: Soft and supple, No LAD, No JVD  Respiratory:  (    Breath sounds are clear bilaterally,    (   ) wheezing, rales or rhonchi  Cardiovascular:     (   )S1 and S2, regular rate and rhythm, no Murmurs, gallops or rubs  Gastrointestinal:  (   )Bowel Sounds present, soft,   (  )nontender, nondistended,    Extremities:    (  ) peripheral edema  Vascular: 2+ peripheral pulses  Neurological:    (    )A/O x 3,   (  ) focal deficits  Musculoskeletal:    (   )  normal strength b/l upper  (     ) normal  lower extremities  Skin: No rashes    MEDICATIONS:  MEDICATIONS  (STANDING):  ascorbic acid 500 milliGRAM(s) Oral daily  ceFAZolin   IVPB 2000 milliGRAM(s) IV Intermittent every 8 hours  ferrous    sulfate Liquid 220 milliGRAM(s) Oral every 12 hours  heparin  Infusion.  Unit(s)/Hr (9 mL/Hr) IV Continuous <Continuous>  magnesium hydroxide Suspension 5 milliLiter(s) Oral daily  mirtazapine 15 milliGRAM(s) Oral at bedtime  pantoprazole   Suspension 40 milliGRAM(s) Oral daily  silver sulfADIAZINE 1% Cream 1 Application(s) Topical every 12 hours  viokace 63071 units 1 Tablet(s) 1 Tablet(s) Enteral Tube once      LABS: All Labs Reviewed:                        6.0    4.96  )-----------( 243      ( 03 Oct 2023 07:00 )             19.6     10-02    127<L>  |  86<L>  |  11  ----------------------------<  391<H>  5.0   |  19<L>  |  0.40<L>    Ca    6.9<L>      02 Oct 2023 05:35  Phos  1.7     10-03  Mg     1.70     10-03      PTT - ( 03 Oct 2023 05:36 )  PTT:90.0 sec      Blood Culture: 09-29 @ 19:04  Organism --  Gram Stain Blood -- Gram Stain --  Specimen Source .Blood Blood-Peripheral  Culture-Blood --      Urine Culture      RADIOLOGY/EKG:    ASSESSMENT AND PLAN:    DVT PPX:    ADVANCED DIRECTIVE:    DISPOSITION: CHIEF COMPLAINT:  patient laying in the bed awake and verbal asking when she can go back to nursing home she was seen with the nursing team difficult access for blood test need blood to be done due to anemia will be discontinue heparin due to multiple ecchymotic lesion discussed with ACP Team  SUBJECTIVE:     REVIEW OF SYSTEMS:    CONSTITUTIONAL: ( x )  weakness,  (  ) fevers or chills  EYES/ENT: (  )visual changes;     NECK: (  ) pain or stiffness  RESPIRATORY:   (  )cough, wheezing, hemoptysis;  (  ) shortness of breath  CARDIOVASCULAR:  (  )chest pain or palpitations  GASTROINTESTINAL:   (  )abdominal or epigastric pain.  (  ) nausea, vomiting, or hematemesis;   (   ) diarrhea or constipation.   GENITOURINARY:   (    ) dysuria, frequency or hematuria  NEUROLOGICAL:  (   ) numbness or weakness   All other review of systems is negative unless indicated above    Vital Signs Last 24 Hrs  T(C): 37.2 (03 Oct 2023 05:00), Max: 37.2 (02 Oct 2023 21:30)  T(F): 98.9 (03 Oct 2023 05:00), Max: 98.9 (02 Oct 2023 21:30)  HR: 102 (03 Oct 2023 05:00) (83 - 102)  BP: 116/62 (03 Oct 2023 05:00) (116/62 - 125/70)  BP(mean): --  RR: 17 (03 Oct 2023 05:00) (17 - 17)  SpO2: 100% (03 Oct 2023 05:00) (100% - 100%)    Parameters below as of 03 Oct 2023 05:00  Patient On (Oxygen Delivery Method): room air        I&O's Summary      CAPILLARY BLOOD GLUCOSE      POCT Blood Glucose.: 135 mg/dL (03 Oct 2023 06:56)  POCT Blood Glucose.: 113 mg/dL (02 Oct 2023 23:56)  POCT Blood Glucose.: 108 mg/dL (02 Oct 2023 18:02)  POCT Blood Glucose.: 82 mg/dL (02 Oct 2023 12:23)      PHYSICAL EXAM:    Constitutional:  ( x  ) NAD,   ( x  )awake and  verbal  HEENT: PERR, EOMI,    Neck: Soft and supple, No LAD, No JVD  Respiratory:  (  x  Breath sounds are clear bilaterally,    (   ) wheezing, rales or rhonchi  Cardiovascular:     (  x )S1 and S2, regular rate and rhythm, no Murmurs, gallops or rubs  Gastrointestinal:  (  x )Bowel Sounds present, soft,   (  )nontender, nondistended, + peg   Extremities:     multiple ecchymotic area in upper and lower extremity  Vascular: 2+ peripheral pulses  Neurological:    (  x  )A/O x  1,   (  ) focal deficits  Musculoskeletal:    (   )  normal strength b/l upper  (     ) normal  lower extremities  Skin:  multiple ecchymotic area in upper and lower extremity    MEDICATIONS:  MEDICATIONS  (STANDING):  ascorbic acid 500 milliGRAM(s) Oral daily  ceFAZolin   IVPB 2000 milliGRAM(s) IV Intermittent every 8 hours  ferrous    sulfate Liquid 220 milliGRAM(s) Oral every 12 hours  heparin  Infusion.  Unit(s)/Hr (9 mL/Hr) IV Continuous <Continuous>  magnesium hydroxide Suspension 5 milliLiter(s) Oral daily  mirtazapine 15 milliGRAM(s) Oral at bedtime  pantoprazole   Suspension 40 milliGRAM(s) Oral daily  silver sulfADIAZINE 1% Cream 1 Application(s) Topical every 12 hours  viokace 15619 units 1 Tablet(s) 1 Tablet(s) Enteral Tube once      LABS: All Labs Reviewed:                        6.0    4.96  )-----------( 243      ( 03 Oct 2023 07:00 )             19.6     10-02    127<L>  |  86<L>  |  11  ----------------------------<  391<H>  5.0   |  19<L>  |  0.40<L>    Ca    6.9<L>      02 Oct 2023 05:35  Phos  1.7     10-03  Mg     1.70     10-03      PTT - ( 03 Oct 2023 05:36 )  PTT:90.0 sec      Blood Culture: 09-29 @ 19:04  Organism --  Gram Stain Blood -- Gram Stain --  Specimen Source .Blood Blood-Peripheral  Culture-Blood --      Urine Culture      RADIOLOGY/EKG:    ASSESSMENT AND PLAN:  79 yo F PMH Alzheimer's dementia s/p PEG 8/2023, GE flap valve, grade B esophagitis, Multiple Myeloma (previously on Revlimid), cataracts, chronic venous ulcers b/l LE, OA knee, HTN, primary pulmonary hypertension, MDD sent from NH given hypotension documented at 87/56. Unable to obtain reliable history although per documentation patient has experienced cough, dysuria, and abdominal pain. Labs significant for anemia to 6.2 s/p PRBC without active bleeding and Na 124 likely iatrogenic as patient was on D5 1/2 NS and standing free water flushes at nursing home. Patient with fevers (T 100.3) and leukocytosis to 13.3 concerning for sepsis likely 2/2 urinary source - alternatively consider lower extremity wounds. Patient with hx hematoma following initial PEG placement, would f/u CT abdomen r/o expansion.      Problem/Plan - 1:  ·  Problem: Sepsis.   ·  Plan: -patient with fever to 100.3, leukocytosis to 13.3, hypotension 87/56-->92/56 concerning for sepsis 2/2 urinary source, less likely in setting of lower extremity wounds, lactic acid 2.4  -s/p vanc and zosyn in the ED, s/p 1L LR  -f/u BCx, UCx, UA turbid 100 protein moderate blood, large LE, WBC 2921 significant for UTI, per documentation patient has experienced dysuria  -CXR clear  -will order ceftriaxone and vancomycin  -monitor BP, f/u repeat lactic acid.    Problem/Plan - 2:  ·  Problem: Hyponatremia.   ·  Plan: -Na 124  -likely iatrogenic given patient was on D5 1/2 NS and free water flushes at facility  -will hold D5 NS and free water flushes  -f/u urine sodium urine osm, serum osm  -monitor BMP q8h.    Problem/Plan - 3:  ·  Problem: Anemia.   ·  Plan: -Hgb 6.6  -s/p 1u PRBC in ED, f/u repeat CBC, monitor CBC q8h  -f/u iron, ferritin, TIBC  -continue home iron  -monitor CBC, maintain active type and screen, transfuse for Hgb<7.0  -patient with hx hematoma following initial PEG placement, f/u CT A/P consider expansion.    Problem/Plan - 4:  ·  Problem: Alzheimers disease.   ·  Plan: -would obtain collateral on baseline mental status, attempted to call emergency contact tomeka voicemail was full  -patient is alert, conversant, although A&Ox1 believes she is in [facility].    Problem/Plan - 5:  ·  Problem: Multiple myeloma.   ·  Plan: -patient previously on revlimid per documentation       Problem/Plan - 6:  ·  Problem: Rheumatoid arthritis.   ·  Plan: -patient with ulnar deviation of b/l UE digits, documented RA  -would obtain records.    Problem/Plan - 7:  ·  Problem: Chronic cutaneous venous stasis ulcer.   ·  Plan: -patient with b/l LE ulcers, in setting of chronic venous stasis  -f/u wound care eval.    Problem/Plan - 8:  ·  Problem: History of esophagitis.   ·  Plan: -patient with grade B esophagitis on EGD 8/2023, also with Hill grade 3 gastroesophageal flap, patient was pending ?outpatient w/u with EUS  -home omeprazole not on formulary will order pantoprazole daily.    Problem/Plan - 9:  ·  Problem: Need for prophylactic measure.   ·  Plan: -DVT ppx: patient with documented hx hematoma after initial PEG placement, SCD for now, will hold pharmacologic ppx  -Diet: f/u nutrition consult for tube feeds, patient on Jevity at facility.    -9/23/2023 ID and IR note and consult appreciated due to positive blood culture recommended to remove right upper port   her antibiotic was changed to Ancef grams every 8 hours as per ID recommendation  -9/24/2023 condition stable waiting for removal of right upper chest sided port  -9/25/2023 right-sided chest port was removed by interventional radiology condition stable continue Ancef 2 g every 8 hours  -9/26/2023 continue Ancef 2 g with Hours for the next 4 weeks patient needs PICC line/midline after negative blood culture echo result noted mild aortic stenosis and severe MR no other  invasivel intervention need to be done due to her general condition and terminal dementia  -9/27/2023 blood culture from 9/25 /2023 Negative Pl. PICC line in a.m. and discharge plan discussed with a ACP  Team discussed with ID about the plan  9/28/2023 D/C back to SNF   with midline  to complet IV antibiotic    -9/30/2023 patient with right upper extremity DVT due to midline start on heparin continue Ancef 2 g every 8 hours discussed with AC P Team   -10/1/2023 condition improving with start  Eliquis in a.m. duration usually between 3-6 months will ask vascular recommendation  10/2/2023   continue  IV Ancef   ID follow-up    -10/3/2023 discontinue heparin due to low hemoglobin and multiple ecchymotic area when asked to be seen by vascular for follow-up and hematology if she has persistent low hemoglobin  DVT PPX:    ADVANCED DIRECTIVE:    DISPOSITION:

## 2023-10-03 NOTE — CHART NOTE - NSCHARTNOTEFT_GEN_A_CORE
episodes of diarrhea - nutrition consulted for possible change in feeds     h&h decreased this am -> repeat slight improvement, vitals stable, pt is on heparin gtt for dvt prophylaxis gtt d/c -> transfusion ordered events of 10/3     was on heparin gtt for RUE DVT - h&h decreased -> 10/2 8.8/29-> 10/3 6/19.6 repeat 7.1/22.9 -> 1 unit prbc ordered received about 50cc when HR increased to 130 sinus tach, temp 101, talking to providers -> possible transfusion reaction.  transfusion reaction protocol followed.  tylenol IV ordered, repeat labs ordered     notified may be bleeding from peg tube.    made npo, peg tube clamped, when unclamped tube able to poor contents into cup - does not have any odor - does not smell like bile and or melena.  awaiting repeat labs    short run of PAT's hr high 170's events of 10/3     was on heparin gtt for RUE DVT - h&h decreased -> 10/2 8.8/29-> 10/3 6/19.6 repeat 7.1/22.9 -> 1 unit prbc ordered received about 50cc when HR increased to 130 sinus tach, temp 101, talking to providers -> possible transfusion reaction.  transfusion reaction protocol followed.  tylenol IV ordered, repeat labs ordered, vitals changed to q4    notified may be bleeding from peg tube.    made npo, peg tube clamped, when unclamped tube able to poor contents into cup - does not have any odor - does not smell like bile and or melena.  awaiting repeat labs    short run of PAT's hr high 170's    discussed above with dr caballero pt has hx of MM requesting heme consult events of 10/3     was on heparin gtt for RUE DVT - h&h decreased -> 10/2 8.8/29-> 10/3 6/19.6 repeat 7.1/22.9 -> 1 unit prbc ordered received about 50cc when HR increased to 130 sinus tach, temp 101, bp stable, talking to providers -> possible transfusion reaction.  transfusion reaction protocol followed.  tylenol IV ordered, repeat labs ordered, vitals changed to q4    notified may be bleeding from peg tube.    made npo, peg tube clamped, when unclamped tube able to poor contents into cup - does not have any odor - does not smell like bile and or melena.  awaiting repeat labs    short run of PAT's hr high 170's    discussed above with dr caballero pt has hx of MM requesting heme consult - awaiting call back

## 2023-10-03 NOTE — PROGRESS NOTE ADULT - SUBJECTIVE AND OBJECTIVE BOX
Follow Up:      Interval History/ROS:   feels ok    right arm swelling better     DVT  +      s/p transfusion         Allergies  No Known Allergies        ANTIMICROBIALS: ceFAZolin   IVPB 2000 every 8 hours      MEDICATIONS  (STANDING):  acetaminophen   Oral Liquid .. 650 every 6 hours PRN  heparin   Injectable 2000 every 6 hours PRN  heparin   Injectable 4000 every 6 hours PRN  heparin  Infusion.  <Continuous>  magnesium hydroxide Suspension 5 daily  mirtazapine 15 at bedtime  pantoprazole   Suspension 40 daily    Vital Signs Last 24 Hrs  T(F): 100.5 (10-03-23 @ 17:11), Max: 100.5 (10-03-23 @ 17:11)  HR: 96 (10-03-23 @ 17:11)  BP: 134/85 (10-03-23 @ 17:11)  RR: 17 (10-03-23 @ 17:11)  SpO2: 94% (10-03-23 @ 10:20) (94% - 100%)    PHYSICAL EXAM:  Constitutional: Not in acute distress, frail, verbal   Eyes: No icterus.  Oral cavity: Clear, no lesions  RS: no respiratory distress RA  CVS: s1s2  Extremities: arthritic changes  few bruises   right upper arm no swelling or erythema                             8.9    8.57  )-----------( 342      ( 03 Oct 2023 16:30 )             28.9 10-03    134  |  99  |  14  ----------------------------<  112  3.8   |  26  |  0.43  Ca    8.5      03 Oct 2023 16:30Phos  2.3     10-03Mg     2.10     10-03              MICROBIOLOGY:      cuCulture - Blood (09.29.23 @ 19:04)   Specimen Source: .Blood Blood-Venous  Culture Results:   No growth Culture - Blood (09.29.23 @ 19:04)   Specimen Source: .Blood Blood-Peripheral  Culture Results:   No growth     .Blood Blood-Peripheral  09-25-23   No growth at 5 days  --  --      .Blood Blood-Venous  09-25-23   No growth at 5 days   --  --      .Blood Blood-Peripheral  09-23-23   No growth at 5 days   --  --      .Blood Blood-Venous  09-23-23   Growth in anaerobic bottle: Staphylococcus aureus  See previous culture [id number]  --    Growth in anaerobic bottle: Gram Positive Cocci in Clusters      Clean Catch Clean Catch (Midstream)  09-21-23   >100,000 CFU/ml Staphylococcus aureus  --  Staphylococcus aureus      .Blood Blood-Peripheral  09-21-23   Growth in aerobic bottle: Staphylococcus aureus  Growth in anaerobic bottle: Staphylococcus capitis  Coagulase Negative Staphylococci isolated from a single blood culture set  may represent contamination.  Contact the Microbiology Department at [phone] if susceptibility  testing is clinically indicated.  Direct identification is available within approximately 3-5  hours either by Blood Panel Multiplexed PCR or Direct  MALDI-TOF. Details: [email or web address]  --  Blood Culture PCR  Staphylococcus aureus      .Blood Blood-Peripheral  09-21-23   Growth in aerobic and anaerobic bottles: Staphylococcus aureus  See previous culture [id number]  --    Growth in aerobic bottle: Gram Positive Cocci in Clusters  Growth in anaerobic bottle: Gram Positive Cocci in Clusters        RADIOLOGY:    ra< from: Xray Chest 1 View- PORTABLE-Urgent (Xray Chest 1 View- PORTABLE-Urgent .) (09.23.23 @ 14:29) >  IMPRESSION:    No radiographic evidence of acute cardiopulmonary disease.    --- End of Report ---          RICK CHANDLER DO; Resident Radiologist  This document has been electronically signed.  WALTER HALE MD; Attending Radiologist  This document has been electronically signed. Sep 24 20    < end of copied text >  < from: CT Abdomen and Pelvis No Cont (09.23.23 @ 08:57) >    IMPRESSION:  No abdominal wall hematoma or subcutaneous soft tissue swelling at the   PEG tube insertion site.    No hematoma elsewhere in the abdomen or pelvis.    Ascending and descending thoracic aortic dilation.        --- End of Report ---    < end of copied text >      < from: TTE W or WO Ultrasound Enhancing Agent (09.26.23 @ 12:08) >    TRANSTHORACIC ECHOCARDIOGRAM REPORT  ________________________________________________________________________________                                      _______       Pt. Name:       MAXIMILIANO MOOREAREZ Study Date:    9/26/2023  MRN:            [id number]     YOB: 1945  Accession #:    [id number]     Age:           78 years  Account#:       [id number]      Gender:        F  Heart Rate:                   Height:        67.00 in (170.18 cm)  Rhythm:                       Weight:        103.00 lb (46.72 kg)  Blood Pressure: 140/75 mmHg   BSA/BMI:       1.53 m² / 16.13 kg/m²  ________________________________________________________________________________________  Referring Physician:    [id number] Linda Vasques  Interpreting Physician: Chelsea Valentino  Primary Sonographer:    Clementine Delaney CS    CPT:               ECHO TTE WO CON COMP W DOPP - 59686.m  Indication(s):     Abnormal electrocardiogram ECG/EKG - R94.31  Procedure:         Transthoracic echocardiogram with 2-D, M-mode and complete                     spectral and color flow Doppler.  Ordering Location: [facility]  Admission Status:  Inpatient  Study Information: Image quality for this study is good.    _______________________________________________________________________________________     CONCLUSIONS:      1. The left ventricular cavity is normal size. Left ventricular wall thickness is normal. Left ventricular systolic function is normal with an ejection fraction of 67 % by Millan's method of disks.   2.Right ventricular cavity is normal in size, normal wall thickness and normal systolic function.   3. The aortic valve is tricuspid with normal structure without stenosis with reduced systolic excursion. There is calcification of the aortic valve leaflets. There is moderate aortic stenosis. The peak transaortic velocity is 1.98 m/s, peak transaortic gradient is 15.7 mmHg and mean transaortic gradient is 8.0 mmHg with an LVOT/aortic valve VTI ratio of 0.44. The aortic valve area is estimated at 1.38 cm² by the continuity equation. There is mild aortic regurgitation.   4. Structurally normal mitral valve with normal leaflet excursion. There is calcification of the mitral valve annulus. There is moderate to severe mitral regurgitation.   5. Estimated pulmonary artery systolic pressure is 48 mmHg.   6. No pericardial effusion seen.    _________________________________________________    < end of copied text >  < from: TTE W or WO Ultrasound Enhancing Agent (09.26.23 @ 12:08) >    TRANSTHORACIC ECHOCARDIOGRAM REPORT  ________________________________________________________________________________                                      _______       Pt. Name:       MAXIMILIANO ESTEVEZ Study Date:    9/26/2023  MRN:            [id number]     YOB: 1945  Accession #:    [id number]     Age:           78 years  Account#:       [id number]      Gender:        F  Heart Rate:                   Height:        67.00 in (170.18 cm)  Rhythm:                       Weight:        103.00 lb (46.72 kg)  Blood Pressure: 140/75 mmHg   BSA/BMI:       1.53 m² / 16.13 kg/m²  ________________________________________________________________________________________  Referring Physician:    [id number] Linda Vasques  Interpreting Physician: Chelsea Valentino  Primary Sonographer:    Clementine Delaney [facility]    CPT:               ECHO TTE WO CON COMP W DOPP - 79369.m  Indication(s):     Abnormal electrocardiogram ECG/EKG - R94.31  Procedure:         Transthoracic echocardiogram with 2-D, M-mode and complete                     spectral and color flow Doppler.  Ordering Location: [facility]  Admission Status:  Inpatient  Study Information: Image quality for this study is good.    _______________________________________________________________________________________     CONCLUSIONS:      1. The left ventricular cavity is normal size. Left ventricular wall thickness is normal. Left ventricular systolic function is normal with an ejection fraction of 67 % by Millan's method of disks.   2.Right ventricular cavity is normal in size, normal wall thickness and normal systolic function.   3. The aortic valve is tricuspid with normal structure without stenosis with reduced systolic excursion. There is calcification of the aortic valve leaflets. There is moderate aortic stenosis. The peak transaortic velocity is 1.98 m/s, peak transaortic gradient is 15.7 mmHg and mean transaortic gradient is 8.0 mmHg with an LVOT/aortic valve VTI ratio of 0.44. The aortic valve area is estimated at 1.38 cm² by the continuity equation. There is mild aortic regurgitation.   4. Structurally normal mitral valve with normal leaflet excursion. There is calcification of the mitral valve annulus. There is moderate to severe mitral regurgitation.   5. Estimated pulmonary artery systolic pressure is 48 mmHg.   6. No pericardial effusion seen.    _________________________________________________    < end of copied text >

## 2023-10-04 LAB
ANION GAP SERPL CALC-SCNC: 8 MMOL/L — SIGNIFICANT CHANGE UP (ref 7–14)
BUN SERPL-MCNC: 17 MG/DL — SIGNIFICANT CHANGE UP (ref 7–23)
CALCIUM SERPL-MCNC: 8.2 MG/DL — LOW (ref 8.4–10.5)
CHLORIDE SERPL-SCNC: 98 MMOL/L — SIGNIFICANT CHANGE UP (ref 98–107)
CO2 SERPL-SCNC: 28 MMOL/L — SIGNIFICANT CHANGE UP (ref 22–31)
CREAT SERPL-MCNC: 0.42 MG/DL — LOW (ref 0.5–1.3)
EGFR: 101 ML/MIN/1.73M2 — SIGNIFICANT CHANGE UP
GLUCOSE BLDC GLUCOMTR-MCNC: 100 MG/DL — HIGH (ref 70–99)
GLUCOSE BLDC GLUCOMTR-MCNC: 106 MG/DL — HIGH (ref 70–99)
GLUCOSE BLDC GLUCOMTR-MCNC: 81 MG/DL — SIGNIFICANT CHANGE UP (ref 70–99)
GLUCOSE BLDC GLUCOMTR-MCNC: 85 MG/DL — SIGNIFICANT CHANGE UP (ref 70–99)
GLUCOSE BLDC GLUCOMTR-MCNC: 92 MG/DL — SIGNIFICANT CHANGE UP (ref 70–99)
GLUCOSE SERPL-MCNC: 91 MG/DL — SIGNIFICANT CHANGE UP (ref 70–99)
HCT VFR BLD CALC: 26.2 % — LOW (ref 34.5–45)
HGB BLD-MCNC: 7.9 G/DL — LOW (ref 11.5–15.5)
MAGNESIUM SERPL-MCNC: 2.2 MG/DL — SIGNIFICANT CHANGE UP (ref 1.6–2.6)
MCHC RBC-ENTMCNC: 22.6 PG — LOW (ref 27–34)
MCHC RBC-ENTMCNC: 30.2 GM/DL — LOW (ref 32–36)
MCV RBC AUTO: 74.9 FL — LOW (ref 80–100)
NRBC # BLD: 0 /100 WBCS — SIGNIFICANT CHANGE UP (ref 0–0)
NRBC # FLD: 0 K/UL — SIGNIFICANT CHANGE UP (ref 0–0)
PHOSPHATE SERPL-MCNC: 2.9 MG/DL — SIGNIFICANT CHANGE UP (ref 2.5–4.5)
PLATELET # BLD AUTO: 318 K/UL — SIGNIFICANT CHANGE UP (ref 150–400)
POTASSIUM SERPL-MCNC: 4.3 MMOL/L — SIGNIFICANT CHANGE UP (ref 3.5–5.3)
POTASSIUM SERPL-SCNC: 4.3 MMOL/L — SIGNIFICANT CHANGE UP (ref 3.5–5.3)
RBC # BLD: 3.5 M/UL — LOW (ref 3.8–5.2)
RBC # FLD: 26.5 % — HIGH (ref 10.3–14.5)
SODIUM SERPL-SCNC: 134 MMOL/L — LOW (ref 135–145)
WBC # BLD: 6.88 K/UL — SIGNIFICANT CHANGE UP (ref 3.8–10.5)
WBC # FLD AUTO: 6.88 K/UL — SIGNIFICANT CHANGE UP (ref 3.8–10.5)

## 2023-10-04 RX ADMIN — Medication 100 MILLIGRAM(S): at 14:18

## 2023-10-04 RX ADMIN — Medication 100 MILLIGRAM(S): at 05:21

## 2023-10-04 RX ADMIN — Medication 1 APPLICATION(S): at 05:29

## 2023-10-04 RX ADMIN — Medication 1 APPLICATION(S): at 17:45

## 2023-10-04 RX ADMIN — MIRTAZAPINE 15 MILLIGRAM(S): 45 TABLET, ORALLY DISINTEGRATING ORAL at 22:23

## 2023-10-04 RX ADMIN — Medication 100 MILLIGRAM(S): at 22:22

## 2023-10-04 NOTE — CHART NOTE - NSCHARTNOTEFT_GEN_A_CORE
Heme consult placed via email. Will follow recs.    Howie Lim PA-C  Department of Medicine  Pager #[phone]

## 2023-10-04 NOTE — CHART NOTE - NSCHARTNOTEFT_GEN_A_CORE
Source: Patient A&Ox [1]  other [x] Comprehensive chart review     Medical Course: 79 yo F PMH Alzheimer's dementia s/p PEG 8/2023, GE flap valve, grade B esophagitis, Multiple Myeloma (previously on Revlimid), cataracts, chronic venous ulcers b/l LE, OA knee, HTN, primary pulmonary hypertension, MDD sent from NH given hypotension documented at 87/56. Unable to obtain reliable history although per documentation patient has experienced cough, dysuria, and abdominal pain. Labs significant for anemia to 6.2 s/p PRBC without active bleeding and Na 124 likely iatrogenic as patient was on D5 1/2 NS and standing free water flushes at nursing home. Patient with fevers (T 100.3) and leukocytosis to 13.3 concerning for sepsis likely 2/2 urinary source - alternatively consider lower extremity wounds. Patient with hx hematoma following initial PEG placement, would f/u CT abdomen r/o expansion.    Nutrition Course: Patient asleep at the time of visit. Pt with decreased cognition. AxOx1 as per RN flow sheet. Pt previously on TF via PEG Jevity 1.5. TF on hold at the time of visit. As per comprehensive chart review, pt noted with anemia H/H 6.0L/19.6L (10/3), s/p 2u PRBC for repletion. H/H now 7.9L/26.2L. As per Provider Contact Note on 10/3, Pt with blood tinged fluid pulled from PEG, TF on hold. Recommend consider GI consult. Recommend when medically feasible, resume TF Jevity 1.5 @ goal rate 50ml/hr x 24 hrs to provide total volume 1000ml/day, 1500cal, 64gm pro, and 760 free water which is meeting pt's needs.             Diet, NPO (10-03-23 @ 16:57)    Anthropometrics: Height (cm): 170.2 (09-22), 170.2 (08-04)  Weight (kg): 47 (09-23), 59 (08-04)  BMI (kg/m2): 16.2 (09-23), 20.4 (09-22), 20.4 (08-04)    Edema: As per wound note dated 9/28:  Pressure Injury 1: cocoyx towards left lower back, Suspected deep tissue injury  Pressure Injury 2: sacrum, Suspected deep tissue injury  Pressure Injury 3: right lateral midfoot, Suspected deep tissue injury    Pressure Injuries: None reported at present.     __________________ Pertinent Medications__________________   MEDICATIONS  (STANDING):  ascorbic acid 500 milliGRAM(s) Oral daily  ceFAZolin   IVPB 2000 milliGRAM(s) IV Intermittent every 8 hours  ferrous    sulfate Liquid 220 milliGRAM(s) Oral every 12 hours  magnesium hydroxide Suspension 5 milliLiter(s) Oral daily  mirtazapine 15 milliGRAM(s) Oral at bedtime  pantoprazole   Suspension 40 milliGRAM(s) Oral daily  silver sulfADIAZINE 1% Cream 1 Application(s) Topical every 12 hours  viokace 70011 units 1 Tablet(s) 1 Tablet(s) Enteral Tube once    MEDICATIONS  (PRN):  acetaminophen   Oral Liquid .. 650 milliGRAM(s) Enteral Tube every 6 hours PRN Temp greater or equal to 38C (100.4F), Mild Pain (1 - 3), Moderate Pain (4 - 6)      __________________ Pertinent Labs__________________   10-04 Na134 mmol/L<L> Glu 91 mg/dL K+ 4.3 mmol/L Cr  0.42 mg/dL<L> BUN 17 mg/dL 10-04 Phos 2.9 mg/dL 09-29 Alb 2.1 g/dL<L>        POCT Blood Glucose.: 92 mg/dL (10-04-23 @ 12:01)  POCT Blood Glucose.: 100 mg/dL (10-04-23 @ 06:56)  POCT Blood Glucose.: 106 mg/dL (10-04-23 @ 01:07)  POCT Blood Glucose.: 145 mg/dL (10-03-23 @ 17:54)  POCT Blood Glucose.: 114 mg/dL (10-03-23 @ 12:01)  POCT Blood Glucose.: 135 mg/dL (10-03-23 @ 06:56)  POCT Blood Glucose.: 113 mg/dL (10-02-23 @ 23:56)  POCT Blood Glucose.: 108 mg/dL (10-02-23 @ 18:02)  POCT Blood Glucose.: 82 mg/dL (10-02-23 @ 12:23)  POCT Blood Glucose.: 72 mg/dL (10-02-23 @ 06:09)  POCT Blood Glucose.: 77 mg/dL (10-02-23 @ 00:53)  POCT Blood Glucose.: 109 mg/dL (10-01-23 @ 17:36)          Estimated Needs:   [x ] no change since previous assessment        Previous Nutrition Diagnosis: Severe protein calorie malnutrition     Nutrition Diagnosis is [x ] ongoing        Recommendations:  1) Recommend resume TF when medically feasible.   2) Cont monitor POCT and provide D5W and IV hydration PRN.   3) Monitor Labs, weights, BMs, and skin integrity.   4) Consider GI consult.   5) RD to remain available for further nutritional interventions as indicated.       Monitoring and Evaluation:      [ x] Tolerance to diet prescription [x ] weights [x ] follow up per protocol  [ ] other:

## 2023-10-04 NOTE — PROGRESS NOTE ADULT - SUBJECTIVE AND OBJECTIVE BOX
CHIEF COMPLAINT:    SUBJECTIVE:     REVIEW OF SYSTEMS:    CONSTITUTIONAL: (  )  weakness,  (  ) fevers or chills  EYES/ENT: (  )visual changes;     NECK: (  ) pain or stiffness  RESPIRATORY:   (  )cough, wheezing, hemoptysis;  (  ) shortness of breath  CARDIOVASCULAR:  (  )chest pain or palpitations  GASTROINTESTINAL:   (  )abdominal or epigastric pain.  (  ) nausea, vomiting, or hematemesis;   (   ) diarrhea or constipation.   GENITOURINARY:   (    ) dysuria, frequency or hematuria  NEUROLOGICAL:  (   ) numbness or weakness   All other review of systems is negative unless indicated above    Vital Signs Last 24 Hrs  T(C): 37.2 (04 Oct 2023 05:00), Max: 38.6 (03 Oct 2023 15:00)  T(F): 99 (04 Oct 2023 05:00), Max: 101.5 (03 Oct 2023 15:00)  HR: 90 (04 Oct 2023 05:00) (75 - 130)  BP: 129/84 (04 Oct 2023 05:00) (124/80 - 142/77)  BP(mean): --  RR: 18 (04 Oct 2023 05:00) (16 - 18)  SpO2: 100% (04 Oct 2023 05:00) (94% - 100%)    Parameters below as of 04 Oct 2023 05:00  Patient On (Oxygen Delivery Method): room air        I&O's Summary      CAPILLARY BLOOD GLUCOSE      POCT Blood Glucose.: 100 mg/dL (04 Oct 2023 06:56)  POCT Blood Glucose.: 106 mg/dL (04 Oct 2023 01:07)  POCT Blood Glucose.: 145 mg/dL (03 Oct 2023 17:54)  POCT Blood Glucose.: 114 mg/dL (03 Oct 2023 12:01)      PHYSICAL EXAM:    Constitutional:  (   ) NAD,   (   )awake and alert  HEENT: PERR, EOMI,    Neck: Soft and supple, No LAD, No JVD  Respiratory:  (    Breath sounds are clear bilaterally,    (   ) wheezing, rales or rhonchi  Cardiovascular:     (   )S1 and S2, regular rate and rhythm, no Murmurs, gallops or rubs  Gastrointestinal:  (   )Bowel Sounds present, soft,   (  )nontender, nondistended,    Extremities:    (  ) peripheral edema  Vascular: 2+ peripheral pulses  Neurological:    (    )A/O x 3,   (  ) focal deficits  Musculoskeletal:    (   )  normal strength b/l upper  (     ) normal  lower extremities  Skin: No rashes    MEDICATIONS:  MEDICATIONS  (STANDING):  ascorbic acid 500 milliGRAM(s) Oral daily  ceFAZolin   IVPB 2000 milliGRAM(s) IV Intermittent every 8 hours  ferrous    sulfate Liquid 220 milliGRAM(s) Oral every 12 hours  magnesium hydroxide Suspension 5 milliLiter(s) Oral daily  mirtazapine 15 milliGRAM(s) Oral at bedtime  pantoprazole   Suspension 40 milliGRAM(s) Oral daily  silver sulfADIAZINE 1% Cream 1 Application(s) Topical every 12 hours  viokace 26957 units 1 Tablet(s) 1 Tablet(s) Enteral Tube once      LABS: All Labs Reviewed:                        8.9    8.57  )-----------( 342      ( 03 Oct 2023 16:30 )             28.9     10-03    134<L>  |  99  |  14  ----------------------------<  112<H>  3.8   |  26  |  0.43<L>    Ca    8.5      03 Oct 2023 16:30  Phos  2.3     10-03  Mg     2.10     10-03      PTT - ( 03 Oct 2023 05:36 )  PTT:90.0 sec      Blood Culture: 09-29 @ 19:04  Organism --  Gram Stain Blood -- Gram Stain --  Specimen Source .Blood Blood-Peripheral  Culture-Blood --      Urine Culture      RADIOLOGY/EKG:    ASSESSMENT AND PLAN:    DVT PPX:    ADVANCED DIRECTIVE:    DISPOSITION: CHIEF COMPLAINT:  patient condition stable event of reaction blood transfusion noted was febrile but at present time she is afebrile         SUBJECTIVE:     REVIEW OF SYSTEMS:    CONSTITUTIONAL: ( x )  weakness,  (  ) fevers or chills  EYES/ENT: (  )visual changes;     NECK: (  ) pain or stiffness  RESPIRATORY:   (  )cough, wheezing, hemoptysis;  (  ) shortness of breath  CARDIOVASCULAR:  (  )chest pain or palpitations  GASTROINTESTINAL:   (  )abdominal or epigastric pain.  (  ) nausea, vomiting, or hematemesis;   (   ) diarrhea or constipation.   GENITOURINARY:   (    ) dysuria, frequency or hematuria  NEUROLOGICAL:  (   ) numbness or weakness   All other review of systems is negative unless indicated above    Vital Signs Last 24 Hrs  T(C): 37.2 (04 Oct 2023 05:00), Max: 38.6 (03 Oct 2023 15:00)  T(F): 99 (04 Oct 2023 05:00), Max: 101.5 (03 Oct 2023 15:00)  HR: 90 (04 Oct 2023 05:00) (75 - 130)  BP: 129/84 (04 Oct 2023 05:00) (124/80 - 142/77)  BP(mean): --  RR: 18 (04 Oct 2023 05:00) (16 - 18)  SpO2: 100% (04 Oct 2023 05:00) (94% - 100%)    Parameters below as of 04 Oct 2023 05:00  Patient On (Oxygen Delivery Method): room air        I&O's Summary      CAPILLARY BLOOD GLUCOSE      POCT Blood Glucose.: 100 mg/dL (04 Oct 2023 06:56)  POCT Blood Glucose.: 106 mg/dL (04 Oct 2023 01:07)  POCT Blood Glucose.: 145 mg/dL (03 Oct 2023 17:54)  POCT Blood Glucose.: 114 mg/dL (03 Oct 2023 12:01)      PHYSICAL EXAM:    Constitutional:  ( x ) NAD,   ( x  )awake and  verbal  HEENT: PERR, EOMI,    Neck: Soft and supple, No LAD, No JVD  Respiratory:  (  x  Breath sounds are clear bilaterally,    (   ) wheezing, rales or rhonchi  Cardiovascular:     (  x )S1 and S2, regular rate and rhythm, no Murmurs, gallops or rubs  Gastrointestinal:  ( x)Bowel Sounds present, soft,   (  )nontender, nondistended,    + PEG    Extremities:     + multiple ecchymotic area  Vascular: 2+ peripheral pulses  Neurological:    ( x   )A/O x  1   (  ) focal deficits  Musculoskeletal:    (   )  normal strength b/l upper  (     ) normal  lower extremities  Skin: No rashes    MEDICATIONS:  MEDICATIONS  (STANDING):  ascorbic acid 500 milliGRAM(s) Oral daily  ceFAZolin   IVPB 2000 milliGRAM(s) IV Intermittent every 8 hours  ferrous    sulfate Liquid 220 milliGRAM(s) Oral every 12 hours  magnesium hydroxide Suspension 5 milliLiter(s) Oral daily  mirtazapine 15 milliGRAM(s) Oral at bedtime  pantoprazole   Suspension 40 milliGRAM(s) Oral daily  silver sulfADIAZINE 1% Cream 1 Application(s) Topical every 12 hours  viokace 61352 units 1 Tablet(s) 1 Tablet(s) Enteral Tube once      LABS: All Labs Reviewed:                        8.9    8.57  )-----------( 342      ( 03 Oct 2023 16:30 )             28.9     10-03    134<L>  |  99  |  14  ----------------------------<  112<H>  3.8   |  26  |  0.43<L>    Ca    8.5      03 Oct 2023 16:30  Phos  2.3     10-03  Mg     2.10     10-03      PTT - ( 03 Oct 2023 05:36 )  PTT:90.0 sec            6.0    4.96  )-----------( 243      ( 03 Oct 2023 07:00 )             19.6     10-02    127<L>  |  86<L>  |  11  ----------------------------<  391<H>  5.0   |  19<L>  |  0.40<L>    Blood Culture: 09-29 @ 19:04  79 yo F PMH Alzheimer's dementia s/p PEG 8/2023, GE flap valve, grade B esophagitis, Multiple Myeloma (previously on Revlimid), cataracts, chronic venous ulcers b/l LE, OA knee, HTN, primary pulmonary hypertension, MDD sent from NH given hypotension documented at 87/56. Unable to obtain reliable history although per documentation patient has experienced cough, dysuria, and abdominal pain. Labs significant for anemia to 6.2 s/p PRBC without active bleeding and Na 124 likely iatrogenic as patient was on D5 1/2 NS and standing free water flushes at nursing home. Patient with fevers (T 100.3) and leukocytosis to 13.3 concerning for sepsis likely 2/2 urinary source - alternatively consider lower extremity wounds. Patient with hx hematoma following initial PEG placement, would f/u CT abdomen r/o expansion.      Problem/Plan - 1:  ·  Problem: Sepsis.   ·  Plan: -patient with fever to 100.3, leukocytosis to 13.3, hypotension 87/56-->92/56 concerning for sepsis 2/2 urinary source, less likely in setting of lower extremity wounds, lactic acid 2.4  -s/p vanc and zosyn in the ED, s/p 1L LR  -f/u BCx, UCx, UA turbid 100 protein moderate blood, large LE, WBC 2921 significant for UTI, per documentation patient has experienced dysuria  -CXR clear  -will order ceftriaxone and vancomycin  -monitor BP, f/u repeat lactic acid.    Problem/Plan - 2:  ·  Problem: Hyponatremia.   ·  Plan: -Na 124  -likely iatrogenic given patient was on D5 1/2 NS and free water flushes at facility  -will hold D5 NS and free water flushes  -f/u urine sodium urine osm, serum osm  -monitor BMP q8h.    Problem/Plan - 3:  ·  Problem: Anemia.   ·  Plan: -Hgb 6.6  -s/p 1u PRBC in ED, f/u repeat CBC, monitor CBC q8h  -f/u iron, ferritin, TIBC  -continue home iron  -monitor CBC, maintain active type and screen, transfuse for Hgb<7.0  -patient with hx hematoma following initial PEG placement, f/u CT A/P consider expansion.    Problem/Plan - 4:  ·  Problem: Alzheimers disease.   ·  Plan: -would obtain collateral on baseline mental status, attempted to call emergency contact tomeka high was full  -patient is alert, conversant, although A&Ox1 believes she is in [facility].    Problem/Plan - 5:  ·  Problem: Multiple myeloma.   ·  Plan: -patient previously on revlimid per documentation       Problem/Plan - 6:  ·  Problem: Rheumatoid arthritis.   ·  Plan: -patient with ulnar deviation of b/l UE digits, documented RA  -would obtain records.    Problem/Plan - 7:  ·  Problem: Chronic cutaneous venous stasis ulcer.   ·  Plan: -patient with b/l LE ulcers, in setting of chronic venous stasis  -f/u wound care eval.    Problem/Plan - 8:  ·  Problem: History of esophagitis.   ·  Plan: -patient with grade B esophagitis on EGD 8/2023, also with Hill grade 3 gastroesophageal flap, patient was pending ?outpatient w/u with EUS  -home omeprazole not on formulary will order pantoprazole daily.    Problem/Plan - 9:  ·  Problem: Need for prophylactic measure.   ·  Plan: -DVT ppx: patient with documented hx hematoma after initial PEG placement, SCD for now, will hold pharmacologic ppx  -Diet: f/u nutrition consult for tube feeds, patient on Jevity at facility.    -9/23/2023 ID and IR note and consult appreciated due to positive blood culture recommended to remove right upper port   her antibiotic was changed to Ancef grams every 8 hours as per ID recommendation  -9/24/2023 condition stable waiting for removal of right upper chest sided port  -9/25/2023 right-sided chest port was removed by interventional radiology condition stable continue Ancef 2 g every 8 hours  -9/26/2023 continue Ancef 2 g with Hours for the next 4 weeks patient needs PICC line/midline after negative blood culture echo result noted mild aortic stenosis and severe MR no other  invasivel intervention need to be done due to her general condition and terminal dementia  -9/27/2023 blood culture from 9/25 /2023 Negative Pl. PICC line in a.m. and discharge plan discussed with a ACP  Team discussed with ID about the plan  9/28/2023 D/C back to SNF   with midline  to complet IV antibiotic    -9/30/2023 patient with right upper extremity DVT due to midline start on heparin continue Ancef 2 g every 8 hours discussed with AC P Team   -10/1/2023 condition improving with start  Eliquis in a.m. duration usually between 3-6 months will ask vascular recommendation  10/2/2023   continue  IV Ancef   ID follow-up    -10/3/2023 discontinue heparin due to low hemoglobin and multiple ecchymotic area when asked to be seen by vascular for follow-up and hematology if she has persistent low hemoglobin    -10/4/2023 continue IV antibiotic hematology consult pending off heparin due to  ecchymotic area    Urine Culture      RADIOLOGY/EKG:    ASSESSMENT AND PLAN:    DVT PPX:    ADVANCED DIRECTIVE:    DISPOSITION:

## 2023-10-05 LAB
ANION GAP SERPL CALC-SCNC: 10 MMOL/L — SIGNIFICANT CHANGE UP (ref 7–14)
BASOPHILS # BLD AUTO: 0.04 K/UL — SIGNIFICANT CHANGE UP (ref 0–0.2)
BASOPHILS NFR BLD AUTO: 0.7 % — SIGNIFICANT CHANGE UP (ref 0–2)
BILIRUB DIRECT SERPL-MCNC: 0.2 MG/DL — SIGNIFICANT CHANGE UP (ref 0–0.3)
BILIRUB INDIRECT FLD-MCNC: 0.4 MG/DL — SIGNIFICANT CHANGE UP (ref 0–1)
BILIRUB SERPL-MCNC: 0.6 MG/DL — SIGNIFICANT CHANGE UP (ref 0.2–1.2)
BUN SERPL-MCNC: 14 MG/DL — SIGNIFICANT CHANGE UP (ref 7–23)
CALCIUM SERPL-MCNC: 8.4 MG/DL — SIGNIFICANT CHANGE UP (ref 8.4–10.5)
CHLORIDE SERPL-SCNC: 103 MMOL/L — SIGNIFICANT CHANGE UP (ref 98–107)
CO2 SERPL-SCNC: 25 MMOL/L — SIGNIFICANT CHANGE UP (ref 22–31)
CREAT SERPL-MCNC: 0.45 MG/DL — LOW (ref 0.5–1.3)
CULTURE RESULTS: SIGNIFICANT CHANGE UP
CULTURE RESULTS: SIGNIFICANT CHANGE UP
EGFR: 99 ML/MIN/1.73M2 — SIGNIFICANT CHANGE UP
EOSINOPHIL # BLD AUTO: 0.17 K/UL — SIGNIFICANT CHANGE UP (ref 0–0.5)
EOSINOPHIL NFR BLD AUTO: 3.1 % — SIGNIFICANT CHANGE UP (ref 0–6)
FERRITIN SERPL-MCNC: 522 NG/ML — HIGH (ref 13–330)
GLUCOSE BLDC GLUCOMTR-MCNC: 118 MG/DL — HIGH (ref 70–99)
GLUCOSE BLDC GLUCOMTR-MCNC: 145 MG/DL — HIGH (ref 70–99)
GLUCOSE BLDC GLUCOMTR-MCNC: 98 MG/DL — SIGNIFICANT CHANGE UP (ref 70–99)
GLUCOSE BLDC GLUCOMTR-MCNC: 99 MG/DL — SIGNIFICANT CHANGE UP (ref 70–99)
GLUCOSE SERPL-MCNC: 81 MG/DL — SIGNIFICANT CHANGE UP (ref 70–99)
HAPTOGLOB SERPL-MCNC: 298 MG/DL — HIGH (ref 34–200)
HCT VFR BLD CALC: 25.1 % — LOW (ref 34.5–45)
HGB BLD-MCNC: 7.6 G/DL — LOW (ref 11.5–15.5)
IANC: 4.08 K/UL — SIGNIFICANT CHANGE UP (ref 1.8–7.4)
IMM GRANULOCYTES NFR BLD AUTO: 0.5 % — SIGNIFICANT CHANGE UP (ref 0–0.9)
IRON SATN MFR SERPL: 17 % — SIGNIFICANT CHANGE UP (ref 14–50)
IRON SATN MFR SERPL: 31 UG/DL — SIGNIFICANT CHANGE UP (ref 30–160)
LDH SERPL L TO P-CCNC: 326 U/L — HIGH (ref 135–225)
LYMPHOCYTES # BLD AUTO: 0.88 K/UL — LOW (ref 1–3.3)
LYMPHOCYTES # BLD AUTO: 15.8 % — SIGNIFICANT CHANGE UP (ref 13–44)
MAGNESIUM SERPL-MCNC: 2.3 MG/DL — SIGNIFICANT CHANGE UP (ref 1.6–2.6)
MCHC RBC-ENTMCNC: 22.8 PG — LOW (ref 27–34)
MCHC RBC-ENTMCNC: 30.3 GM/DL — LOW (ref 32–36)
MCV RBC AUTO: 75.4 FL — LOW (ref 80–100)
MONOCYTES # BLD AUTO: 0.36 K/UL — SIGNIFICANT CHANGE UP (ref 0–0.9)
MONOCYTES NFR BLD AUTO: 6.5 % — SIGNIFICANT CHANGE UP (ref 2–14)
NEUTROPHILS # BLD AUTO: 4.08 K/UL — SIGNIFICANT CHANGE UP (ref 1.8–7.4)
NEUTROPHILS NFR BLD AUTO: 73.4 % — SIGNIFICANT CHANGE UP (ref 43–77)
NRBC # BLD: 0 /100 WBCS — SIGNIFICANT CHANGE UP (ref 0–0)
NRBC # FLD: 0 K/UL — SIGNIFICANT CHANGE UP (ref 0–0)
PHOSPHATE SERPL-MCNC: 2.9 MG/DL — SIGNIFICANT CHANGE UP (ref 2.5–4.5)
PLATELET # BLD AUTO: 314 K/UL — SIGNIFICANT CHANGE UP (ref 150–400)
POTASSIUM SERPL-MCNC: 4.2 MMOL/L — SIGNIFICANT CHANGE UP (ref 3.5–5.3)
POTASSIUM SERPL-SCNC: 4.2 MMOL/L — SIGNIFICANT CHANGE UP (ref 3.5–5.3)
RBC # BLD: 3.33 M/UL — LOW (ref 3.8–5.2)
RBC # FLD: 26.4 % — HIGH (ref 10.3–14.5)
RETICS #: 133.4 K/UL — HIGH (ref 25–125)
RETICS/RBC NFR: 3.9 % — HIGH (ref 0.5–2.5)
SODIUM SERPL-SCNC: 138 MMOL/L — SIGNIFICANT CHANGE UP (ref 135–145)
SPECIMEN SOURCE: SIGNIFICANT CHANGE UP
SPECIMEN SOURCE: SIGNIFICANT CHANGE UP
TIBC SERPL-MCNC: 178 UG/DL — LOW (ref 220–430)
UIBC SERPL-MCNC: 147 UG/DL — SIGNIFICANT CHANGE UP (ref 110–370)
WBC # BLD: 5.24 K/UL — SIGNIFICANT CHANGE UP (ref 3.8–10.5)
WBC # FLD AUTO: 5.24 K/UL — SIGNIFICANT CHANGE UP (ref 3.8–10.5)

## 2023-10-05 PROCEDURE — 78802 RP LOCLZJ TUM WHBDY 1 D IMG: CPT | Mod: 26

## 2023-10-05 PROCEDURE — 99233 SBSQ HOSP IP/OBS HIGH 50: CPT | Mod: GC

## 2023-10-05 PROCEDURE — 99232 SBSQ HOSP IP/OBS MODERATE 35: CPT

## 2023-10-05 RX ADMIN — Medication 100 MILLIGRAM(S): at 21:37

## 2023-10-05 RX ADMIN — Medication 500 MILLIGRAM(S): at 13:30

## 2023-10-05 RX ADMIN — PANTOPRAZOLE SODIUM 40 MILLIGRAM(S): 20 TABLET, DELAYED RELEASE ORAL at 13:30

## 2023-10-05 RX ADMIN — MIRTAZAPINE 15 MILLIGRAM(S): 45 TABLET, ORALLY DISINTEGRATING ORAL at 21:37

## 2023-10-05 RX ADMIN — Medication 1 APPLICATION(S): at 05:58

## 2023-10-05 RX ADMIN — MAGNESIUM HYDROXIDE 5 MILLILITER(S): 400 TABLET, CHEWABLE ORAL at 13:30

## 2023-10-05 RX ADMIN — Medication 220 MILLIGRAM(S): at 17:23

## 2023-10-05 RX ADMIN — Medication 100 MILLIGRAM(S): at 13:37

## 2023-10-05 RX ADMIN — Medication 220 MILLIGRAM(S): at 05:54

## 2023-10-05 RX ADMIN — Medication 1 APPLICATION(S): at 17:22

## 2023-10-05 RX ADMIN — Medication 100 MILLIGRAM(S): at 05:54

## 2023-10-05 NOTE — CONSULT NOTE ADULT - ATTENDING COMMENTS
79 yo woman with h/o MM port in place, dementia, RA admitted with hypotension   found MSSA in blood cultures x 2   fever low grade   leucocytosis   would check echo  remove port   check repeat blood cultures for clearance of bacteremia   agree with cefazolin   will follow
77 yo Female h/o Alzheimer's Dementia, FTT s/p 20 Fr externally removable PEG 8/10/2023, Multiple Myeloma a/w MSSA bacteremia.  GI consulted for clogged PEG tube.  Tube brushed at bedside with flushed with water.  May resume PEG tube feedings and medications.  Flush tube with water after use.
Ms. Olmos was seen during consultation rounds today with the hematology team.  This woman is reported to have dementia.  There is some mention of prior diagnosis of multiple myeloma and documents from the nursing home.  She has a PEG tube.  She is verbal, but she says that she is in [facility] at this time.  When asked what year it is, she said it is last year +2 years.  On physical examination, she is contracted.  She is in no acute distress.  She has significant deformities of her fingers.  There does not seem to be any synovitis present.    Her admission hemoglobin was 7.6 g.  Platelet count was 314,000.  Her reticulocyte count was low indicating hypoproliferative anemia.  The iron studies are consistent with anemia of chronic disease.  She has a decubitus ulcer and was admitted with sepsis.  We reviewed the peripheral blood smear with no significant findings.    In the interim, supportive transfusion is indicated as per the usual parameters.  I agree with the assessment and plan as stated in the above note from the hematology fellow, Dr. Grewal.  Ron Elliott MD  Hematology attending

## 2023-10-05 NOTE — PROGRESS NOTE ADULT - ASSESSMENT
77 yo F PMH Alzheimer's dementia s/p PEG 8/2023, GE flap valve, grade B esophagitis, Multiple Myeloma (previously on Revlimid), chronic venous ulcers b/l LE, arthritis, HTN, primary pulmonary hypertension, MDD sent from NH given hypotension.      BCx+ high-grade  MSSA bacteremia 9/21 2/2;  staph capitis in one;  MSSA in 1/2 on 9/23   R chest wall port in place on admission s/p removal   9/25 blood cultures no growth   urine culture MSSA       TTE mod mitral stenosis     Abx:  Zosyn (9/22) x1  Vanco (9/22-9/23)  CTX (9/22) x1  Cefazolin (9/23-)      #MSSA Bacteremia, leukocytosis, Chest wall port    - s/p port removal     - midline placed right arm for d/c to NH 9/29 then right arm swelling and warmth with temp 100.5   - midline removed  DVT+     9/29 blood cultures no growth    afebrile      - Continue cefazolin 2g q8h  - Indium scan to assess for other focus of infection ?joints ordered         
79 yo F PMH Alzheimer's dementia s/p PEG 8/2023, GE flap valve, grade B esophagitis, Multiple Myeloma (previously on Revlimid), cataracts, chronic venous ulcers b/l LE, arthritis, HTN, primary pulmonary hypertension, MDD sent from NH given hypotension.    Tmax 100.2, WBC to 13 with neutrophilia  UA 2.9K WBC, many bacteria, was being treated for UTI at NH with CTX starting 9/21  CXR clear    BCx+ high-grade  MSSA bacteremia 9/21 2/2;  staph captis in one;  MSSA in 1/2 on 9/23   R chest wall port in place on admission s/p removal   9/25 blood cultures no growth to date   urine culture MSSA     TTE mod mitral stenosis     Abx:  Zosyn (9/22) x1  Vanco (9/22-9/23)  CTX (9/22) x1  Cefazolin (9/23-)      #MSSA Bacteremia, leukocytosis, Chest wall port    - s/p port removal     - Continue cefazolin 2g q8h  - F/u 9/25 BCx for clearance  - Monitor for symptoms or signs of septic joint given Hx of arthritis   - would  plan for 4 weeks of IV cefazolin once document clearance of bacteremia       
77 yo F PMH Alzheimer's dementia s/p PEG 8/2023, GE flap valve, grade B esophagitis, Multiple Myeloma (previously on Revlimid), cataracts, chronic venous ulcers b/l LE, arthritis, HTN, primary pulmonary hypertension, MDD sent from NH given hypotension.      BCx+ high-grade  MSSA bacteremia 9/21 2/2;  staph captis in one;  MSSA in 1/2 on 9/23   R chest wall port in place on admission s/p removal   9/25 blood cultures no growth   urine culture MSSA       TTE mod mitral stenosis     Abx:  Zosyn (9/22) x1  Vanco (9/22-9/23)  CTX (9/22) x1  Cefazolin (9/23-)      #MSSA Bacteremia, leukocytosis, Chest wall port    - s/p port removal     - midline placed right arm for d/c to NH 9/29 then right arm swelling and warmth with temp 100.5   - midline removed   9/29 blood cultures no growth     - Continue cefazolin 2g q8h  - Indium scan to assess for other focus of infection ?joints ordered         
77 yo F PMH Alzheimer's dementia s/p PEG 8/2023, GE flap valve, grade B esophagitis, Multiple Myeloma (previously on Revlimid), chronic venous ulcers b/l LE, arthritis, HTN, primary pulmonary hypertension, MDD sent from NH given hypotension.      BCx+ high-grade  MSSA bacteremia 9/21 2/2;  staph capitis in one;  MSSA in 1/2 on 9/23   R chest wall port in place on admission s/p removal   9/25 blood cultures no growth   urine culture MSSA       TTE mod mitral stenosis     Abx:  Zosyn (9/22) x1  Vanco (9/22-9/23)  CTX (9/22) x1  Cefazolin (9/23-)      #MSSA Bacteremia, leukocytosis, Chest wall port    - s/p port removal     - midline placed right arm for d/c to NH 9/29 then right arm swelling and warmth with temp 100.5   - midline removed  DVT+     9/29 blood cultures no growth    afebrile      - Indium scan limited  no infection seen      - Continue cefazolin 2g q8h  --> 10/22           
79 yo F PMH Alzheimer's dementia s/p PEG 8/2023, GE flap valve, grade B esophagitis, Multiple Myeloma (previously on Revlimid), cataracts, chronic venous ulcers b/l LE, arthritis, HTN, primary pulmonary hypertension, MDD sent from NH given hypotension.      BCx+ high-grade  MSSA bacteremia 9/21 2/2;  staph captis in one;  MSSA in 1/2 on 9/23   R chest wall port in place on admission s/p removal   9/25 blood cultures no growth x 4 days   urine culture MSSA       TTE mod mitral stenosis     Abx:  Zosyn (9/22) x1  Vanco (9/22-9/23)  CTX (9/22) x1  Cefazolin (9/23-)      #MSSA Bacteremia, leukocytosis, Chest wall port    - s/p port removal     - midline placed right arm for d/c to NH    - today right arm swelling and warmth     -temp 100.5    - Continue cefazolin 2g q8h  - check blood culture  - would remove midline   - Indium scan to assess for other focus of infection ?joints       ID service available over weekend   
79 yo F PMH Alzheimer's dementia s/p PEG 8/2023, GE flap valve, grade B esophagitis, Multiple Myeloma (previously on Revlimid), cataracts, chronic venous ulcers b/l LE, arthritis, HTN, primary pulmonary hypertension, MDD sent from NH given hypotension.    Tmax 100.2, WBC to 13 with neutrophilia  UA 2.9K WBC, many bacteria, was being treated for UTI at NH with CTX starting 9/21  CXR clear    BCx+ high-grade  MSSA bacteremia 9/21 2/2;  staph captis in one;  MSSA in 1/2 on 9/23   R chest wall port in place on admission s/p removal   9/25 blood cultures no growth 48 h   urine culture MSSA       TTE mod mitral stenosis     Abx:  Zosyn (9/22) x1  Vanco (9/22-9/23)  CTX (9/22) x1  Cefazolin (9/23-)      #MSSA Bacteremia, leukocytosis, Chest wall port    - s/p port removal     - Continue cefazolin 2g q8h  - F/u 9/25 BCx for clearance  - Monitor for symptoms or signs of septic joint given Hx of arthritis   - Indium scan to assess for other focus of infection ?joints   - would  plan for 4 weeks of IV cefazolin once document clearance of bacteremia

## 2023-10-05 NOTE — CONSULT NOTE ADULT - ASSESSMENT
79 yo F PMH Alzheimer's dementia s/p PEG 8/2023, GE flap valve, grade B esophagitis, Multiple Myeloma (previously on Revlimid), cataracts, chronic venous ulcers b/l LE, OA knee, HTN, primary pulmonary hypertension, admitted for septic shock. Patient anemic at time of admission and recieved 2U PRBC. Found to have acute DVT and started on heparin ggt. H/H dropped while on heparin ggt, which was held and another 1U PRBC administration was attempted, but stopped after possible transfusion reaction (tachycardia / dizziness). H/H stable off hep ggt. Requesting consult for anemia evaluation in setting of multiple myeloma.     PLAN:  - complete anemia workup, added on iron studies and hemolytic workup, including ferritin, LDH hapto, retic  - will review peripheral smear  - hematology will follow    ***NOTE INCOMPLETE****    ***************************************************************  Ana Grewal, PGY4  Fellow Hematology/Oncology  pager: [phone]   Available on Microsoft Teams  After 5pm or on weekends please contact  to page on-call fellow   ***************************************************************     77 yo F PMH Alzheimer's dementia s/p PEG 8/2023, GE flap valve, grade B esophagitis, Multiple Myeloma (previously on Revlimid), cataracts, chronic venous ulcers b/l LE, OA knee, HTN, primary pulmonary hypertension, admitted for septic shock. Patient anemic at time of admission and recieved 2U PRBC. Found to have acute DVT and started on heparin ggt. H/H dropped while on heparin ggt, which was held and another 1U PRBC administration was attempted, but stopped after possible transfusion reaction (tachycardia / dizziness). H/H stable off hep ggt. Requesting consult for anemia evaluation in setting of multiple myeloma.     PLAN:  - complete anemia workup, added on iron studies and hemolytic workup, including ferritin, LDH hapto, retic  - Hgb electrophoresis  - will review peripheral smear  - transfuse for hg < 7.0 and platelets < 10k, < 20k if febrile and < 50k if bleeding  - hematology will follow    ***NOTE INCOMPLETE****    ***************************************************************  Ana Grewal, PGY4  Fellow Hematology/Oncology  pager: [phone]   Available on Microsoft Teams  After 5pm or on weekends please contact  to page on-call fellow   ***************************************************************     77 yo F PMH Alzheimer's dementia s/p PEG 8/2023, GE flap valve, grade B esophagitis, Multiple Myeloma (previously on Revlimid), cataracts, chronic venous ulcers b/l LE, OA knee, HTN, primary pulmonary hypertension, admitted for septic shock. Patient anemic at time of admission and recieved 2U PRBC. Found to have acute DVT and started on heparin ggt. H/H dropped while on heparin ggt, which was held and another 1U PRBC administration was attempted, but stopped after possible transfusion reaction (tachycardia / dizziness). H/H stable off hep ggt. Requesting consult for anemia evaluation in setting of multiple myeloma.     Labs at admission: WBC 5.2, Hgb 7.6, , MCV 75, Retic Index 1.70 (hypoproliferative), iron studies c/w AoCD (ferritin 522, iron 31, TIBC 178). Patient is not a good historian and did not know her multiple myeloma history. Would recommend getting this from family and hematologist.     PLAN:  # Anemia  # History of multiple myeloma  # Work up for thalassemia (target cells on peripheral smear)  - Please complete anemia workup, iron studies performed, add hemolytic workup (LDH haptoglobin, retic count, fractionated hemoglobin), B12, folate  - Please get outside records for outpatient multiple myeloma and contact us when that is received  - Please send multiple myeloma workup: SPEP, UPEP, immunofixation, serum free light chains, quantitative immunoglobulins, beta-2 microglobulin  - Please order Hgb electrophoresis  - Peripheral smear: target cells, eliptocytes, anisocytosis, hypochromatic cells, rare myelodysplastic cells  - Transfuse for hg < 7.0 and platelets < 10k, < 20k if febrile and < 50k if bleeding  - Hematology will follow        ***************************************************************  Ana Grewal, PGY4  Fellow Hematology/Oncology  pager: [phone]   Available on Microsoft Teams  After 5pm or on weekends please contact  to page on-call fellow   ***************************************************************     79 yo F PMH Alzheimer's dementia s/p PEG 8/2023, GE flap valve, grade B esophagitis, Multiple Myeloma (previously on Revlimid), cataracts, chronic venous ulcers b/l LE, OA knee, HTN, primary pulmonary hypertension, admitted for septic shock. Patient anemic at time of admission and recieved 2U PRBC. Found to have acute DVT and started on heparin ggt. H/H dropped while on heparin ggt, which was held and another 1U PRBC administration was attempted, but stopped after possible transfusion reaction (tachycardia / dizziness). H/H stable off hep ggt. Requesting consult for anemia evaluation in setting of multiple myeloma.     Labs at admission: WBC 5.2, Hgb 7.6, , MCV 75, Retic Index 1.70 (hypoproliferative), iron studies c/w AoCD (ferritin 522, iron 31, TIBC 178). Patient is not a good historian and did not know her multiple myeloma history. Would recommend getting this from family and hematologist.     PLAN:  # Anemia  # History of multiple myeloma  # Work up for thalassemia (target cells on peripheral smear)  - Please complete anemia workup, iron studies performed, add hemolytic workup (LDH haptoglobin, retic count, fractionated hemoglobin), B12, folate  - Please get outside records for outpatient multiple myeloma and contact us when that is received  - Please send multiple myeloma workup: SPEP, UPEP, immunofixation, serum free light chains, quantitative immunoglobulins, beta-2 microglobulin  - Please order Hgb electrophoresis  - Peripheral smear: target cells, elliptocytes, anisocytosis, hypochromatic cells  - Transfuse for hg < 7.0 and platelets < 10k, < 20k if febrile and < 50k if bleeding  - Hematology will follow        ***************************************************************  Ana Grewal, PGY4  Fellow Hematology/Oncology  pager: [phone]   Available on Microsoft Teams  After 5pm or on weekends please contact  to page on-call fellow   ***************************************************************

## 2023-10-05 NOTE — PROGRESS NOTE ADULT - SUBJECTIVE AND OBJECTIVE BOX
CHIEF COMPLAINT:    SUBJECTIVE:     REVIEW OF SYSTEMS:    CONSTITUTIONAL: (  )  weakness,  (  ) fevers or chills  EYES/ENT: (  )visual changes;     NECK: (  ) pain or stiffness  RESPIRATORY:   (  )cough, wheezing, hemoptysis;  (  ) shortness of breath  CARDIOVASCULAR:  (  )chest pain or palpitations  GASTROINTESTINAL:   (  )abdominal or epigastric pain.  (  ) nausea, vomiting, or hematemesis;   (   ) diarrhea or constipation.   GENITOURINARY:   (    ) dysuria, frequency or hematuria  NEUROLOGICAL:  (   ) numbness or weakness   All other review of systems is negative unless indicated above    Vital Signs Last 24 Hrs  T(C): 36.9 (05 Oct 2023 01:11), Max: 37.6 (04 Oct 2023 20:46)  T(F): 98.5 (05 Oct 2023 01:11), Max: 99.6 (04 Oct 2023 20:46)  HR: 90 (05 Oct 2023 01:11) (90 - 105)  BP: 146/82 (05 Oct 2023 01:11) (122/72 - 146/82)  BP(mean): --  RR: 17 (05 Oct 2023 01:11) (14 - 17)  SpO2: 98% (05 Oct 2023 01:11) (98% - 100%)    Parameters below as of 05 Oct 2023 01:11  Patient On (Oxygen Delivery Method): room air        I&O's Summary      CAPILLARY BLOOD GLUCOSE      POCT Blood Glucose.: 99 mg/dL (05 Oct 2023 06:17)  POCT Blood Glucose.: 85 mg/dL (04 Oct 2023 23:32)  POCT Blood Glucose.: 81 mg/dL (04 Oct 2023 17:31)  POCT Blood Glucose.: 92 mg/dL (04 Oct 2023 12:01)      PHYSICAL EXAM:    Constitutional:  (   ) NAD,   (   )awake and alert  HEENT: PERR, EOMI,    Neck: Soft and supple, No LAD, No JVD  Respiratory:  (    Breath sounds are clear bilaterally,    (   ) wheezing, rales or rhonchi  Cardiovascular:     (   )S1 and S2, regular rate and rhythm, no Murmurs, gallops or rubs  Gastrointestinal:  (   )Bowel Sounds present, soft,   (  )nontender, nondistended,    Extremities:    (  ) peripheral edema  Vascular: 2+ peripheral pulses  Neurological:    (    )A/O x 3,   (  ) focal deficits  Musculoskeletal:    (   )  normal strength b/l upper  (     ) normal  lower extremities  Skin: No rashes    MEDICATIONS:  MEDICATIONS  (STANDING):  ascorbic acid 500 milliGRAM(s) Oral daily  ceFAZolin   IVPB 2000 milliGRAM(s) IV Intermittent every 8 hours  ferrous    sulfate Liquid 220 milliGRAM(s) Oral every 12 hours  magnesium hydroxide Suspension 5 milliLiter(s) Oral daily  mirtazapine 15 milliGRAM(s) Oral at bedtime  pantoprazole   Suspension 40 milliGRAM(s) Oral daily  silver sulfADIAZINE 1% Cream 1 Application(s) Topical every 12 hours  viokace 50764 units 1 Tablet(s) 1 Tablet(s) Enteral Tube once      LABS: All Labs Reviewed:                        7.6    5.24  )-----------( 314      ( 05 Oct 2023 06:04 )             25.1     10-05    138  |  103  |  14  ----------------------------<  81  4.2   |  25  |  0.45<L>    Ca    8.4      05 Oct 2023 06:04  Phos  2.9     10-05  Mg     2.30     10-05            Blood Culture:   Urine Culture      RADIOLOGY/EKG:    ASSESSMENT AND PLAN:    DVT PPX:    ADVANCED DIRECTIVE:    DISPOSITION: CHIEF COMPLAINT:  patient condition improving hematology consult appreciated indium  scan reported negative  SUBJECTIVE:     REVIEW OF SYSTEMS:    CONSTITUTIONAL: ( x )  weakness,  (  ) fevers or chills  EYES/ENT: (  )visual changes;     NECK: (  ) pain or stiffness  RESPIRATORY:   (  )cough, wheezing, hemoptysis;  (  ) shortness of breath  CARDIOVASCULAR:  (  )chest pain or palpitations  GASTROINTESTINAL:   (  )abdominal or epigastric pain.  (  ) nausea, vomiting, or hematemesis;   (   ) diarrhea or constipation.   GENITOURINARY:   (    ) dysuria, frequency or hematuria  NEUROLOGICAL:  (   ) numbness or weakness   All other review of systems is negative unless indicated above    Vital Signs Last 24 Hrs  T(C): 36.9 (05 Oct 2023 01:11), Max: 37.6 (04 Oct 2023 20:46)  T(F): 98.5 (05 Oct 2023 01:11), Max: 99.6 (04 Oct 2023 20:46)  HR: 90 (05 Oct 2023 01:11) (90 - 105)  BP: 146/82 (05 Oct 2023 01:11) (122/72 - 146/82)  BP(mean): --  RR: 17 (05 Oct 2023 01:11) (14 - 17)  SpO2: 98% (05 Oct 2023 01:11) (98% - 100%)    Parameters below as of 05 Oct 2023 01:11  Patient On (Oxygen Delivery Method): room air        I&O's Summary      CAPILLARY BLOOD GLUCOSE      POCT Blood Glucose.: 99 mg/dL (05 Oct 2023 06:17)  POCT Blood Glucose.: 85 mg/dL (04 Oct 2023 23:32)  POCT Blood Glucose.: 81 mg/dL (04 Oct 2023 17:31)  POCT Blood Glucose.: 92 mg/dL (04 Oct 2023 12:01)      PHYSICAL EXAM:    Constitutional:  ( x  ) NAD,   (   x)awake  and verbal  HEENT: PERR, EOMI,    Neck: Soft and supple, No LAD, No JVD  Respiratory:  (  x  Breath sounds are clear bilaterally,    (   ) wheezing, rales or rhonchi  Cardiovascular:     (  x )S1 and S2, regular rate and rhythm, no Murmurs, gallops or rubs  Gastrointestinal:  ( x  )Bowel Sounds present, soft,   (  )nontender, nondistended,  + PEG  Extremities:    (  ) peripheral edema  Vascular: 2+ peripheral pulses  Neurological:    (  x  )A/O x  1,   (  ) focal deficits  Musculoskeletal:    (   )  normal strength b/l upper  (     ) normal  lower extremities  Skin:  multiple ecchymotic area  improving in  the size    MEDICATIONS:  MEDICATIONS  (STANDING):  ascorbic acid 500 milliGRAM(s) Oral daily  ceFAZolin   IVPB 2000 milliGRAM(s) IV Intermittent every 8 hours  ferrous    sulfate Liquid 220 milliGRAM(s) Oral every 12 hours  magnesium hydroxide Suspension 5 milliLiter(s) Oral daily  mirtazapine 15 milliGRAM(s) Oral at bedtime  pantoprazole   Suspension 40 milliGRAM(s) Oral daily  silver sulfADIAZINE 1% Cream 1 Application(s) Topical every 12 hours  viokace 31545 units 1 Tablet(s) 1 Tablet(s) Enteral Tube once      LABS: All Labs Reviewed:                        7.6    5.24  )-----------( 314      ( 05 Oct 2023 06:04 )             25.1     10-05    138  |  103  |  14  ----------------------------<  81  4.2   |  25  |  0.45<L>    Ca    8.4      05 Oct 2023 06:04  Phos  2.9     10-05  Mg     2.30     10-05                 8.9    8.57  )-----------( 342      ( 03 Oct 2023 16:30 )             28.9       Blood Culture:   Urine Culture      RADIOLOGY/EKG:    ASSESSMENT AND PLAN:  77 yo F PMH Alzheimer's dementia s/p PEG 8/2023, GE flap valve, grade B esophagitis, Multiple Myeloma (previously on Revlimid), cataracts, chronic venous ulcers b/l LE, OA knee, HTN, primary pulmonary hypertension, MDD sent from NH given hypotension documented at 87/56. Unable to obtain reliable history although per documentation patient has experienced cough, dysuria, and abdominal pain. Labs significant for anemia to 6.2 s/p PRBC without active bleeding and Na 124 likely iatrogenic as patient was on D5 1/2 NS and standing free water flushes at nursing home. Patient with fevers (T 100.3) and leukocytosis to 13.3 concerning for sepsis likely 2/2 urinary source - alternatively consider lower extremity wounds. Patient with hx hematoma following initial PEG placement, would f/u CT abdomen r/o expansion.      Problem/Plan - 1:  ·  Problem: Sepsis.   ·  Plan: -patient with fever to 100.3, leukocytosis to 13.3, hypotension 87/56-->92/56 concerning for sepsis 2/2 urinary source, less likely in setting of lower extremity wounds, lactic acid 2.4  -s/p vanc and zosyn in the ED, s/p 1L LR  -f/u BCx, UCx, UA turbid 100 protein moderate blood, large LE, WBC 2921 significant for UTI, per documentation patient has experienced dysuria  -CXR clear  -will order ceftriaxone and vancomycin  -monitor BP, f/u repeat lactic acid.    Problem/Plan - 2:  ·  Problem: Hyponatremia.   ·  Plan: -Na 124  -likely iatrogenic given patient was on D5 1/2 NS and free water flushes at facility  -will hold D5 NS and free water flushes  -f/u urine sodium urine osm, serum osm  -monitor BMP q8h.    Problem/Plan - 3:  ·  Problem: Anemia.   ·  Plan: -Hgb 6.6  -s/p 1u PRBC in ED, f/u repeat CBC, monitor CBC q8h  -f/u iron, ferritin, TIBC  -continue home iron  -monitor CBC, maintain active type and screen, transfuse for Hgb<7.0  -patient with hx hematoma following initial PEG placement, f/u CT A/P consider expansion.    Problem/Plan - 4:  ·  Problem: Alzheimers disease.   ·  Plan: -would obtain collateral on baseline mental status, attempted to call emergency contact tomeka mendozamail was full  -patient is alert, conversant, although A&Ox1 believes she is in [facility].    Problem/Plan - 5:  ·  Problem: Multiple myeloma.   ·  Plan: -patient previously on revlimid per documentation       Problem/Plan - 6:  ·  Problem: Rheumatoid arthritis.   ·  Plan: -patient with ulnar deviation of b/l UE digits, documented RA  -would obtain records.    Problem/Plan - 7:  ·  Problem: Chronic cutaneous venous stasis ulcer.   ·  Plan: -patient with b/l LE ulcers, in setting of chronic venous stasis  -f/u wound care eval.    Problem/Plan - 8:  ·  Problem: History of esophagitis.   ·  Plan: -patient with grade B esophagitis on EGD 8/2023, also with Hill grade 3 gastroesophageal flap, patient was pending ?outpatient w/u with EUS  -home omeprazole not on formulary will order pantoprazole daily.    Problem/Plan - 9:  ·  Problem: Need for prophylactic measure.   ·  Plan: -DVT ppx: patient with documented hx hematoma after initial PEG placement, SCD for now, will hold pharmacologic ppx  -Diet: f/u nutrition consult for tube feeds, patient on Jevity at facility.    -9/23/2023 ID and IR note and consult appreciated due to positive blood culture recommended to remove right upper port   her antibiotic was changed to Ancef grams every 8 hours as per ID recommendation  -9/24/2023 condition stable waiting for removal of right upper chest sided port  -9/25/2023 right-sided chest port was removed by interventional radiology condition stable continue Ancef 2 g every 8 hours  -9/26/2023 continue Ancef 2 g with Hours for the next 4 weeks patient needs PICC line/midline after negative blood culture echo result noted mild aortic stenosis and severe MR no other  invasivel intervention need to be done due to her general condition and terminal dementia  -9/27/2023 blood culture from 9/25 /2023 Negative Pl. PICC line in a.m. and discharge plan discussed with a ACP  Team discussed with ID about the plan  9/28/2023 D/C back to SNF   with midline  to complet IV antibiotic    -9/30/2023 patient with right upper extremity DVT due to midline start on heparin continue Ancef 2 g every 8 hours discussed with AC P Team   -10/1/2023 condition improving with start  Eliquis in a.m. duration usually between 3-6 months will ask vascular recommendation  10/2/2023   continue  IV Ancef   ID follow-up    -10/3/2023 discontinue heparin due to low hemoglobin and multiple ecchymotic area when asked to be seen by vascular for follow-up and hematology if she has persistent low hemoglobin    -10/4/2023 continue IV antibiotic hematology consult pending off heparin due to  ecchymotic area  -10/5/2023 continue Ancef 2 g every 8 hours hematology consult noted will perform all the testing requested    DVT PPX:    ADVANCED DIRECTIVE:    DISPOSITION:

## 2023-10-05 NOTE — PROGRESS NOTE ADULT - SUBJECTIVE AND OBJECTIVE BOX
Follow Up:      Interval History/ROS:   feels ok    DVT  +  arm    indium neg         Allergies  No Known Allergies        ANTIMICROBIALS: ceFAZolin   IVPB 2000 every 8 hours    MEDICATIONS  (STANDING):  acetaminophen   Oral Liquid .. 650 every 6 hours PRN  magnesium hydroxide Suspension 5 daily  mirtazapine 15 at bedtime  pantoprazole   Suspension 40 daily      Vital Signs Last 24 Hrs  T(F): 99.7 (10-05-23 @ 18:16), Max: 99.7 (10-05-23 @ 18:16)  HR: 105 (10-05-23 @ 18:16)  BP: 140/83 (10-05-23 @ 18:16)  RR: 17 (10-05-23 @ 18:16)  SpO2: 100% (10-05-23 @ 18:16) (98% - 100%)    PHYSICAL EXAM:  Constitutional: Not in acute distress, frail, verbal   Eyes: No icterus.  Oral cavity: Clear, no lesions  RS: no respiratory distress RA  CVS: s1s2  Extremities: arthritic changes  few bruises   right upper arm no swelling or erythema                             7.6    5.24  )-----------( 314      ( 05 Oct 2023 06:04 )             25.1 10-05    138  |  103  |  14  ----------------------------<  81  4.2   |  25  |  0.45  Ca    8.4      05 Oct 2023 06:04Phos  2.9     10-05Mg     2.30     10-05  TPro  x   /  Alb  x   /  TBili  0.6  /  DBili  0.2  /  AST  x   /  ALT  x   /  AlkPhos  x   10-05            MICROBIOLOGY:      cuCulture - Blood (09.29.23 @ 19:04)   Specimen Source: .Blood Blood-Venous  Culture Results:   No growth Culture - Blood (09.29.23 @ 19:04)   Specimen Source: .Blood Blood-Peripheral  Culture Results:   No growth     .Blood Blood-Peripheral  09-25-23   No growth at 5 days  --  --      .Blood Blood-Venous  09-25-23   No growth at 5 days   --  --      .Blood Blood-Peripheral  09-23-23   No growth at 5 days   --  --      .Blood Blood-Venous  09-23-23   Growth in anaerobic bottle: Staphylococcus aureus  See previous culture [id number]  --    Growth in anaerobic bottle: Gram Positive Cocci in Clusters      Clean Catch Clean Catch (Midstream)  09-21-23   >100,000 CFU/ml Staphylococcus aureus  --  Staphylococcus aureus      .Blood Blood-Peripheral  09-21-23   Growth in aerobic bottle: Staphylococcus aureus  Growth in anaerobic bottle: Staphylococcus capitis  Coagulase Negative Staphylococci isolated from a single blood culture set  may represent contamination.  Contact the Microbiology Department at [phone] if susceptibility  testing is clinically indicated.  Direct identification is available within approximately 3-5  hours either by Blood Panel Multiplexed PCR or Direct  MALDI-TOF. Details: [email or web address]  --  Blood Culture PCR  Staphylococcus aureus      .Blood Blood-Peripheral  09-21-23   Growth in aerobic and anaerobic bottles: Staphylococcus aureus  See previous culture [id number]  --    Growth in aerobic bottle: Gram Positive Cocci in Clusters  Growth in anaerobic bottle: Gram Positive Cocci in Clusters        RADIOLOGY:    ra< from: Xray Chest 1 View- PORTABLE-Urgent (Xray Chest 1 View- PORTABLE-Urgent .) (09.23.23 @ 14:29) >  IMPRESSION:    No radiographic evidence of acute cardiopulmonary disease.    --- End of Report ---          RICK CHANDLER DO; Resident Radiologist  This document has been electronically signed.  WALTER HALE MD; Attending Radiologist  This document has been electronically signed. Sep 24 20    < end of copied text >  < from: CT Abdomen and Pelvis No Cont (09.23.23 @ 08:57) >    IMPRESSION:  No abdominal wall hematoma or subcutaneous soft tissue swelling at the   PEG tube insertion site.    No hematoma elsewhere in the abdomen or pelvis.    Ascending and descending thoracic aortic dilation.        --- End of Report ---    < end of copied text >      < from: TTE W or WO Ultrasound Enhancing Agent (09.26.23 @ 12:08) >    TRANSTHORACIC ECHOCARDIOGRAM REPORT  ________________________________________________________________________________                                      _______       Pt. Name:       MAXIMILIANO CHAMPION HERB Study Date:    9/26/2023  MRN:            [id number]     YOB: 1945  Accession #:    [id number]     Age:           78 years  Account#:       [id number]      Gender:        F  Heart Rate:                   Height:        67.00 in (170.18 cm)  Rhythm:                       Weight:        103.00 lb (46.72 kg)  Blood Pressure: 140/75 mmHg   BSA/BMI:       1.53 m² / 16.13 kg/m²  ________________________________________________________________________________________  Referring Physician:    [id number] Linda Vasques  Interpreting Physician: Chelsea Valentino  Primary Sonographer:    Clementine Delaney SLICK    CPT:               ECHO TTE WO CON COMP W DOPP - 76894.m  Indication(s):     Abnormal electrocardiogram ECG/EKG - R94.31  Procedure:         Transthoracic echocardiogram with 2-D, M-mode and complete                     spectral and color flow Doppler.  Ordering Location: [facility]  Admission Status:  Inpatient  Study Information: Image quality for this study is good.    _______________________________________________________________________________________     CONCLUSIONS:      1. The left ventricular cavity is normal size. Left ventricular wall thickness is normal. Left ventricular systolic function is normal with an ejection fraction of 67 % by Millan's method of disks.   2.Right ventricular cavity is normal in size, normal wall thickness and normal systolic function.   3. The aortic valve is tricuspid with normal structure without stenosis with reduced systolic excursion. There is calcification of the aortic valve leaflets. There is moderate aortic stenosis. The peak transaortic velocity is 1.98 m/s, peak transaortic gradient is 15.7 mmHg and mean transaortic gradient is 8.0 mmHg with an LVOT/aortic valve VTI ratio of 0.44. The aortic valve area is estimated at 1.38 cm² by the continuity equation. There is mild aortic regurgitation.   4. Structurally normal mitral valve with normal leaflet excursion. There is calcification of the mitral valve annulus. There is moderate to severe mitral regurgitation.   5. Estimated pulmonary artery systolic pressure is 48 mmHg.   6. No pericardial effusion seen.    _________________________________________________    < end of copied text >  < from: TTE W or WO Ultrasound Enhancing Agent (09.26.23 @ 12:08) >    TRANSTHORACIC ECHOCARDIOGRAM REPORT  ________________________________________________________________________________                                      _______       Pt. Name:       MAXIMILIANO ESTEVEZ Study Date:    9/26/2023  MRN:            [id number]     YOB: 1945  Accession #:    [id number]     Age:           78 years  Account#:       [id number]      Gender:        F  Heart Rate:                   Height:        67.00 in (170.18 cm)  Rhythm:                       Weight:        103.00 lb (46.72 kg)  Blood Pressure: 140/75 mmHg   BSA/BMI:       1.53 m² / 16.13 kg/m²  ________________________________________________________________________________________  Referring Physician:    [id number] Linda Vasques  Interpreting Physician: Chelsea Valentino  Primary Sonographer:    Clementine Delaney SLICK    CPT:               ECHO TTE WO CON COMP W DOPP - 99231.m  Indication(s):     Abnormal electrocardiogram ECG/EKG - R94.31  Procedure:         Transthoracic echocardiogram with 2-D, M-mode and complete                     spectral and color flow Doppler.  Ordering Location: [facility]  Admission Status:  Inpatient  Study Information: Image quality for this study is good.    _______________________________________________________________________________________     CONCLUSIONS:      1. The left ventricular cavity is normal size. Left ventricular wall thickness is normal. Left ventricular systolic function is normal with an ejection fraction of 67 % by Millan's method of disks.   2.Right ventricular cavity is normal in size, normal wall thickness and normal systolic function.   3. The aortic valve is tricuspid with normal structure without stenosis with reduced systolic excursion. There is calcification of the aortic valve leaflets. There is moderate aortic stenosis. The peak transaortic velocity is 1.98 m/s, peak transaortic gradient is 15.7 mmHg and mean transaortic gradient is 8.0 mmHg with an LVOT/aortic valve VTI ratio of 0.44. The aortic valve area is estimated at 1.38 cm² by the continuity equation. There is mild aortic regurgitation.   4. Structurally normal mitral valve with normal leaflet excursion. There is calcification of the mitral valve annulus. There is moderate to severe mitral regurgitation.   5. Estimated pulmonary artery systolic pressure is 48 mmHg.   6. No pericardial effusion seen.    _________________________________________________    < end of copied text >

## 2023-10-05 NOTE — CONSULT NOTE ADULT - SUBJECTIVE AND OBJECTIVE BOX
HPI:  79 yo F PMH Alzheimer's dementia s/p PEG 8/2023, GE flap valve, grade B esophagitis, Multiple Myeloma (previously on Revlimid), cataracts, chronic venous ulcers b/l LE, OA knee, HTN, primary pulmonary hypertension, MDD sent from NH given hypotension documented at 87/56. Patient is presently A&Ox1 (only oriented to self, believes she is in [facility] and is unable to provide reliable history. Attempt made to contact emergency contact Ubaldo who provided consent for transfusion to ED. History as per chart. Patient experienced cough for two days (patient presently denies cough or phlegm, shortness of breath). Patient also experienced dysuria for 1 week and is presently on treatment with CTX at NH 9/21-9/25. Of note, patient was also on D5 1/2  cc/hr for ?hypotension at NH in addition to free water flushes. Patient also noted to have abdominal tenderness in the ED - patient presently denies abdominal pain, nausea, vomiting, diarrhea, fevers, chills. At [facility] BP 92/56 s/p 1L LR remains systolic 90-100s, T 100.3->99.3. Labs significant for Hgb 6.2, WBC 13.3, Na 124. In the ED Patient was administered 1u PRBC, vancomycin, and zosyn x1. (22 Sep 2023 10:51)      14 point ROS otherwise negative    PAST MEDICAL & SURGICAL HISTORY:  Multiple myeloma      Dementia      Rheumatoid arthritis      Mild protein-calorie malnutrition      Primary pulmonary hypertension      Osteoarthritis      Cataract      MDD (major depressive disorder)      H/O left knee surgery          Allergies    No Known Allergies    Intolerances        MEDICATIONS  (STANDING):  ascorbic acid 500 milliGRAM(s) Oral daily  ceFAZolin   IVPB 2000 milliGRAM(s) IV Intermittent every 8 hours  ferrous    sulfate Liquid 220 milliGRAM(s) Oral every 12 hours  magnesium hydroxide Suspension 5 milliLiter(s) Oral daily  mirtazapine 15 milliGRAM(s) Oral at bedtime  pantoprazole   Suspension 40 milliGRAM(s) Oral daily  silver sulfADIAZINE 1% Cream 1 Application(s) Topical every 12 hours  viokace 25539 units 1 Tablet(s) 1 Tablet(s) Enteral Tube once    MEDICATIONS  (PRN):  acetaminophen   Oral Liquid .. 650 milliGRAM(s) Enteral Tube every 6 hours PRN Temp greater or equal to 38C (100.4F), Mild Pain (1 - 3), Moderate Pain (4 - 6)      FAMILY HISTORY:      SOCIAL HISTORY: No EtOH, no tobacco        VITALS:   T(F): 98.5 (10-05-23 @ 01:11), Max: 99.6 (10-04-23 @ 20:46)  HR: 90 (10-05-23 @ 01:11)  BP: 146/82 (10-05-23 @ 01:11)  RR: 17 (10-05-23 @ 01:11)  SpO2: 98% (10-05-23 @ 01:11)  Wt(kg): --    PHYSICAL EXAM    GENERAL: NAD, well-developed  HEAD:  Atraumatic, Normocephalic  EYES: EOMI, PERRLA, conjunctiva and sclera clear  NECK: Supple, No JVD  CHEST/LUNG: Clear to auscultation bilaterally; No wheeze  HEART: Regular rate and rhythm; No murmurs, rubs, or gallops  ABDOMEN: Soft, Nontender, Nondistended; Bowel sounds present  EXTREMITIES:  2+ Peripheral Pulses, No clubbing, cyanosis, or edema  NEUROLOGY: non-focal  SKIN: No rashes or lesions    LABS:                         7.6    5.24  )-----------( 314      ( 05 Oct 2023 06:04 )             25.1     10-05    138  |  103  |  14  ----------------------------<  81  4.2   |  25  |  0.45<L>    Ca    8.4      05 Oct 2023 06:04  Phos  2.9     10-05  Mg     2.30     10-05      Phosphorus: 2.9 mg/dL (10-05 @ 06:04)  Magnesium: 2.30 mg/dL (10-05 @ 06:04)      .Blood Blood-Peripheral  09-29 @ 19:04   No growth at 5 days  --  --      .Blood Blood-Peripheral  09-25 @ 12:11   No growth at 5 days  --  --      .Blood Blood-Venous  09-25 @ 12:00   No growth at 5 days  --  --      .Blood Blood-Peripheral  09-23 @ 10:15   No growth at 5 days  --  --      .Blood Blood-Venous  09-23 @ 09:49   Growth in anaerobic bottle: Staphylococcus aureus  See previous culture [id number]  --    Growth in anaerobic bottle: Gram Positive Cocci in Clusters      Clean Catch Clean Catch (Midstream)  09-21 @ 23:35   >100,000 CFU/ml Staphylococcus aureus  --  Staphylococcus aureus      .Blood Blood-Peripheral  09-21 @ 23:30   Growth in aerobic bottle: Staphylococcus aureus  Growth in anaerobic bottle: Staphylococcus capitis  Direct identification is available within approximately 3-5  hours either by Blood Panel Multiplexed PCR or Direct  MALDI-TOF. Details: [email or web address]  --  Blood Culture PCR  Staphylococcus aureus      .Blood Blood-Peripheral  09-21 @ 23:15   Growth in aerobic and anaerobic bottles: Staphylococcus aureus  See previous culture [id number]  --    Growth in aerobic bottle: Gram Positive Cocci in Clusters  Growth in anaerobic bottle: Gram Positive Cocci in Clusters      Clean Catch Clean Catch (Midstream)  08-01 @ 23:00   <10,000 CFU/mL Normal Urogenital Indy  --  --      .Blood Blood-Peripheral  08-01 @ 18:15   No growth at 5 days  --  --      .Blood Blood-Peripheral  08-01 @ 18:00   No growth at 5 days  --  --          IMAGING: HPI:  79 yo F PMH Alzheimer's dementia s/p PEG 8/2023, GE flap valve, grade B esophagitis, Multiple Myeloma (previously on Revlimid), cataracts, chronic venous ulcers b/l LE, OA knee, HTN, primary pulmonary hypertension, MDD sent from NH given hypotension documented at 87/56. Patient is presently A&Ox1 (only oriented to self, believes she is in [facility] and is unable to provide reliable history. Attempt made to contact emergency contact Ubaldo who provided consent for transfusion to ED. History as per chart. Patient experienced cough for two days (patient presently denies cough or phlegm, shortness of breath). Patient also experienced dysuria for 1 week and is presently on treatment with CTX at NH 9/21-9/25. Of note, patient was also on D5 1/2  cc/hr for ?hypotension at NH in addition to free water flushes. Patient also noted to have abdominal tenderness in the ED - patient presently denies abdominal pain, nausea, vomiting, diarrhea, fevers, chills. At [facility] BP 92/56 s/p 1L LR remains systolic 90-100s, T 100.3->99.3. Labs significant for Hgb 6.2, WBC 13.3, Na 124. In the ED Patient was administered 1u PRBC, vancomycin, and zosyn x1. (22 Sep 2023 10:51)      14 point ROS otherwise negative    PAST MEDICAL & SURGICAL HISTORY:  Multiple myeloma      Dementia      Rheumatoid arthritis      Mild protein-calorie malnutrition      Primary pulmonary hypertension      Osteoarthritis      Cataract      MDD (major depressive disorder)      H/O left knee surgery          Allergies    No Known Allergies    Intolerances        MEDICATIONS  (STANDING):  ascorbic acid 500 milliGRAM(s) Oral daily  ceFAZolin   IVPB 2000 milliGRAM(s) IV Intermittent every 8 hours  ferrous    sulfate Liquid 220 milliGRAM(s) Oral every 12 hours  magnesium hydroxide Suspension 5 milliLiter(s) Oral daily  mirtazapine 15 milliGRAM(s) Oral at bedtime  pantoprazole   Suspension 40 milliGRAM(s) Oral daily  silver sulfADIAZINE 1% Cream 1 Application(s) Topical every 12 hours  viokace 47422 units 1 Tablet(s) 1 Tablet(s) Enteral Tube once    MEDICATIONS  (PRN):  acetaminophen   Oral Liquid .. 650 milliGRAM(s) Enteral Tube every 6 hours PRN Temp greater or equal to 38C (100.4F), Mild Pain (1 - 3), Moderate Pain (4 - 6)      FAMILY HISTORY:      SOCIAL HISTORY: No EtOH, no tobacco        VITALS:   T(F): 98.5 (10-05-23 @ 01:11), Max: 99.6 (10-04-23 @ 20:46)  HR: 90 (10-05-23 @ 01:11)  BP: 146/82 (10-05-23 @ 01:11)  RR: 17 (10-05-23 @ 01:11)  SpO2: 98% (10-05-23 @ 01:11)  Wt(kg): --    PHYSICAL EXAM    GENERAL: NAD, contracted and confused  HEAD:  Atraumatic, Normocephalic  EYES: EOMI, PERRLA, conjunctiva and sclera clear  NECK: Supple, No JVD  CHEST/LUNG: Clear to auscultation bilaterally; No wheeze  HEART: Regular rate and rhythm; No murmurs, rubs, or gallops  ABDOMEN: Soft, (+) Tender to palpation, G-tube in place, Nondistended; Bowel sounds present  EXTREMITIES:  2+ Peripheral Pulses, No clubbing, cyanosis, or edema  NEUROLOGY: non-focal  SKIN: No rashes or lesions    LABS:                         7.6    5.24  )-----------( 314      ( 05 Oct 2023 06:04 )             25.1     10-05    138  |  103  |  14  ----------------------------<  81  4.2   |  25  |  0.45<L>    Ca    8.4      05 Oct 2023 06:04  Phos  2.9     10-05  Mg     2.30     10-05      Phosphorus: 2.9 mg/dL (10-05 @ 06:04)  Magnesium: 2.30 mg/dL (10-05 @ 06:04)      .Blood Blood-Peripheral  09-29 @ 19:04   No growth at 5 days  --  --      .Blood Blood-Peripheral  09-25 @ 12:11   No growth at 5 days  --  --      .Blood Blood-Venous  09-25 @ 12:00   No growth at 5 days  --  --      .Blood Blood-Peripheral  09-23 @ 10:15   No growth at 5 days  --  --      .Blood Blood-Venous  09-23 @ 09:49   Growth in anaerobic bottle: Staphylococcus aureus  See previous culture [id number]  --    Growth in anaerobic bottle: Gram Positive Cocci in Clusters      Clean Catch Clean Catch (Midstream)  09-21 @ 23:35   >100,000 CFU/ml Staphylococcus aureus  --  Staphylococcus aureus      .Blood Blood-Peripheral  09-21 @ 23:30   Growth in aerobic bottle: Staphylococcus aureus  Growth in anaerobic bottle: Staphylococcus capitis  Direct identification is available within approximately 3-5  hours either by Blood Panel Multiplexed PCR or Direct  MALDI-TOF. Details: [email or web address]  --  Blood Culture PCR  Staphylococcus aureus      .Blood Blood-Peripheral  09-21 @ 23:15   Growth in aerobic and anaerobic bottles: Staphylococcus aureus  See previous culture [id number]  --    Growth in aerobic bottle: Gram Positive Cocci in Clusters  Growth in anaerobic bottle: Gram Positive Cocci in Clusters      Clean Catch Clean Catch (Midstream)  08-01 @ 23:00   <10,000 CFU/mL Normal Urogenital Indy  --  --      .Blood Blood-Peripheral  08-01 @ 18:15   No growth at 5 days  --  --      .Blood Blood-Peripheral  08-01 @ 18:00   No growth at 5 days  --  --          IMAGING:

## 2023-10-06 LAB
ANION GAP SERPL CALC-SCNC: 10 MMOL/L — SIGNIFICANT CHANGE UP (ref 7–14)
B PERT DNA SPEC QL NAA+PROBE: SIGNIFICANT CHANGE UP
B PERT+PARAPERT DNA PNL SPEC NAA+PROBE: SIGNIFICANT CHANGE UP
BASE EXCESS BLDV CALC-SCNC: 1.4 MMOL/L — SIGNIFICANT CHANGE UP (ref -2–3)
BLOOD GAS VENOUS COMPREHENSIVE RESULT: SIGNIFICANT CHANGE UP
BORDETELLA PARAPERTUSSIS (RAPRVP): SIGNIFICANT CHANGE UP
BUN SERPL-MCNC: 13 MG/DL — SIGNIFICANT CHANGE UP (ref 7–23)
C PNEUM DNA SPEC QL NAA+PROBE: SIGNIFICANT CHANGE UP
CALCIUM SERPL-MCNC: 8.5 MG/DL — SIGNIFICANT CHANGE UP (ref 8.4–10.5)
CHLORIDE BLDV-SCNC: 103 MMOL/L — SIGNIFICANT CHANGE UP (ref 96–108)
CHLORIDE SERPL-SCNC: 101 MMOL/L — SIGNIFICANT CHANGE UP (ref 98–107)
CO2 BLDV-SCNC: 28 MMOL/L — HIGH (ref 22–26)
CO2 SERPL-SCNC: 25 MMOL/L — SIGNIFICANT CHANGE UP (ref 22–31)
CREAT SERPL-MCNC: 0.44 MG/DL — LOW (ref 0.5–1.3)
EGFR: 100 ML/MIN/1.73M2 — SIGNIFICANT CHANGE UP
FLUAV SUBTYP SPEC NAA+PROBE: SIGNIFICANT CHANGE UP
FLUBV RNA SPEC QL NAA+PROBE: SIGNIFICANT CHANGE UP
GAS PNL BLDV: 135 MMOL/L — LOW (ref 136–145)
GLUCOSE BLDC GLUCOMTR-MCNC: 120 MG/DL — HIGH (ref 70–99)
GLUCOSE BLDC GLUCOMTR-MCNC: 125 MG/DL — HIGH (ref 70–99)
GLUCOSE BLDC GLUCOMTR-MCNC: 149 MG/DL — HIGH (ref 70–99)
GLUCOSE BLDV-MCNC: 141 MG/DL — HIGH (ref 70–99)
GLUCOSE SERPL-MCNC: 134 MG/DL — HIGH (ref 70–99)
HADV DNA SPEC QL NAA+PROBE: SIGNIFICANT CHANGE UP
HCO3 BLDV-SCNC: 27 MMOL/L — SIGNIFICANT CHANGE UP (ref 22–29)
HCOV 229E RNA SPEC QL NAA+PROBE: SIGNIFICANT CHANGE UP
HCOV HKU1 RNA SPEC QL NAA+PROBE: SIGNIFICANT CHANGE UP
HCOV NL63 RNA SPEC QL NAA+PROBE: SIGNIFICANT CHANGE UP
HCOV OC43 RNA SPEC QL NAA+PROBE: SIGNIFICANT CHANGE UP
HCT VFR BLD CALC: 25.9 % — LOW (ref 34.5–45)
HCT VFR BLDA CALC: 23 % — LOW (ref 34.5–46.5)
HGB BLD CALC-MCNC: 7.7 G/DL — LOW (ref 11.7–16.1)
HGB BLD-MCNC: 8 G/DL — LOW (ref 11.5–15.5)
HMPV RNA SPEC QL NAA+PROBE: SIGNIFICANT CHANGE UP
HPIV1 RNA SPEC QL NAA+PROBE: SIGNIFICANT CHANGE UP
HPIV2 RNA SPEC QL NAA+PROBE: SIGNIFICANT CHANGE UP
HPIV3 RNA SPEC QL NAA+PROBE: SIGNIFICANT CHANGE UP
HPIV4 RNA SPEC QL NAA+PROBE: SIGNIFICANT CHANGE UP
LACTATE BLDV-MCNC: 2.5 MMOL/L — HIGH (ref 0.5–2)
M PNEUMO DNA SPEC QL NAA+PROBE: SIGNIFICANT CHANGE UP
MAGNESIUM SERPL-MCNC: 2.1 MG/DL — SIGNIFICANT CHANGE UP (ref 1.6–2.6)
MCHC RBC-ENTMCNC: 22.7 PG — LOW (ref 27–34)
MCHC RBC-ENTMCNC: 30.9 GM/DL — LOW (ref 32–36)
MCV RBC AUTO: 73.6 FL — LOW (ref 80–100)
NRBC # BLD: 0 /100 WBCS — SIGNIFICANT CHANGE UP (ref 0–0)
NRBC # FLD: 0 K/UL — SIGNIFICANT CHANGE UP (ref 0–0)
PCO2 BLDV: 44 MMHG — SIGNIFICANT CHANGE UP (ref 39–52)
PH BLDV: 7.39 — SIGNIFICANT CHANGE UP (ref 7.32–7.43)
PHOSPHATE SERPL-MCNC: 2.3 MG/DL — LOW (ref 2.5–4.5)
PLATELET # BLD AUTO: 349 K/UL — SIGNIFICANT CHANGE UP (ref 150–400)
PO2 BLDV: 58 MMHG — HIGH (ref 25–45)
POTASSIUM BLDV-SCNC: 3.7 MMOL/L — SIGNIFICANT CHANGE UP (ref 3.5–5.1)
POTASSIUM SERPL-MCNC: 3.7 MMOL/L — SIGNIFICANT CHANGE UP (ref 3.5–5.3)
POTASSIUM SERPL-SCNC: 3.7 MMOL/L — SIGNIFICANT CHANGE UP (ref 3.5–5.3)
RAPID RVP RESULT: DETECTED
RBC # BLD: 3.52 M/UL — LOW (ref 3.8–5.2)
RBC # FLD: 26.2 % — HIGH (ref 10.3–14.5)
RSV RNA SPEC QL NAA+PROBE: SIGNIFICANT CHANGE UP
RV+EV RNA SPEC QL NAA+PROBE: SIGNIFICANT CHANGE UP
SAO2 % BLDV: 90.2 % — HIGH (ref 67–88)
SARS-COV-2 RNA SPEC QL NAA+PROBE: DETECTED
SODIUM SERPL-SCNC: 136 MMOL/L — SIGNIFICANT CHANGE UP (ref 135–145)
WBC # BLD: 8.39 K/UL — SIGNIFICANT CHANGE UP (ref 3.8–10.5)
WBC # FLD AUTO: 8.39 K/UL — SIGNIFICANT CHANGE UP (ref 3.8–10.5)

## 2023-10-06 PROCEDURE — 71045 X-RAY EXAM CHEST 1 VIEW: CPT | Mod: 26

## 2023-10-06 RX ORDER — ACETAMINOPHEN 500 MG
700 TABLET ORAL ONCE
Refills: 0 | Status: COMPLETED | OUTPATIENT
Start: 2023-10-06 | End: 2023-10-06

## 2023-10-06 RX ADMIN — Medication 500 MILLIGRAM(S): at 12:44

## 2023-10-06 RX ADMIN — MIRTAZAPINE 15 MILLIGRAM(S): 45 TABLET, ORALLY DISINTEGRATING ORAL at 22:12

## 2023-10-06 RX ADMIN — Medication 220 MILLIGRAM(S): at 18:22

## 2023-10-06 RX ADMIN — PANTOPRAZOLE SODIUM 40 MILLIGRAM(S): 20 TABLET, DELAYED RELEASE ORAL at 12:44

## 2023-10-06 RX ADMIN — MAGNESIUM HYDROXIDE 5 MILLILITER(S): 400 TABLET, CHEWABLE ORAL at 12:44

## 2023-10-06 RX ADMIN — Medication 100 MILLIGRAM(S): at 15:06

## 2023-10-06 RX ADMIN — Medication 1 APPLICATION(S): at 06:03

## 2023-10-06 RX ADMIN — Medication 100 MILLIGRAM(S): at 06:04

## 2023-10-06 RX ADMIN — Medication 280 MILLIGRAM(S): at 06:22

## 2023-10-06 RX ADMIN — Medication 220 MILLIGRAM(S): at 06:04

## 2023-10-06 RX ADMIN — Medication 100 MILLIGRAM(S): at 22:12

## 2023-10-06 RX ADMIN — Medication 1 APPLICATION(S): at 18:22

## 2023-10-06 NOTE — PROGRESS NOTE ADULT - SUBJECTIVE AND OBJECTIVE BOX
CHIEF COMPLAINT:    SUBJECTIVE:     REVIEW OF SYSTEMS:    CONSTITUTIONAL: (  )  weakness,  (  ) fevers or chills  EYES/ENT: (  )visual changes;     NECK: (  ) pain or stiffness  RESPIRATORY:   (  )cough, wheezing, hemoptysis;  (  ) shortness of breath  CARDIOVASCULAR:  (  )chest pain or palpitations  GASTROINTESTINAL:   (  )abdominal or epigastric pain.  (  ) nausea, vomiting, or hematemesis;   (   ) diarrhea or constipation.   GENITOURINARY:   (    ) dysuria, frequency or hematuria  NEUROLOGICAL:  (   ) numbness or weakness   All other review of systems is negative unless indicated above    Vital Signs Last 24 Hrs  T(C): 37.3 (06 Oct 2023 09:09), Max: 38.2 (06 Oct 2023 06:11)  T(F): 99.1 (06 Oct 2023 09:09), Max: 100.7 (06 Oct 2023 06:11)  HR: 100 (06 Oct 2023 09:09) (100 - 110)  BP: 124/64 (06 Oct 2023 09:09) (124/64 - 148/91)  BP(mean): --  RR: 17 (06 Oct 2023 09:09) (16 - 17)  SpO2: 99% (06 Oct 2023 09:09) (96% - 100%)    Parameters below as of 06 Oct 2023 09:09  Patient On (Oxygen Delivery Method): room air        I&O's Summary      CAPILLARY BLOOD GLUCOSE      POCT Blood Glucose.: 149 mg/dL (06 Oct 2023 05:11)  POCT Blood Glucose.: 145 mg/dL (05 Oct 2023 23:05)  POCT Blood Glucose.: 118 mg/dL (05 Oct 2023 17:46)  POCT Blood Glucose.: 98 mg/dL (05 Oct 2023 13:56)      PHYSICAL EXAM:    Constitutional:  (   ) NAD,   (   )awake and alert  HEENT: PERR, EOMI,    Neck: Soft and supple, No LAD, No JVD  Respiratory:  (    Breath sounds are clear bilaterally,    (   ) wheezing, rales or rhonchi  Cardiovascular:     (   )S1 and S2, regular rate and rhythm, no Murmurs, gallops or rubs  Gastrointestinal:  (   )Bowel Sounds present, soft,   (  )nontender, nondistended,    Extremities:    (  ) peripheral edema  Vascular: 2+ peripheral pulses  Neurological:    (    )A/O x 3,   (  ) focal deficits  Musculoskeletal:    (   )  normal strength b/l upper  (     ) normal  lower extremities  Skin: No rashes    MEDICATIONS:  MEDICATIONS  (STANDING):  ascorbic acid 500 milliGRAM(s) Oral daily  ceFAZolin   IVPB 2000 milliGRAM(s) IV Intermittent every 8 hours  ferrous    sulfate Liquid 220 milliGRAM(s) Oral every 12 hours  magnesium hydroxide Suspension 5 milliLiter(s) Oral daily  mirtazapine 15 milliGRAM(s) Oral at bedtime  pantoprazole   Suspension 40 milliGRAM(s) Oral daily  silver sulfADIAZINE 1% Cream 1 Application(s) Topical every 12 hours  viokace 14020 units 1 Tablet(s) 1 Tablet(s) Enteral Tube once      LABS: All Labs Reviewed:                        8.0    8.39  )-----------( 349      ( 06 Oct 2023 05:18 )             25.9     10-06    136  |  101  |  13  ----------------------------<  134<H>  3.7   |  25  |  0.44<L>    Ca    8.5      06 Oct 2023 05:18  Phos  2.3     10-06  Mg     2.10     10-06    TPro  x   /  Alb  x   /  TBili  0.6  /  DBili  0.2  /  AST  x   /  ALT  x   /  AlkPhos  x   10-05          Blood Culture:   Urine Culture      RADIOLOGY/EKG:    ASSESSMENT AND PLAN:    DVT PPX:    ADVANCED DIRECTIVE:    DISPOSITION: CHIEF COMPLAINT:   patient condition remain stable her case was discussed with ACP in detail regarding  heumatology follow-up vascular follow-up anticoagulation plan physical therapy for follow-up due to her severe contact contracture especially in the   right lower extremity  SUBJECTIVE:     REVIEW OF SYSTEMS:    CONSTITUTIONAL: ( x )  weakness,  (  ) fevers or chills  EYES/ENT: (  )visual changes;     NECK: (  ) pain or stiffness  RESPIRATORY:   (  )cough, wheezing, hemoptysis;  (  ) shortness of breath  CARDIOVASCULAR:  (  )chest pain or palpitations  GASTROINTESTINAL:   (  )abdominal or epigastric pain.  (  ) nausea, vomiting, or hematemesis;   (   ) diarrhea or constipation.   GENITOURINARY:   (    ) dysuria, frequency or hematuria  NEUROLOGICAL:  (   ) numbness or weakness   All other review of systems is negative unless indicated above    Vital Signs Last 24 Hrs  T(C): 37.3 (06 Oct 2023 09:09), Max: 38.2 (06 Oct 2023 06:11)  T(F): 99.1 (06 Oct 2023 09:09), Max: 100.7 (06 Oct 2023 06:11)  HR: 100 (06 Oct 2023 09:09) (100 - 110)  BP: 124/64 (06 Oct 2023 09:09) (124/64 - 148/91)  BP(mean): --  RR: 17 (06 Oct 2023 09:09) (16 - 17)  SpO2: 99% (06 Oct 2023 09:09) (96% - 100%)    Parameters below as of 06 Oct 2023 09:09  Patient On (Oxygen Delivery Method): room air        I&O's Summary      CAPILLARY BLOOD GLUCOSE      POCT Blood Glucose.: 149 mg/dL (06 Oct 2023 05:11)  POCT Blood Glucose.: 145 mg/dL (05 Oct 2023 23:05)  POCT Blood Glucose.: 118 mg/dL (05 Oct 2023 17:46)  POCT Blood Glucose.: 98 mg/dL (05 Oct 2023 13:56)      PHYSICAL EXAM:    Constitutional:  (  x ) NAD,   ( x  )awake and  verbal  HEENT: PERR, EOMI,    Neck: Soft and supple, No LAD, No JVD  Respiratory:  (  x  Breath sounds are clear bilaterally,    (   ) wheezing, rales or rhonchi  Cardiovascular:     ( x  )S1 and S2, regular rate and rhythm, no Murmurs, gallops or rubs  Gastrointestinal:  ( x  )Bowel Sounds present, soft,   (  )nontender, nondistended,    Extremities:    (  ) peripheral edema  Vascular: 2+ peripheral pulses  Neurological:    (   x )A/O x  1,   (  ) focal deficits  Musculoskeletal:    severe contracture in the right lower extremity difficult to move and adjust her in the bed   Skin: No rashes    MEDICATIONS:  MEDICATIONS  (STANDING):  ascorbic acid 500 milliGRAM(s) Oral daily  ceFAZolin   IVPB 2000 milliGRAM(s) IV Intermittent every 8 hours  ferrous    sulfate Liquid 220 milliGRAM(s) Oral every 12 hours  magnesium hydroxide Suspension 5 milliLiter(s) Oral daily  mirtazapine 15 milliGRAM(s) Oral at bedtime  pantoprazole   Suspension 40 milliGRAM(s) Oral daily  silver sulfADIAZINE 1% Cream 1 Application(s) Topical every 12 hours  viokace 51894 units 1 Tablet(s) 1 Tablet(s) Enteral Tube once      LABS: All Labs Reviewed:                        8.0    8.39  )-----------( 349      ( 06 Oct 2023 05:18 )             25.9     10-06    136  |  101  |  13  ----------------------------<  134<H>  3.7   |  25  |  0.44<L>    Ca    8.5      06 Oct 2023 05:18  Phos  2.3     10-06  Mg     2.10     10-06    TPro  x   /  Alb  x   /  TBili  0.6  /  DBili  0.2  /  AST  x   /  ALT  x   /  AlkPhos  x   10-05          Blood Culture:   Urine Culture      RADIOLOGY/EKG:    ASSESSMENT AND PLAN:  77 yo F PMH Alzheimer's dementia s/p PEG 8/2023, GE flap valve, grade B esophagitis, Multiple Myeloma (previously on Revlimid), cataracts, chronic venous ulcers b/l LE, OA knee, HTN, primary pulmonary hypertension, MDD sent from NH given hypotension documented at 87/56. Unable to obtain reliable history although per documentation patient has experienced cough, dysuria, and abdominal pain. Labs significant for anemia to 6.2 s/p PRBC without active bleeding and Na 124 likely iatrogenic as patient was on D5 1/2 NS and standing free water flushes at nursing home. Patient with fevers (T 100.3) and leukocytosis to 13.3 concerning for sepsis likely 2/2 urinary source - alternatively consider lower extremity wounds. Patient with hx hematoma following initial PEG placement, would f/u CT abdomen r/o expansion.      Problem/Plan - 1:  ·  Problem: Sepsis.   ·  Plan: -patient with fever to 100.3, leukocytosis to 13.3, hypotension 87/56-->92/56 concerning for sepsis 2/2 urinary source, less likely in setting of lower extremity wounds, lactic acid 2.4  -s/p vanc and zosyn in the ED, s/p 1L LR  -f/u BCx, UCx, UA turbid 100 protein moderate blood, large LE, WBC 2921 significant for UTI, per documentation patient has experienced dysuria  -CXR clear  -will order ceftriaxone and vancomycin  -monitor BP, f/u repeat lactic acid.    Problem/Plan - 2:  ·  Problem: Hyponatremia.   ·  Plan: -Na 124  -likely iatrogenic given patient was on D5 1/2 NS and free water flushes at facility  -will hold D5 NS and free water flushes  -f/u urine sodium urine osm, serum osm  -monitor BMP q8h.    Problem/Plan - 3:  ·  Problem: Anemia.   ·  Plan: -Hgb 6.6  -s/p 1u PRBC in ED, f/u repeat CBC, monitor CBC q8h  -f/u iron, ferritin, TIBC  -continue home iron  -monitor CBC, maintain active type and screen, transfuse for Hgb<7.0  -patient with hx hematoma following initial PEG placement, f/u CT A/P consider expansion.    Problem/Plan - 4:  ·  Problem: Alzheimers disease.   ·  Plan: -would obtain collateral on baseline mental status, attempted to call emergency contact tomeka mendozamail was full  -patient is alert, conversant, although A&Ox1 believes she is in [facility].    Problem/Plan - 5:  ·  Problem: Multiple myeloma.   ·  Plan: -patient previously on revlimid per documentation       Problem/Plan - 6:  ·  Problem: Rheumatoid arthritis.   ·  Plan: -patient with ulnar deviation of b/l UE digits, documented RA  -would obtain records.    Problem/Plan - 7:  ·  Problem: Chronic cutaneous venous stasis ulcer.   ·  Plan: -patient with b/l LE ulcers, in setting of chronic venous stasis  -f/u wound care eval.    Problem/Plan - 8:  ·  Problem: History of esophagitis.   ·  Plan: -patient with grade B esophagitis on EGD 8/2023, also with Hill grade 3 gastroesophageal flap, patient was pending ?outpatient w/u with EUS  -home omeprazole not on formulary will order pantoprazole daily.    Problem/Plan - 9:  ·  Problem: Need for prophylactic measure.   ·  Plan: -DVT ppx: patient with documented hx hematoma after initial PEG placement, SCD for now, will hold pharmacologic ppx  -Diet: f/u nutrition consult for tube feeds, patient on Jevity at facility.    -9/23/2023 ID and IR note and consult appreciated due to positive blood culture recommended to remove right upper port   her antibiotic was changed to Ancef grams every 8 hours as per ID recommendation  -9/24/2023 condition stable waiting for removal of right upper chest sided port  -9/25/2023 right-sided chest port was removed by interventional radiology condition stable continue Ancef 2 g every 8 hours  -9/26/2023 continue Ancef 2 g with Hours for the next 4 weeks patient needs PICC line/midline after negative blood culture echo result noted mild aortic stenosis and severe MR no other  invasivel intervention need to be done due to her general condition and terminal dementia  -9/27/2023 blood culture from 9/25 /2023 Negative Pl. PICC line in a.m. and discharge plan discussed with a ACP  Team discussed with ID about the plan  9/28/2023 D/C back to SNF   with midline  to complet IV antibiotic    -9/30/2023 patient with right upper extremity DVT due to midline start on heparin continue Ancef 2 g every 8 hours discussed with AC P Team   -10/1/2023 condition improving with start  Eliquis in a.m. duration usually between 3-6 months will ask vascular recommendation  10/2/2023   continue  IV Ancef   ID follow-up    -10/3/2023 discontinue heparin due to low hemoglobin and multiple ecchymotic area when asked to be seen by vascular for follow-up and hematology if she has persistent low hemoglobin    -10/4/2023 continue IV antibiotic hematology consult pending off heparin due to  ecchymotic area  -10/5/2023 continue Ancef 2 g every 8 hours hematology consult noted will perform all the testing requested  -10/6/2023 discussed with ACP in detail regarding patient condition hematology follow-up rehabilitation follow-up and vascular regarding anticoagulation  DVT PPX:    ADVANCED DIRECTIVE:    DISPOSITION:

## 2023-10-06 NOTE — CHART NOTE - NSCHARTNOTEFT_GEN_A_CORE
Updated patient's sister (HCP) Lindsey at bedside on plan of care and recent findings. Addressed concerns and answered all questions. Lindsey emphasized comfort focused care, and stated she did NOT want patient to resume anticoagulation. Will hold off on vascular consult for now.     Howie Lim PA-C  Department of Medicine  Pager #[phone]

## 2023-10-07 LAB
ANION GAP SERPL CALC-SCNC: 6 MMOL/L — LOW (ref 7–14)
B2 MICROGLOB SERPL-MCNC: 4.1 MG/L — HIGH (ref 0.8–2.2)
BUN SERPL-MCNC: 12 MG/DL — SIGNIFICANT CHANGE UP (ref 7–23)
CALCIUM SERPL-MCNC: 8.1 MG/DL — LOW (ref 8.4–10.5)
CHLORIDE SERPL-SCNC: 103 MMOL/L — SIGNIFICANT CHANGE UP (ref 98–107)
CO2 SERPL-SCNC: 29 MMOL/L — SIGNIFICANT CHANGE UP (ref 22–31)
CREAT SERPL-MCNC: 0.43 MG/DL — LOW (ref 0.5–1.3)
EGFR: 100 ML/MIN/1.73M2 — SIGNIFICANT CHANGE UP
GLUCOSE BLDC GLUCOMTR-MCNC: 107 MG/DL — HIGH (ref 70–99)
GLUCOSE BLDC GLUCOMTR-MCNC: 113 MG/DL — HIGH (ref 70–99)
GLUCOSE BLDC GLUCOMTR-MCNC: 119 MG/DL — HIGH (ref 70–99)
GLUCOSE BLDC GLUCOMTR-MCNC: 124 MG/DL — HIGH (ref 70–99)
GLUCOSE BLDC GLUCOMTR-MCNC: 132 MG/DL — HIGH (ref 70–99)
GLUCOSE SERPL-MCNC: 105 MG/DL — HIGH (ref 70–99)
HCT VFR BLD CALC: 27.3 % — LOW (ref 34.5–45)
HGB BLD-MCNC: 8.1 G/DL — LOW (ref 11.5–15.5)
MAGNESIUM SERPL-MCNC: 2.3 MG/DL — SIGNIFICANT CHANGE UP (ref 1.6–2.6)
MCHC RBC-ENTMCNC: 22.8 PG — LOW (ref 27–34)
MCHC RBC-ENTMCNC: 29.7 GM/DL — LOW (ref 32–36)
MCV RBC AUTO: 76.7 FL — LOW (ref 80–100)
NRBC # BLD: 0 /100 WBCS — SIGNIFICANT CHANGE UP (ref 0–0)
NRBC # FLD: 0 K/UL — SIGNIFICANT CHANGE UP (ref 0–0)
PHOSPHATE SERPL-MCNC: 2.1 MG/DL — LOW (ref 2.5–4.5)
PLATELET # BLD AUTO: 350 K/UL — SIGNIFICANT CHANGE UP (ref 150–400)
POTASSIUM SERPL-MCNC: 4.2 MMOL/L — SIGNIFICANT CHANGE UP (ref 3.5–5.3)
POTASSIUM SERPL-SCNC: 4.2 MMOL/L — SIGNIFICANT CHANGE UP (ref 3.5–5.3)
PROT ?TM UR-MCNC: 87 MG/DL — SIGNIFICANT CHANGE UP
RBC # BLD: 3.56 M/UL — LOW (ref 3.8–5.2)
RBC # FLD: 26.4 % — HIGH (ref 10.3–14.5)
SODIUM SERPL-SCNC: 138 MMOL/L — SIGNIFICANT CHANGE UP (ref 135–145)
WBC # BLD: 6.2 K/UL — SIGNIFICANT CHANGE UP (ref 3.8–10.5)
WBC # FLD AUTO: 6.2 K/UL — SIGNIFICANT CHANGE UP (ref 3.8–10.5)

## 2023-10-07 PROCEDURE — 86335 IMMUNFIX E-PHORSIS/URINE/CSF: CPT | Mod: 26

## 2023-10-07 PROCEDURE — 84166 PROTEIN E-PHORESIS/URINE/CSF: CPT | Mod: 26

## 2023-10-07 RX ADMIN — Medication 220 MILLIGRAM(S): at 05:15

## 2023-10-07 RX ADMIN — PANTOPRAZOLE SODIUM 40 MILLIGRAM(S): 20 TABLET, DELAYED RELEASE ORAL at 14:39

## 2023-10-07 RX ADMIN — Medication 220 MILLIGRAM(S): at 18:00

## 2023-10-07 RX ADMIN — Medication 100 MILLIGRAM(S): at 21:54

## 2023-10-07 RX ADMIN — Medication 100 MILLIGRAM(S): at 05:15

## 2023-10-07 RX ADMIN — MIRTAZAPINE 15 MILLIGRAM(S): 45 TABLET, ORALLY DISINTEGRATING ORAL at 21:55

## 2023-10-07 RX ADMIN — MAGNESIUM HYDROXIDE 5 MILLILITER(S): 400 TABLET, CHEWABLE ORAL at 14:39

## 2023-10-07 RX ADMIN — Medication 500 MILLIGRAM(S): at 14:40

## 2023-10-07 RX ADMIN — Medication 100 MILLIGRAM(S): at 14:37

## 2023-10-07 RX ADMIN — Medication 1 APPLICATION(S): at 17:59

## 2023-10-07 RX ADMIN — Medication 1 APPLICATION(S): at 05:24

## 2023-10-07 NOTE — PROGRESS NOTE ADULT - SUBJECTIVE AND OBJECTIVE BOX
CHIEF COMPLAINT:    SUBJECTIVE:     REVIEW OF SYSTEMS:    CONSTITUTIONAL: (  )  weakness,  (  ) fevers or chills  EYES/ENT: (  )visual changes;     NECK: (  ) pain or stiffness  RESPIRATORY:   (  )cough, wheezing, hemoptysis;  (  ) shortness of breath  CARDIOVASCULAR:  (  )chest pain or palpitations  GASTROINTESTINAL:   (  )abdominal or epigastric pain.  (  ) nausea, vomiting, or hematemesis;   (   ) diarrhea or constipation.   GENITOURINARY:   (    ) dysuria, frequency or hematuria  NEUROLOGICAL:  (   ) numbness or weakness   All other review of systems is negative unless indicated above    Vital Signs Last 24 Hrs  T(C): 36.6 (07 Oct 2023 05:15), Max: 37.3 (06 Oct 2023 09:09)  T(F): 97.9 (07 Oct 2023 05:15), Max: 99.2 (06 Oct 2023 21:29)  HR: 103 (07 Oct 2023 05:15) (98 - 103)  BP: 117/69 (07 Oct 2023 05:15) (100/75 - 133/63)  BP(mean): --  RR: 17 (07 Oct 2023 05:15) (17 - 18)  SpO2: 95% (07 Oct 2023 05:15) (94% - 100%)    Parameters below as of 07 Oct 2023 05:15  Patient On (Oxygen Delivery Method): room air        I&O's Summary      CAPILLARY BLOOD GLUCOSE      POCT Blood Glucose.: 132 mg/dL (07 Oct 2023 05:37)  POCT Blood Glucose.: 107 mg/dL (07 Oct 2023 01:26)  POCT Blood Glucose.: 125 mg/dL (06 Oct 2023 17:45)  POCT Blood Glucose.: 120 mg/dL (06 Oct 2023 12:08)      PHYSICAL EXAM:    Constitutional:  (   ) NAD,   (   )awake and alert  HEENT: PERR, EOMI,    Neck: Soft and supple, No LAD, No JVD  Respiratory:  (    Breath sounds are clear bilaterally,    (   ) wheezing, rales or rhonchi  Cardiovascular:     (   )S1 and S2, regular rate and rhythm, no Murmurs, gallops or rubs  Gastrointestinal:  (   )Bowel Sounds present, soft,   (  )nontender, nondistended,    Extremities:    (  ) peripheral edema  Vascular: 2+ peripheral pulses  Neurological:    (    )A/O x 3,   (  ) focal deficits  Musculoskeletal:    (   )  normal strength b/l upper  (     ) normal  lower extremities  Skin: No rashes    MEDICATIONS:  MEDICATIONS  (STANDING):  ascorbic acid 500 milliGRAM(s) Oral daily  ceFAZolin   IVPB 2000 milliGRAM(s) IV Intermittent every 8 hours  ferrous    sulfate Liquid 220 milliGRAM(s) Oral every 12 hours  magnesium hydroxide Suspension 5 milliLiter(s) Oral daily  mirtazapine 15 milliGRAM(s) Oral at bedtime  pantoprazole   Suspension 40 milliGRAM(s) Oral daily  silver sulfADIAZINE 1% Cream 1 Application(s) Topical every 12 hours  viokace 52175 units 1 Tablet(s) 1 Tablet(s) Enteral Tube once      LABS: All Labs Reviewed:                        8.1    6.20  )-----------( 350      ( 07 Oct 2023 05:15 )             27.3     10-07    138  |  103  |  12  ----------------------------<  105<H>  4.2   |  29  |  0.43<L>    Ca    8.1<L>      07 Oct 2023 05:15  Phos  2.1     10-07  Mg     2.30     10-07            Blood Culture:   Urine Culture      RADIOLOGY/EKG:    ASSESSMENT AND PLAN:    DVT PPX:    ADVANCED DIRECTIVE:    DISPOSITION: CHIEF COMPLAINT:  Patient condition unchanged but she was found to have oh with positive  Covid test isolation but no cough no other symptoms no fever  SUBJECTIVE:     REVIEW OF SYSTEMS:    CONSTITUTIONAL: ( x )  weakness,  (  ) fevers or chills  EYES/ENT: (  )visual changes;     NECK: (  ) pain or stiffness  RESPIRATORY:   (  )cough, wheezing, hemoptysis;  (  ) shortness of breath  CARDIOVASCULAR:  (  )chest pain or palpitations  GASTROINTESTINAL:   (  )abdominal or epigastric pain.  (  ) nausea, vomiting, or hematemesis;   (   ) diarrhea or constipation.   GENITOURINARY:   (    ) dysuria, frequency or hematuria  NEUROLOGICAL:  (   ) numbness or weakness   All other review of systems is negative unless indicated above    Vital Signs Last 24 Hrs  T(C): 36.6 (07 Oct 2023 05:15), Max: 37.3 (06 Oct 2023 09:09)  T(F): 97.9 (07 Oct 2023 05:15), Max: 99.2 (06 Oct 2023 21:29)  HR: 103 (07 Oct 2023 05:15) (98 - 103)  BP: 117/69 (07 Oct 2023 05:15) (100/75 - 133/63)  BP(mean): --  RR: 17 (07 Oct 2023 05:15) (17 - 18)  SpO2: 95% (07 Oct 2023 05:15) (94% - 100%)    Parameters below as of 07 Oct 2023 05:15  Patient On (Oxygen Delivery Method): room air        I&O's Summary      CAPILLARY BLOOD GLUCOSE      POCT Blood Glucose.: 132 mg/dL (07 Oct 2023 05:37)  POCT Blood Glucose.: 107 mg/dL (07 Oct 2023 01:26)  POCT Blood Glucose.: 125 mg/dL (06 Oct 2023 17:45)  POCT Blood Glucose.: 120 mg/dL (06 Oct 2023 12:08)      PHYSICAL EXAM:    Constitutional:  ( x  ) NAD,   (  x )awake    HEENT: PERR, EOMI,    Neck: Soft and supple, No LAD, No JVD  Respiratory:  (  x  Breath sounds are clear bilaterally,    (   ) wheezing, rales or rhonchi  Cardiovascular:     ( x  )S1 and S2, regular rate and rhythm, no Murmurs, gallops or rubs  Gastrointestinal:  ( x  )Bowel Sounds present, soft,   (  )nontender, nondistended,  + PEG  Extremities:    (  ) peripheral edema  Vascular: 2+ peripheral pulses  Neurological:    (    )A/O x 3,   (  ) focal deficits  Musculoskeletal:    (   )  normal strength b/l upper  (     ) normal  lower extremities  Skin: No rashes    MEDICATIONS:  MEDICATIONS  (STANDING):  ascorbic acid 500 milliGRAM(s) Oral daily  ceFAZolin   IVPB 2000 milliGRAM(s) IV Intermittent every 8 hours  ferrous    sulfate Liquid 220 milliGRAM(s) Oral every 12 hours  magnesium hydroxide Suspension 5 milliLiter(s) Oral daily  mirtazapine 15 milliGRAM(s) Oral at bedtime  pantoprazole   Suspension 40 milliGRAM(s) Oral daily  silver sulfADIAZINE 1% Cream 1 Application(s) Topical every 12 hours  viokace 85106 units 1 Tablet(s) 1 Tablet(s) Enteral Tube once      LABS: All Labs Reviewed:                        8.1    6.20  )-----------( 350      ( 07 Oct 2023 05:15 )             27.3     10-07    138  |  103  |  12  ----------------------------<  105<H>  4.2   |  29  |  0.43<L>    Ca    8.1<L>      07 Oct 2023 05:15  Phos  2.1     10-07  Mg     2.30     10-07            Blood Culture:   Urine Culture      RADIOLOGY/EKG:    ASSESSMENT AND PLAN:  77 yo F PMH Alzheimer's dementia s/p PEG 8/2023, GE flap valve, grade B esophagitis, Multiple Myeloma (previously on Revlimid), cataracts, chronic venous ulcers b/l LE, OA knee, HTN, primary pulmonary hypertension, MDD sent from NH given hypotension documented at 87/56. Unable to obtain reliable history although per documentation patient has experienced cough, dysuria, and abdominal pain. Labs significant for anemia to 6.2 s/p PRBC without active bleeding and Na 124 likely iatrogenic as patient was on D5 1/2 NS and standing free water flushes at nursing home. Patient with fevers (T 100.3) and leukocytosis to 13.3 concerning for sepsis likely 2/2 urinary source - alternatively consider lower extremity wounds. Patient with hx hematoma following initial PEG placement, would f/u CT abdomen r/o expansion.      Problem/Plan - 1:  ·  Problem: Sepsis.   ·  Plan: -patient with fever to 100.3, leukocytosis to 13.3, hypotension 87/56-->92/56 concerning for sepsis 2/2 urinary source, less likely in setting of lower extremity wounds, lactic acid 2.4  -s/p vanc and zosyn in the ED, s/p 1L LR  -f/u BCx, UCx, UA turbid 100 protein moderate blood, large LE, WBC 2921 significant for UTI, per documentation patient has experienced dysuria  -CXR clear  -will order ceftriaxone and vancomycin  -monitor BP, f/u repeat lactic acid.    Problem/Plan - 2:  ·  Problem: Hyponatremia.   ·  Plan: -Na 124  -likely iatrogenic given patient was on D5 1/2 NS and free water flushes at facility  -will hold D5 NS and free water flushes  -f/u urine sodium urine osm, serum osm  -monitor BMP q8h.    Problem/Plan - 3:  ·  Problem: Anemia.   ·  Plan: -Hgb 6.6  -s/p 1u PRBC in ED, f/u repeat CBC, monitor CBC q8h  -f/u iron, ferritin, TIBC  -continue home iron  -monitor CBC, maintain active type and screen, transfuse for Hgb<7.0  -patient with hx hematoma following initial PEG placement, f/u CT A/P consider expansion.    Problem/Plan - 4:  ·  Problem: Alzheimers disease.   ·  Plan: -would obtain collateral on baseline mental status, attempted to call emergency contact tomeka mendozamail was full  -patient is alert, conversant, although A&Ox1 believes she is in [facility].    Problem/Plan - 5:  ·  Problem: Multiple myeloma.   ·  Plan: -patient previously on revlimid per documentation       Problem/Plan - 6:  ·  Problem: Rheumatoid arthritis.   ·  Plan: -patient with ulnar deviation of b/l UE digits, documented RA  -would obtain records.    Problem/Plan - 7:  ·  Problem: Chronic cutaneous venous stasis ulcer.   ·  Plan: -patient with b/l LE ulcers, in setting of chronic venous stasis  -f/u wound care eval.    Problem/Plan - 8:  ·  Problem: History of esophagitis.   ·  Plan: -patient with grade B esophagitis on EGD 8/2023, also with Hill grade 3 gastroesophageal flap, patient was pending ?outpatient w/u with EUS  -home omeprazole not on formulary will order pantoprazole daily.    Problem/Plan - 9:  ·  Problem: Need for prophylactic measure.   ·  Plan: -DVT ppx: patient with documented hx hematoma after initial PEG placement, SCD for now, will hold pharmacologic ppx  -Diet: f/u nutrition consult for tube feeds, patient on Jevity at facility.    -9/23/2023 ID and IR note and consult appreciated due to positive blood culture recommended to remove right upper port   her antibiotic was changed to Ancef grams every 8 hours as per ID recommendation  -9/24/2023 condition stable waiting for removal of right upper chest sided port  -9/25/2023 right-sided chest port was removed by interventional radiology condition stable continue Ancef 2 g every 8 hours  -9/26/2023 continue Ancef 2 g with Hours for the next 4 weeks patient needs PICC line/midline after negative blood culture echo result noted mild aortic stenosis and severe MR no other  invasivel intervention need to be done due to her general condition and terminal dementia  -9/27/2023 blood culture from 9/25 /2023 Negative Pl. PICC line in a.m. and discharge plan discussed with a ACP  Team discussed with ID about the plan  9/28/2023 D/C back to SNF   with midline  to complet IV antibiotic    -9/30/2023 patient with right upper extremity DVT due to midline start on heparin continue Ancef 2 g every 8 hours discussed with AC P Team   -10/1/2023 condition improving with start  Eliquis in a.m. duration usually between 3-6 months will ask vascular recommendation  10/2/2023   continue  IV Ancef   ID follow-up    -10/3/2023 discontinue heparin due to low hemoglobin and multiple ecchymotic area when asked to be seen by vascular for follow-up and hematology if she has persistent low hemoglobin    -10/4/2023 continue IV antibiotic hematology consult pending off heparin due to  ecchymotic area  -10/5/2023 continue Ancef 2 g every 8 hours hematology consult noted will perform all the testing requested  -10/6/2023 discussed with ACP in detail regarding patient condition hematology follow-up rehabilitation follow-up and vascular regarding anticoagulation   -10/7/2023 patient condition remain stable except reported positive Covid test is symptomatic continue Ancef for her bacteremia as per ID recommendation    ADVANCED DIRECTIVE:    DISPOSITION:

## 2023-10-08 LAB
ANION GAP SERPL CALC-SCNC: 14 MMOL/L — SIGNIFICANT CHANGE UP (ref 7–14)
BUN SERPL-MCNC: 14 MG/DL — SIGNIFICANT CHANGE UP (ref 7–23)
CALCIUM SERPL-MCNC: 7.6 MG/DL — LOW (ref 8.4–10.5)
CHLORIDE SERPL-SCNC: 96 MMOL/L — LOW (ref 98–107)
CO2 SERPL-SCNC: 23 MMOL/L — SIGNIFICANT CHANGE UP (ref 22–31)
CREAT SERPL-MCNC: 0.42 MG/DL — LOW (ref 0.5–1.3)
EGFR: 101 ML/MIN/1.73M2 — SIGNIFICANT CHANGE UP
GLUCOSE BLDC GLUCOMTR-MCNC: 104 MG/DL — HIGH (ref 70–99)
GLUCOSE BLDC GLUCOMTR-MCNC: 105 MG/DL — HIGH (ref 70–99)
GLUCOSE BLDC GLUCOMTR-MCNC: 107 MG/DL — HIGH (ref 70–99)
GLUCOSE BLDC GLUCOMTR-MCNC: 115 MG/DL — HIGH (ref 70–99)
GLUCOSE SERPL-MCNC: 103 MG/DL — HIGH (ref 70–99)
HCT VFR BLD CALC: 27.3 % — LOW (ref 34.5–45)
HGB BLD-MCNC: 8 G/DL — LOW (ref 11.5–15.5)
MAGNESIUM SERPL-MCNC: 2.3 MG/DL — SIGNIFICANT CHANGE UP (ref 1.6–2.6)
MCHC RBC-ENTMCNC: 22.5 PG — LOW (ref 27–34)
MCHC RBC-ENTMCNC: 29.3 GM/DL — LOW (ref 32–36)
MCV RBC AUTO: 76.9 FL — LOW (ref 80–100)
NRBC # BLD: 0 /100 WBCS — SIGNIFICANT CHANGE UP (ref 0–0)
NRBC # FLD: 0 K/UL — SIGNIFICANT CHANGE UP (ref 0–0)
PHOSPHATE SERPL-MCNC: 1.7 MG/DL — LOW (ref 2.5–4.5)
PLATELET # BLD AUTO: 272 K/UL — SIGNIFICANT CHANGE UP (ref 150–400)
POTASSIUM SERPL-MCNC: 5.5 MMOL/L — HIGH (ref 3.5–5.3)
POTASSIUM SERPL-SCNC: 5.5 MMOL/L — HIGH (ref 3.5–5.3)
RBC # BLD: 3.55 M/UL — LOW (ref 3.8–5.2)
RBC # FLD: 26.1 % — HIGH (ref 10.3–14.5)
SODIUM SERPL-SCNC: 133 MMOL/L — LOW (ref 135–145)
WBC # BLD: 6.01 K/UL — SIGNIFICANT CHANGE UP (ref 3.8–10.5)
WBC # FLD AUTO: 6.01 K/UL — SIGNIFICANT CHANGE UP (ref 3.8–10.5)

## 2023-10-08 RX ORDER — SODIUM ZIRCONIUM CYCLOSILICATE 10 G/10G
10 POWDER, FOR SUSPENSION ORAL ONCE
Refills: 0 | Status: COMPLETED | OUTPATIENT
Start: 2023-10-08 | End: 2023-10-08

## 2023-10-08 RX ADMIN — Medication 100 MILLIGRAM(S): at 05:12

## 2023-10-08 RX ADMIN — Medication 220 MILLIGRAM(S): at 05:12

## 2023-10-08 RX ADMIN — MIRTAZAPINE 15 MILLIGRAM(S): 45 TABLET, ORALLY DISINTEGRATING ORAL at 21:13

## 2023-10-08 RX ADMIN — Medication 1 APPLICATION(S): at 17:56

## 2023-10-08 RX ADMIN — Medication 100 MILLIGRAM(S): at 21:12

## 2023-10-08 RX ADMIN — Medication 500 MILLIGRAM(S): at 14:52

## 2023-10-08 RX ADMIN — MAGNESIUM HYDROXIDE 5 MILLILITER(S): 400 TABLET, CHEWABLE ORAL at 14:51

## 2023-10-08 RX ADMIN — SODIUM ZIRCONIUM CYCLOSILICATE 10 GRAM(S): 10 POWDER, FOR SUSPENSION ORAL at 09:33

## 2023-10-08 RX ADMIN — Medication 100 MILLIGRAM(S): at 14:51

## 2023-10-08 RX ADMIN — Medication 1 APPLICATION(S): at 05:12

## 2023-10-08 RX ADMIN — Medication 220 MILLIGRAM(S): at 17:55

## 2023-10-08 RX ADMIN — PANTOPRAZOLE SODIUM 40 MILLIGRAM(S): 20 TABLET, DELAYED RELEASE ORAL at 14:52

## 2023-10-08 NOTE — PROGRESS NOTE ADULT - SUBJECTIVE AND OBJECTIVE BOX
Patient condition unchanged but she was found to have oh with positive  Covid test  on isolation but no cough no other symptoms  low-grade fever  SUBJECTIVE:     REVIEW OF SYSTEMS:    CONSTITUTIONAL: ( x )  weakness,  (  ) fevers or chills  EYES/ENT: (  )visual changes;     NECK: (  ) pain or stiffness  RESPIRATORY:   (  )cough, wheezing, hemoptysis;  (  ) shortness of breath  CARDIOVASCULAR:  (  )chest pain or palpitations  GASTROINTESTINAL:   (  )abdominal or epigastric pain.  (  ) nausea, vomiting, or hematemesis;   (   ) diarrhea or constipation.   GENITOURINARY:   (    ) dysuria, frequency or hematuria  NEUROLOGICAL:  (   ) numbness or weakness   All other review of systems is negative unless indicated above    Vital Signs Last 24 Hrs   ICU Vital Signs Last 24 Hrs  T(C): 36.6 (08 Oct 2023 12:51), Max: 37.4 (07 Oct 2023 21:56)  T(F): 97.8 (08 Oct 2023 12:51), Max: 99.4 (07 Oct 2023 21:56)  HR: 85 (08 Oct 2023 12:51) (85 - 97)  BP: 106/64 (08 Oct 2023 12:51) (102/72 - 132/76)  BP(mean): --  ABP: --  ABP(mean): --  RR: 17 (08 Oct 2023 12:51) (17 - 18)  SpO2: 98% (08 Oct 2023 12:51) (97% - 100%)    O2 Parameters below as of 08 Oct 2023 12:51  Patient On (Oxygen Delivery Method): room air            I&O's Summary      CAPILLARY BLOOD GLUCOSE      POCT Blood Glucose.: 132 mg/dL (07 Oct 2023 05:37)  POCT Blood Glucose.: 107 mg/dL (07 Oct 2023 01:26)  POCT Blood Glucose.: 125 mg/dL (06 Oct 2023 17:45)  POCT Blood Glucose.: 120 mg/dL (06 Oct 2023 12:08)      PHYSICAL EXAM:    Constitutional:  ( x  ) NAD,   (  x )awake    HEENT: PERR, EOMI,    Neck: Soft and supple, No LAD, No JVD  Respiratory:  (  x  Breath sounds are clear bilaterally,    (   ) wheezing, rales or rhonchi  Cardiovascular:     ( x  )S1 and S2, regular rate and rhythm, no Murmurs, gallops or rubs  Gastrointestinal:  ( x  )Bowel Sounds present, soft,   (  )nontender, nondistended,  + PEG  Extremities:    (  ) peripheral edema  Vascular: 2+ peripheral pulses  Neurological:    (    )A/O x 3,   (  ) focal deficits  Musculoskeletal:    contracture of lower extremity    MEDICATIONS:  MEDICATIONS  (STANDING):  ascorbic acid 500 milliGRAM(s) Oral daily  ceFAZolin   IVPB 2000 milliGRAM(s) IV Intermittent every 8 hours  ferrous    sulfate Liquid 220 milliGRAM(s) Oral every 12 hours  magnesium hydroxide Suspension 5 milliLiter(s) Oral daily  mirtazapine 15 milliGRAM(s) Oral at bedtime  pantoprazole   Suspension 40 milliGRAM(s) Oral daily  silver sulfADIAZINE 1% Cream 1 Application(s) Topical every 12 hours  viokace 66077 units 1 Tablet(s) 1 Tablet(s) Enteral Tube once     .  Labs reviewed personally.                          8.0    6.01  )-----------( 272      ( 08 Oct 2023 05:37 )             27.3     Hgb Trend: 8.0<--, 8.1<--, 8.0<--, 7.6<--, 7.9<--  10-08    133<L>  |  96<L>  |  14  ----------------------------<  103<H>  5.5<H>   |  23  |  0.42<L>    Ca    7.6<L>      08 Oct 2023 05:37  Phos  1.7     10-08  Mg     2.30     10-08      Creatinine Trend: 0.42<--, 0.43<--, 0.44<--, 0.45<--, 0.42<--, 0.43<--        Urinalysis Basic - ( 08 Oct 2023 05:37 )    Color: x / Appearance: x / SG: x / pH: x  Gluc: 103 mg/dL / Ketone: x  / Bili: x / Urobili: x   Blood: x / Protein: x / Nitrite: x   Leuk Esterase: x / RBC: x / WBC x   Sq Epi: x / Non Sq Epi: x / Bacteria: x        Culture - Blood (collected 06 Oct 2023 08:23)  Source: .Blood Blood-Venous  Preliminary Report (08 Oct 2023 13:01):    No growth at 48 Hours    Culture - Blood (collected 06 Oct 2023 08:23)  Source: .Blood Blood-Peripheral  Preliminary Report (08 Oct 2023 13:01):    No growth at 48 Hours                           8.1    6.20  )-----------( 350      ( 07 Oct 2023 05:15 )             27.3     10-07    138  |  103  |  12  ----------------------------<  105<H>  4.2   |  29  |  0.43<L>    Ca    8.1<L>      07 Oct 2023 05:15  Phos  2.1     10-07  Mg     2.30     10-07            Blood Culture:   Urine Culture      RADIOLOGY/EKG:    ASSESSMENT AND PLAN:  79 yo F PMH Alzheimer's dementia s/p PEG 8/2023, GE flap valve, grade B esophagitis, Multiple Myeloma (previously on Revlimid), cataracts, chronic venous ulcers b/l LE, OA knee, HTN, primary pulmonary hypertension, MDD sent from NH given hypotension documented at 87/56. Unable to obtain reliable history although per documentation patient has experienced cough, dysuria, and abdominal pain. Labs significant for anemia to 6.2 s/p PRBC without active bleeding and Na 124 likely iatrogenic as patient was on D5 1/2 NS and standing free water flushes at nursing home. Patient with fevers (T 100.3) and leukocytosis to 13.3 concerning for sepsis likely 2/2 urinary source - alternatively consider lower extremity wounds. Patient with hx hematoma following initial PEG placement, would f/u CT abdomen r/o expansion.      Problem/Plan - 1:  ·  Problem: Sepsis.   ·  Plan: -patient with fever to 100.3, leukocytosis to 13.3, hypotension 87/56-->92/56 concerning for sepsis 2/2 urinary source, less likely in setting of lower extremity wounds, lactic acid 2.4  -s/p vanc and zosyn in the ED, s/p 1L LR  -f/u BCx, UCx, UA turbid 100 protein moderate blood, large LE, WBC 2921 significant for UTI, per documentation patient has experienced dysuria  -CXR clear  -will order ceftriaxone and vancomycin  -monitor BP, f/u repeat lactic acid.    Problem/Plan - 2:  ·  Problem: Hyponatremia.   ·  Plan: -Na 124  -likely iatrogenic given patient was on D5 1/2 NS and free water flushes at facility  -will hold D5 NS and free water flushes  -f/u urine sodium urine osm, serum osm  -monitor BMP q8h.    Problem/Plan - 3:  ·  Problem: Anemia.   ·  Plan: -Hgb 6.6  -s/p 1u PRBC in ED, f/u repeat CBC, monitor CBC q8h  -f/u iron, ferritin, TIBC  -continue home iron  -monitor CBC, maintain active type and screen, transfuse for Hgb<7.0  -patient with hx hematoma following initial PEG placement, f/u CT A/P consider expansion.    Problem/Plan - 4:  ·  Problem: Alzheimers disease.   ·  Plan: -would obtain collateral on baseline mental status, attempted to call emergency contact tomeka mendozamail was full  -patient is alert, conversant, although A&Ox1 believes she is in [facility].    Problem/Plan - 5:  ·  Problem: Multiple myeloma.   ·  Plan: -patient previously on revlimid per documentation       Problem/Plan - 6:  ·  Problem: Rheumatoid arthritis.   ·  Plan: -patient with ulnar deviation of b/l UE digits, documented RA  -would obtain records.    Problem/Plan - 7:  ·  Problem: Chronic cutaneous venous stasis ulcer.   ·  Plan: -patient with b/l LE ulcers, in setting of chronic venous stasis  -f/u wound care eval.    Problem/Plan - 8:  ·  Problem: History of esophagitis.   ·  Plan: -patient with grade B esophagitis on EGD 8/2023, also with Hill grade 3 gastroesophageal flap, patient was pending ?outpatient w/u with EUS  -home omeprazole not on formulary will order pantoprazole daily.    Problem/Plan - 9:  ·  Problem: Need for prophylactic measure.   ·  Plan: -DVT ppx: patient with documented hx hematoma after initial PEG placement, SCD for now, will hold pharmacologic ppx  -Diet: f/u nutrition consult for tube feeds, patient on Jevity at facility.    -9/23/2023 ID and IR note and consult appreciated due to positive blood culture recommended to remove right upper port   her antibiotic was changed to Ancef grams every 8 hours as per ID recommendation  -9/24/2023 condition stable waiting for removal of right upper chest sided port  -9/25/2023 right-sided chest port was removed by interventional radiology condition stable continue Ancef 2 g every 8 hours  -9/26/2023 continue Ancef 2 g with Hours for the next 4 weeks patient needs PICC line/midline after negative blood culture echo result noted mild aortic stenosis and severe MR no other  invasivel intervention need to be done due to her general condition and terminal dementia  -9/27/2023 blood culture from 9/25 /2023 Negative Pl. PICC line in a.m. and discharge plan discussed with a ACP  Team discussed with ID about the plan  9/28/2023 D/C back to SNF   with midline  to complet IV antibiotic    -9/30/2023 patient with right upper extremity DVT due to midline start on heparin continue Ancef 2 g every 8 hours discussed with AC P Team   -10/1/2023 condition improving with start  Eliquis in a.m. duration usually between 3-6 months will ask vascular recommendation  10/2/2023   continue  IV Ancef   ID follow-up    -10/3/2023 discontinue heparin due to low hemoglobin and multiple ecchymotic area when asked to be seen by vascular for follow-up and hematology if she has persistent low hemoglobin    -10/4/2023 continue IV antibiotic hematology consult pending off heparin due to  ecchymotic area  -10/5/2023 continue Ancef 2 g every 8 hours hematology consult noted will perform all the testing requested  -10/6/2023 discussed with ACP in detail regarding patient condition hematology follow-up rehabilitation follow-up and vascular regarding anticoagulation   -10/7/2023 patient condition remain stable except reported positive Covid test  she is asymptomatic continue Ancef for her bacteremia as per ID recommendation  -10/8/2023 continue above management patient not hypoxic but she had low-grade fever otherwise asymptomatic

## 2023-10-09 LAB
ANION GAP SERPL CALC-SCNC: 6 MMOL/L — LOW (ref 7–14)
BUN SERPL-MCNC: 12 MG/DL — SIGNIFICANT CHANGE UP (ref 7–23)
CALCIUM SERPL-MCNC: 7.9 MG/DL — LOW (ref 8.4–10.5)
CHLORIDE SERPL-SCNC: 101 MMOL/L — SIGNIFICANT CHANGE UP (ref 98–107)
CO2 SERPL-SCNC: 26 MMOL/L — SIGNIFICANT CHANGE UP (ref 22–31)
CREAT SERPL-MCNC: 0.35 MG/DL — LOW (ref 0.5–1.3)
EGFR: 105 ML/MIN/1.73M2 — SIGNIFICANT CHANGE UP
GLUCOSE BLDC GLUCOMTR-MCNC: 101 MG/DL — HIGH (ref 70–99)
GLUCOSE BLDC GLUCOMTR-MCNC: 103 MG/DL — HIGH (ref 70–99)
GLUCOSE BLDC GLUCOMTR-MCNC: 112 MG/DL — HIGH (ref 70–99)
GLUCOSE BLDC GLUCOMTR-MCNC: 117 MG/DL — HIGH (ref 70–99)
GLUCOSE SERPL-MCNC: 108 MG/DL — HIGH (ref 70–99)
HCT VFR BLD CALC: 28.5 % — LOW (ref 34.5–45)
HGB BLD-MCNC: 8.6 G/DL — LOW (ref 11.5–15.5)
IGA FLD-MCNC: 425 MG/DL — HIGH (ref 70–400)
IGG FLD-MCNC: 1607 MG/DL — HIGH (ref 700–1600)
IGM SERPL-MCNC: 43 MG/DL — SIGNIFICANT CHANGE UP (ref 40–230)
MAGNESIUM SERPL-MCNC: 2.2 MG/DL — SIGNIFICANT CHANGE UP (ref 1.6–2.6)
MCHC RBC-ENTMCNC: 22.9 PG — LOW (ref 27–34)
MCHC RBC-ENTMCNC: 30.2 GM/DL — LOW (ref 32–36)
MCV RBC AUTO: 75.8 FL — LOW (ref 80–100)
NRBC # BLD: 0 /100 WBCS — SIGNIFICANT CHANGE UP (ref 0–0)
NRBC # FLD: 0 K/UL — SIGNIFICANT CHANGE UP (ref 0–0)
PHOSPHATE SERPL-MCNC: 2.2 MG/DL — LOW (ref 2.5–4.5)
PLATELET # BLD AUTO: 282 K/UL — SIGNIFICANT CHANGE UP (ref 150–400)
POTASSIUM SERPL-MCNC: 5.5 MMOL/L — HIGH (ref 3.5–5.3)
POTASSIUM SERPL-SCNC: 5.5 MMOL/L — HIGH (ref 3.5–5.3)
RBC # BLD: 3.76 M/UL — LOW (ref 3.8–5.2)
RBC # FLD: 26.1 % — HIGH (ref 10.3–14.5)
SODIUM SERPL-SCNC: 133 MMOL/L — LOW (ref 135–145)
WBC # BLD: 5.6 K/UL — SIGNIFICANT CHANGE UP (ref 3.8–10.5)
WBC # FLD AUTO: 5.6 K/UL — SIGNIFICANT CHANGE UP (ref 3.8–10.5)

## 2023-10-09 RX ORDER — SODIUM ZIRCONIUM CYCLOSILICATE 10 G/10G
10 POWDER, FOR SUSPENSION ORAL ONCE
Refills: 0 | Status: COMPLETED | OUTPATIENT
Start: 2023-10-09 | End: 2023-10-09

## 2023-10-09 RX ORDER — ACETAMINOPHEN 500 MG
650 TABLET ORAL EVERY 6 HOURS
Refills: 0 | Status: COMPLETED | OUTPATIENT
Start: 2023-10-09 | End: 2023-10-11

## 2023-10-09 RX ORDER — POTASSIUM PHOSPHATE, MONOBASIC POTASSIUM PHOSPHATE, DIBASIC 236; 224 MG/ML; MG/ML
15 INJECTION, SOLUTION INTRAVENOUS ONCE
Refills: 0 | Status: DISCONTINUED | OUTPATIENT
Start: 2023-10-09 | End: 2023-10-09

## 2023-10-09 RX ADMIN — Medication 500 MILLIGRAM(S): at 13:07

## 2023-10-09 RX ADMIN — Medication 100 MILLIGRAM(S): at 05:19

## 2023-10-09 RX ADMIN — MAGNESIUM HYDROXIDE 5 MILLILITER(S): 400 TABLET, CHEWABLE ORAL at 13:07

## 2023-10-09 RX ADMIN — Medication 220 MILLIGRAM(S): at 05:21

## 2023-10-09 RX ADMIN — Medication 1 APPLICATION(S): at 05:20

## 2023-10-09 RX ADMIN — SODIUM ZIRCONIUM CYCLOSILICATE 10 GRAM(S): 10 POWDER, FOR SUSPENSION ORAL at 09:02

## 2023-10-09 RX ADMIN — MIRTAZAPINE 15 MILLIGRAM(S): 45 TABLET, ORALLY DISINTEGRATING ORAL at 21:14

## 2023-10-09 RX ADMIN — Medication 100 MILLIGRAM(S): at 13:07

## 2023-10-09 RX ADMIN — PANTOPRAZOLE SODIUM 40 MILLIGRAM(S): 20 TABLET, DELAYED RELEASE ORAL at 13:08

## 2023-10-09 RX ADMIN — Medication 100 MILLIGRAM(S): at 21:13

## 2023-10-09 RX ADMIN — Medication 650 MILLIGRAM(S): at 13:08

## 2023-10-09 NOTE — PROGRESS NOTE ADULT - SUBJECTIVE AND OBJECTIVE BOX
CHIEF COMPLAINT:    SUBJECTIVE:     REVIEW OF SYSTEMS:    CONSTITUTIONAL: (  )  weakness,  (  ) fevers or chills  EYES/ENT: (  )visual changes;     NECK: (  ) pain or stiffness  RESPIRATORY:   (  )cough, wheezing, hemoptysis;  (  ) shortness of breath  CARDIOVASCULAR:  (  )chest pain or palpitations  GASTROINTESTINAL:   (  )abdominal or epigastric pain.  (  ) nausea, vomiting, or hematemesis;   (   ) diarrhea or constipation.   GENITOURINARY:   (    ) dysuria, frequency or hematuria  NEUROLOGICAL:  (   ) numbness or weakness   All other review of systems is negative unless indicated above    Vital Signs Last 24 Hrs  T(C): 37.1 (09 Oct 2023 05:21), Max: 37.1 (09 Oct 2023 01:00)  T(F): 98.7 (09 Oct 2023 05:21), Max: 98.7 (09 Oct 2023 01:00)  HR: 91 (09 Oct 2023 05:21) (85 - 92)  BP: 129/66 (09 Oct 2023 05:21) (106/64 - 129/66)  BP(mean): --  RR: 18 (09 Oct 2023 05:21) (17 - 18)  SpO2: 94% (09 Oct 2023 05:21) (94% - 98%)    Parameters below as of 09 Oct 2023 05:21  Patient On (Oxygen Delivery Method): room air        I&O's Summary    08 Oct 2023 07:01  -  09 Oct 2023 07:00  --------------------------------------------------------  IN: 120 mL / OUT: 0 mL / NET: 120 mL        CAPILLARY BLOOD GLUCOSE      POCT Blood Glucose.: 101 mg/dL (09 Oct 2023 06:27)  POCT Blood Glucose.: 107 mg/dL (08 Oct 2023 23:19)  POCT Blood Glucose.: 104 mg/dL (08 Oct 2023 17:45)  POCT Blood Glucose.: 105 mg/dL (08 Oct 2023 12:07)      PHYSICAL EXAM:    Constitutional:  (   ) NAD,   (   )awake and alert  HEENT: PERR, EOMI,    Neck: Soft and supple, No LAD, No JVD  Respiratory:  (    Breath sounds are clear bilaterally,    (   ) wheezing, rales or rhonchi  Cardiovascular:     (   )S1 and S2, regular rate and rhythm, no Murmurs, gallops or rubs  Gastrointestinal:  (   )Bowel Sounds present, soft,   (  )nontender, nondistended,    Extremities:    (  ) peripheral edema  Vascular: 2+ peripheral pulses  Neurological:    (    )A/O x 3,   (  ) focal deficits  Musculoskeletal:    (   )  normal strength b/l upper  (     ) normal  lower extremities  Skin: No rashes    MEDICATIONS:  MEDICATIONS  (STANDING):  ascorbic acid 500 milliGRAM(s) Oral daily  ceFAZolin   IVPB 2000 milliGRAM(s) IV Intermittent every 8 hours  ferrous    sulfate Liquid 220 milliGRAM(s) Oral every 12 hours  magnesium hydroxide Suspension 5 milliLiter(s) Oral daily  mirtazapine 15 milliGRAM(s) Oral at bedtime  pantoprazole   Suspension 40 milliGRAM(s) Oral daily  silver sulfADIAZINE 1% Cream 1 Application(s) Topical every 12 hours  sodium phosphate 15 milliMole(s)/250 mL IVPB 15 milliMole(s) IV Intermittent once  sodium zirconium cyclosilicate 10 Gram(s) Oral once  viokace 86298 units 1 Tablet(s) 1 Tablet(s) Enteral Tube once      LABS: All Labs Reviewed:                        8.6    5.60  )-----------( 282      ( 09 Oct 2023 06:33 )             28.5     10-09    133<L>  |  101  |  12  ----------------------------<  108<H>  5.5<H>   |  26  |  0.35<L>    Ca    7.9<L>      09 Oct 2023 06:33  Phos  2.2     10-09  Mg     2.20     10-09            Blood Culture: 10-06 @ 08:23  Organism --  Gram Stain Blood -- Gram Stain --  Specimen Source .Blood Blood-Peripheral  Culture-Blood --      Urine Culture      RADIOLOGY/EKG:    ASSESSMENT AND PLAN:    DVT PPX:    ADVANCED DIRECTIVE:    DISPOSITION: CHIEF COMPLAINT:  Patient condition and change awake and verbal complaining of headache I discussed with her this is from her recent Covid infection she is on Tylenol as needed but will change it to every 6 hour discussed with nursing team  SUBJECTIVE:     REVIEW OF SYSTEMS:    CONSTITUTIONAL: (  )  weakness,  (  ) fevers or chills  EYES/ENT: (  )visual changes;     NECK: (  ) pain or stiffness  RESPIRATORY:   (  )cough, wheezing, hemoptysis;  (  ) shortness of breath  CARDIOVASCULAR:  (  )chest pain or palpitations  GASTROINTESTINAL:   (  )abdominal or epigastric pain.  (  ) nausea, vomiting, or hematemesis;   (   ) diarrhea or constipation.   GENITOURINARY:   (    ) dysuria, frequency or hematuria  NEUROLOGICAL:  (   ) numbness or weakness   All other review of systems is negative unless indicated above    Vital Signs Last 24 Hrs  T(C): 37.1 (09 Oct 2023 05:21), Max: 37.1 (09 Oct 2023 01:00)  T(F): 98.7 (09 Oct 2023 05:21), Max: 98.7 (09 Oct 2023 01:00)  HR: 91 (09 Oct 2023 05:21) (85 - 92)  BP: 129/66 (09 Oct 2023 05:21) (106/64 - 129/66)  BP(mean): --  RR: 18 (09 Oct 2023 05:21) (17 - 18)  SpO2: 94% (09 Oct 2023 05:21) (94% - 98%)    Parameters below as of 09 Oct 2023 05:21  Patient On (Oxygen Delivery Method): room air        I&O's Summary    08 Oct 2023 07:01  -  09 Oct 2023 07:00  --------------------------------------------------------  IN: 120 mL / OUT: 0 mL / NET: 120 mL        CAPILLARY BLOOD GLUCOSE      POCT Blood Glucose.: 101 mg/dL (09 Oct 2023 06:27)  POCT Blood Glucose.: 107 mg/dL (08 Oct 2023 23:19)  POCT Blood Glucose.: 104 mg/dL (08 Oct 2023 17:45)  POCT Blood Glucose.: 105 mg/dL (08 Oct 2023 12:07)      PHYSICAL EXAM:    Constitutional:  ( x  ) NAD,   ( x  )awake and verbal  HEENT: PERR, EOMI,    Neck: Soft and supple, No LAD, No JVD  Respiratory:  (  x  Breath sounds are clear bilaterally,    (   ) wheezing, rales or rhonchi  Cardiovascular:     ( x  )S1 and S2, regular rate and rhythm, no Murmurs, gallops or rubs  Gastrointestinal:  (  x )Bowel Sounds present, soft,   (  )nontender, nondistended,  + PEG  Extremities:    (  ) peripheral edema  Vascular: 2+ peripheral pulses  Neurological:    (  x  )A/O x 1   (  ) focal deficits  Musculoskeletal:    severe contracture and lower extremity    MEDICATIONS:  MEDICATIONS  (STANDING):  ascorbic acid 500 milliGRAM(s) Oral daily  ceFAZolin   IVPB 2000 milliGRAM(s) IV Intermittent every 8 hours  ferrous    sulfate Liquid 220 milliGRAM(s) Oral every 12 hours  magnesium hydroxide Suspension 5 milliLiter(s) Oral daily  mirtazapine 15 milliGRAM(s) Oral at bedtime  pantoprazole   Suspension 40 milliGRAM(s) Oral daily  silver sulfADIAZINE 1% Cream 1 Application(s) Topical every 12 hours  sodium phosphate 15 milliMole(s)/250 mL IVPB 15 milliMole(s) IV Intermittent once  sodium zirconium cyclosilicate 10 Gram(s) Oral once  viokace 52070 units 1 Tablet(s) 1 Tablet(s) Enteral Tube once      LABS: All Labs Reviewed:                        8.6    5.60  )-----------( 282      ( 09 Oct 2023 06:33 )             28.5     10-09    133<L>  |  101  |  12  ----------------------------<  108<H>  5.5<H>   |  26  |  0.35<L>    Ca    7.9<L>      09 Oct 2023 06:33  Phos  2.2     10-09  Mg     2.20     10-09            Blood Culture: 10-06 @ 08:23  Organism --  Gram Stain Blood -- Gram Stain --  Specimen Source .Blood Blood-Peripheral  Culture-Blood --      Urine Culture      RADIOLOGY/EKG:    ASSESSMENT AND PLAN:  77 yo F PMH Alzheimer's dementia s/p PEG 8/2023, GE flap valve, grade B esophagitis, Multiple Myeloma (previously on Revlimid), cataracts, chronic venous ulcers b/l LE, OA knee, HTN, primary pulmonary hypertension, MDD sent from NH given hypotension documented at 87/56. Unable to obtain reliable history although per documentation patient has experienced cough, dysuria, and abdominal pain. Labs significant for anemia to 6.2 s/p PRBC without active bleeding and Na 124 likely iatrogenic as patient was on D5 1/2 NS and standing free water flushes at nursing home. Patient with fevers (T 100.3) and leukocytosis to 13.3 concerning for sepsis likely 2/2 urinary source - alternatively consider lower extremity wounds. Patient with hx hematoma following initial PEG placement, would f/u CT abdomen r/o expansion.      Problem/Plan - 1:  ·  Problem: Sepsis.   ·  Plan: -patient with fever to 100.3, leukocytosis to 13.3, hypotension 87/56-->92/56 concerning for sepsis 2/2 urinary source, less likely in setting of lower extremity wounds, lactic acid 2.4  -s/p vanc and zosyn in the ED, s/p 1L LR  -f/u BCx, UCx, UA turbid 100 protein moderate blood, large LE, WBC 2921 significant for UTI, per documentation patient has experienced dysuria  -CXR clear  -will order ceftriaxone and vancomycin  -monitor BP, f/u repeat lactic acid.    Problem/Plan - 2:  ·  Problem: Hyponatremia.   ·  Plan: -Na 124  -likely iatrogenic given patient was on D5 1/2 NS and free water flushes at facility  -will hold D5 NS and free water flushes  -f/u urine sodium urine osm, serum osm  -monitor BMP q8h.    Problem/Plan - 3:  ·  Problem: Anemia.   ·  Plan: -Hgb 6.6  -s/p 1u PRBC in ED, f/u repeat CBC, monitor CBC q8h  -f/u iron, ferritin, TIBC  -continue home iron  -monitor CBC, maintain active type and screen, transfuse for Hgb<7.0  -patient with hx hematoma following initial PEG placement, f/u CT A/P consider expansion.    Problem/Plan - 4:  ·  Problem: Alzheimers disease.   ·  Plan: -would obtain collateral on baseline mental status, attempted to call emergency contact tomeka mendozamail was full  -patient is alert, conversant, although A&Ox1 believes she is in [facility].    Problem/Plan - 5:  ·  Problem: Multiple myeloma.   ·  Plan: -patient previously on revlimid per documentation       Problem/Plan - 6:  ·  Problem: Rheumatoid arthritis.   ·  Plan: -patient with ulnar deviation of b/l UE digits, documented RA  -would obtain records.    Problem/Plan - 7:  ·  Problem: Chronic cutaneous venous stasis ulcer.   ·  Plan: -patient with b/l LE ulcers, in setting of chronic venous stasis  -f/u wound care eval.    Problem/Plan - 8:  ·  Problem: History of esophagitis.   ·  Plan: -patient with grade B esophagitis on EGD 8/2023, also with Hill grade 3 gastroesophageal flap, patient was pending ?outpatient w/u with EUS  -home omeprazole not on formulary will order pantoprazole daily.    Problem/Plan - 9:  ·  Problem: Need for prophylactic measure.   ·  Plan: -DVT ppx: patient with documented hx hematoma after initial PEG placement, SCD for now, will hold pharmacologic ppx  -Diet: f/u nutrition consult for tube feeds, patient on Jevity at facility.    -9/23/2023 ID and IR note and consult appreciated due to positive blood culture recommended to remove right upper port   her antibiotic was changed to Ancef grams every 8 hours as per ID recommendation  -9/24/2023 condition stable waiting for removal of right upper chest sided port  -9/25/2023 right-sided chest port was removed by interventional radiology condition stable continue Ancef 2 g every 8 hours  -9/26/2023 continue Ancef 2 g with Hours for the next 4 weeks patient needs PICC line/midline after negative blood culture echo result noted mild aortic stenosis and severe MR no other  invasivel intervention need to be done due to her general condition and terminal dementia  -9/27/2023 blood culture from 9/25 /2023 Negative Pl. PICC line in a.m. and discharge plan discussed with a ACP  Team discussed with ID about the plan  9/28/2023 D/C back to SNF   with midline  to complet IV antibiotic    -9/30/2023 patient with right upper extremity DVT due to midline start on heparin continue Ancef 2 g every 8 hours discussed with AC P Team   -10/1/2023 condition improving with start  Eliquis in a.m. duration usually between 3-6 months will ask vascular recommendation  10/2/2023   continue  IV Ancef   ID follow-up    -10/3/2023 discontinue heparin due to low hemoglobin and multiple ecchymotic area when asked to be seen by vascular for follow-up and hematology if she has persistent low hemoglobin    -10/4/2023 continue IV antibiotic hematology consult pending off heparin due to  ecchymotic area  -10/5/2023 continue Ancef 2 g every 8 hours hematology consult noted will perform all the testing requested  -10/6/2023 discussed with ACP in detail regarding patient condition hematology follow-up rehabilitation follow-up and vascular regarding anticoagulation   -10/7/2023 patient condition remain stable except reported positive Covid test  she is asymptomatic continue Ancef for her bacteremia as per ID recommendation  -10/8/2023 continue above management patient not hypoxic but she had low-grade fever otherwise asymptomatic  - 10/9/2023 continue supportive care and IV antibiotic still on isolation due to positive Covid      DVT PPX:    ADVANCED DIRECTIVE:    DISPOSITION:

## 2023-10-10 LAB
ANION GAP SERPL CALC-SCNC: 9 MMOL/L — SIGNIFICANT CHANGE UP (ref 7–14)
BUN SERPL-MCNC: 12 MG/DL — SIGNIFICANT CHANGE UP (ref 7–23)
CALCIUM SERPL-MCNC: 8.3 MG/DL — LOW (ref 8.4–10.5)
CHLORIDE SERPL-SCNC: 102 MMOL/L — SIGNIFICANT CHANGE UP (ref 98–107)
CO2 SERPL-SCNC: 27 MMOL/L — SIGNIFICANT CHANGE UP (ref 22–31)
CREAT SERPL-MCNC: 0.39 MG/DL — LOW (ref 0.5–1.3)
EGFR: 102 ML/MIN/1.73M2 — SIGNIFICANT CHANGE UP
GLUCOSE BLDC GLUCOMTR-MCNC: 101 MG/DL — HIGH (ref 70–99)
GLUCOSE BLDC GLUCOMTR-MCNC: 108 MG/DL — HIGH (ref 70–99)
GLUCOSE BLDC GLUCOMTR-MCNC: 119 MG/DL — HIGH (ref 70–99)
GLUCOSE SERPL-MCNC: 106 MG/DL — HIGH (ref 70–99)
HCT VFR BLD CALC: 29.7 % — LOW (ref 34.5–45)
HGB BLD-MCNC: 8.9 G/DL — LOW (ref 11.5–15.5)
MAGNESIUM SERPL-MCNC: 2.2 MG/DL — SIGNIFICANT CHANGE UP (ref 1.6–2.6)
MCHC RBC-ENTMCNC: 22.6 PG — LOW (ref 27–34)
MCHC RBC-ENTMCNC: 30 GM/DL — LOW (ref 32–36)
MCV RBC AUTO: 75.6 FL — LOW (ref 80–100)
NRBC # BLD: 0 /100 WBCS — SIGNIFICANT CHANGE UP (ref 0–0)
NRBC # FLD: 0 K/UL — SIGNIFICANT CHANGE UP (ref 0–0)
PHOSPHATE SERPL-MCNC: 2.7 MG/DL — SIGNIFICANT CHANGE UP (ref 2.5–4.5)
PLATELET # BLD AUTO: 283 K/UL — SIGNIFICANT CHANGE UP (ref 150–400)
POTASSIUM SERPL-MCNC: 4.4 MMOL/L — SIGNIFICANT CHANGE UP (ref 3.5–5.3)
POTASSIUM SERPL-SCNC: 4.4 MMOL/L — SIGNIFICANT CHANGE UP (ref 3.5–5.3)
RBC # BLD: 3.93 M/UL — SIGNIFICANT CHANGE UP (ref 3.8–5.2)
RBC # FLD: 26 % — HIGH (ref 10.3–14.5)
SODIUM SERPL-SCNC: 138 MMOL/L — SIGNIFICANT CHANGE UP (ref 135–145)
WBC # BLD: 5.87 K/UL — SIGNIFICANT CHANGE UP (ref 3.8–10.5)
WBC # FLD AUTO: 5.87 K/UL — SIGNIFICANT CHANGE UP (ref 3.8–10.5)

## 2023-10-10 RX ADMIN — Medication 650 MILLIGRAM(S): at 13:22

## 2023-10-10 RX ADMIN — Medication 220 MILLIGRAM(S): at 17:35

## 2023-10-10 RX ADMIN — Medication 220 MILLIGRAM(S): at 06:44

## 2023-10-10 RX ADMIN — Medication 650 MILLIGRAM(S): at 01:06

## 2023-10-10 RX ADMIN — Medication 650 MILLIGRAM(S): at 17:35

## 2023-10-10 RX ADMIN — PANTOPRAZOLE SODIUM 40 MILLIGRAM(S): 20 TABLET, DELAYED RELEASE ORAL at 13:23

## 2023-10-10 RX ADMIN — Medication 100 MILLIGRAM(S): at 06:45

## 2023-10-10 RX ADMIN — MAGNESIUM HYDROXIDE 5 MILLILITER(S): 400 TABLET, CHEWABLE ORAL at 13:23

## 2023-10-10 RX ADMIN — Medication 650 MILLIGRAM(S): at 14:32

## 2023-10-10 RX ADMIN — Medication 1 APPLICATION(S): at 17:43

## 2023-10-10 RX ADMIN — Medication 100 MILLIGRAM(S): at 13:23

## 2023-10-10 RX ADMIN — Medication 1 APPLICATION(S): at 06:43

## 2023-10-10 RX ADMIN — Medication 650 MILLIGRAM(S): at 06:44

## 2023-10-10 RX ADMIN — Medication 500 MILLIGRAM(S): at 13:22

## 2023-10-10 RX ADMIN — Medication 650 MILLIGRAM(S): at 18:10

## 2023-10-10 NOTE — PROGRESS NOTE ADULT - SUBJECTIVE AND OBJECTIVE BOX
CHIEF COMPLAINT:    SUBJECTIVE:     REVIEW OF SYSTEMS:    CONSTITUTIONAL: (  )  weakness,  (  ) fevers or chills  EYES/ENT: (  )visual changes;     NECK: (  ) pain or stiffness  RESPIRATORY:   (  )cough, wheezing, hemoptysis;  (  ) shortness of breath  CARDIOVASCULAR:  (  )chest pain or palpitations  GASTROINTESTINAL:   (  )abdominal or epigastric pain.  (  ) nausea, vomiting, or hematemesis;   (   ) diarrhea or constipation.   GENITOURINARY:   (    ) dysuria, frequency or hematuria  NEUROLOGICAL:  (   ) numbness or weakness   All other review of systems is negative unless indicated above    Vital Signs Last 24 Hrs  T(C): 36.8 (10 Oct 2023 05:33), Max: 36.8 (09 Oct 2023 21:00)  T(F): 98.2 (10 Oct 2023 05:33), Max: 98.2 (09 Oct 2023 21:00)  HR: 85 (10 Oct 2023 05:33) (85 - 94)  BP: 144/76 (10 Oct 2023 05:33) (116/82 - 144/76)  BP(mean): --  RR: 17 (10 Oct 2023 05:33) (17 - 18)  SpO2: 100% (10 Oct 2023 05:33) (100% - 100%)    Parameters below as of 10 Oct 2023 05:33  Patient On (Oxygen Delivery Method): room air        I&O's Summary      CAPILLARY BLOOD GLUCOSE      POCT Blood Glucose.: 119 mg/dL (10 Oct 2023 07:02)  POCT Blood Glucose.: 117 mg/dL (09 Oct 2023 23:15)  POCT Blood Glucose.: 112 mg/dL (09 Oct 2023 17:32)  POCT Blood Glucose.: 103 mg/dL (09 Oct 2023 12:06)      PHYSICAL EXAM:    Constitutional:  (   ) NAD,   (   )awake and alert  HEENT: PERR, EOMI,    Neck: Soft and supple, No LAD, No JVD  Respiratory:  (    Breath sounds are clear bilaterally,    (   ) wheezing, rales or rhonchi  Cardiovascular:     (   )S1 and S2, regular rate and rhythm, no Murmurs, gallops or rubs  Gastrointestinal:  (   )Bowel Sounds present, soft,   (  )nontender, nondistended,    Extremities:    (  ) peripheral edema  Vascular: 2+ peripheral pulses  Neurological:    (    )A/O x 3,   (  ) focal deficits  Musculoskeletal:    (   )  normal strength b/l upper  (     ) normal  lower extremities  Skin: No rashes    MEDICATIONS:  MEDICATIONS  (STANDING):  acetaminophen   Oral Liquid .. 650 milliGRAM(s) Oral every 6 hours  ascorbic acid 500 milliGRAM(s) Oral daily  ceFAZolin   IVPB 2000 milliGRAM(s) IV Intermittent every 8 hours  ferrous    sulfate Liquid 220 milliGRAM(s) Oral every 12 hours  magnesium hydroxide Suspension 5 milliLiter(s) Oral daily  mirtazapine 15 milliGRAM(s) Oral at bedtime  pantoprazole   Suspension 40 milliGRAM(s) Oral daily  silver sulfADIAZINE 1% Cream 1 Application(s) Topical every 12 hours  viokace 42218 units 1 Tablet(s) 1 Tablet(s) Enteral Tube once      LABS: All Labs Reviewed:                        8.9    5.87  )-----------( 283      ( 10 Oct 2023 07:02 )             29.7     10-10    138  |  102  |  12  ----------------------------<  106<H>  4.4   |  27  |  0.39<L>    Ca    8.3<L>      10 Oct 2023 07:02  Phos  2.7     10-10  Mg     2.20     10-10            Blood Culture: 10-06 @ 08:23  Organism --  Gram Stain Blood -- Gram Stain --  Specimen Source .Blood Blood-Peripheral  Culture-Blood --      Urine Culture      RADIOLOGY/EKG:    ASSESSMENT AND PLAN:    DVT PPX:    ADVANCED DIRECTIVE:    DISPOSITION: CHIEF COMPLAINT:patient awake and verbal complaining of weakness headache is improving   she is day 4 of isolation for the Covid  SUBJECTIVE:     REVIEW OF SYSTEMS:    CONSTITUTIONAL: (x  )  weakness,  (  ) fevers or chills  EYES/ENT: (  )visual changes;     NECK: (  ) pain or stiffness  RESPIRATORY:   (  )cough, wheezing, hemoptysis;  (  ) shortness of breath  CARDIOVASCULAR:  (  )chest pain or palpitations  GASTROINTESTINAL:   (  )abdominal or epigastric pain.  (  ) nausea, vomiting, or hematemesis;   (   ) diarrhea or constipation.   GENITOURINARY:   (    ) dysuria, frequency or hematuria  NEUROLOGICAL:  (   ) numbness or weakness   All other review of systems is negative unless indicated above    Vital Signs Last 24 Hrs  T(C): 36.8 (10 Oct 2023 05:33), Max: 36.8 (09 Oct 2023 21:00)  T(F): 98.2 (10 Oct 2023 05:33), Max: 98.2 (09 Oct 2023 21:00)  HR: 85 (10 Oct 2023 05:33) (85 - 94)  BP: 144/76 (10 Oct 2023 05:33) (116/82 - 144/76)  BP(mean): --  RR: 17 (10 Oct 2023 05:33) (17 - 18)  SpO2: 100% (10 Oct 2023 05:33) (100% - 100%)    Parameters below as of 10 Oct 2023 05:33  Patient On (Oxygen Delivery Method): room air        I&O's Summary      CAPILLARY BLOOD GLUCOSE      POCT Blood Glucose.: 119 mg/dL (10 Oct 2023 07:02)  POCT Blood Glucose.: 117 mg/dL (09 Oct 2023 23:15)  POCT Blood Glucose.: 112 mg/dL (09 Oct 2023 17:32)  POCT Blood Glucose.: 103 mg/dL (09 Oct 2023 12:06)      PHYSICAL EXAM:    Constitutional:  ( x  ) NAD,   ( x  )awake and  verbal  HEENT: PERR, EOMI,    Neck: Soft and supple, No LAD, No JVD  Respiratory:  (  x  Breath sounds are clear bilaterally,    (   ) wheezing, rales or rhonchi  Cardiovascular:     ( x  )S1 and S2, regular rate and rhythm, no Murmurs, gallops or rubs  Gastrointestinal:  (  x )Bowel Sounds present, soft,   (  )nontender, nondistended, + PEG   Extremities:    (  ) peripheral edema  Vascular: 2+ peripheral pulses  Neurological:    (  x  )A/O x  1blood,   (  ) focal deficits  Musculoskeletal:    (   )  normal strength b/l upper  (     ) normal  lower extremities  Skin: No rashes    MEDICATIONS:  MEDICATIONS  (STANDING):  acetaminophen   Oral Liquid .. 650 milliGRAM(s) Oral every 6 hours  ascorbic acid 500 milliGRAM(s) Oral daily  ceFAZolin   IVPB 2000 milliGRAM(s) IV Intermittent every 8 hours  ferrous    sulfate Liquid 220 milliGRAM(s) Oral every 12 hours  magnesium hydroxide Suspension 5 milliLiter(s) Oral daily  mirtazapine 15 milliGRAM(s) Oral at bedtime  pantoprazole   Suspension 40 milliGRAM(s) Oral daily  silver sulfADIAZINE 1% Cream 1 Application(s) Topical every 12 hours  viokace 69793 units 1 Tablet(s) 1 Tablet(s) Enteral Tube once      LABS: All Labs Reviewed:                        8.9    5.87  )-----------( 283      ( 10 Oct 2023 07:02 )             29.7     10-10    138  |  102  |  12  ----------------------------<  106<H>  4.4   |  27  |  0.39<L>    Ca    8.3<L>      10 Oct 2023 07:02  Phos  2.7     10-10  Mg     2.20     10-10            Blood Culture: 10-06 @ 08:23  Organism --  Gram Stain Blood -- Gram Stain --  Specimen Source .Blood Blood-Peripheral  Culture-Blood --      Urine Culture      RADIOLOGY/EKG:    ACC: [id number] EXAM:  XR CHEST PORTABLE URGENT 1V   ORDERED BY: JIMMIE SEGOVIA     PROCEDURE DATE:  10/06/2023          INTERPRETATION:  EXAMINATION: XR CHEST URGENT    CLINICAL INDICATION: fever h/o DVT and port removal for MSSA    TECHNIQUE: Single frontal, portable view of the chest was obtained.    COMPARISON: Chest x-ray 9/23/2023.    FINDINGS:  Exam limited by rotation into an JIMENEZ projection.  The heart is not accurately assessed in this AP projection.  Streaky atelectasis left lower lobe.  There is no pneumothorax. Trace left pleural effusion.  No acute bony abnormality.    IMPRESSION:  Trace left pleural effusion.  ASSESSMENT AND PLAN:  77 yo F PMH Alzheimer's dementia s/p PEG 8/2023, GE flap valve, grade B esophagitis, Multiple Myeloma (previously on Revlimid), cataracts, chronic venous ulcers b/l LE, OA knee, HTN, primary pulmonary hypertension, MDD sent from NH given hypotension documented at 87/56. Unable to obtain reliable history although per documentation patient has experienced cough, dysuria, and abdominal pain. Labs significant for anemia to 6.2 s/p PRBC without active bleeding and Na 124 likely iatrogenic as patient was on D5 1/2 NS and standing free water flushes at nursing home. Patient with fevers (T 100.3) and leukocytosis to 13.3 concerning for sepsis likely 2/2 urinary source - alternatively consider lower extremity wounds. Patient with hx hematoma following initial PEG placement, would f/u CT abdomen r/o expansion.      Problem/Plan - 1:  ·  Problem: Sepsis.   ·  Plan: -patient with fever to 100.3, leukocytosis to 13.3, hypotension 87/56-->92/56 concerning for sepsis 2/2 urinary source, less likely in setting of lower extremity wounds, lactic acid 2.4  -s/p vanc and zosyn in the ED, s/p 1L LR  -f/u BCx, UCx, UA turbid 100 protein moderate blood, large LE, WBC 2921 significant for UTI, per documentation patient has experienced dysuria  -CXR clear  -will order ceftriaxone and vancomycin  -monitor BP, f/u repeat lactic acid.    Problem/Plan - 2:  ·  Problem: Hyponatremia.   ·  Plan: -Na 124  -likely iatrogenic given patient was on D5 1/2 NS and free water flushes at facility  -will hold D5 NS and free water flushes  -f/u urine sodium urine osm, serum osm  -monitor BMP q8h.    Problem/Plan - 3:  ·  Problem: Anemia.   ·  Plan: -Hgb 6.6  -s/p 1u PRBC in ED, f/u repeat CBC, monitor CBC q8h  -f/u iron, ferritin, TIBC  -continue home iron  -monitor CBC, maintain active type and screen, transfuse for Hgb<7.0  -patient with hx hematoma following initial PEG placement, f/u CT A/P consider expansion.    Problem/Plan - 4:  ·  Problem: Alzheimers disease.   ·  Plan: -would obtain collateral on baseline mental status, attempted to call emergency contact tomeka anila was full  -patient is alert, conversant, although A&Ox1 believes she is in [facility].    Problem/Plan - 5:  ·  Problem: Multiple myeloma.   ·  Plan: -patient previously on revlimid per documentation       Problem/Plan - 6:  ·  Problem: Rheumatoid arthritis.   ·  Plan: -patient with ulnar deviation of b/l UE digits, documented RA  -would obtain records.    Problem/Plan - 7:  ·  Problem: Chronic cutaneous venous stasis ulcer.   ·  Plan: -patient with b/l LE ulcers, in setting of chronic venous stasis  -f/u wound care eval.    Problem/Plan - 8:  ·  Problem: History of esophagitis.   ·  Plan: -patient with grade B esophagitis on EGD 8/2023, also with Hill grade 3 gastroesophageal flap, patient was pending ?outpatient w/u with EUS  -home omeprazole not on formulary will order pantoprazole daily.    Problem/Plan - 9:  ·  Problem: Need for prophylactic measure.   ·  Plan: -DVT ppx: patient with documented hx hematoma after initial PEG placement, SCD for now, will hold pharmacologic ppx  -Diet: f/u nutrition consult for tube feeds, patient on Jevity at facility.    -9/23/2023 ID and IR note and consult appreciated due to positive blood culture recommended to remove right upper port   her antibiotic was changed to Ancef  2 grams every 8 hours as per ID recommendation  -9/24/2023 condition stable waiting for removal of right upper chest sided port  -9/25/2023 right-sided chest port was removed by interventional radiology condition stable continue Ancef 2 g every 8 hours  -9/26/2023 continue Ancef 2 g with Hours for the next 4 weeks patient needs PICC line/midline after negative blood culture echo result noted mild aortic stenosis and severe MR no other  invasivel intervention need to be done due to her general condition and terminal dementia  -9/27/2023 blood culture from 9/25 /2023 Negative Pl. PICC line in a.m. and discharge plan discussed with a ACP  Team discussed with ID about the plan  9/28/2023 D/C back to SNF   with midline  to complet IV antibiotic    -9/30/2023 patient with right upper extremity DVT due to midline start on heparin continue Ancef 2 g every 8 hours discussed with AC P Team   -10/1/2023 condition improving with start  Eliquis in a.m. duration usually between 3-6 months will ask vascular recommendation  10/2/2023   continue  IV Ancef   ID follow-up    -10/3/2023 discontinue heparin due to low hemoglobin and multiple ecchymotic area when asked to be seen by vascular for follow-up and hematology if she has persistent low hemoglobin    -10/4/2023 continue IV antibiotic hematology consult pending off heparin due to  ecchymotic area  -10/5/2023 continue Ancef 2 g every 8 hours hematology consult noted will perform all the testing requested  -10/6/2023 discussed with ACP in detail regarding patient condition hematology follow-up rehabilitation follow-up and vascular regarding anticoagulation   -10/7/2023 patient condition remain stable except reported positive Covid test  she is asymptomatic continue Ancef for her bacteremia as per ID recommendation  -10/8/2023 continue above management patient not hypoxic but she had low-grade fever otherwise asymptomatic  - 10/9/2023 continue supportive care and IV antibiotic still on isolation due to positive Covid  -10/10/2023 patient can be discharged in a.m. after 5 days post Covid need another midline or antibiotic continuation till October 22, 2023 in the past family refused anticoagulation for right upper extremity DVT discussed with ACP to perform another right upper extremity Doppler it is still positive we may call the family for prophylaxis of another midline for her before inserting    DVT PPX:    ADVANCED DIRECTIVE:    DISPOSITION:

## 2023-10-11 LAB
ANION GAP SERPL CALC-SCNC: 9 MMOL/L — SIGNIFICANT CHANGE UP (ref 7–14)
ANISOCYTOSIS BLD QL: SLIGHT — SIGNIFICANT CHANGE UP
BASOPHILS # BLD AUTO: 0 K/UL — SIGNIFICANT CHANGE UP (ref 0–0.2)
BASOPHILS NFR BLD AUTO: 0 % — SIGNIFICANT CHANGE UP (ref 0–2)
BUN SERPL-MCNC: 15 MG/DL — SIGNIFICANT CHANGE UP (ref 7–23)
CALCIUM SERPL-MCNC: 8.3 MG/DL — LOW (ref 8.4–10.5)
CHLORIDE SERPL-SCNC: 100 MMOL/L — SIGNIFICANT CHANGE UP (ref 98–107)
CO2 SERPL-SCNC: 26 MMOL/L — SIGNIFICANT CHANGE UP (ref 22–31)
CREAT SERPL-MCNC: 0.41 MG/DL — LOW (ref 0.5–1.3)
CULTURE RESULTS: SIGNIFICANT CHANGE UP
CULTURE RESULTS: SIGNIFICANT CHANGE UP
EGFR: 101 ML/MIN/1.73M2 — SIGNIFICANT CHANGE UP
EOSINOPHIL # BLD AUTO: 0.23 K/UL — SIGNIFICANT CHANGE UP (ref 0–0.5)
EOSINOPHIL NFR BLD AUTO: 4.4 % — SIGNIFICANT CHANGE UP (ref 0–6)
GIANT PLATELETS BLD QL SMEAR: PRESENT — SIGNIFICANT CHANGE UP
GLUCOSE BLDC GLUCOMTR-MCNC: 102 MG/DL — HIGH (ref 70–99)
GLUCOSE BLDC GLUCOMTR-MCNC: 114 MG/DL — HIGH (ref 70–99)
GLUCOSE BLDC GLUCOMTR-MCNC: 85 MG/DL — SIGNIFICANT CHANGE UP (ref 70–99)
GLUCOSE BLDC GLUCOMTR-MCNC: 91 MG/DL — SIGNIFICANT CHANGE UP (ref 70–99)
GLUCOSE SERPL-MCNC: 105 MG/DL — HIGH (ref 70–99)
HCT VFR BLD CALC: 27.3 % — LOW (ref 34.5–45)
HEMOGLOBIN INTERPRETATION: SIGNIFICANT CHANGE UP
HGB A MFR BLD: 96.6 % — SIGNIFICANT CHANGE UP (ref 95–97.6)
HGB A2 MFR BLD: 2.7 % — SIGNIFICANT CHANGE UP (ref 2.4–3.5)
HGB BLD-MCNC: 8.3 G/DL — LOW (ref 11.5–15.5)
HGB F MFR BLD: <1 % — SIGNIFICANT CHANGE UP (ref 0–1.5)
IANC: 3.75 K/UL — SIGNIFICANT CHANGE UP (ref 1.8–7.4)
LYMPHOCYTES # BLD AUTO: 0.41 K/UL — LOW (ref 1–3.3)
LYMPHOCYTES # BLD AUTO: 7.8 % — LOW (ref 13–44)
MAGNESIUM SERPL-MCNC: 2.1 MG/DL — SIGNIFICANT CHANGE UP (ref 1.6–2.6)
MANUAL SMEAR VERIFICATION: SIGNIFICANT CHANGE UP
MCHC RBC-ENTMCNC: 22.6 PG — LOW (ref 27–34)
MCHC RBC-ENTMCNC: 30.4 GM/DL — LOW (ref 32–36)
MCV RBC AUTO: 74.2 FL — LOW (ref 80–100)
MICROCYTES BLD QL: SLIGHT — SIGNIFICANT CHANGE UP
MONOCYTES # BLD AUTO: 0.28 K/UL — SIGNIFICANT CHANGE UP (ref 0–0.9)
MONOCYTES NFR BLD AUTO: 5.2 % — SIGNIFICANT CHANGE UP (ref 2–14)
NEUTROPHILS # BLD AUTO: 4.38 K/UL — SIGNIFICANT CHANGE UP (ref 1.8–7.4)
NEUTROPHILS NFR BLD AUTO: 82.6 % — HIGH (ref 43–77)
OVALOCYTES BLD QL SMEAR: SLIGHT — SIGNIFICANT CHANGE UP
PHOSPHATE SERPL-MCNC: 2.2 MG/DL — LOW (ref 2.5–4.5)
PLAT MORPH BLD: NORMAL — SIGNIFICANT CHANGE UP
PLATELET # BLD AUTO: 314 K/UL — SIGNIFICANT CHANGE UP (ref 150–400)
PLATELET COUNT - ESTIMATE: NORMAL — SIGNIFICANT CHANGE UP
POIKILOCYTOSIS BLD QL AUTO: SIGNIFICANT CHANGE UP
POLYCHROMASIA BLD QL SMEAR: SLIGHT — SIGNIFICANT CHANGE UP
POTASSIUM SERPL-MCNC: 4.2 MMOL/L — SIGNIFICANT CHANGE UP (ref 3.5–5.3)
POTASSIUM SERPL-SCNC: 4.2 MMOL/L — SIGNIFICANT CHANGE UP (ref 3.5–5.3)
RBC # BLD: 3.68 M/UL — LOW (ref 3.8–5.2)
RBC # FLD: 25.5 % — HIGH (ref 10.3–14.5)
RBC BLD AUTO: ABNORMAL
SARS-COV-2 RNA SPEC QL NAA+PROBE: DETECTED
SODIUM SERPL-SCNC: 135 MMOL/L — SIGNIFICANT CHANGE UP (ref 135–145)
SPECIMEN SOURCE: SIGNIFICANT CHANGE UP
SPECIMEN SOURCE: SIGNIFICANT CHANGE UP
TARGETS BLD QL SMEAR: SLIGHT — SIGNIFICANT CHANGE UP
WBC # BLD: 5.3 K/UL — SIGNIFICANT CHANGE UP (ref 3.8–10.5)
WBC # FLD AUTO: 5.3 K/UL — SIGNIFICANT CHANGE UP (ref 3.8–10.5)

## 2023-10-11 PROCEDURE — 93971 EXTREMITY STUDY: CPT | Mod: 26

## 2023-10-11 RX ORDER — SODIUM HYPOCHLORITE 0.125 %
1 SOLUTION, NON-ORAL MISCELLANEOUS
Refills: 0 | Status: DISCONTINUED | OUTPATIENT
Start: 2023-10-11 | End: 2023-10-13

## 2023-10-11 RX ORDER — COLLAGENASE CLOSTRIDIUM HIST. 250 UNIT/G
1 OINTMENT (GRAM) TOPICAL
Refills: 0 | Status: DISCONTINUED | OUTPATIENT
Start: 2023-10-11 | End: 2023-10-13

## 2023-10-11 RX ADMIN — Medication 500 MILLIGRAM(S): at 11:27

## 2023-10-11 RX ADMIN — Medication 1 APPLICATION(S): at 17:05

## 2023-10-11 RX ADMIN — Medication 220 MILLIGRAM(S): at 06:17

## 2023-10-11 RX ADMIN — Medication 100 MILLIGRAM(S): at 21:44

## 2023-10-11 RX ADMIN — Medication 220 MILLIGRAM(S): at 17:05

## 2023-10-11 RX ADMIN — Medication 1 APPLICATION(S): at 06:36

## 2023-10-11 RX ADMIN — Medication 100 MILLIGRAM(S): at 06:17

## 2023-10-11 RX ADMIN — Medication 100 MILLIGRAM(S): at 13:02

## 2023-10-11 RX ADMIN — PANTOPRAZOLE SODIUM 40 MILLIGRAM(S): 20 TABLET, DELAYED RELEASE ORAL at 11:27

## 2023-10-11 RX ADMIN — MIRTAZAPINE 15 MILLIGRAM(S): 45 TABLET, ORALLY DISINTEGRATING ORAL at 21:45

## 2023-10-11 RX ADMIN — MAGNESIUM HYDROXIDE 5 MILLILITER(S): 400 TABLET, CHEWABLE ORAL at 11:27

## 2023-10-11 RX ADMIN — Medication 650 MILLIGRAM(S): at 06:17

## 2023-10-11 NOTE — PROGRESS NOTE ADULT - SUBJECTIVE AND OBJECTIVE BOX
CHIEF COMPLAINT:    SUBJECTIVE:     REVIEW OF SYSTEMS:    CONSTITUTIONAL: (  )  weakness,  (  ) fevers or chills  EYES/ENT: (  )visual changes;     NECK: (  ) pain or stiffness  RESPIRATORY:   (  )cough, wheezing, hemoptysis;  (  ) shortness of breath  CARDIOVASCULAR:  (  )chest pain or palpitations  GASTROINTESTINAL:   (  )abdominal or epigastric pain.  (  ) nausea, vomiting, or hematemesis;   (   ) diarrhea or constipation.   GENITOURINARY:   (    ) dysuria, frequency or hematuria  NEUROLOGICAL:  (   ) numbness or weakness   All other review of systems is negative unless indicated above    Vital Signs Last 24 Hrs  T(C): 37.4 (11 Oct 2023 05:30), Max: 37.4 (10 Oct 2023 11:44)  T(F): 99.3 (11 Oct 2023 05:30), Max: 99.3 (10 Oct 2023 11:44)  HR: 91 (11 Oct 2023 05:30) (66 - 94)  BP: 129/72 (11 Oct 2023 05:30) (118/77 - 135/80)  BP(mean): --  RR: 18 (11 Oct 2023 05:30) (17 - 18)  SpO2: 100% (11 Oct 2023 05:30) (86% - 100%)    Parameters below as of 11 Oct 2023 05:30  Patient On (Oxygen Delivery Method): room air        I&O's Summary      CAPILLARY BLOOD GLUCOSE      POCT Blood Glucose.: 114 mg/dL (11 Oct 2023 06:28)  POCT Blood Glucose.: 123 mg/dL (10 Oct 2023 23:51)  POCT Blood Glucose.: 108 mg/dL (10 Oct 2023 17:53)  POCT Blood Glucose.: 101 mg/dL (10 Oct 2023 12:08)      PHYSICAL EXAM:    Constitutional:  (   ) NAD,   (   )awake and alert  HEENT: PERR, EOMI,    Neck: Soft and supple, No LAD, No JVD  Respiratory:  (    Breath sounds are clear bilaterally,    (   ) wheezing, rales or rhonchi  Cardiovascular:     (   )S1 and S2, regular rate and rhythm, no Murmurs, gallops or rubs  Gastrointestinal:  (   )Bowel Sounds present, soft,   (  )nontender, nondistended,    Extremities:    (  ) peripheral edema  Vascular: 2+ peripheral pulses  Neurological:    (    )A/O x 3,   (  ) focal deficits  Musculoskeletal:    (   )  normal strength b/l upper  (     ) normal  lower extremities  Skin: No rashes    MEDICATIONS:  MEDICATIONS  (STANDING):  ascorbic acid 500 milliGRAM(s) Oral daily  ceFAZolin   IVPB 2000 milliGRAM(s) IV Intermittent every 8 hours  ferrous    sulfate Liquid 220 milliGRAM(s) Oral every 12 hours  magnesium hydroxide Suspension 5 milliLiter(s) Oral daily  mirtazapine 15 milliGRAM(s) Oral at bedtime  pantoprazole   Suspension 40 milliGRAM(s) Oral daily  silver sulfADIAZINE 1% Cream 1 Application(s) Topical every 12 hours  viokace 40490 units 1 Tablet(s) 1 Tablet(s) Enteral Tube once      LABS: All Labs Reviewed:                        8.3    5.30  )-----------( 314      ( 11 Oct 2023 05:03 )             27.3     10-11    135  |  100  |  15  ----------------------------<  105<H>  4.2   |  26  |  0.41<L>    Ca    8.3<L>      11 Oct 2023 05:03  Phos  2.2     10-11  Mg     2.10     10-11            Blood Culture:   Urine Culture      RADIOLOGY/EKG:    ASSESSMENT AND PLAN:    DVT PPX:    ADVANCED DIRECTIVE:    DISPOSITION: CHIEF COMPLAINT:  patient was seen laying in the bed awake and verbal is complaining of weakness and feeling tired discussed with ACP to expedite her right upper Doppler study and it is negative we can insert and a midline for her for continuation of her antibiotic until 10/22/2023  SUBJECTIVE:     REVIEW OF SYSTEMS:    CONSTITUTIONAL: (x  )  weakness,  (  ) fevers or chills  EYES/ENT: (  )visual changes;     NECK: (  ) pain or stiffness  RESPIRATORY:   (  )cough, wheezing, hemoptysis;  (  ) shortness of breath  CARDIOVASCULAR:  (  )chest pain or palpitations  GASTROINTESTINAL:   (  )abdominal or epigastric pain.  (  ) nausea, vomiting, or hematemesis;   (   ) diarrhea or constipation.   GENITOURINARY:   (    ) dysuria, frequency or hematuria  NEUROLOGICAL:  (   ) numbness or weakness   All other review of systems is negative unless indicated above    Vital Signs Last 24 Hrs  T(C): 37.4 (11 Oct 2023 05:30), Max: 37.4 (10 Oct 2023 11:44)  T(F): 99.3 (11 Oct 2023 05:30), Max: 99.3 (10 Oct 2023 11:44)  HR: 91 (11 Oct 2023 05:30) (66 - 94)  BP: 129/72 (11 Oct 2023 05:30) (118/77 - 135/80)  BP(mean): --  RR: 18 (11 Oct 2023 05:30) (17 - 18)  SpO2: 100% (11 Oct 2023 05:30) (86% - 100%)    Parameters below as of 11 Oct 2023 05:30  Patient On (Oxygen Delivery Method): room air        I&O's Summary      CAPILLARY BLOOD GLUCOSE      POCT Blood Glucose.: 114 mg/dL (11 Oct 2023 06:28)  POCT Blood Glucose.: 123 mg/dL (10 Oct 2023 23:51)  POCT Blood Glucose.: 108 mg/dL (10 Oct 2023 17:53)  POCT Blood Glucose.: 101 mg/dL (10 Oct 2023 12:08)      PHYSICAL EXAM:    Constitutional:  (x   ) NAD,   (  x )awake and alert  HEENT: PERR, EOMI,    Neck: Soft and supple, No LAD, No JVD  Respiratory:  (  x  Breath sounds are clear bilaterally,    (   ) wheezing, rales or rhonchi  Cardiovascular:     ( x  )S1 and S2, regular rate and rhythm, no Murmurs, gallops or rubs  Gastrointestinal:  ( x  )Bowel Sounds present, soft,   (  )nontender, nondistended,   + PEG  Extremities:     severe contracture  Vascular: 2+ peripheral pulses  Neurological:    (   x )A/O x  1,   (  ) focal deficits  Musculoskeletal:    (   )  normal strength b/l upper  (     ) normal  lower extremities  Skin:  pressure ulcer and ecchymotic area  MEDICATIONS:  MEDICATIONS  (STANDING):  ascorbic acid 500 milliGRAM(s) Oral daily  ceFAZolin   IVPB 2000 milliGRAM(s) IV Intermittent every 8 hours  ferrous    sulfate Liquid 220 milliGRAM(s) Oral every 12 hours  magnesium hydroxide Suspension 5 milliLiter(s) Oral daily  mirtazapine 15 milliGRAM(s) Oral at bedtime  pantoprazole   Suspension 40 milliGRAM(s) Oral daily  silver sulfADIAZINE 1% Cream 1 Application(s) Topical every 12 hours  viokace 76083 units 1 Tablet(s) 1 Tablet(s) Enteral Tube once      LABS: All Labs Reviewed:                        8.3    5.30  )-----------( 314      ( 11 Oct 2023 05:03 )             27.3     10-11    135  |  100  |  15  ----------------------------<  105<H>  4.2   |  26  |  0.41<L>    Ca    8.3<L>      11 Oct 2023 05:03  Phos  2.2     10-11  Mg     2.10     10-11            Blood Culture:   Urine Culture      RADIOLOGY/EKG:    ASSESSMENT AND PLAN:  79 yo F PMH Alzheimer's dementia s/p PEG 8/2023, GE flap valve, grade B esophagitis, Multiple Myeloma (previously on Revlimid), cataracts, chronic venous ulcers b/l LE, OA knee, HTN, primary pulmonary hypertension, MDD sent from NH given hypotension documented at 87/56. Unable to obtain reliable history although per documentation patient has experienced cough, dysuria, and abdominal pain. Labs significant for anemia to 6.2 s/p PRBC without active bleeding and Na 124 likely iatrogenic as patient was on D5 1/2 NS and standing free water flushes at nursing home. Patient with fevers (T 100.3) and leukocytosis to 13.3 concerning for sepsis likely 2/2 urinary source - alternatively consider lower extremity wounds. Patient with hx hematoma following initial PEG placement, would f/u CT abdomen r/o expansion.      Problem/Plan - 1:  ·  Problem: Sepsis.   ·  Plan: -patient with fever to 100.3, leukocytosis to 13.3, hypotension 87/56-->92/56 concerning for sepsis 2/2 urinary source, less likely in setting of lower extremity wounds, lactic acid 2.4  -s/p vanc and zosyn in the ED, s/p 1L LR  -f/u BCx, UCx, UA turbid 100 protein moderate blood, large LE, WBC 2921 significant for UTI, per documentation patient has experienced dysuria  -CXR clear  -will order ceftriaxone and vancomycin  -monitor BP, f/u repeat lactic acid.    Problem/Plan - 2:  ·  Problem: Hyponatremia.   ·  Plan: -Na 124  -likely iatrogenic given patient was on D5 1/2 NS and free water flushes at facility  -will hold D5 NS and free water flushes  -f/u urine sodium urine osm, serum osm  -monitor BMP q8h.    Problem/Plan - 3:  ·  Problem: Anemia.   ·  Plan: -Hgb 6.6  -s/p 1u PRBC in ED, f/u repeat CBC, monitor CBC q8h  -f/u iron, ferritin, TIBC  -continue home iron  -monitor CBC, maintain active type and screen, transfuse for Hgb<7.0  -patient with hx hematoma following initial PEG placement, f/u CT A/P consider expansion.    Problem/Plan - 4:  ·  Problem: Alzheimers disease.   ·  Plan: -would obtain collateral on baseline mental status, attempted to call emergency contact tomeka mendozamail was full  -patient is alert, conversant, although A&Ox1 believes she is in [facility].    Problem/Plan - 5:  ·  Problem: Multiple myeloma.   ·  Plan: -patient previously on revlimid per documentation       Problem/Plan - 6:  ·  Problem: Rheumatoid arthritis.   ·  Plan: -patient with ulnar deviation of b/l UE digits, documented RA  -would obtain records.    Problem/Plan - 7:  ·  Problem: Chronic cutaneous venous stasis ulcer.   ·  Plan: -patient with b/l LE ulcers, in setting of chronic venous stasis  -f/u wound care eval.    Problem/Plan - 8:  ·  Problem: History of esophagitis.   ·  Plan: -patient with grade B esophagitis on EGD 8/2023, also with Hill grade 3 gastroesophageal flap, patient was pending ?outpatient w/u with EUS  -home omeprazole not on formulary will order pantoprazole daily.    Problem/Plan - 9:  ·  Problem: Need for prophylactic measure.   ·  Plan: -DVT ppx: patient with documented hx hematoma after initial PEG placement, SCD for now, will hold pharmacologic ppx  -Diet: f/u nutrition consult for tube feeds, patient on Jevity at facility.    -9/23/2023 ID and IR note and consult appreciated due to positive blood culture recommended to remove right upper port   her antibiotic was changed to Ancef  2 grams every 8 hours as per ID recommendation  -9/24/2023 condition stable waiting for removal of right upper chest sided port  -9/25/2023 right-sided chest port was removed by interventional radiology condition stable continue Ancef 2 g every 8 hours  -9/26/2023 continue Ancef 2 g with Hours for the next 4 weeks patient needs PICC line/midline after negative blood culture echo result noted mild aortic stenosis and severe MR no other  invasivel intervention need to be done due to her general condition and terminal dementia  -9/27/2023 blood culture from 9/25 /2023 Negative Pl. PICC line in a.m. and discharge plan discussed with a ACP  Team discussed with ID about the plan  9/28/2023 D/C back to SNF   with midline  to complet IV antibiotic    -9/30/2023 patient with right upper extremity DVT due to midline start on heparin continue Ancef 2 g every 8 hours discussed with AC P Team   -10/1/2023 condition improving with start  Eliquis in a.m. duration usually between 3-6 months will ask vascular recommendation  10/2/2023   continue  IV Ancef   ID follow-up    -10/3/2023 discontinue heparin due to low hemoglobin and multiple ecchymotic area when asked to be seen by vascular for follow-up and hematology if she has persistent low hemoglobin    -10/4/2023 continue IV antibiotic hematology consult pending off heparin due to  ecchymotic area  -10/5/2023 continue Ancef 2 g every 8 hours hematology consult noted will perform all the testing requested  -10/6/2023 discussed with ACP in detail regarding patient condition hematology follow-up rehabilitation follow-up and vascular regarding anticoagulation   -10/7/2023 patient condition remain stable except reported positive Covid test  she is asymptomatic continue Ancef for her bacteremia as per ID recommendation  -10/8/2023 continue above management patient not hypoxic but she had low-grade fever otherwise asymptomatic  - 10/9/2023 continue supportive care and IV antibiotic still on isolation due to positive Covid  -10/10/2023 patient can be discharged in a.m. after 5 days post Covid need another midline or antibiotic continuation till October 22, 2023 in the past family refused anticoagulation for right upper extremity DVT discussed with ACP to perform another right upper extremity Doppler it is still positive we may call the family for prophylaxis of another midline for her before inserting  -10/11/2023 condition stable not hypoxic discharge planning after midline insertion discussed with ACP team  DVT PPX:    ADVANCED DIRECTIVE:    DISPOSITION:

## 2023-10-11 NOTE — ADVANCED PRACTICE NURSE CONSULT - ASSESSMENT
General: Patient oriented to self, cachectic with muscle wasting, thin, fragile. Contracted lower extremities to chest, rigid unable to straighten, [place] neck deformity to fingers, LUQ PEG. Patient with pain upon movement. Ecchymosis to bilateral upper arms. Area of blanching erythema to left ischium measuring 2cmx1.2cm. Prominent bony prominences.     Right lower back- Unstageable pressure injury measuring 5.2aik6dbv1.5cm exposing 100% tan, yellow firmly attached slough, boggy center, unable to express any drainage. No active drainage, no odor. Regular borders, well defined. Periwound hyperpigmentation. No increased warmth, no edema, no induration, no signs or symptoms of overt skin infection.   Goals of care: chemical and enzymatic debridement, offload pressure, protect from friction and shear, monitor for tissue type changes.     Left ischium evolving DTPI  entire are measuring 5cmx3.5cmx0.3cm exposing 60% deep dark maroon base in center ([id number]) and 40% pink moist dermis at edges. Small serous drainage, no odor. Periwound skin with blanching erythema extending 2cm from 8-2oclock. No induration, no increased warmth, no edema. Goals of care: Monitor for tissue type changes, continue to offload, protect from friction and shear.     Left hip with slow blanching erythema measuring 2cmx1.6jld9xa. No induration, no palpable tissue type changes. Goals of care: Monitor for tissue type changes.     Left inner thigh resolving DTPI presenting with slow blanching erythema measuring 6cmx1.9gny0mo. Periwound skin intact. No induration, no increased warmth, no edema. Goals of care: Monitor for tissue type changes.     Vascular: Bilateral lower extremities with hemosiderin staining, Dry, flaky skin. Thickened-yellow hyperpigmented overgrown toenails. Multiple wounds present. Increased coolness from midfoot to distal toes more significant to left foot. Faint  DP/PT pulses.     Right lateral midfoot - DTPI, complicated by arterial insufficiency with an area of purple maroon discoloration measuring 1cmx1.8ktp6og. Periwound skin intact. No drainage, no odor, no s/s of acute skin infection. Goals of care: offload pressure, protect from friction and shear, monitor for tissue type changes.     Right lateral malleolus- Deep tissue pressure injury complicated by arterial insufficiency measuring 0.3dvg2jmk3uu exposing 100% purple maroon discoloration. Periwound skin intact. No tissue type changes. No induration, no erythema, no increased warmth. Goals of care: Monitor for tissue type changes, continue to offload.      BLE with venous ulcers:  Right lower medial leg- 0rlt7mnm0.2cm   Left lower medial leg- 1brq8reb7.3cm   Both ulcers with 100% red moist friable dermis, scant sanguinous drainage, no odor. Periwound with hyperpigmentation, no erythema, no increased warmth, no edema, no induration, no fluctuance no signs or symptoms of overt skin infection. Goals of care: contain small drainage, protect periwound skin, monitor for tissue type changes.      General: Patient oriented to self, cachectic with muscle wasting, thin, fragile. Contracted lower extremities to chest, rigid unable to straighten, [place] neck deformity to fingers, LUQ PEG. Patient with pain upon movement. Ecchymosis to bilateral upper arms. Area of blanching erythema to left ischium measuring 2cmx1.2cm. Prominent bony prominences.     Right lower back- Unstageable pressure injury measuring 5.2tbl1tvo8.5cm exposing 100% tan, yellow firmly attached slough, boggy center, unable to express any drainage. No active drainage, no odor. Regular borders, well defined. Periwound hyperpigmentation. No increased warmth, no edema, no induration, no signs or symptoms of overt skin infection.   Goals of care: chemical and enzymatic debridement, offload pressure, protect from friction and shear, monitor for tissue type changes.     Left sacrum evolving DTPI  entire are measuring 5cmx3.5cmx0.3cm exposing 60% deep dark maroon base in center ([id number]) and 40% pink moist dermis at edges. Small serous drainage, no odor. Periwound skin with blanching erythema extending 2cm from 8-2oclock. No induration, no increased warmth, no edema. Goals of care: Monitor for tissue type changes, continue to offload, protect from friction and shear.     Left hip with slow blanching erythema measuring 2cmx1.0xnv4fl. No induration, no palpable tissue type changes. Goals of care: Monitor for tissue type changes.     Left inner thigh resolving DTPI presenting with slow blanching erythema measuring 6cmx1.5cdy5sx. Periwound skin intact. No induration, no increased warmth, no edema. Goals of care: Monitor for tissue type changes.     Vascular: Bilateral lower extremities with hemosiderin staining, Dry, flaky skin. Thickened-yellow hyperpigmented overgrown toenails. Multiple wounds present. Increased coolness from midfoot to distal toes more significant to left foot. Faint  DP/PT pulses.     Right lateral midfoot - DTPI, complicated by arterial insufficiency with an area of purple maroon discoloration measuring 1cmx1.5nlj5xs. Periwound skin intact. No drainage, no odor, no s/s of acute skin infection. Goals of care: offload pressure, protect from friction and shear, monitor for tissue type changes.     Right lateral malleolus- Deep tissue pressure injury complicated by arterial insufficiency measuring 0.8pyz0ves5jw exposing 100% purple maroon discoloration. Periwound skin intact. No tissue type changes. No induration, no erythema, no increased warmth. Goals of care: Monitor for tissue type changes, continue to offload.      BLE with venous ulcers:  Right lower medial leg- 3ocz0xlw8.2cm   Left lower medial leg- 9xyy6upu9.3cm   Both ulcers with 100% red moist friable dermis, scant sanguinous drainage, no odor. Periwound with hyperpigmentation, no erythema, no increased warmth, no edema, no induration, no fluctuance no signs or symptoms of overt skin infection. Goals of care: contain small drainage, protect periwound skin, monitor for tissue type changes.      General: Patient oriented to self, cachectic with muscle wasting, thin, fragile. Contracted lower extremities to chest, rigid unable to straighten, [place] neck deformity to fingers, LUQ PEG. Patient with pain upon movement. Ecchymosis to bilateral upper arms. Area of blanching erythema to left ischium measuring 2cmx1.2cm. Prominent bony prominences. Right upper chest wall incision line from previous port removal.     Right lower back- Unstageable pressure injury measuring 5.8iwe1kaq5.5cm exposing 100% tan, yellow firmly attached slough, boggy center, unable to express any drainage. No active drainage, no odor. Regular borders, well defined. Periwound hyperpigmentation. No increased warmth, no edema, no induration, no signs or symptoms of overt skin infection.   Goals of care: chemical and enzymatic debridement, offload pressure, protect from friction and shear, monitor for tissue type changes.     Left sacrum evolving DTPI  entire are measuring 5cmx3.5cmx0.3cm exposing 60% deep dark maroon base in center ([id number]) and 40% pink moist dermis at edges. Small serous drainage, no odor. Periwound skin with blanching erythema extending 2cm from 8-2oclock. No induration, no increased warmth, no edema. Goals of care: Monitor for tissue type changes, continue to offload, protect from friction and shear.     Left hip with slow blanching erythema measuring 2cmx1.8ecp4nw. No induration, no palpable tissue type changes. Goals of care: Monitor for tissue type changes.     Left inner thigh resolving DTPI presenting with slow blanching erythema measuring 6cmx1.0vpv9mn. Periwound skin intact. No induration, no increased warmth, no edema. Goals of care: Monitor for tissue type changes.     Vascular: Bilateral lower extremities with hemosiderin staining, Dry, flaky skin. Thickened-yellow hyperpigmented overgrown toenails. Multiple wounds present. Increased coolness from midfoot to distal toes more significant to left foot. Faint  DP/PT pulses. Monophasic pulses to DP/PT on right and PT on left unable to find on Left DP.     Right lateral midfoot - DTPI, complicated by arterial insufficiency with an area of purple maroon discoloration measuring 1cmx1.9qna6sf. Periwound skin intact. No drainage, no odor, no s/s of acute skin infection. Goals of care: offload pressure, protect from friction and shear, monitor for tissue type changes.     Right lateral malleolus- Deep tissue pressure injury complicated by arterial insufficiency measuring 0.4kga9tyw6hg exposing 100% purple maroon discoloration. Periwound skin intact. No tissue type changes. No induration, no erythema, no increased warmth. Goals of care: Monitor for tissue type changes, continue to offload.      BLE with venous ulcers:  Right lower medial leg- 1nqz3eth1.2cm   Left lower medial leg- 3upr4sgk5.3cm   Both ulcers with 100% red moist friable dermis, scant sanguinous drainage, no odor. Periwound with hyperpigmentation, no erythema, no increased warmth, no edema, no induration, no fluctuance no signs or symptoms of overt skin infection. Goals of care: contain small drainage, protect periwound skin, monitor for tissue type changes.

## 2023-10-11 NOTE — ADVANCED PRACTICE NURSE CONSULT - REASON FOR CONSULT
Patient seen on skin care rounds after wound care referral received for assessment of skin impairment and recommendations of topical management. Patient known to [facility] service line last seen on previous admission. Patient H/O of Alzheimer's dementia s/p PEG 8/2023, GE flap valve, grade B esophagitis, Multiple Myeloma (previously on Revlimid), cataracts, chronic venous ulcers b/l LE, OA knee, HTN, primary pulmonary hypertension, MDD sent from NH given hypotension documented at 87/56. Unable to obtain reliable history although per documentation patient has experienced cough, dysuria, and abdominal pain. Labs significant for anemia to 6.2 s/p PRBC without active bleeding and Na 124 likely iatrogenic as patient was on D5 1/2 NS and standing free water flushes at nursing home. Patient with fevers (T 100.3) and leukocytosis to 13.3 concerning for sepsis likely 2/2 urinary source - alternatively consider lower extremity wounds. Patient with hx hematoma following initial PEG placement, would f/u CT abdomen r/o expansion.  Chart reviewed: WBC 5.30, H/H 8.3/27.3, platelets 314, Irvin 12.

## 2023-10-11 NOTE — ADVANCED PRACTICE NURSE CONSULT - RECOMMEDATIONS
Recommend Wound NP/MD consult for multiple full thickness pressure injuries with possible debridement for right lower back injury.   Recommend NICK/PVR for diminished pulses and increase coolness to left foot.     Topical recommendations:     ---Right lower back- Cleanse with NS, pat dry. Apply Liquid barrier film to periwound skin (allow to dry). Apply collagenase (Santyl), nickel-coin thickness, to entire base of wound. Lightly pack wound with 0.125% Dakins moistened gauze, cover with ABD (combine pad) and secure with paper tape. Change twice a  day.   ---Left ischium- Cleanse with NS, pat dry. Apply Liquid barrier film to periwound skin (allow to dry). Cover with silicone foam with border. Change daily, monitor for tissue type changes.   ---Left inner thigh and left hip- Apply LBF to site, cover with silicone foam with border, change every other day.   ---Perineum: Cleanse with skin cleanser, pat dry. Apply Moe moisture barrier cream twice a day and PRN with incontinent episodes.   ---Right lateral midfoot and right lateral malleolus: Apply LBF, place gauze, wrap with kerlix, monitor for tissue type changes. Change every other day.   ---Left inner thigh- Cover with silicone foam with border, Change daily.   ---Areas of dry flaky skin to BLE: apply sween24 moisturizer daily, avoid in between the toes.   ---PEG: Cleanse q shift with NS, apply liquid barrier film beneath carlos disc.  If redness noted under carlos disc bumper apply thin foam  dressing without border (Mepilex Lite)- cut to mid dressing with a 'Y' shape. Secondary securement to abdomen taping in 'H' fashion with Steri-strips.   ---Continue low air loss bed therapy, continue heel elevation ,continue to turn & reposition per protocol, soft pillow between bony prominences, continue moisture management with barrier creams & single breathable pad, continue measures to decrease friction/shear/pressure. Continue with nutritional support as per dietary/orders.     Please contact Wound/Ostomy Care Service Line if we can be of further assistance (ext [phone]).           Recommend Wound NP/MD consult for multiple full thickness pressure injuries with possible debridement for right lower back injury.   Recommend NICK/PVR for diminished pulses and increase coolness to left foot.     Topical recommendations:     ---Right lower back- Cleanse with NS, pat dry. Apply Liquid barrier film to periwound skin (allow to dry). Apply collagenase (Santyl), nickel-coin thickness, to entire base of wound. Lightly pack wound with 0.125% Dakins moistened gauze, cover with ABD (combine pad) and secure with paper tape. Change twice a  day.   ---Left sacrum- Cleanse with NS, pat dry. Apply Liquid barrier film to periwound skin (allow to dry). Cover with silicone foam with border. Change daily, monitor for tissue type changes.   ---Left inner thigh and left hip- Apply LBF to site, cover with silicone foam with border, change every other day.   ---Perineum: Cleanse with skin cleanser, pat dry. Apply Moe moisture barrier cream twice a day and PRN with incontinent episodes.   ---Right lateral midfoot and right lateral malleolus: Apply LBF, place gauze, wrap with kerlix, monitor for tissue type changes. Change every other day.   ---Left inner thigh- Cover with silicone foam with border, Change daily.   ---Areas of dry flaky skin to BLE: apply sween24 moisturizer daily, avoid in between the toes.   ---PEG: Cleanse q shift with NS, apply liquid barrier film beneath carlos disc.  If redness noted under carlos disc bumper apply thin foam  dressing without border (Mepilex Lite)- cut to mid dressing with a 'Y' shape. Secondary securement to abdomen taping in 'H' fashion with Steri-strips.   ---Continue low air loss bed therapy, continue heel elevation ,continue to turn & reposition per protocol, soft pillow between bony prominences, continue moisture management with barrier creams & single breathable pad, continue measures to decrease friction/shear/pressure. Continue with nutritional support as per dietary/orders.     Please contact Wound/Ostomy Care Service Line if we can be of further assistance (ext [phone]).           Recommend Wound NP/MD consult for multiple full thickness pressure injuries with possible debridement for right lower back injury.   Recommend NICK/PVR for diminished pulses and increase coolness to left foot.     Topical recommendations:     ---Right lower back- Cleanse with NS, pat dry. Apply Liquid barrier film to periwound skin (allow to dry). Apply collagenase (Santyl), nickel-coin thickness, to entire base of wound. Lightly pack wound with 0.125% Dakins moistened gauze, cover with ABD (combine pad) and secure with paper tape. Change twice a  day.   ---Left sacrum- Cleanse with NS, pat dry. Apply Liquid barrier film to periwound skin (allow to dry). Cover with silicone foam with border. Change daily, monitor for tissue type changes.   ---Left inner thigh and left hip- Apply LBF to site, cover with silicone foam with border, change every other day.   ---Perineum: Cleanse with skin cleanser, pat dry. Apply Moe moisture barrier cream twice a day and PRN with incontinent episodes.   ---Right lateral midfoot and right lateral malleolus: Apply LBF, place gauze, wrap with kerlix, monitor for tissue type changes. Change every other day.   ---Left inner thigh- Cover with silicone foam with border, Change daily.   ---Areas of dry flaky skin to BLE: apply sween24 moisturizer daily, avoid in between the toes.   ---PEG: Cleanse q shift with NS, apply liquid barrier film beneath carlos disc.  If redness noted under carlos disc bumper apply thin foam  dressing without border (Mepilex Lite)- cut to mid dressing with a 'Y' shape. Secondary securement to abdomen taping in 'H' fashion with Steri-strips.   ---Continue low air loss bed therapy, continue heel elevation ,continue to turn & reposition per protocol, soft pillow between bony prominences, continue moisture management with barrier creams & single breathable pad, continue measures to decrease friction/shear/pressure. Continue with nutritional support as per dietary/orders.     Findings and recs discussed with ACP provider.     Please contact Wound/Ostomy Care Service Line if we can be of further assistance (ext [phone]).           Recommend Wound NP/MD consult for multiple full thickness pressure injuries with possible debridement for right lower back injury.   Recommend NICK/PVR for diminished pulses and increase coolness to left foot.     Topical recommendations:     ---Right lower back- Cleanse with NS, pat dry. Apply Liquid barrier film to periwound skin (allow to dry). Apply collagenase (Santyl), nickel-coin thickness, to entire base of wound. Lightly pack wound with 0.125% Dakins moistened gauze, cover with ABD (combine pad) and secure with paper tape. Change twice a  day.   ---Left sacrum- Cleanse with NS, pat dry. Apply Liquid barrier film to periwound skin (allow to dry). Cover with silicone foam with border. Change daily, monitor for tissue type changes.   ---Left inner thigh and left hip- Apply LBF to site, cover with silicone foam with border, change every other day.   ---Perineum: Cleanse with skin cleanser, pat dry. Apply Moe moisture barrier cream twice a day and PRN with incontinent episodes.   ---Right lateral midfoot and right lateral malleolus: Apply LBF, place gauze, wrap with kerlix, monitor for tissue type changes. Change every other day.   ---Left inner thigh- Cover with silicone foam with border, Change daily.   ---Areas of dry flaky skin to BLE: apply sween24 moisturizer daily, avoid in between the toes.   ---PEG: Cleanse q shift with NS, apply liquid barrier film beneath carlos disc.  If redness noted under carlos disc bumper apply thin foam  dressing without border (Mepilex Lite)- cut to mid dressing with a 'Y' shape. Secondary securement to abdomen taping in 'H' fashion with Steri-strips.   ---Continue low air loss bed therapy, continue heel elevation ,continue to turn & reposition per protocol, soft pillow between bony prominences, continue moisture management with barrier creams & single breathable pad, continue measures to decrease friction/shear/pressure. Continue with nutritional support as per dietary/orders.     Findings and recs discussed with ACP provider, Howie.     Please contact Wound/Ostomy Care Service Line if we can be of further assistance (ext [phone]).

## 2023-10-11 NOTE — CHART NOTE - NSCHARTNOTEFT_GEN_A_CORE
Patient with history of possible Multiple myleoma , consulted for anemia.    # Multiple myeloma  # Anemia  - with low MCV, iron panel consistent with anemia of chronic d/s.   - Hb electrophoresis sent to check for underlying hemoglobinopathy however, would not change Mx  - patient can follow up with her own hematologist for multiple myeloma on d.c    # DVT  - patient's sister opt for no AC and given the risk of falls in the setting of Dementia, it may be an appropriate option  - Heme will sign off.  - Pls call back if there is any questions.

## 2023-10-12 LAB
ANION GAP SERPL CALC-SCNC: 8 MMOL/L — SIGNIFICANT CHANGE UP (ref 7–14)
BASOPHILS # BLD AUTO: 0.03 K/UL — SIGNIFICANT CHANGE UP (ref 0–0.2)
BASOPHILS NFR BLD AUTO: 0.6 % — SIGNIFICANT CHANGE UP (ref 0–2)
BUN SERPL-MCNC: 16 MG/DL — SIGNIFICANT CHANGE UP (ref 7–23)
CALCIUM SERPL-MCNC: 8.1 MG/DL — LOW (ref 8.4–10.5)
CHLORIDE SERPL-SCNC: 99 MMOL/L — SIGNIFICANT CHANGE UP (ref 98–107)
CO2 SERPL-SCNC: 26 MMOL/L — SIGNIFICANT CHANGE UP (ref 22–31)
CREAT SERPL-MCNC: 0.43 MG/DL — LOW (ref 0.5–1.3)
EGFR: 100 ML/MIN/1.73M2 — SIGNIFICANT CHANGE UP
EOSINOPHIL # BLD AUTO: 0.16 K/UL — SIGNIFICANT CHANGE UP (ref 0–0.5)
EOSINOPHIL NFR BLD AUTO: 3.4 % — SIGNIFICANT CHANGE UP (ref 0–6)
GLUCOSE BLDC GLUCOMTR-MCNC: 131 MG/DL — HIGH (ref 70–99)
GLUCOSE SERPL-MCNC: 109 MG/DL — HIGH (ref 70–99)
HCT VFR BLD CALC: 25.1 % — LOW (ref 34.5–45)
HGB BLD-MCNC: 7.6 G/DL — LOW (ref 11.5–15.5)
IANC: 3.41 K/UL — SIGNIFICANT CHANGE UP (ref 1.8–7.4)
IMM GRANULOCYTES NFR BLD AUTO: 0.6 % — SIGNIFICANT CHANGE UP (ref 0–0.9)
LYMPHOCYTES # BLD AUTO: 0.75 K/UL — LOW (ref 1–3.3)
LYMPHOCYTES # BLD AUTO: 15.8 % — SIGNIFICANT CHANGE UP (ref 13–44)
MAGNESIUM SERPL-MCNC: 2.1 MG/DL — SIGNIFICANT CHANGE UP (ref 1.6–2.6)
MCHC RBC-ENTMCNC: 22.7 PG — LOW (ref 27–34)
MCHC RBC-ENTMCNC: 30.3 GM/DL — LOW (ref 32–36)
MCV RBC AUTO: 74.9 FL — LOW (ref 80–100)
MONOCYTES # BLD AUTO: 0.37 K/UL — SIGNIFICANT CHANGE UP (ref 0–0.9)
MONOCYTES NFR BLD AUTO: 7.8 % — SIGNIFICANT CHANGE UP (ref 2–14)
NEUTROPHILS # BLD AUTO: 3.41 K/UL — SIGNIFICANT CHANGE UP (ref 1.8–7.4)
NEUTROPHILS NFR BLD AUTO: 71.8 % — SIGNIFICANT CHANGE UP (ref 43–77)
NRBC # BLD: 0 /100 WBCS — SIGNIFICANT CHANGE UP (ref 0–0)
NRBC # FLD: 0 K/UL — SIGNIFICANT CHANGE UP (ref 0–0)
OB PNL STL: NEGATIVE — SIGNIFICANT CHANGE UP
PHOSPHATE SERPL-MCNC: 2.2 MG/DL — LOW (ref 2.5–4.5)
PLATELET # BLD AUTO: 330 K/UL — SIGNIFICANT CHANGE UP (ref 150–400)
POTASSIUM SERPL-MCNC: 4.2 MMOL/L — SIGNIFICANT CHANGE UP (ref 3.5–5.3)
POTASSIUM SERPL-SCNC: 4.2 MMOL/L — SIGNIFICANT CHANGE UP (ref 3.5–5.3)
RBC # BLD: 3.35 M/UL — LOW (ref 3.8–5.2)
RBC # FLD: 25.3 % — HIGH (ref 10.3–14.5)
SODIUM SERPL-SCNC: 133 MMOL/L — LOW (ref 135–145)
WBC # BLD: 4.75 K/UL — SIGNIFICANT CHANGE UP (ref 3.8–10.5)
WBC # FLD AUTO: 4.75 K/UL — SIGNIFICANT CHANGE UP (ref 3.8–10.5)

## 2023-10-12 RX ADMIN — Medication 1 APPLICATION(S): at 18:56

## 2023-10-12 RX ADMIN — Medication 220 MILLIGRAM(S): at 14:26

## 2023-10-12 RX ADMIN — Medication 100 MILLIGRAM(S): at 14:22

## 2023-10-12 RX ADMIN — MIRTAZAPINE 15 MILLIGRAM(S): 45 TABLET, ORALLY DISINTEGRATING ORAL at 21:45

## 2023-10-12 RX ADMIN — PANTOPRAZOLE SODIUM 40 MILLIGRAM(S): 20 TABLET, DELAYED RELEASE ORAL at 14:25

## 2023-10-12 RX ADMIN — Medication 100 MILLIGRAM(S): at 05:23

## 2023-10-12 RX ADMIN — Medication 500 MILLIGRAM(S): at 14:24

## 2023-10-12 RX ADMIN — Medication 220 MILLIGRAM(S): at 05:42

## 2023-10-12 RX ADMIN — Medication 1 APPLICATION(S): at 05:24

## 2023-10-12 RX ADMIN — MAGNESIUM HYDROXIDE 5 MILLILITER(S): 400 TABLET, CHEWABLE ORAL at 14:24

## 2023-10-12 RX ADMIN — Medication 100 MILLIGRAM(S): at 21:43

## 2023-10-12 RX ADMIN — Medication 1 APPLICATION(S): at 18:55

## 2023-10-12 RX ADMIN — Medication 1 APPLICATION(S): at 18:53

## 2023-10-12 NOTE — PROGRESS NOTE ADULT - SUBJECTIVE AND OBJECTIVE BOX
CHIEF COMPLAINT:  patient name in the bed awake verbal seems to be tired and was found in the body event last night and tarry stool/dark stool and transfusion by nursing team this morning she received transfusion no active bleeding at present time  SUBJECTIVE:     REVIEW OF SYSTEMS:    CONSTITUTIONAL: ( x )  weakness,  (  ) fevers or chills  EYES/ENT: (  )visual changes;     NECK: (  ) pain or stiffness  RESPIRATORY:   (  )cough, wheezing, hemoptysis;  (  ) shortness of breath  CARDIOVASCULAR:  (  )chest pain or palpitations  GASTROINTESTINAL:   (  )abdominal or epigastric pain.  (  ) nausea, vomiting, or hematemesis;   (   ) diarrhea or constipation.   GENITOURINARY:   (    ) dysuria, frequency or hematuria  NEUROLOGICAL:  (   ) numbness or weakness   All other review of systems is negative unless indicated above    Vital Signs Last 24 Hrs  T(C): 36.8 (12 Oct 2023 20:46), Max: 37.2 (11 Oct 2023 22:00)  T(F): 98.2 (12 Oct 2023 20:46), Max: 99 (11 Oct 2023 22:00)  HR: 87 (12 Oct 2023 20:46) (66 - 104)  BP: 132/75 (12 Oct 2023 20:46) (132/75 - 156/60)  BP(mean): --  RR: 18 (12 Oct 2023 20:46) (18 - 19)  SpO2: 96% (12 Oct 2023 20:46) (96% - 99%)    Parameters below as of 12 Oct 2023 20:46  Patient On (Oxygen Delivery Method): room air        I&O's Summary      CAPILLARY BLOOD GLUCOSE      POCT Blood Glucose.: 104 mg/dL (12 Oct 2023 12:27)  POCT Blood Glucose.: 131 mg/dL (12 Oct 2023 07:21)  POCT Blood Glucose.: 102 mg/dL (11 Oct 2023 23:28)      PHYSICAL EXAM:    Constitutional:  (  x ) NAD,   ( x  )awake and  verbal  HEENT: PERR, EOMI,    Neck: Soft and supple, No LAD, No JVD  Respiratory:  (   x Breath sounds are clear bilaterally,    (   ) wheezing, rales or rhonchi  Cardiovascular:     (  x )S1 and S2, regular rate and rhythm, no Murmurs, gallops or rubs  Gastrointestinal:  (  x )Bowel Sounds present, soft,   (  )nontender, nondistended + PEG,    Extremities:    (  ) peripheral edema  Vascular: 2+ peripheral pulses  Neurological:    (    )A/O x 3,   (  ) focal deficits  Musculoskeletal:  severe contracture lower extremity multiple ulcer in the sacrum and extremity    MEDICATIONS:  MEDICATIONS  (STANDING):  ascorbic acid 500 milliGRAM(s) Oral daily  ceFAZolin   IVPB 2000 milliGRAM(s) IV Intermittent every 8 hours  collagenase Ointment 1 Application(s) Topical two times a day  Dakins Solution - 1/4 Strength 1 Application(s) Topical two times a day  ferrous    sulfate Liquid 220 milliGRAM(s) Oral every 12 hours  magnesium hydroxide Suspension 5 milliLiter(s) Oral daily  mirtazapine 15 milliGRAM(s) Oral at bedtime  pantoprazole   Suspension 40 milliGRAM(s) Oral daily  silver sulfADIAZINE 1% Cream 1 Application(s) Topical every 12 hours  viokace 76072 units 1 Tablet(s) 1 Tablet(s) Enteral Tube once      LABS: All Labs Reviewed:                        7.6    4.75  )-----------( 330      ( 12 Oct 2023 07:18 )             25.1     10-12    133<L>  |  99  |  16  ----------------------------<  109<H>  4.2   |  26  |  0.43<L>    Ca    8.1<L>      12 Oct 2023 07:18  Phos  2.2     10-12  Mg     2.10     10-12            Blood Culture:   Urine Culture      RADIOLOGY/EKG:    ASSESSMENT AND PLAN:  79 yo F PMH Alzheimer's dementia s/p PEG 8/2023, GE flap valve, grade B esophagitis, Multiple Myeloma (previously on Revlimid), cataracts, chronic venous ulcers b/l LE, OA knee, HTN, primary pulmonary hypertension, MDD sent from NH given hypotension documented at 87/56. Unable to obtain reliable history although per documentation patient has experienced cough, dysuria, and abdominal pain. Labs significant for anemia to 6.2 s/p PRBC without active bleeding and Na 124 likely iatrogenic as patient was on D5 1/2 NS and standing free water flushes at nursing home. Patient with fevers (T 100.3) and leukocytosis to 13.3 concerning for sepsis likely 2/2 urinary source - alternatively consider lower extremity wounds. Patient with hx hematoma following initial PEG placement, would f/u CT abdomen r/o expansion.      Problem/Plan - 1:  ·  Problem: Sepsis.   ·  Plan: -patient with fever to 100.3, leukocytosis to 13.3, hypotension 87/56-->92/56 concerning for sepsis 2/2 urinary source, less likely in setting of lower extremity wounds, lactic acid 2.4  -s/p vanc and zosyn in the ED, s/p 1L LR  -f/u BCx, UCx, UA turbid 100 protein moderate blood, large LE, WBC 2921 significant for UTI, per documentation patient has experienced dysuria  -CXR clear  -will order ceftriaxone and vancomycin  -monitor BP, f/u repeat lactic acid.    Problem/Plan - 2:  ·  Problem: Hyponatremia.   ·  Plan: -Na 124  -likely iatrogenic given patient was on D5 1/2 NS and free water flushes at facility  -will hold D5 NS and free water flushes  -f/u urine sodium urine osm, serum osm  -monitor BMP q8h.    Problem/Plan - 3:  ·  Problem: Anemia.   ·  Plan: -Hgb 6.6  -s/p 1u PRBC in ED, f/u repeat CBC, monitor CBC q8h  -f/u iron, ferritin, TIBC  -continue home iron  -monitor CBC, maintain active type and screen, transfuse for Hgb<7.0  -patient with hx hematoma following initial PEG placement, f/u CT A/P consider expansion.    Problem/Plan - 4:  ·  Problem: Alzheimers disease.   ·  Plan: -would obtain collateral on baseline mental status, attempted to call emergency contact tomeka voicemail was full  -patient is alert, conversant, although A&Ox1 believes she is in [facility].    Problem/Plan - 5:  ·  Problem: Multiple myeloma.   ·  Plan: -patient previously on revlimid per documentation       Problem/Plan - 6:  ·  Problem: Rheumatoid arthritis.   ·  Plan: -patient with ulnar deviation of b/l UE digits, documented RA  -would obtain records.    Problem/Plan - 7:  ·  Problem: Chronic cutaneous venous stasis ulcer.   ·  Plan: -patient with b/l LE ulcers, in setting of chronic venous stasis  -f/u wound care eval.    Problem/Plan - 8:  ·  Problem: History of esophagitis.   ·  Plan: -patient with grade B esophagitis on EGD 8/2023, also with Hill grade 3 gastroesophageal flap, patient was pending ?outpatient w/u with EUS  -home omeprazole not on formulary will order pantoprazole daily.    Problem/Plan - 9:  ·  Problem: Need for prophylactic measure.   ·  Plan: -DVT ppx: patient with documented hx hematoma after initial PEG placement, SCD for now, will hold pharmacologic ppx  -Diet: f/u nutrition consult for tube feeds, patient on Jevity at facility.    -9/23/2023 ID and IR note and consult appreciated due to positive blood culture recommended to remove right upper port   her antibiotic was changed to Ancef  2 grams every 8 hours as per ID recommendation  -9/24/2023 condition stable waiting for removal of right upper chest sided port  -9/25/2023 right-sided chest port was removed by interventional radiology condition stable continue Ancef 2 g every 8 hours  -9/26/2023 continue Ancef 2 g with Hours for the next 4 weeks patient needs PICC line/midline after negative blood culture echo result noted mild aortic stenosis and severe MR no other  invasivel intervention need to be done due to her general condition and terminal dementia  -9/27/2023 blood culture from 9/25 /2023 Negative Pl. PICC line in a.m. and discharge plan discussed with a ACP  Team discussed with ID about the plan  9/28/2023 D/C back to SNF   with midline  to complet IV antibiotic    -9/30/2023 patient with right upper extremity DVT due to midline start on heparin continue Ancef 2 g every 8 hours discussed with AC P Team   -10/1/2023 condition improving with start  Eliquis in a.m. duration usually between 3-6 months will ask vascular recommendation  10/2/2023   continue  IV Ancef   ID follow-up    -10/3/2023 discontinue heparin due to low hemoglobin and multiple ecchymotic area when asked to be seen by vascular for follow-up and hematology if she has persistent low hemoglobin    -10/4/2023 continue IV antibiotic hematology consult pending off heparin due to  ecchymotic area  -10/5/2023 continue Ancef 2 g every 8 hours hematology consult noted will perform all the testing requested  -10/6/2023 discussed with ACP in detail regarding patient condition hematology follow-up rehabilitation follow-up and vascular regarding anticoagulation   -10/7/2023 patient condition remain stable except reported positive Covid test  she is asymptomatic continue Ancef for her bacteremia as per ID recommendation  -10/8/2023 continue above management patient not hypoxic but she had low-grade fever otherwise asymptomatic  - 10/9/2023 continue supportive care and IV antibiotic still on isolation due to positive Covid  -10/10/2023 patient can be discharged in a.m. after 5 days post Covid need another midline or antibiotic continuation till October 22, 2023 in the past family refused anticoagulation for right upper extremity DVT discussed with ACP to perform another right upper extremity Doppler it is still positive we may call the family for prophylaxis of another midline for her before inserting  -10/11/2023 condition stable not hypoxic discharge planning after midline insertion discussed with ACP team  -10/12/2023 continue monitoring her CBC transfused for hemoglobin less than 7 discharge planning will be canceled due to recent GI bleeding  DVT PPX:    ADVANCED DIRECTIVE:    DISPOSITION:

## 2023-10-12 NOTE — PROVIDER CONTACT NOTE (OTHER) - ASSESSMENT
Pt A&Ox1, VSS. No acute signs of distress noted. Pt has no c/o pain, dizziness or lethargy
Pt asymptomatic, no s.s of distress, safety maintained
Patient HR sustaining a mildy tacchy, current 
Patient is AOx1, RA, patient is asymptotic, no signs of pain or distress.
Pt A&Ox4, VSS. No acute signs of distress noted. Pt unsteady on feet and will need standby assistance
vitals as per flow sheet, Blood tinged fluid pulled from PEG tube, Pt confused (baseline A&Ox1)
Patient A&Ox1. Patient admitted with PEG tube LUQ. TF held at 0000 for procedure at IR today. PEG tube unable to flush when attempting to administer 1200 medications. Resistance met when attempting. Position changed, hot water attempted, massaged PEG tube at site of blockage and still unable to flush tubing. 1200 medications unable to be administered at this time.
Patient HR slightly tachycardic,  , oral temperature slightly elevated at 100 degrees F
Pt  is A&Ox1, calm
Patient PEG tube clogged, patient's PEG tube was previously clogged and was unclogged 9/26
no acute distress noted   fall safety maintained
Pt is A&Ox1 confused

## 2023-10-12 NOTE — PROVIDER CONTACT NOTE (OTHER) - BACKGROUND
DKA without coma
Patient came in drop in hematocrit levels. PMH of MDD, cataract, dementia, rheumatoid arthritis.
Pt admitted for precipitous drop in hct
(Admit Diagnosis) Precipitous drop in hematocrit  (PMH) MDD (major depressive disorder)  (PMH) Cataract  (PMH) Osteoarthritis
Admit Diagnosis) Precipitous drop in hematocrit  (PMH) MDD (major depressive disorder)  (PMH) Cataract  (PMH) Osteoarthritis
Patient admitted for sepsis (BC MSSA +) and anemia (s/p 2 units PRBCs 9/21 & 9/22). PMH dementia, adult failure to thrive, arthritis. PSH PEG tube placement and R ACW port insertion.
Precipitous drop in hematocrit   Anemia  Peg tube for feeding
Pt admitted for precipitous drop in Hct
Pt admitted with anemia

## 2023-10-12 NOTE — PROVIDER CONTACT NOTE (OTHER) - REASON
Pt temp 100.7
PEG tube clogged
Patient HR sustaining a mildy tacchy, current 
Pt Temp 101.5 and  during blood transfusion
Patient PEG tube clogged
 Temp (rectal) 99.5
Patient HR slightly tachycardic
noted with redness and low grade fever 100.4
/114, Pulse 104, Temp 99.4 F
Patient's PEG tube clogged, unable to flush and administer medications
Pt has large black tarry stool
pt refusing bed alarm. Pt states "if you put that bed alarm on again I will get up every 1 second" Pt educated on risks of falling and injury

## 2023-10-12 NOTE — CHART NOTE - NSCHARTNOTEFT_GEN_A_CORE
Source: Patient A&Ox [2 ]   other [x] RN/Chart review     Medical Course: 79 yo F PMH Alzheimer's dementia s/p PEG 8/2023, GE flap valve, grade B esophagitis, Multiple Myeloma (previously on Revlimid), cataracts, chronic venous ulcers b/l LE, OA knee, HTN, primary pulmonary hypertension, MDD sent from NH given hypotension documented at 87/56. Unable to obtain reliable history although per documentation patient has experienced cough, dysuria, and abdominal pain. Labs significant for anemia to 6.2 s/p PRBC without active bleeding and Na 124 likely iatrogenic as patient was on D5 1/2 NS and standing free water flushes at nursing home. Patient with fevers (T 100.3) and leukocytosis to 13.3 concerning for sepsis likely 2/2 urinary source - alternatively consider lower extremity wounds. Patient with hx hematoma following initial PEG placement, would f/u CT abdomen r/o expansion.    Nutrition Course: Patient seen at bedside, more awake today. Pt reports feeling better today. Pt noted on Jevity 1.5 @ 50ml/hr x 20hrs to provide total volume 1000ml/day (1500kcal, 63.8gms protein, 760mL free water)  to provide pt's sole nutrition support. At the time of visit, pt's TF visibly seen on hold with error message(clog in line). RN made aware. Recommend continue monitoring TF is running at goal rate to ensure pt is receiving adequate nutrition. RD was notified this morning that Jevity 1.5 on low inventory today and on back order. Pt noted with d/c planning in placed. If pt remains in hospital please reconsult this service for new TF formula to substitute Jevity 1.5 RD to remain available for further nutritional interventions as indicated.          Diet, Regular:   Minced and Moist (MINCEDMOIST)  Tube Feeding Modality: Gastrostomy  Jevity 1.5 Vignesh (JEVITY1.5RTH)  Total Volume for 24 Hours (mL): 1000  Continuous  Starting Tube Feed Rate {mL per Hour}: 20  Increase Tube Feed Rate by (mL): 10     Every 4 hours  Until Goal Tube Feed Rate (mL per Hour): 50  Tube Feed Duration (in Hours): 20  Tube Feed Start Time: 17:30 (10-06-23 @ 12:11)      GI: WDL. Last BM 10/12 with black and tarry stool as per RN flow sheet.       Anthropometrics: Height (cm): 170.2 (09-22), 170.2 (08-04)  Weight (kg): 47 (09-23), 59 (08-04)  BMI (kg/m2): 16.2 (09-23), 20.4 (09-22), 20.4 (08-04)    __________________ Pertinent Medications__________________   MEDICATIONS  (STANDING):  ascorbic acid 500 milliGRAM(s) Oral daily  ceFAZolin   IVPB 2000 milliGRAM(s) IV Intermittent every 8 hours  collagenase Ointment 1 Application(s) Topical two times a day  Dakins Solution - 1/4 Strength 1 Application(s) Topical two times a day  ferrous    sulfate Liquid 220 milliGRAM(s) Oral every 12 hours  magnesium hydroxide Suspension 5 milliLiter(s) Oral daily  mirtazapine 15 milliGRAM(s) Oral at bedtime  pantoprazole   Suspension 40 milliGRAM(s) Oral daily  silver sulfADIAZINE 1% Cream 1 Application(s) Topical every 12 hours  viokace 03808 units 1 Tablet(s) 1 Tablet(s) Enteral Tube once    MEDICATIONS  (PRN):      __________________ Pertinent Labs__________________   10-12 Na133 mmol/L<L> Glu 109 mg/dL<H> K+ 4.2 mmol/L Cr  0.43 mg/dL<L> BUN 16 mg/dL 10-12 Phos 2.2 mg/dL<L>        POCT Blood Glucose.: 104 mg/dL (10-12-23 @ 12:27)  POCT Blood Glucose.: 131 mg/dL (10-12-23 @ 07:21)  POCT Blood Glucose.: 102 mg/dL (10-11-23 @ 23:28)  POCT Blood Glucose.: 85 mg/dL (10-11-23 @ 17:55)  POCT Blood Glucose.: 91 mg/dL (10-11-23 @ 12:25)  POCT Blood Glucose.: 114 mg/dL (10-11-23 @ 06:28)  POCT Blood Glucose.: 123 mg/dL (10-10-23 @ 23:51)  POCT Blood Glucose.: 108 mg/dL (10-10-23 @ 17:53)  POCT Blood Glucose.: 101 mg/dL (10-10-23 @ 12:08)  POCT Blood Glucose.: 119 mg/dL (10-10-23 @ 07:02)  POCT Blood Glucose.: 117 mg/dL (10-09-23 @ 23:15)  POCT Blood Glucose.: 112 mg/dL (10-09-23 @ 17:32)          Estimated Needs:    [x] no change since previous assessment      Previous Nutrition Diagnosis: Severe protein calorie malnutrition     Nutrition Diagnosis is [ x] ongoing       Recommendations:  1) Recommend continue with Jevity 1.5@ 50mlx 20 hrs to provide total 57045qv/day.   2) Monitor TF intake, Labs, weights, BMs, and skin integrity.   3) RD to remain available for further nutritional interventions as indicated.       Monitoring and Evaluation:      [ x] Tolerance to diet prescription [x ] weights [x ] follow up per protocol  [ ] other: Source: Patient A&Ox [2 ]   other [x] RN/Chart review     Medical Course: 79 yo F PMH Alzheimer's dementia s/p PEG 8/2023, GE flap valve, grade B esophagitis, Multiple Myeloma (previously on Revlimid), cataracts, chronic venous ulcers b/l LE, OA knee, HTN, primary pulmonary hypertension, MDD sent from NH given hypotension documented at 87/56. Unable to obtain reliable history although per documentation patient has experienced cough, dysuria, and abdominal pain. Labs significant for anemia to 6.2 s/p PRBC without active bleeding and Na 124 likely iatrogenic as patient was on D5 1/2 NS and standing free water flushes at nursing home. Patient with fevers (T 100.3) and leukocytosis to 13.3 concerning for sepsis likely 2/2 urinary source - alternatively consider lower extremity wounds. Patient with hx hematoma following initial PEG placement, would f/u CT abdomen r/o expansion.    Nutrition Course: Patient seen at bedside, more awake today. Pt reports feeling better today. Pt noted on Jevity 1.5 @ 50ml/hr x 20hrs to provide total volume 1000ml/day (1500kcal, 63.8gms protein, 760mL free water)  to provide pt's sole nutrition support. At the time of visit, pt's TF visibly seen on hold with error message(clog in line). RN made aware. Recommend continue monitoring TF is running at goal rate to ensure pt is receiving adequate nutrition. RD was notified this morning that Jevity 1.5 on low inventory today and on back order. Pt noted with d/c planning in placed. If pt remains in hospital please reconsult this service for new TF formula to substitute Jevity 1.5. RD to remain available for further nutritional interventions as indicated.          Diet, Regular:   Minced and Moist (MINCEDMOIST)  Tube Feeding Modality: Gastrostomy  Jevity 1.5 Vignesh (JEVITY1.5RTH)  Total Volume for 24 Hours (mL): 1000  Continuous  Starting Tube Feed Rate {mL per Hour}: 20  Increase Tube Feed Rate by (mL): 10     Every 4 hours  Until Goal Tube Feed Rate (mL per Hour): 50  Tube Feed Duration (in Hours): 20  Tube Feed Start Time: 17:30 (10-06-23 @ 12:11)      GI: WDL. Last BM 10/12 with black and tarry stool as per RN flow sheet.       Anthropometrics: Height (cm): 170.2 (09-22), 170.2 (08-04)  Weight (kg): 47 (09-23), 59 (08-04)  BMI (kg/m2): 16.2 (09-23), 20.4 (09-22), 20.4 (08-04)    __________________ Pertinent Medications__________________   MEDICATIONS  (STANDING):  ascorbic acid 500 milliGRAM(s) Oral daily  ceFAZolin   IVPB 2000 milliGRAM(s) IV Intermittent every 8 hours  collagenase Ointment 1 Application(s) Topical two times a day  Dakins Solution - 1/4 Strength 1 Application(s) Topical two times a day  ferrous    sulfate Liquid 220 milliGRAM(s) Oral every 12 hours  magnesium hydroxide Suspension 5 milliLiter(s) Oral daily  mirtazapine 15 milliGRAM(s) Oral at bedtime  pantoprazole   Suspension 40 milliGRAM(s) Oral daily  silver sulfADIAZINE 1% Cream 1 Application(s) Topical every 12 hours  viokace 45975 units 1 Tablet(s) 1 Tablet(s) Enteral Tube once    MEDICATIONS  (PRN):      __________________ Pertinent Labs__________________   10-12 Na133 mmol/L<L> Glu 109 mg/dL<H> K+ 4.2 mmol/L Cr  0.43 mg/dL<L> BUN 16 mg/dL 10-12 Phos 2.2 mg/dL<L>        POCT Blood Glucose.: 104 mg/dL (10-12-23 @ 12:27)  POCT Blood Glucose.: 131 mg/dL (10-12-23 @ 07:21)  POCT Blood Glucose.: 102 mg/dL (10-11-23 @ 23:28)  POCT Blood Glucose.: 85 mg/dL (10-11-23 @ 17:55)  POCT Blood Glucose.: 91 mg/dL (10-11-23 @ 12:25)  POCT Blood Glucose.: 114 mg/dL (10-11-23 @ 06:28)  POCT Blood Glucose.: 123 mg/dL (10-10-23 @ 23:51)  POCT Blood Glucose.: 108 mg/dL (10-10-23 @ 17:53)  POCT Blood Glucose.: 101 mg/dL (10-10-23 @ 12:08)  POCT Blood Glucose.: 119 mg/dL (10-10-23 @ 07:02)  POCT Blood Glucose.: 117 mg/dL (10-09-23 @ 23:15)  POCT Blood Glucose.: 112 mg/dL (10-09-23 @ 17:32)          Estimated Needs:    [x] no change since previous assessment      Previous Nutrition Diagnosis: Severe protein calorie malnutrition     Nutrition Diagnosis is [ x] ongoing       Recommendations:  1) Recommend continue with Jevity 1.5@ 50mlx 20 hrs to provide total 91902gq/day.   2) Monitor TF intake, Labs, weights, BMs, and skin integrity.   3) RD to remain available for further nutritional interventions as indicated.       Monitoring and Evaluation:      [ x] Tolerance to diet prescription [x ] weights [x ] follow up per protocol  [ ] other:

## 2023-10-12 NOTE — PROVIDER CONTACT NOTE (OTHER) - SITUATION
Temp (rectal) 99.5
Patient HR sustaining a mildy tacchy, current 
Patient's PEG tube clogged, unable to flush and administer medications
Pt temp 100.7
noted with redness and low grade fever 100.4
Pt has large black tarry stool
/114, Pulse 104, Temp 99.4 F
pt refusing bed alarm. Pt states "if you put that bed alarm on again I will get up every 1 second" Pt educated on risks of falling and injury
Patient PEG tube clogged
Patient HR slightly tachycardic
PEG tube clogged
Pt Temp 101.5 and  during blood transfusion

## 2023-10-12 NOTE — PROVIDER CONTACT NOTE (OTHER) - RECOMMENDATIONS
medication given as per order
ACP notified
Hold peg tube feedings and blood transfusion.
Will try to reeducate
ACP notified
ACP notified
Fecal occult blood stool sample
Request IV Tylenol ordered
ACP notified
continue to monitor pt

## 2023-10-12 NOTE — CHART NOTE - NSCHARTNOTESELECT_GEN_ALL_CORE
ACP IR NOTE/Event Note
Event Note
Follow Up/Nutrition Services
Follow-up/Nutrition Services
Heme/onc
ACP/Event Note
ER VISIT/Event Note
Event Note
Event Note
Follow Up/Nutrition Services

## 2023-10-12 NOTE — PROVIDER CONTACT NOTE (OTHER) - DATE AND TIME:
03-Oct-2023 17:49
27-Sep-2023 06:11
29-Sep-2023 17:23
27-Sep-2023 21:30
04-Oct-2023 21:00
12-Oct-2023 06:10
06-Oct-2023 06:15
25-Sep-2023 11:30
01-Oct-2023 21:17
27-Sep-2023 20:30
12-Oct-2023 02:54
28-Sep-2023 22:28

## 2023-10-12 NOTE — PROVIDER CONTACT NOTE (OTHER) - ACTION/TREATMENT ORDERED:
ACP [place] notified, Acetominophen given, will continue to monitor vital signs
ACP made aware. Patient sent down to IR for port removal, team to attempt while patient in IR. Viocase to be administered when patient returns to floor.
Fecal occult stool sample stat order. CBC with AM labs sent
No intervention ordered at this time continue to monitor pt.
ACP notified, Tylenol ordered
ACP notified, ordered to flush PEG gently and slowly with 30 cc warm water and clamp tube for 20 minutes, then retry
IV tylenol for fever reduction, monitor on tele
ACP notified, set of vitals obtained, patient has mildy elevated oral temp, tylenol ordered, gentle bolus ordered as well
ACP notified
IV Tylenol  and sepsis workup ordered
medication given as per order

## 2023-10-13 VITALS
TEMPERATURE: 98 F | DIASTOLIC BLOOD PRESSURE: 67 MMHG | HEART RATE: 99 BPM | SYSTOLIC BLOOD PRESSURE: 117 MMHG | OXYGEN SATURATION: 99 % | RESPIRATION RATE: 16 BRPM

## 2023-10-13 LAB
% GAMMA, URINE: SIGNIFICANT CHANGE UP
ALBUMIN 24H MFR UR ELPH: PRESENT
ALPHA1 GLOB 24H MFR UR ELPH: SIGNIFICANT CHANGE UP
ALPHA2 GLOB 24H MFR UR ELPH: SIGNIFICANT CHANGE UP
ANION GAP SERPL CALC-SCNC: 9 MMOL/L — SIGNIFICANT CHANGE UP (ref 7–14)
B-GLOBULIN 24H MFR UR ELPH: SIGNIFICANT CHANGE UP
BASOPHILS # BLD AUTO: 0.05 K/UL — SIGNIFICANT CHANGE UP (ref 0–0.2)
BASOPHILS NFR BLD AUTO: 0.9 % — SIGNIFICANT CHANGE UP (ref 0–2)
BLD GP AB SCN SERPL QL: NEGATIVE — SIGNIFICANT CHANGE UP
BUN SERPL-MCNC: 15 MG/DL — SIGNIFICANT CHANGE UP (ref 7–23)
CALCIUM SERPL-MCNC: 8.7 MG/DL — SIGNIFICANT CHANGE UP (ref 8.4–10.5)
CHLORIDE SERPL-SCNC: 101 MMOL/L — SIGNIFICANT CHANGE UP (ref 98–107)
CO2 SERPL-SCNC: 24 MMOL/L — SIGNIFICANT CHANGE UP (ref 22–31)
CREAT SERPL-MCNC: 0.45 MG/DL — LOW (ref 0.5–1.3)
EGFR: 99 ML/MIN/1.73M2 — SIGNIFICANT CHANGE UP
EOSINOPHIL # BLD AUTO: 0.24 K/UL — SIGNIFICANT CHANGE UP (ref 0–0.5)
EOSINOPHIL NFR BLD AUTO: 4.2 % — SIGNIFICANT CHANGE UP (ref 0–6)
GLUCOSE BLDC GLUCOMTR-MCNC: 68 MG/DL — LOW (ref 70–99)
GLUCOSE BLDC GLUCOMTR-MCNC: 70 MG/DL — SIGNIFICANT CHANGE UP (ref 70–99)
GLUCOSE BLDC GLUCOMTR-MCNC: 78 MG/DL — SIGNIFICANT CHANGE UP (ref 70–99)
GLUCOSE BLDC GLUCOMTR-MCNC: 78 MG/DL — SIGNIFICANT CHANGE UP (ref 70–99)
GLUCOSE BLDC GLUCOMTR-MCNC: 81 MG/DL — SIGNIFICANT CHANGE UP (ref 70–99)
GLUCOSE BLDC GLUCOMTR-MCNC: 90 MG/DL — SIGNIFICANT CHANGE UP (ref 70–99)
GLUCOSE SERPL-MCNC: 73 MG/DL — SIGNIFICANT CHANGE UP (ref 70–99)
HCT VFR BLD CALC: 28.1 % — LOW (ref 34.5–45)
HGB BLD-MCNC: 8.3 G/DL — LOW (ref 11.5–15.5)
IANC: 3.7 K/UL — SIGNIFICANT CHANGE UP (ref 1.8–7.4)
IMM GRANULOCYTES NFR BLD AUTO: 0.5 % — SIGNIFICANT CHANGE UP (ref 0–0.9)
LYMPHOCYTES # BLD AUTO: 1.26 K/UL — SIGNIFICANT CHANGE UP (ref 1–3.3)
LYMPHOCYTES # BLD AUTO: 22 % — SIGNIFICANT CHANGE UP (ref 13–44)
M PROTEIN 24H UR ELPH-MRATE: SIGNIFICANT CHANGE UP
MAGNESIUM SERPL-MCNC: 2.3 MG/DL — SIGNIFICANT CHANGE UP (ref 1.6–2.6)
MCHC RBC-ENTMCNC: 22.1 PG — LOW (ref 27–34)
MCHC RBC-ENTMCNC: 29.5 GM/DL — LOW (ref 32–36)
MCV RBC AUTO: 74.9 FL — LOW (ref 80–100)
MONOCYTES # BLD AUTO: 0.46 K/UL — SIGNIFICANT CHANGE UP (ref 0–0.9)
MONOCYTES NFR BLD AUTO: 8 % — SIGNIFICANT CHANGE UP (ref 2–14)
NEUTROPHILS # BLD AUTO: 3.7 K/UL — SIGNIFICANT CHANGE UP (ref 1.8–7.4)
NEUTROPHILS NFR BLD AUTO: 64.4 % — SIGNIFICANT CHANGE UP (ref 43–77)
NRBC # BLD: 0 /100 WBCS — SIGNIFICANT CHANGE UP (ref 0–0)
NRBC # FLD: 0 K/UL — SIGNIFICANT CHANGE UP (ref 0–0)
PHOSPHATE SERPL-MCNC: 3.2 MG/DL — SIGNIFICANT CHANGE UP (ref 2.5–4.5)
PLATELET # BLD AUTO: 366 K/UL — SIGNIFICANT CHANGE UP (ref 150–400)
POTASSIUM SERPL-MCNC: 4.4 MMOL/L — SIGNIFICANT CHANGE UP (ref 3.5–5.3)
POTASSIUM SERPL-SCNC: 4.4 MMOL/L — SIGNIFICANT CHANGE UP (ref 3.5–5.3)
PROT ?TM UR-MCNC: 87 MG/DL — SIGNIFICANT CHANGE UP
PROT PATTERN 24H UR ELPH-IMP: SIGNIFICANT CHANGE UP
RBC # BLD: 3.75 M/UL — LOW (ref 3.8–5.2)
RBC # FLD: 25.8 % — HIGH (ref 10.3–14.5)
RH IG SCN BLD-IMP: POSITIVE — SIGNIFICANT CHANGE UP
SODIUM SERPL-SCNC: 134 MMOL/L — LOW (ref 135–145)
WBC # BLD: 5.74 K/UL — SIGNIFICANT CHANGE UP (ref 3.8–10.5)
WBC # FLD AUTO: 5.74 K/UL — SIGNIFICANT CHANGE UP (ref 3.8–10.5)

## 2023-10-13 RX ORDER — SODIUM HYPOCHLORITE 0.125 %
1 SOLUTION, NON-ORAL MISCELLANEOUS
Qty: 0 | Refills: 0 | DISCHARGE
Start: 2023-10-13

## 2023-10-13 RX ORDER — COLLAGENASE CLOSTRIDIUM HIST. 250 UNIT/G
1 OINTMENT (GRAM) TOPICAL
Qty: 0 | Refills: 0 | DISCHARGE
Start: 2023-10-13

## 2023-10-13 RX ADMIN — PANTOPRAZOLE SODIUM 40 MILLIGRAM(S): 20 TABLET, DELAYED RELEASE ORAL at 13:11

## 2023-10-13 RX ADMIN — Medication 100 MILLIGRAM(S): at 13:21

## 2023-10-13 RX ADMIN — Medication 500 MILLIGRAM(S): at 13:13

## 2023-10-13 RX ADMIN — Medication 1 APPLICATION(S): at 05:50

## 2023-10-13 RX ADMIN — Medication 100 MILLIGRAM(S): at 05:30

## 2023-10-13 RX ADMIN — Medication 1 APPLICATION(S): at 05:30

## 2023-10-13 RX ADMIN — Medication 220 MILLIGRAM(S): at 05:29

## 2023-10-13 RX ADMIN — Medication 220 MILLIGRAM(S): at 18:00

## 2023-10-13 RX ADMIN — Medication 1 APPLICATION(S): at 18:08

## 2023-10-13 RX ADMIN — MAGNESIUM HYDROXIDE 5 MILLILITER(S): 400 TABLET, CHEWABLE ORAL at 13:10

## 2023-10-13 RX ADMIN — Medication 1 APPLICATION(S): at 18:02

## 2023-10-13 RX ADMIN — Medication 1 APPLICATION(S): at 05:29

## 2023-10-13 NOTE — CONSULT NOTE ADULT - ASSESSMENT
Assessment/Plan: 79 yo F [facility] Alzheimer's dementia s/p PEG 8/2023, GE flap valve, grade B esophagitis, Multiple Myeloma (previously on Revlimid), cataracts, chronic venous ulcers b/l LE, OA knee, HTN, primary pulmonary hypertension, MDD sent from NH given hypotension documented at 87/56. Unable to obtain reliable history although per documentation patient has experienced cough, dysuria, and abdominal pain. Labs significant for anemia to 6.2 s/p PRBC without active bleeding and Na 124 likely iatrogenic as patient was on D5 1/2 NS and standing free water flushes at nursing home. Patient with fevers (T 100.3) and leukocytosis to 13.3 concerning for sepsis likely 2/2 urinary source - alternatively consider lower extremity wounds. Patient with hx hematoma following initial PEG placement, CT of the ABD and pelvis performed. Patient pending discharge back to NH today.     Wound Consult requested to assist w/ management of Multiple full thickness pressure injuries of R lower back , possible debridement. Patient known to wound care nursing service line, seen on 9/22, 9/28 and 10/11 for multiple wounds. Hospital course complicated by anemia requiring 4 packed RBCs, severe protein malnutrition. hypoglycemia events, bacteremia (MSSA urine culture), chest wall Mediport removed, recurrent fevers, RUE DVT (not on AC as per sister wishes). Patient recently admitted to the hospital on 8/2 for severe protein malnutrition secondary to FTT, during that admission PEG tube placed 8/10/23). Patient seen by dietician during this admission for severe protein malnutrition.     Right lower back/upper buttock Stage 4 pressure injury complicated by friction   -CT of the ABD and pelvis 9/23/23, no mention of pressure injury. Remainder of findings as per primary team   -No associated cellulitis   -No bone exposed, + bone palpable   -WBC wnl, recent low grade temps, receiving Ancef for MSSA bacteremia as per primary team.   -No fluctuance, no crepitus  -Mixed tissue type moist slough at wound edges and red granular tissue at base (centrally).   -No puss, no s/s of acute infection   -No sharp debridement indicated at this time as remaining slough is adhere   -Continue with enzymatic debridement of necrotic tissue with collagenase and chemical debridement with Dakins solution (initiated 10/11)  -Continue to turn and position as per hospital protocol   -If patient remains in the hospital upgrade patient to Air fluidized therapy (Envella bed)  -Topical recommendations: Clean wound and periwound skin with NS. Pat dry. Apply liquid barrier film to periwound skin, allow to dry. Apply collagenase, nickel coin thickness over necrotic tissue, Lightly pack depth and undermining with moistened jayashree using Dakins solution 1/4 strength, cove rwith dry 4x4 gauze and tegaderm or paper tape. Change twice a day or prn if soiled.     Left lower back/upper buttock Unstageable pressure injury complicated by pressure (prev evolving DTPI-->Now unstageable pressure injury)  -No s/s of acute infection   -Tissue type mostly adhere gray/tan eschar  -No crepitus, no fluctuance.   -No purulence   -No sharp debridement indicated as eschar is adhere  -Topical recommendations: Clean wound and periwound skin with NS. Pat dry. Apply liquid barrier film to periwound skin. Allow to dry. Apply Medihoney gel to wound bed. Cover with silicone foam with border. Change daily or prn of soiled.   -Continue to turn and position   -If patient remains in the hospital, upgrade to Air fluidized therapy (Envella bed)    left posterior trochanter healing stage 2 pressure injury: Clean with NS. Pat dry. Apply liquid barrier film, allow to dry. Cover with silicone foam with border. Change every other day or prn if soiled.   Areas of blanching erythema in left trochanter, left medial thigh, Sacrum: Continue to monitor for tissue type changes, continue to turn and position as per hospital protocol. Apply liquid barrier film, allow to dry.   May Cover with silicone foam with border (to protect from friction). Change every other day or prn if soiled. Inspect skin daily.     Bilateral medial leg wounds secondary to venous insufficiency   -As per records chronic venous ulcers   -Healing ulcers exposing pink dermis and reepithelialization   -Would recommend Medihoney gel (antimicrobial gel) instead of current orders for silvadene cream BID   -Scant drainage   -Topical recommendations: Clean wound and periwound skin with NS. Pat dry. Apply liquid barrier film to periwound skin. Allow to dry. Apply Medihoney gel to wound bed. Cover with silicone foam with border. Change daily or prn if soiled.     Right foot skin changes secondary to vascular dx ? complicated by pressure (patient severely contracted).   -Right 5th met head, Right lateral malleolus, right medial great toe, areas of purple maroon discoloration- Clean with NS. pat dry. Apply betadine wipe, allow to dry. Cover with ABD and kerlix. Change daily or prn if soiled. Monitor for tissue type changes.   -Left lateral 5th met head blanching erythema, bilateral heels, apply liquid barrier film daily. Continue to offload heels as per hospital protocol   -Consider NICK/PVR, no pulses palpated, multiple foot/toe wounds. No signs of overt ischemia (As per medical attending notes, would be difficult  to perform NICK given severe leg contractures).   -If NICK/PVR abnormal then consider vascular/podiatry consult     Multiple Myeloma   -Management as per primary team          Anemia  -Management as per primary team     Additional Recs   Consider Palliative team consult for pain management input, as patient expressing hip pain   Continue turning and positioning w/ offloading assistive devices as per protocol  Continue w/ Pericare as per protocol  Continue w/ low air loss fluidized bed surface     Care as per medicine, will follow w/ you periodically during hospital stay.     Upon discharge f/u as outpatient at [facility] [address] [phone]  Seen w/ attng Josue JAMA and D/w team    Thank you for this consult  NAINA Cagle, [email or web address] (pager #[phone] or available in MS teams).     If after 4PM or before 7:30AM on Mon-Friday or weekend/holiday please contact general surgery for urgent matters.   Team A- [id number]   Team B- [id number]  For non-urgent matters e-mail [email or web address]    We spent 75 minutes face to face with this patient of which more than 50% of the time was spent counseling & coordinating care of this pt

## 2023-10-13 NOTE — CONSULT NOTE ADULT - SUBJECTIVE AND OBJECTIVE BOX
Wound SURGERY CONSULT NOTE    HPI: 77 yo F PMH Alzheimer's dementia s/p PEG 8/2023, GE flap valve, grade B esophagitis, Multiple Myeloma (previously on Revlimid), cataracts, chronic venous ulcers b/l LE, OA knee, HTN, primary pulmonary hypertension, MDD sent from NH given hypotension documented at 87/56. Patient is presently A&Ox1 (only oriented to self, believes she is in [facility] and is unable to provide reliable history. Attempt made to contact emergency contact Ubaldo who provided consent for transfusion to ED. History as per chart. Patient experienced cough for two days (patient presently denies cough or phlegm, shortness of breath). Patient also experienced dysuria for 1 week and is presently on treatment with CTX at NH 9/21-9/25. Of note, patient was also on D5 1/2  cc/hr for ?hypotension at NH in addition to free water flushes. Patient also noted to have abdominal tenderness in the ED - patient presently denies abdominal pain, nausea, vomiting, diarrhea, fevers, chills. At [facility] BP 92/56 s/p 1L LR remains systolic 90-100s, T 100.3->99.3. Labs significant for Hgb 6.2, WBC 13.3, Na 124. In the ED Patient was administered 1u PRBC, vancomycin, and zosyn x1. (22 Sep 2023 10:51)    Wound consult requested to assist w/ management of  Multiple full thickness pressure injuries of R lower back , possible debridement. Limited interviewed, patient alert and reports her name. Endorses pain in her left hip, noted pain with turns and wound care. Patient also stating " you are cleaning my wounds that's nice".     Current Diet: Diet, Regular:   Minced and Moist (MINCEDMOIST)      Tube Feeding Modality: Gastrostomy  Jevity 1.5 Vignesh (JEVITY1.5RTH)  Total Volume for 24 Hours (mL): 1000  Continuous  Starting Tube Feed Rate mL per Hour: 20  Increase Tube Feed Rate by (mL): 10     Every 4 hours  Until Goal Tube Feed Rate (mL per Hour): 50  Tube Feed Duration (in Hours): 20  Tube Feed Start Time: 17:30 (10-06-23 @ 12:11)      PAST MEDICAL & SURGICAL HISTORY:  Multiple myeloma      Dementia      Rheumatoid arthritis      Mild protein-calorie malnutrition      Primary pulmonary hypertension      Osteoarthritis      Cataract      MDD (major depressive disorder)      H/O left knee surgery    REVIEW OF SYSTEMS: Pt unable to offer      MEDICATIONS  (STANDING):  ascorbic acid 500 milliGRAM(s) Oral daily  ceFAZolin   IVPB 2000 milliGRAM(s) IV Intermittent every 8 hours  collagenase Ointment 1 Application(s) Topical two times a day  Dakins Solution - 1/4 Strength 1 Application(s) Topical two times a day  ferrous    sulfate Liquid 220 milliGRAM(s) Oral every 12 hours  magnesium hydroxide Suspension 5 milliLiter(s) Oral daily  mirtazapine 15 milliGRAM(s) Oral at bedtime  pantoprazole   Suspension 40 milliGRAM(s) Oral daily  silver sulfADIAZINE 1% Cream 1 Application(s) Topical every 12 hours  viokace 21744 units 1 Tablet(s) 1 Tablet(s) Enteral Tube once    MEDICATIONS  (PRN):      Allergies    No Known Allergies    Intolerances        SOCIAL HISTORY: NH resident at [facility]. Patient DNR. [facility]. On isolation precautions secondary to COVID 19 +.     FAMILY HISTORY:    PHYSICAL EXAM:  Vital Signs Last 24 Hrs  T(C): 36.9 (13 Oct 2023 16:30), Max: 38.1 (13 Oct 2023 01:17)  T(F): 98.5 (13 Oct 2023 16:30), Max: 100.5 (13 Oct 2023 01:17)  HR: 99 (13 Oct 2023 16:30) (64 - 104)  BP: 117/67 (13 Oct 2023 16:30) (117/67 - 150/93)  BP(mean): --  RR: 16 (13 Oct 2023 16:30) (16 - 18)  SpO2: 99% (13 Oct 2023 16:30) (96% - 100%)    Parameters below as of 13 Oct 2023 16:30  Patient On (Oxygen Delivery Method): room air    Weight (kg): 47 (23 Sep 2023 11:42)  BMI (kg/m2): 16.2 (23 Sep 2023 11:42)    Constitutional: NAD/AOX1, dementia, alert, frail, ill appearing. Cachectic. On contact and airborne precautions.   (+) low airloss support surface, (+) fluidized positioning devices, (+) complete cair boots   HEENT: NC/AT, non icteric, mucosa moist, throat clear, trachea midline, neck supple  Cardiovascular: Rate regular.   Respiratory: Equal chest expansion, room air, + cough.   Gastrointestinal soft NT/ND, + PEG tube in place (peritubular skin with hypergranular tissue from 6-9 o'clock)  : incontinence of urine   Neurology  weakened strength & sensation grossly intact, able to follow simple commands functional paraplegic.   Musculoskeletal:  limited aROM, bilateral knees contracted worse to right knee, muscle wasting, [place] neck deformities to bilateral hands.   Vascular: BLE equally warm, capillary refill grater than 3sec,  no cyanosis, clubbing, edema. Left lateral malleolus tattoo.  Thicken curled great toenails.   DP/PT pulses non palpable  no overt  ischemia noted  hemosiderin staining  Left and right medial healing shallow leg ulcers presenting with same clinical characteristics exposing pink moist dermis and re-epithelization at wound edges.   Left leg-0.6cmx0.6cmx0.1cm  Right leg -2cmx1.5cmx0.1cm,   No associated cellulitis, scant serous drainage. No odor.   Medihoney gel applied to wound and covered with silicone foam with border.   Multiple skin changes to bilateral feet.   Right 5th met head purple maroon discoloration measuring- [id number], + bogginess underneath purple maroon discoloration. No associated cellulitis   Right lateral malleolus purple maroon discoloration- 0.5cmx0.6hek2la, no tissue type changes palpated  Right medial MPT joint of great toe-intact reabsorbing fluid filled blister with purple maroon at the base. No associated cellulitis. No drainage.   Left lateral 5th met head MPT blanching erythema   Skin:  poor  turgor  frail, ecchymosis w/o hematoma in upper extremities   Left trochanter with blanching erythema   Left posterior trochanter healing stage 2 pressure injury surrounded by blanching erythema- 0.5cmx0.5cmx0.1cm, exposing pink dermis. No drainage. NO odor. Periowund skin with blanching erythema extending 0.5cm. No associated cellulitis. Silicone foam with border placed.   Left medial thigh linear hyperpigmentation with surrounding  blanching erythema- [id number]. LBF applied.   Sacrum blanching erythema- [id number], no open wounds directly over sacrum   Left upper buttock/lower back Unstageable pressure injury complicated by friction (prev evolving DTPI noted)- 7egk7vie2.4cm, Tissue type exposing 90% gray/tan adhere eschar and 10% red moist granular tissue at wound edge. No crepiuts, no fluctuance. Scant serous drainage. No odor. Periwound skin with reactive hyperemia extending less than 2cm, no temperature changes. Medihoney gel applied over wound bed and cover with silicone foam with border.   Right upper buttock/lower back- stage 4 pressure injury complicated by friction- (heart shape)- 5cmx4.5cmx1.8cm, Undermining circumferentially extends from 1.5cm at 12 and 9 o'clock and deepest 2.5cm at 3 o'clock. No purulence expressed. No crepitus, no fluctuance. Mixed tissue type centrally red moist granular tissue at least 50% and remaining yellow moist adhere  slough at wound edges (some loosely attach slough easily removed with deep cleansing with NS and gauze in circular motions). Bone is palpable, not visualized Periwound skin with reactive hyperemia that extends less than 2cm. Small serosanguinous drainage, no odor. No associated cellulitis. Wound packed with wet to dry Dakins solution using jayashree and collagenase applied over necrotic tissue.     LABS/ CULTURES/ RADIOLOGY:                        8.3    5.74  )-----------( 366      ( 13 Oct 2023 05:15 )             28.1       134  |  101  |  15  ----------------------------<  73      [10-13-23 @ 05:15]  4.4   |  24  |  0.45        Ca     8.7     [10-13-23 @ 05:15]      Mg     2.30     [10-13-23 @ 05:15]      Phos  3.2     [10-13-23 @ 05:15]        CC: [id number] EXAM:  CT ABDOMEN AND PELVIS   ORDERED BY: ELDA PRICE     PROCEDURE DATE:  09/23/2023          INTERPRETATION:  CLINICAL INFORMATION: Patient with Alzheimer's dementia,   multiple myeloma with recent PEG placement, evaluate for hematoma.    COMPARISON: None.    CONTRAST/COMPLICATIONS:  IV Contrast: None  Oral Contrast: None  Complications: None    PROCEDURE:  CT of the Abdomen and Pelvis was performed.  Sagittal and coronal reformats were performed.    FINDINGS:  Motion degraded study    LOWER CHEST: Partially visualized right chest port, with tip terminating   in the lower SVC. Ascending aortic dilation measuring up to 4.2 cm in   diameter. Descending thoracic aorta at the level of T10 measures up to   3.7 cm in diameter. Small bilateral pleural effusions with associated   passive atelectasis and groundglass opacities. Coronary artery and aortic   calcifications. Mild cardiomegaly.    LIVER: Punctate calcifications in the right hepatic lobe.  BILE DUCTS: Normal caliber.  GALLBLADDER: Cholecystectomy.  SPLEEN: Within normal limits.  PANCREAS: Within normal limits.  ADRENALS: Within normal limits.  KIDNEYS/URETERS: Large left renal cyst. No hydronephrosis.    BLADDER: Within normal limits.  REPRODUCTIVE ORGANS: Uterus and adnexa within normal limits.    BOWEL: No bowel obstruction. Appendix is normal. PEG tube with tip in the   stomach lumen.  PERITONEUM: No ascites.  VESSELS: Atherosclerotic changes.  RETROPERITONEUM/LYMPH NODES: No lymphadenopathy.  ABDOMINAL WALL: Anterior abdominal wall PEG tube, no associated   subcutaneous soft tissue swelling or hematoma visualized.  BONES: Degenerative changes. Mild anterior compression fracture and   retrolisthesis of L2. Mild compression fracture of T11. Severe   degenerative changes of the left hip.    IMPRESSION:  No abdominal wall hematoma or subcutaneous soft tissue swelling at the   PEG tube insertion site.    No hematoma elsewhere in the abdomen or pelvis.    Ascending and descending thoracic aortic dilation.        --- End of Report ---          MARTHA KANG MD; Resident Radiologist  This document has been electronically signed.  ROSETTE CHANDLER MD; Attending Radiologist  This document has been electronically signed. Sep 23 2023  1:31PM

## 2023-10-13 NOTE — CONSULT NOTE ADULT - CONSULT REASON
MSSA bacteremia
Clogged PEG
Anemia
Multiple full thickness pressure injuries of R lower back , possible debridement
right port removal

## 2023-10-13 NOTE — PROGRESS NOTE ADULT - PROVIDER SPECIALTY LIST ADULT
Internal Medicine
Anesthesia
Infectious Disease
Internal Medicine
Infectious Disease
Internal Medicine
Internal Medicine

## 2023-10-13 NOTE — PROGRESS NOTE ADULT - NUTRITIONAL ASSESSMENT
This patient has been assessed with a concern for Malnutrition and has been determined to have a diagnosis/diagnoses of Severe protein-calorie malnutrition and Underweight (BMI < 19).    This patient is being managed with:   Diet NPO after Midnight-     NPO Start Date: 24-Sep-2023   NPO Start Time: 23:59  Except Medications  With Ice Chips/Sips of Water  Entered: Sep 24 2023  8:02PM    Diet Regular-  Minced and Moist (MINCEDMOIST)  Tube Feeding Modality: Gastrostomy  Jevity 1.5 Vignesh (JEVITY1.5RTH)  Total Volume for 24 Hours (mL): 1000  Continuous  Starting Tube Feed Rate {mL per Hour}: 20  Increase Tube Feed Rate by (mL): 10     Every 4 hours  Until Goal Tube Feed Rate (mL per Hour): 50  Tube Feed Duration (in Hours): 20  Tube Feed Start Time: 17:30  Entered: Sep 23 2023  2:46PM  
This patient has been assessed with a concern for Malnutrition and has been determined to have a diagnosis/diagnoses of Severe protein-calorie malnutrition and Underweight (BMI < 19).    This patient is being managed with:   Diet NPO-  Entered: Oct  3 2023  4:57PM  
This patient has been assessed with a concern for Malnutrition and has been determined to have a diagnosis/diagnoses of Severe protein-calorie malnutrition and Underweight (BMI < 19).    This patient is being managed with:   Diet NPO-  Tube Feeding Modality: Gastrostomy  Jevity 1.5 Vignesh (JEVITY1.5RTH)  Continuous  Starting Tube Feed Rate {mL per Hour}: 20  Increase Tube Feed Rate by (mL): 10     Every 4 hours  Until Goal Tube Feed Rate (mL per Hour): 50  Tube Feed Duration (in Hours): 24  Tube Feed Start Time: 21:00  Entered: Oct  4 2023  8:43PM  
This patient has been assessed with a concern for Malnutrition and has been determined to have a diagnosis/diagnoses of Severe protein-calorie malnutrition and Underweight (BMI < 19).    This patient is being managed with:   Diet Regular-  Minced and Moist (MINCEDMOIST)  Tube Feeding Modality: Gastrostomy  Jevity 1.5 Vignesh (JEVITY1.5RTH)  Total Volume for 24 Hours (mL): 1000  Continuous  Starting Tube Feed Rate {mL per Hour}: 20  Increase Tube Feed Rate by (mL): 10     Every 4 hours  Until Goal Tube Feed Rate (mL per Hour): 50  Tube Feed Duration (in Hours): 20  Tube Feed Start Time: 17:30  Entered: Oct  6 2023 12:11PM  
This patient has been assessed with a concern for Malnutrition and has been determined to have a diagnosis/diagnoses of Severe protein-calorie malnutrition and Underweight (BMI < 19).    This patient is being managed with:   Diet Regular-  Minced and Moist (MINCEDMOIST)  Tube Feeding Modality: Gastrostomy  Jevity 1.5 Vignesh (JEVITY1.5RTH)  Total Volume for 24 Hours (mL): 1000  Continuous  Starting Tube Feed Rate {mL per Hour}: 20  Increase Tube Feed Rate by (mL): 10     Every 4 hours  Until Goal Tube Feed Rate (mL per Hour): 50  Tube Feed Duration (in Hours): 20  Tube Feed Start Time: 17:30  Entered: Sep 26 2023  2:23PM  
This patient has been assessed with a concern for Malnutrition and has been determined to have a diagnosis/diagnoses of Severe protein-calorie malnutrition and Underweight (BMI < 19).    This patient is being managed with:   Diet NPO after Midnight-     NPO Start Date: 24-Sep-2023   NPO Start Time: 23:59  Except Medications  With Ice Chips/Sips of Water  Entered: Sep 24 2023  8:02PM    Diet Regular-  Minced and Moist (MINCEDMOIST)  Tube Feeding Modality: Gastrostomy  Jevity 1.5 Vignesh (JEVITY1.5RTH)  Total Volume for 24 Hours (mL): 1000  Continuous  Starting Tube Feed Rate {mL per Hour}: 20  Increase Tube Feed Rate by (mL): 10     Every 4 hours  Until Goal Tube Feed Rate (mL per Hour): 50  Tube Feed Duration (in Hours): 20  Tube Feed Start Time: 17:30  Entered: Sep 23 2023  2:46PM  
This patient has been assessed with a concern for Malnutrition and has been determined to have a diagnosis/diagnoses of Severe protein-calorie malnutrition and Underweight (BMI < 19).    This patient is being managed with:   Diet Regular-  Minced and Moist (MINCEDMOIST)  Tube Feeding Modality: Gastrostomy  Jevity 1.5 Vignesh (JEVITY1.5RTH)  Total Volume for 24 Hours (mL): 1000  Continuous  Starting Tube Feed Rate {mL per Hour}: 20  Increase Tube Feed Rate by (mL): 10     Every 4 hours  Until Goal Tube Feed Rate (mL per Hour): 50  Tube Feed Duration (in Hours): 20  Tube Feed Start Time: 17:30  Entered: Oct  6 2023 12:11PM  
This patient has been assessed with a concern for Malnutrition and has been determined to have a diagnosis/diagnoses of Severe protein-calorie malnutrition and Underweight (BMI < 19).    This patient is being managed with:   Diet Regular-  Minced and Moist (MINCEDMOIST)  Tube Feeding Modality: Gastrostomy  Jevity 1.5 Vignesh (JEVITY1.5RTH)  Total Volume for 24 Hours (mL): 1000  Continuous  Starting Tube Feed Rate {mL per Hour}: 20  Increase Tube Feed Rate by (mL): 10     Every 4 hours  Until Goal Tube Feed Rate (mL per Hour): 50  Tube Feed Duration (in Hours): 20  Tube Feed Start Time: 17:30  Entered: Oct  6 2023 12:11PM  
This patient has been assessed with a concern for Malnutrition and has been determined to have a diagnosis/diagnoses of Severe protein-calorie malnutrition and Underweight (BMI < 19).    This patient is being managed with:   Diet Regular-  Minced and Moist (MINCEDMOIST)  Tube Feeding Modality: Gastrostomy  Jevity 1.5 Vignesh (JEVITY1.5RTH)  Total Volume for 24 Hours (mL): 1000  Continuous  Starting Tube Feed Rate {mL per Hour}: 20  Increase Tube Feed Rate by (mL): 10     Every 4 hours  Until Goal Tube Feed Rate (mL per Hour): 50  Tube Feed Duration (in Hours): 20  Tube Feed Start Time: 17:30  Entered: Sep 26 2023  2:23PM  
This patient has been assessed with a concern for Malnutrition and has been determined to have a diagnosis/diagnoses of Severe protein-calorie malnutrition and Underweight (BMI < 19).    This patient is being managed with:   Diet NPO after Midnight-     NPO Start Date: 23-Sep-2023   NPO Start Time: 23:59  Except Medications  Entered: Sep 23 2023  5:07PM    Diet Regular-  Minced and Moist (MINCEDMOIST)  Tube Feeding Modality: Gastrostomy  Jevity 1.5 Vignesh (JEVITY1.5RTH)  Total Volume for 24 Hours (mL): 1000  Continuous  Starting Tube Feed Rate {mL per Hour}: 20  Increase Tube Feed Rate by (mL): 10     Every 4 hours  Until Goal Tube Feed Rate (mL per Hour): 50  Tube Feed Duration (in Hours): 20  Tube Feed Start Time: 17:30  Entered: Sep 23 2023  2:46PM    This patient has been assessed with a concern for Malnutrition and has been determined to have a diagnosis/diagnoses of Severe protein-calorie malnutrition and Underweight (BMI < 19).    This patient is being managed with:   Diet NPO after Midnight-     NPO Start Date: 23-Sep-2023   NPO Start Time: 23:59  Except Medications  Entered: Sep 23 2023  5:07PM    Diet Regular-  Minced and Moist (MINCEDMOIST)  Tube Feeding Modality: Gastrostomy  Jevity 1.5 Vignesh (JEVITY1.5RTH)  Total Volume for 24 Hours (mL): 1000  Continuous  Starting Tube Feed Rate {mL per Hour}: 20  Increase Tube Feed Rate by (mL): 10     Every 4 hours  Until Goal Tube Feed Rate (mL per Hour): 50  Tube Feed Duration (in Hours): 20  Tube Feed Start Time: 17:30  Entered: Sep 23 2023  2:46PM  
This patient has been assessed with a concern for Malnutrition and has been determined to have a diagnosis/diagnoses of Severe protein-calorie malnutrition and Underweight (BMI < 19).    This patient is being managed with:   Diet NPO after Midnight-     NPO Start Date: 24-Sep-2023   NPO Start Time: 23:59  Entered: Sep 24 2023  8:55AM    Diet Regular-  Minced and Moist (MINCEDMOIST)  Tube Feeding Modality: Gastrostomy  Jevity 1.5 Vignesh (JEVITY1.5RTH)  Total Volume for 24 Hours (mL): 1000  Continuous  Starting Tube Feed Rate {mL per Hour}: 20  Increase Tube Feed Rate by (mL): 10     Every 4 hours  Until Goal Tube Feed Rate (mL per Hour): 50  Tube Feed Duration (in Hours): 20  Tube Feed Start Time: 17:30  Entered: Sep 23 2023  2:46PM  
This patient has been assessed with a concern for Malnutrition and has been determined to have a diagnosis/diagnoses of Severe protein-calorie malnutrition and Underweight (BMI < 19).    This patient is being managed with:   Diet NPO-  Tube Feeding Modality: Gastrostomy  Jevity 1.5 Vignesh (JEVITY1.5RTH)  Continuous  Starting Tube Feed Rate {mL per Hour}: 20  Increase Tube Feed Rate by (mL): 10     Every 4 hours  Until Goal Tube Feed Rate (mL per Hour): 50  Tube Feed Duration (in Hours): 24  Tube Feed Start Time: 21:00  Entered: Oct  4 2023  8:43PM  
This patient has been assessed with a concern for Malnutrition and has been determined to have a diagnosis/diagnoses of Severe protein-calorie malnutrition and Underweight (BMI < 19).    This patient is being managed with:   Diet Regular-  Minced and Moist (MINCEDMOIST)  Tube Feeding Modality: Gastrostomy  Jevity 1.5 Vignesh (JEVITY1.5RTH)  Total Volume for 24 Hours (mL): 1000  Continuous  Starting Tube Feed Rate {mL per Hour}: 20  Increase Tube Feed Rate by (mL): 10     Every 4 hours  Until Goal Tube Feed Rate (mL per Hour): 50  Tube Feed Duration (in Hours): 20  Tube Feed Start Time: 17:30  Entered: Oct  6 2023 12:11PM  
This patient has been assessed with a concern for Malnutrition and has been determined to have a diagnosis/diagnoses of Severe protein-calorie malnutrition and Underweight (BMI < 19).    This patient is being managed with:   Diet Regular-  Minced and Moist (MINCEDMOIST)  Tube Feeding Modality: Gastrostomy  Jevity 1.5 Vignesh (JEVITY1.5RTH)  Total Volume for 24 Hours (mL): 1000  Continuous  Starting Tube Feed Rate {mL per Hour}: 20  Increase Tube Feed Rate by (mL): 10     Every 4 hours  Until Goal Tube Feed Rate (mL per Hour): 50  Tube Feed Duration (in Hours): 20  Tube Feed Start Time: 17:30  Entered: Oct  6 2023 12:11PM  
This patient has been assessed with a concern for Malnutrition and has been determined to have a diagnosis/diagnoses of Severe protein-calorie malnutrition and Underweight (BMI < 19).    This patient is being managed with:   Diet Regular-  Minced and Moist (MINCEDMOIST)  Tube Feeding Modality: Gastrostomy  Jevity 1.5 Vignesh (JEVITY1.5RTH)  Total Volume for 24 Hours (mL): 1000  Continuous  Starting Tube Feed Rate {mL per Hour}: 20  Increase Tube Feed Rate by (mL): 10     Every 4 hours  Until Goal Tube Feed Rate (mL per Hour): 50  Tube Feed Duration (in Hours): 20  Tube Feed Start Time: 17:30  Entered: Sep 26 2023  2:23PM  
This patient has been assessed with a concern for Malnutrition and has been determined to have a diagnosis/diagnoses of Severe protein-calorie malnutrition and Underweight (BMI < 19).    This patient is being managed with:   Diet Regular-  Minced and Moist (MINCEDMOIST)  Tube Feeding Modality: Gastrostomy  Jevity 1.5 Vignesh (JEVITY1.5RTH)  Total Volume for 24 Hours (mL): 1000  Continuous  Starting Tube Feed Rate {mL per Hour}: 20  Increase Tube Feed Rate by (mL): 10     Every 4 hours  Until Goal Tube Feed Rate (mL per Hour): 50  Tube Feed Duration (in Hours): 20  Tube Feed Start Time: 17:30  Entered: Sep 26 2023  2:23PM

## 2023-10-13 NOTE — ADVANCED PRACTICE NURSE CONSULT - ASSESSMENT
Patient is aware and alert. Midline insertion along with risks, benefits, possible complications and infection prevention explained to patient who verbalized understanding. All questions addressed and patient gave verbal consent to place midline. Left arm cleansed with CHG. Using sterile technique under ultra sound guidance, placed PowerGlide Pro Midline 20G /10cm into Left Basilic vein. Brisk blood return and flushed with 20Mls of normal saline. Minimal blood loss and patient tolerated procedure well. CHG dressing placed. All sharps accounted for. Report given to district RN and ordering provider. LOT#: [id number] , REF#: [id number] Patient is alert. Midline insertion along with risks, benefits, possible complications and infection prevention explained to patient & sister Hallie (via telephone 10/13/ @ 1012) who verbalized understanding. All questions addressed and patient & sister gave verbal consent to place midline. Left arm cleansed with CHG. Using sterile technique under ultra sound guidance, placed PowerGlide Pro Midline 20G /10cm into Left Basilic vein. Brisk blood return and flushed with 20Mls of normal saline. Minimal blood loss and patient tolerated procedure well. CHG dressing placed. All sharps accounted for. Report given to district RN and ordering provider. LOT#: [id number] , REF#: [id number]

## 2023-10-13 NOTE — PROGRESS NOTE ADULT - REASON FOR ADMISSION
Hypotension

## 2023-10-16 LAB
INTERPRETATION 24H UR IFE-IMP: SIGNIFICANT CHANGE UP

## 2023-10-22 ENCOUNTER — INPATIENT (INPATIENT)
Facility: HOSPITAL | Age: 78
LOS: 4 days | Discharge: NURSING, REHAB OR LONG-TERM CARE | End: 2023-10-27
Attending: INTERNAL MEDICINE | Admitting: INTERNAL MEDICINE
Payer: MEDICARE

## 2023-10-22 VITALS
OXYGEN SATURATION: 100 % | DIASTOLIC BLOOD PRESSURE: 83 MMHG | RESPIRATION RATE: 16 BRPM | HEART RATE: 100 BPM | SYSTOLIC BLOOD PRESSURE: 137 MMHG | TEMPERATURE: 100 F | HEIGHT: 67 IN

## 2023-10-22 DIAGNOSIS — Z98.890 OTHER SPECIFIED POSTPROCEDURAL STATES: Chronic | ICD-10-CM

## 2023-10-22 DIAGNOSIS — K94.23 GASTROSTOMY MALFUNCTION: ICD-10-CM

## 2023-10-22 LAB
ALBUMIN SERPL ELPH-MCNC: 2.7 G/DL — LOW (ref 3.3–5)
ALBUMIN SERPL ELPH-MCNC: 2.7 G/DL — LOW (ref 3.3–5)
ALP SERPL-CCNC: 122 U/L — HIGH (ref 40–120)
ALP SERPL-CCNC: 122 U/L — HIGH (ref 40–120)
ALT FLD-CCNC: 9 U/L — SIGNIFICANT CHANGE UP (ref 4–33)
ALT FLD-CCNC: 9 U/L — SIGNIFICANT CHANGE UP (ref 4–33)
ANION GAP SERPL CALC-SCNC: 11 MMOL/L — SIGNIFICANT CHANGE UP (ref 7–14)
ANION GAP SERPL CALC-SCNC: 11 MMOL/L — SIGNIFICANT CHANGE UP (ref 7–14)
AST SERPL-CCNC: 61 U/L — HIGH (ref 4–32)
AST SERPL-CCNC: 61 U/L — HIGH (ref 4–32)
BASOPHILS # BLD AUTO: 0.04 K/UL — SIGNIFICANT CHANGE UP (ref 0–0.2)
BASOPHILS # BLD AUTO: 0.04 K/UL — SIGNIFICANT CHANGE UP (ref 0–0.2)
BASOPHILS NFR BLD AUTO: 0.7 % — SIGNIFICANT CHANGE UP (ref 0–2)
BASOPHILS NFR BLD AUTO: 0.7 % — SIGNIFICANT CHANGE UP (ref 0–2)
BILIRUB SERPL-MCNC: 0.5 MG/DL — SIGNIFICANT CHANGE UP (ref 0.2–1.2)
BILIRUB SERPL-MCNC: 0.5 MG/DL — SIGNIFICANT CHANGE UP (ref 0.2–1.2)
BLD GP AB SCN SERPL QL: NEGATIVE — SIGNIFICANT CHANGE UP
BLD GP AB SCN SERPL QL: NEGATIVE — SIGNIFICANT CHANGE UP
BUN SERPL-MCNC: 14 MG/DL — SIGNIFICANT CHANGE UP (ref 7–23)
BUN SERPL-MCNC: 14 MG/DL — SIGNIFICANT CHANGE UP (ref 7–23)
CALCIUM SERPL-MCNC: 8.7 MG/DL — SIGNIFICANT CHANGE UP (ref 8.4–10.5)
CALCIUM SERPL-MCNC: 8.7 MG/DL — SIGNIFICANT CHANGE UP (ref 8.4–10.5)
CHLORIDE SERPL-SCNC: 98 MMOL/L — SIGNIFICANT CHANGE UP (ref 98–107)
CHLORIDE SERPL-SCNC: 98 MMOL/L — SIGNIFICANT CHANGE UP (ref 98–107)
CO2 SERPL-SCNC: 24 MMOL/L — SIGNIFICANT CHANGE UP (ref 22–31)
CO2 SERPL-SCNC: 24 MMOL/L — SIGNIFICANT CHANGE UP (ref 22–31)
CREAT SERPL-MCNC: 0.38 MG/DL — LOW (ref 0.5–1.3)
CREAT SERPL-MCNC: 0.38 MG/DL — LOW (ref 0.5–1.3)
EGFR: 103 ML/MIN/1.73M2 — SIGNIFICANT CHANGE UP
EGFR: 103 ML/MIN/1.73M2 — SIGNIFICANT CHANGE UP
EOSINOPHIL # BLD AUTO: 0.26 K/UL — SIGNIFICANT CHANGE UP (ref 0–0.5)
EOSINOPHIL # BLD AUTO: 0.26 K/UL — SIGNIFICANT CHANGE UP (ref 0–0.5)
EOSINOPHIL NFR BLD AUTO: 4.7 % — SIGNIFICANT CHANGE UP (ref 0–6)
EOSINOPHIL NFR BLD AUTO: 4.7 % — SIGNIFICANT CHANGE UP (ref 0–6)
FLUAV AG NPH QL: SIGNIFICANT CHANGE UP
FLUAV AG NPH QL: SIGNIFICANT CHANGE UP
FLUBV AG NPH QL: SIGNIFICANT CHANGE UP
FLUBV AG NPH QL: SIGNIFICANT CHANGE UP
GLUCOSE SERPL-MCNC: 104 MG/DL — HIGH (ref 70–99)
GLUCOSE SERPL-MCNC: 104 MG/DL — HIGH (ref 70–99)
HCT VFR BLD CALC: 26.3 % — LOW (ref 34.5–45)
HCT VFR BLD CALC: 26.3 % — LOW (ref 34.5–45)
HGB BLD-MCNC: 7.9 G/DL — LOW (ref 11.5–15.5)
HGB BLD-MCNC: 7.9 G/DL — LOW (ref 11.5–15.5)
IANC: 3.86 K/UL — SIGNIFICANT CHANGE UP (ref 1.8–7.4)
IANC: 3.86 K/UL — SIGNIFICANT CHANGE UP (ref 1.8–7.4)
IMM GRANULOCYTES NFR BLD AUTO: 0.4 % — SIGNIFICANT CHANGE UP (ref 0–0.9)
IMM GRANULOCYTES NFR BLD AUTO: 0.4 % — SIGNIFICANT CHANGE UP (ref 0–0.9)
INR BLD: 1.23 RATIO — HIGH (ref 0.85–1.18)
INR BLD: 1.23 RATIO — HIGH (ref 0.85–1.18)
LYMPHOCYTES # BLD AUTO: 0.97 K/UL — LOW (ref 1–3.3)
LYMPHOCYTES # BLD AUTO: 0.97 K/UL — LOW (ref 1–3.3)
LYMPHOCYTES # BLD AUTO: 17.5 % — SIGNIFICANT CHANGE UP (ref 13–44)
LYMPHOCYTES # BLD AUTO: 17.5 % — SIGNIFICANT CHANGE UP (ref 13–44)
MCHC RBC-ENTMCNC: 21.7 PG — LOW (ref 27–34)
MCHC RBC-ENTMCNC: 21.7 PG — LOW (ref 27–34)
MCHC RBC-ENTMCNC: 30 GM/DL — LOW (ref 32–36)
MCHC RBC-ENTMCNC: 30 GM/DL — LOW (ref 32–36)
MCV RBC AUTO: 72.3 FL — LOW (ref 80–100)
MCV RBC AUTO: 72.3 FL — LOW (ref 80–100)
MONOCYTES # BLD AUTO: 0.39 K/UL — SIGNIFICANT CHANGE UP (ref 0–0.9)
MONOCYTES # BLD AUTO: 0.39 K/UL — SIGNIFICANT CHANGE UP (ref 0–0.9)
MONOCYTES NFR BLD AUTO: 7 % — SIGNIFICANT CHANGE UP (ref 2–14)
MONOCYTES NFR BLD AUTO: 7 % — SIGNIFICANT CHANGE UP (ref 2–14)
NEUTROPHILS # BLD AUTO: 3.86 K/UL — SIGNIFICANT CHANGE UP (ref 1.8–7.4)
NEUTROPHILS # BLD AUTO: 3.86 K/UL — SIGNIFICANT CHANGE UP (ref 1.8–7.4)
NEUTROPHILS NFR BLD AUTO: 69.7 % — SIGNIFICANT CHANGE UP (ref 43–77)
NEUTROPHILS NFR BLD AUTO: 69.7 % — SIGNIFICANT CHANGE UP (ref 43–77)
NRBC # BLD: 0 /100 WBCS — SIGNIFICANT CHANGE UP (ref 0–0)
NRBC # BLD: 0 /100 WBCS — SIGNIFICANT CHANGE UP (ref 0–0)
NRBC # FLD: 0 K/UL — SIGNIFICANT CHANGE UP (ref 0–0)
NRBC # FLD: 0 K/UL — SIGNIFICANT CHANGE UP (ref 0–0)
PLATELET # BLD AUTO: 268 K/UL — SIGNIFICANT CHANGE UP (ref 150–400)
PLATELET # BLD AUTO: 268 K/UL — SIGNIFICANT CHANGE UP (ref 150–400)
POTASSIUM SERPL-MCNC: 4.7 MMOL/L — SIGNIFICANT CHANGE UP (ref 3.5–5.3)
POTASSIUM SERPL-MCNC: 4.7 MMOL/L — SIGNIFICANT CHANGE UP (ref 3.5–5.3)
POTASSIUM SERPL-SCNC: 4.7 MMOL/L — SIGNIFICANT CHANGE UP (ref 3.5–5.3)
POTASSIUM SERPL-SCNC: 4.7 MMOL/L — SIGNIFICANT CHANGE UP (ref 3.5–5.3)
PROT SERPL-MCNC: 7 G/DL — SIGNIFICANT CHANGE UP (ref 6–8.3)
PROT SERPL-MCNC: 7 G/DL — SIGNIFICANT CHANGE UP (ref 6–8.3)
PROTHROM AB SERPL-ACNC: 13.7 SEC — HIGH (ref 9.5–13)
PROTHROM AB SERPL-ACNC: 13.7 SEC — HIGH (ref 9.5–13)
RBC # BLD: 3.64 M/UL — LOW (ref 3.8–5.2)
RBC # BLD: 3.64 M/UL — LOW (ref 3.8–5.2)
RBC # FLD: 22.7 % — HIGH (ref 10.3–14.5)
RBC # FLD: 22.7 % — HIGH (ref 10.3–14.5)
RH IG SCN BLD-IMP: POSITIVE — SIGNIFICANT CHANGE UP
RH IG SCN BLD-IMP: POSITIVE — SIGNIFICANT CHANGE UP
RSV RNA NPH QL NAA+NON-PROBE: SIGNIFICANT CHANGE UP
RSV RNA NPH QL NAA+NON-PROBE: SIGNIFICANT CHANGE UP
SARS-COV-2 RNA SPEC QL NAA+PROBE: SIGNIFICANT CHANGE UP
SARS-COV-2 RNA SPEC QL NAA+PROBE: SIGNIFICANT CHANGE UP
SODIUM SERPL-SCNC: 133 MMOL/L — LOW (ref 135–145)
SODIUM SERPL-SCNC: 133 MMOL/L — LOW (ref 135–145)
WBC # BLD: 5.54 K/UL — SIGNIFICANT CHANGE UP (ref 3.8–10.5)
WBC # BLD: 5.54 K/UL — SIGNIFICANT CHANGE UP (ref 3.8–10.5)
WBC # FLD AUTO: 5.54 K/UL — SIGNIFICANT CHANGE UP (ref 3.8–10.5)
WBC # FLD AUTO: 5.54 K/UL — SIGNIFICANT CHANGE UP (ref 3.8–10.5)

## 2023-10-22 PROCEDURE — 71045 X-RAY EXAM CHEST 1 VIEW: CPT | Mod: 26

## 2023-10-22 PROCEDURE — 99285 EMERGENCY DEPT VISIT HI MDM: CPT

## 2023-10-22 RX ORDER — MAGNESIUM HYDROXIDE 400 MG/1
5 TABLET, CHEWABLE ORAL DAILY
Refills: 0 | Status: DISCONTINUED | OUTPATIENT
Start: 2023-10-22 | End: 2023-10-27

## 2023-10-22 RX ORDER — MIRTAZAPINE 45 MG/1
15 TABLET, ORALLY DISINTEGRATING ORAL AT BEDTIME
Refills: 0 | Status: DISCONTINUED | OUTPATIENT
Start: 2023-10-22 | End: 2023-10-27

## 2023-10-22 RX ORDER — PANTOPRAZOLE SODIUM 20 MG/1
40 TABLET, DELAYED RELEASE ORAL
Refills: 0 | Status: DISCONTINUED | OUTPATIENT
Start: 2023-10-22 | End: 2023-10-24

## 2023-10-22 RX ORDER — COLLAGENASE CLOSTRIDIUM HIST. 250 UNIT/G
1 OINTMENT (GRAM) TOPICAL
Refills: 0 | Status: DISCONTINUED | OUTPATIENT
Start: 2023-10-22 | End: 2023-10-27

## 2023-10-22 RX ORDER — INFLUENZA VIRUS VACCINE 15; 15; 15; 15 UG/.5ML; UG/.5ML; UG/.5ML; UG/.5ML
0.7 SUSPENSION INTRAMUSCULAR ONCE
Refills: 0 | Status: DISCONTINUED | OUTPATIENT
Start: 2023-10-22 | End: 2023-10-27

## 2023-10-22 RX ORDER — CEFAZOLIN SODIUM 1 G
2000 VIAL (EA) INJECTION EVERY 8 HOURS
Refills: 0 | Status: DISCONTINUED | OUTPATIENT
Start: 2023-10-22 | End: 2023-10-23

## 2023-10-22 RX ORDER — ASCORBIC ACID 60 MG
500 TABLET,CHEWABLE ORAL DAILY
Refills: 0 | Status: DISCONTINUED | OUTPATIENT
Start: 2023-10-22 | End: 2023-10-27

## 2023-10-22 RX ORDER — ACETAMINOPHEN 500 MG
1000 TABLET ORAL ONCE
Refills: 0 | Status: COMPLETED | OUTPATIENT
Start: 2023-10-22 | End: 2023-10-22

## 2023-10-22 RX ADMIN — Medication 400 MILLIGRAM(S): at 08:11

## 2023-10-22 RX ADMIN — Medication 1 APPLICATION(S): at 22:38

## 2023-10-22 RX ADMIN — MIRTAZAPINE 15 MILLIGRAM(S): 45 TABLET, ORALLY DISINTEGRATING ORAL at 22:38

## 2023-10-22 RX ADMIN — Medication 100 MILLIGRAM(S): at 14:55

## 2023-10-22 RX ADMIN — Medication 1 APPLICATION(S): at 22:37

## 2023-10-22 RX ADMIN — Medication 100 MILLIGRAM(S): at 22:38

## 2023-10-22 NOTE — ED ADULT NURSE NOTE - OBJECTIVE STATEMENT
pt received to room 23 a&ox1 (baseline self/birthday) from nursing home past medical history of hypertension, rheumatoid arthritis, dementia c/o dislodged PEG tube. MD and team at bedside unsuccessful for replacement. pt abdomen soft and nondistended. pt with 3x1 pressure injury to right buttock, 2.5x2 to left buttock, 1x1 to right ankle and right great toe redressed. midline to left arm in tact upon arrival. 22g placed to the left wrist. labs collected and sent. pt changed and repositioned. report given to day shift rn daniella. pt respirations even and unlabored with no accessory muscle use. pt pending XR and meds

## 2023-10-22 NOTE — ED PROVIDER NOTE - NS ED MD DISPO DIVISION
RAFAELA Cheek-To-Nose Interpolation Flap Text: A decision was made to reconstruct the defect utilizing an interpolation axial flap and a staged reconstruction.  A telfa template was made of the defect.  This telfa template was then used to outline the Cheek-To-Nose Interpolation flap.  The donor area for the pedicle flap was then injected with anesthesia.  The flap was excised through the skin and subcutaneous tissue down to the layer of the underlying musculature.  The interpolation flap was carefully excised within this deep plane to maintain its blood supply.  The edges of the donor site were undermined.   The donor site was closed in a primary fashion.  The pedicle was then rotated into position and sutured.  Once the tube was sutured into place, adequate blood supply was confirmed with blanching and refill.  The pedicle was then wrapped with xeroform gauze and dressed appropriately with a telfa and gauze bandage to ensure continued blood supply and protect the attached pedicle.

## 2023-10-22 NOTE — PATIENT PROFILE ADULT - FALL HARM RISK - HARM RISK INTERVENTIONS

## 2023-10-22 NOTE — H&P ADULT - NSHPPHYSICALEXAM_GEN_ALL_CORE
GENERAL:  No acute distress, patient in bed comfortably   HEENT:  NCAT, no scleral icterus   CHEST:  no respiratory distress  HEART:  Regular rate and rhythm  ABDOMEN:  Soft, non-tender, non-distended, no masses, Previous PEG site with some pink overlying tissue.   EXTREMITIES: No edema  SKIN:  No rash/erythema/ecchymoses/petechiae/wounds/abscess/warm/dry  NEURO:  Alert and oriented x 1

## 2023-10-22 NOTE — ED PROVIDER NOTE - ATTENDING CONTRIBUTION TO CARE
78-year-old female past medical history dementia, myeloma, depression presents for G-tube dislodgment.  Patient dislodged at some point on Saturday.  Unclear exact duration of G-tube.  No erythema, induration and skin surrounding G-tube site.    Exam as above, patient awake alert oriented to self only with mucous membranes.  Pupils equal and reactive to light.  Extraocular movements intact.  Cardiac: S1-S2 regular rhythm.  Lungs are to auscultation bilaterally.  Attempted replacement of G-tube at bedside however unable to pass tube via stoma suspect stoma began to heal closed.  GI consulted patient for admission for G-tube replacement.

## 2023-10-22 NOTE — ED PROVIDER NOTE - PHYSICAL EXAMINATION
On exam, patient is lying on stretcher, in NAD  ABDOMEN: normal BS, soft, NT. Bandage overlying PEG tube site. No discharge.  EXTREMITY: contracted lower extremities, midline catheter in left arm

## 2023-10-22 NOTE — PATIENT PROFILE ADULT - FALL HARM RISK - FACTORS
Pediatric Endocrinology Follow up Consultation    Patient: Pam Salgado MRN# [id number]   YOB: 2014 Age: 8year 11month old   Date of Visit: Aug 17, 2023    Dear Dr. Luz Jones:    I had the pleasure of seeing your patient, Pam Salgado in the Pediatric Endocrinology Clinic, [facility], on Aug 17, 2023 for initial consultation regarding premature pubarche.           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  Will discuss with peds surgery-- recommend removal.              HPI:   Pam is a 8year 11month old female with no significant PMH who initially presented on 22 for evaluation of premature pubarche.  Mom first noticed a few pubic hair three months prior to initial presentation. No body odor, No axillary hair. Mom thought she had some breast buds but thought it was due to her weight. No vaginal discharge or bleeding. No exposure to testosterone cream. No chronic exposure to lavender or tea tree oil.   On review of growth charts, height had been stable at the 50th-60th percentile but BMI had significantly increased since the age of 5. At the initial visit, BMI was at the 99th percentile(z-score: 2.54).   Family history notable for mom at 64 inches, dad at 69 inches. Mom with menarche at age 11- 12 years.   Physical exam at our initial visit was notable for Eduardo II- early Eduardo III pubic hair. Laboratory evaluation was notable for elevated 17-OH progesterone at 1260 ng/dL but with a normal testosterone level. Levels were asked to be repeated given the possibility of error but patient was lost to follow up until recently.     Interim  History: Labs were repeated on 23 and were notable for 17-OH progesterone at 6,962 ng/dL, elevated androstenedione at 1.644 ng/mL, mildly elevated total testosterone at 28 ng/mL. Mom reports no further changes. She doesn't know if the pubic hair has increased. No significant breast development. No vaginal discharge or bleeding.   On review of growth charts, height is at the 64th percentile with a height velocity of 6.8 cm/year (89th percentile). BMI is at the 99th percentile.     I have reviewed the available past laboratory evaluations, imaging studies, and medical records available to me at this visit. I have reviewed the Pam's growth chart.    History was obtained from patient's mother.    35 minutes spent on the date of the encounter doing chart review, history and exam, documentation and further activities per the note     Birth History:   Gestational age FT  Mode of delivery C/S  Complications during pregnancy None  Birth weight 9 lbs 3 oz  Birth length 20.51   course Normal  Genitalia at birth Female            Past Medical History:     Past Medical History:   Diagnosis Date    LGA (large for gestational age) fetus 2015    Single liveborn infant, delivered by  2015            Past Surgical History:     Past Surgical History:   Procedure Laterality Date    ABDOMEN SURGERY  11/12/15    Removal of abdominal cyst and apendix    APPENDECTOMY  11/12/15    GENITOURINARY SURGERY  11/12/15    Abdominal cyst was thought to be ovary    GYN SURGERY  11/12/15    Abdominal cyst was thought to be ovary    LAPAROSCOPY OPERATIVE INFANT N/A 2015    Procedure: LAPAROSCOPY OPERATIVE INFANT;  Surgeon: Kei Oates MD;  Location:  OR               Social History:   Lives with mom, dad, and 10 y/o brother. Going into 2nd grade, no learning problems.            Family History:   Father is  5 feet 9 inches tall.  Mother is  5 feet 4 inches tall.   Mother's menarche is at age  11-12.  "    Father s pubertal progression : was at the normal time, per his recollection  Midparental Height is five feet four inches ( 162.6cm).  Siblings: 10 y/o brother with no signs of puberty.     Family History   Problem Relation Age of Onset    Depression Mother     Other Cancer Father         Leukemia    Diabetes Maternal Grandfather     Prostate Cancer Maternal Grandfather     Other Cancer Maternal Grandfather         Leukemia    Hypertension Maternal Grandmother        History of:  Adrenal insufficiency: none.  Autoimmune disease: none.  Calcium problems: none.  Delayed puberty: none.  Diabetes mellitus: none.  Early puberty: none.  Genetic disease: none.  Short stature: none.  Thyroid disease: mom with hypothyroidism         Allergies:   No Known Allergies          Medications:     Current Outpatient Medications   Medication Sig Dispense Refill    desonide (DESOWEN) 0.05 % external cream Apply topically 2 times daily as needed (rash) Keep prescription on file 60 g 1             Review of Systems:   Gen: Negative  Eye: Negative  ENT: Negative  Pulmonary:  Negative  Cardio: Negative  Gastrointestinal: Negative  Hematologic: Negative  Genitourinary: Negative  Musculoskeletal: Negative  Psychiatric: Negative  Neurologic: Negative  Skin: Negative  Endocrine: see HPI.            Physical Exam:   Blood pressure 110/70, pulse 88, height 1.294 m (4' 2.95\"), weight 42.8 kg (94 lb 5.7 oz).  Blood pressure %danii are 91 % systolic and 88 % diastolic based on the 2017 AAP Clinical Practice Guideline. Blood pressure %ile targets: 90%: 109/71, 95%: 113/74, 95% + 12 mmH/86. This reading is in the elevated blood pressure range (BP >= 90th %ile).  Height: 129.4 cm  (0\") 64 %ile (Z= 0.35) based on CDC (Girls, 2-20 Years) Stature-for-age data based on Stature recorded on 2023.  Weight: 42.8 kg (actual weight), 99 %ile (Z= 2.29) based on CDC (Girls, 2-20 Years) weight-for-age data using vitals from 2023.  BMI: Body " mass index is 25.56 kg/m . >99 %ile (Z= 2.36) based on CDC (Girls, 2-20 Years) BMI-for-age based on BMI available as of 8/17/2023.      Constitutional: awake, alert, cooperative, no apparent distress  Eyes: Lids and lashes normal, sclera clear, conjunctiva normal  ENT: Normocephalic, without obvious abnormality, external ears without lesions,   Neck: Supple, symmetrical, trachea midline, thyroid symmetric, not enlarged and no tenderness  Hematologic / Lymphatic: no cervical lymphadenopathy  Lungs: No increased work of breathing, clear to auscultation bilaterally with good air entry.  Cardiovascular: Regular rate and rhythm, no murmurs.  Abdomen: No scars, normal bowel sounds, soft, non-distended, non-tender, no masses palpated, no hepatosplenomegaly  Genitourinary:  Breasts Eduardo I  Genitalia Female  Pubic hair: Eduardo III, increased from prior  Musculoskeletal: There is no redness, warmth, or swelling of the joints.    Neurologic: Awake, alert, oriented to name, place and time.  Neuropsychiatric: normal  Skin: no lesions          Laboratory results:     Component      Latest Ref Rn 9/29/2022  2:08 PM 7/7/2023  8:14 AM   Free Testosterone Calculated      ng/dL 0.29  0.41    Testosterone Total      0 - 20 ng/dL 18  28 (H)    17-OH Progesterone      <=630 ng/dL 1,260 (H)  6,962 (H)    DHEA Sulfate      ug/dL 85  55    Estradiol      pg/mL <5     FSH      0.3 - 6.9 IU/L 0.7     TSH      0.40 - 4.00 mU/L 1.59     T4 Free      0.76 - 1.46 ng/dL 0.85     Sex Hormone Binding Globulin      35 - 170 nmol/L 40  46    Lutropin (External)      mIU/mL 0.010     Androstenedione      0.020 - 0.280 ng/mL  1.644 (H)       Legend:  (H) High         Assessment and Plan:   Pam is a 8year 11month old female with PMH of unilateral oophorectomy at [age] months of age now presenting for follow up of premature adrenarche, likely due to non-classic congenital adrenal hyperplasia given persistently elevated 17-OH progesterone levels. At  "this time, it is reassuring, that she has no clinical signs of puberty.   I recommend a bone age to assess whether her bone age is advanced significantly. This will also provide us a baseline for future bone ages. I also recommend a high dose ACTH stimulation test to assess whether she is able to produce adequate amounts of cortisol.  I will reach out to genetics to arrange for genetic counseling as well.         Orders Placed This Encounter   Procedures    X-ray Bone age hand pediatrics         A return evaluation will be scheduled for: 3 months    Thank you for allowing me to participate in the care of your patient.  Please do not hesitate to call with questions or concerns.    Sincerely,    Juwan Neal MD   Attending Physician  Division of Diabetes and Endocrinology  [facility]  Patient Care Team:  Meenu Tomlinson PA-C as PCP - General (Physician Assistant)  Meenu Tomlinson PA-C as Assigned PCP  Meenu Tomlinson PA-C as Physician Assistant (Physician Assistant)  Kei Oates MD as MD (Surgery)  Juwan Neal MD as MD (Pediatric Endocrinology)  Juwan Neal MD as Assigned Pediatric Specialist Provider  MEENU TOMLINSON    Copy to patient  [address]UN[address] \"STEVE\"  [id number] [id number]        " Weakness

## 2023-10-22 NOTE — ED ADULT NURSE NOTE - NS ED NURSE RECORD ANOTHER VITAL SIGN
Health Maintenance Due   Topic Date Due   • DTaP/Tdap/Td Vaccine (1 - Tdap) 02/03/2009   • COVID-19 Vaccine (3 - Moderna risk 3-dose series) 04/23/2021   • Medicare Wellness Visit  12/10/2021       Patient is due for topics as listed above but is not proceeding with Immunization(s) COVID-19 at this time. Orders placed for Immunization(s) Dtap/Tdap/Td.         Yes

## 2023-10-22 NOTE — CONSULT NOTE ADULT - ATTENDING COMMENTS
Patient was seen by me 10/23.    77 yo F PMH Alzheimer's dementia s/p PEG 8/2023, GE flap valve, grade B esophagitis, Multiple Myeloma (previously on Revlimid), cataracts, chronic venous ulcers b/l LE, OA knee, HTN, primary pulmonary hypertension, MDD sent from NH given PEG dislodgement. GI consulted for PEG replacement.    # PEG placement - postponed to Tuesday given witnessed new cough and SOB by the anesthesia team in the endoscopy suite. Discussed with primary team - plan for CXR and monitoring - if stable with no new oxygen requirements will re-attempt tomorrow. Suggest alternative means of nutrition in the interim.

## 2023-10-22 NOTE — H&P ADULT - ASSESSMENT
77 yo F PMH Alzheimer's dementia s/p PEG 8/2023, GE flap valve, grade B esophagitis, Multiple Myeloma (previously on Revlimid), cataracts, chronic venous ulcers b/l LE, OA knee, HTN, primary pulmonary hypertension,  was sent from NH today due to PEG malfunction.        Problem/Plan - 1:  ·  Problem:  PEG malfunction was seen by GI bleed PICC replaced in a.m. keep  NPO tonight  ·  Plan: -patient with fever to 100.3, leukocytosis to 13.3, hypotension 87/56-->92/56 concerning for sepsis 2/2 urinary source, less likely in setting of lower extremity wounds, lactic acid 2.4      Problem/Plan - 2:  -s/p  sepsis on recent admission completed IV antibiotic and her right-sided chest port was removed    h.    Problem/Plan - 3:  ·  Problem: Anemia.   ·  Plan: -Hgb was  6.6 on last admission will monitored no need for transfusion       Problem/Plan - 4:     ·  Problem: Multiple myeloma.   ·  Plan: -patient previously on revlimid per documentation       Problem/Plan - 5:  ·  Problem: Rheumatoid arthritis.   ·  Plan: -patient with ulnar deviation of b/l UE digits, documented RA  -would obtain records.    Problem/Plan -  6:  ·  Problem:  multiple pressure ulcer.  from her recent hospitalization when asked to be seen by wound team if she is day more than 2 days in the hospital continue local care   .    Problem/Plan -  7:  ·  Problem: History of esophagitis.   ·  Plan: -patient with grade B esophagitis on EGD 8/2023, also with Hill grade 3 gastroesophageal flap, patient was pending ?outpatient w/u with EUS  -home omeprazole not on formulary will order pantoprazole daily.    Problem/Plan - 8:  ·  Problem: Need for prophylactic measure.   ·  Plan: -DVT ppx: patient with documented hx hematoma after initial PEG placement in August 2023 ,  hold anticoagulation at present time for possible PEG insertion a.m.     - HCP Sister Lindsey Mendiola [phone] was contacted by GI team            79 yo F PMH Alzheimer's dementia s/p PEG 8/2023, GE flap valve, grade B esophagitis, Multiple Myeloma (previously on Revlimid), cataracts, chronic venous ulcers b/l LE, OA knee, HTN, primary pulmonary hypertension,  was sent from NH today due to PEG malfunction.        Problem/Plan - 1:  ·  Problem:  PEG malfunction was seen by GI  to  replaced  PEG in a.m. keep  NPO tonight  ·         Problem/Plan - 2:  -s/p  sepsis on recent admission completed IV antibiotic and her right-sided chest port was removed    h.    Problem/Plan - 3:  ·  Problem: Anemia.   ·  Plan: -Hgb was  6.6 on last admission will monitored no need for transfusion       Problem/Plan - 4:     ·  Problem: Multiple myeloma.   ·  Plan: -patient previously on revlimid per documentation       Problem/Plan - 5:  ·  Problem: Rheumatoid arthritis.   ·  Plan: -patient with ulnar deviation of b/l UE digits, documented RA       Problem/Plan -  6:  ·  Problem:  multiple pressure ulcer.  from her recent hospitalization  will  asked to be seen by wound team  local care   .    Problem/Plan -  7:  ·  Problem: History of esophagitis.   ·  Plan: -patient with grade B esophagitis on EGD 8/2023, also with Hill grade 3 gastroesophageal flap,   -home omeprazole not on formulary will order pantoprazole daily.    Problem/Plan - 8:  ·  Problem: Need for prophylactic measure.   ·  Plan: -DVT ppx: patient with documented hx hematoma after initial PEG placement in August 2023 ,  hold anticoagulation at present time for possible PEG insertion a.m.     - HCP Sister Lindsey Mendiola [phone] was contacted by GI team

## 2023-10-22 NOTE — ED PROVIDER NOTE - OBJECTIVE STATEMENT
78-year-old female, from [facility], history of Alzheimer's, hypertension, multiple myeloma, sent to the emergency department today for PEG tube replacement due to dislodgement yesterday. Was initially placed in August 2023.

## 2023-10-22 NOTE — CONSULT NOTE ADULT - SUBJECTIVE AND OBJECTIVE BOX
HPI: 77 yo F PMH Alzheimer's dementia s/p PEG 8/2023, GE flap valve, grade B esophagitis, Multiple Myeloma (previously on Revlimid), cataracts, chronic venous ulcers b/l LE, OA knee, HTN, primary pulmonary hypertension, MDD sent from NH given PEG dislodgement. GI consulted for PEG replacement.     History limited by patient given patient with dementia.     No knight or other device was placed in PEG to keep tract remaining open. PEG dislodged on 10/21. PEG replacement attempted by ED but unsuccessful.     Also of note, patient able to eat PO but does not meet nutrition needs per Dr. Vasques and Lindsey Mendiola.     Allergies:  No Known Allergies      Home Medications:    Hospital Medications:      PMHX/PSHX:  Multiple myeloma    Dementia    Rheumatoid arthritis    Mild protein-calorie malnutrition    Primary pulmonary hypertension    Osteoarthritis    Cataract    MDD (major depressive disorder)    H/O left knee surgery        Family history:  No pertinent family history in first degree relatives        Denies family history of colon cancer/polyps, stomach cancer/polyps, pancreatic cancer/masses, liver cancer/disease, ovarian cancer and endometrial cancer.    Social History:   Tob: Denies  EtOH: Denies  Illicit Drugs: Denies    ROS:   Unable to assess     PHYSICAL EXAM:     GENERAL:  No acute distress, patient in bed comfortably   HEENT:  NCAT, no scleral icterus   CHEST:  no respiratory distress  HEART:  Regular rate and rhythm  ABDOMEN:  Soft, non-tender, non-distended, no masses, Previous PEG site with some pink overlying tissue.   EXTREMITIES: No edema  SKIN:  No rash/erythema/ecchymoses/petechiae/wounds/abscess/warm/dry  NEURO:  Alert and oriented x 0    Vital Signs:  Vital Signs Last 24 Hrs  T(C): 36.9 (22 Oct 2023 10:25), Max: 37.6 (22 Oct 2023 06:59)  T(F): 98.4 (22 Oct 2023 10:25), Max: 99.6 (22 Oct 2023 06:59)  HR: 85 (22 Oct 2023 10:25) (85 - 100)  BP: 134/56 (22 Oct 2023 10:25) (110/54 - 137/83)  BP(mean): --  RR: 20 (22 Oct 2023 10:25) (16 - 20)  SpO2: 95% (22 Oct 2023 10:25) (95% - 100%)    Parameters below as of 22 Oct 2023 10:25  Patient On (Oxygen Delivery Method): room air      Daily Height in cm: 170.18 (22 Oct 2023 06:59)    Daily     LABS:                        7.9    5.54  )-----------( 268      ( 22 Oct 2023 08:08 )             26.3     Mean Cell Volume: 72.3 fL (10-22-23 @ 08:08)    10-22    133<L>  |  98  |  14  ----------------------------<  104<H>  4.7   |  24  |  0.38<L>    Ca    8.7      22 Oct 2023 08:08    TPro  7.0  /  Alb  2.7<L>  /  TBili  0.5  /  DBili  x   /  AST  61<H>  /  ALT  9   /  AlkPhos  122<H>  10-22    LIVER FUNCTIONS - ( 22 Oct 2023 08:08 )  Alb: 2.7 g/dL / Pro: 7.0 g/dL / ALK PHOS: 122 U/L / ALT: 9 U/L / AST: 61 U/L / GGT: x           PT/INR - ( 22 Oct 2023 08:08 )   PT: 13.7 sec;   INR: 1.23 ratio           Urinalysis Basic - ( 22 Oct 2023 08:08 )    Color: x / Appearance: x / SG: x / pH: x  Gluc: 104 mg/dL / Ketone: x  / Bili: x / Urobili: x   Blood: x / Protein: x / Nitrite: x   Leuk Esterase: x / RBC: x / WBC x   Sq Epi: x / Non Sq Epi: x / Bacteria: x                              7.9    5.54  )-----------( 268      ( 22 Oct 2023 08:08 )             26.3       Imaging:

## 2023-10-22 NOTE — H&P ADULT - HISTORY OF PRESENT ILLNESS
77 yo F PMH Alzheimer's dementia s/p PEG 8/2023, GE flap valve, grade B esophagitis, Multiple Myeloma (previously on Revlimid), cataracts, chronic venous ulcers b/l LE, OA knee, HTN, primary pulmonary hypertension,I was called 5:30 AM  that    from NH given PEG [facility]lodged [facility] unable to replace that you due to bleeding in the area stoma Was closed GI consult was called for the placement .  she was seen with GI team at the bedside discussed with them in detail that she always able to eat pured diet.  An artificial tube feeding was no need for supplemental and maintain her calorie intake due to multiple bedsore.       Discussed with her healthcare proxy Lindsey Maury. regarding the new PEG placement   77 yo F PMH Alzheimer's dementia s/p PEG 8/2023, GE flap valve, grade B esophagitis, Multiple Myeloma (previously on Revlimid), cataracts, chronic venous ulcers b/l LE, OA knee, HTN, primary pulmonary hypertension,I was called 5:30 AM  that    from NH given PEG dislodged recommended to be sent to the ERreplace  , ER team unable to replace that you due to bleeding in the area stoma Was closed GI consult was called for the placement .  she was seen with GI team at the bedside discussed with them in detail that she always able to eat pured diet.  An artificial tube feeding was  for supplemental and maintain her calorie intake due to multiple bedsore.       Discussed with her healthcare proxy Lindsey Mendiola. regarding the new PEG placement

## 2023-10-22 NOTE — H&P ADULT - NSHPLABSRESULTS_GEN_ALL_CORE
7.9    5.54  )-----------( 268      ( 22 Oct 2023 08:08 )             26.3     Mean Cell Volume: 72.3 fL (10-22-23 @ 08:08)    10-22    133<L>  |  98  |  14  ----------------------------<  104<H>  4.7   |  24  |  0.38<L>    Ca    8.7      22 Oct 2023 08:08    TPro  7.0  /  Alb  2.7<L>  /  TBili  0.5  /  DBili  x   /  AST  61<H>  /  ALT  9   /  AlkPhos  122<H>  10-22    LIVER FUNCTIONS - ( 22 Oct 2023 08:08 )  Alb: 2.7 g/dL / Pro: 7.0 g/dL / ALK PHOS: 122 U/L / ALT: 9 U/L / AST: 61 U/L / GGT: x           PT/INR - ( 22 Oct 2023 08:08 )   PT: 13.7 sec;   INR: 1.23 ratio           Urinalysis Basic - ( 22 Oct 2023 08:08 )    Color: x / Appearance: x / SG: x / pH: x  Gluc: 104 mg/dL / Ketone: x  / Bili: x / Urobili: x   Blood: x / Protein: x / Nitrite: x   Leuk Esterase: x / RBC: x / WBC x   Sq Epi: x / Non Sq Epi: x / Bacteria: x                              7.9    5.54  )-----------( 268      ( 22 Oct 2023 08:08 )             26.3

## 2023-10-22 NOTE — CONSULT NOTE ADULT - ASSESSMENT
77 yo F PMH Alzheimer's dementia s/p PEG 8/2023, GE flap valve, grade B esophagitis, Multiple Myeloma (previously on Revlimid), cataracts, chronic venous ulcers b/l LE, OA knee, HTN, primary pulmonary hypertension, MDD sent from NH given PEG dislodgement. GI consulted for PEG replacement.     #PEG tube replacement     Recommendations:   - Keep NPO after midnight for PEG tube placement   - discussed with Lindsey Mendiola about replacement of PEG tube; HCP agrees to replacement and will be available by phone tomorrow   - HCP Sister Lindsey Mendiola [phone]  - rest of care per primary team     All recommendations are tentative until note is attested by attending.     Meredith Branch, PGY-5  Gastroenterology/Hepatology Fellow  Available on [employer] Teams  [phone] ([facility] Short Range Pager)  [phone] ([facility] Long Range Pager)    After 5pm, please contact the on-call GI fellow. [phone]

## 2023-10-22 NOTE — ED ADULT NURSE NOTE - NSFALLHARMRISKINTERV_ED_ALL_ED

## 2023-10-22 NOTE — ED ADULT TRIAGE NOTE - CHIEF COMPLAINT QUOTE
Pt coming in from NH for  PEG tube placement. Per EMS, PEG tube became dislodge yesterday. Pt has hx of alzheimers. pt has DNR/DNI in chart. Pt has midline catheter in left arm with no redness or swelling noted.

## 2023-10-22 NOTE — ED PROVIDER NOTE - CLINICAL SUMMARY MEDICAL DECISION MAKING FREE TEXT BOX
78-year-old female, from [facility], history of Alzheimer's, hypertension, multiple myeloma, sent to the emergency department today for PEG tube replacement.  PEG tube was dislodged yesterday.  No other medical complaints including nausea, vomiting, abdominal pain. 78-year-old female, from [facility], history of Alzheimer's, hypertension, multiple myeloma, sent to the emergency department today for PEG tube replacement.  PEG tube was dislodged yesterday.  No other medical complaints including nausea, vomiting, abdominal pain.    On exam, patient in NAD. Vitals WNL. WIll attempt PEG tube placement at bedside.

## 2023-10-23 LAB
GLUCOSE BLDC GLUCOMTR-MCNC: 74 MG/DL — SIGNIFICANT CHANGE UP (ref 70–99)
GLUCOSE BLDC GLUCOMTR-MCNC: 74 MG/DL — SIGNIFICANT CHANGE UP (ref 70–99)
GLUCOSE BLDC GLUCOMTR-MCNC: 81 MG/DL — SIGNIFICANT CHANGE UP (ref 70–99)
GLUCOSE BLDC GLUCOMTR-MCNC: 81 MG/DL — SIGNIFICANT CHANGE UP (ref 70–99)
GLUCOSE BLDC GLUCOMTR-MCNC: 86 MG/DL — SIGNIFICANT CHANGE UP (ref 70–99)
GLUCOSE BLDC GLUCOMTR-MCNC: 86 MG/DL — SIGNIFICANT CHANGE UP (ref 70–99)

## 2023-10-23 PROCEDURE — 71045 X-RAY EXAM CHEST 1 VIEW: CPT | Mod: 26

## 2023-10-23 PROCEDURE — 99222 1ST HOSP IP/OBS MODERATE 55: CPT | Mod: GC

## 2023-10-23 RX ORDER — CEFTRIAXONE 500 MG/1
1000 INJECTION, POWDER, FOR SOLUTION INTRAMUSCULAR; INTRAVENOUS EVERY 24 HOURS
Refills: 0 | Status: DISCONTINUED | OUTPATIENT
Start: 2023-10-23 | End: 2023-10-26

## 2023-10-23 RX ORDER — AZITHROMYCIN 500 MG/1
500 TABLET, FILM COATED ORAL EVERY 24 HOURS
Refills: 0 | Status: DISCONTINUED | OUTPATIENT
Start: 2023-10-24 | End: 2023-10-26

## 2023-10-23 RX ORDER — AZITHROMYCIN 500 MG/1
500 TABLET, FILM COATED ORAL ONCE
Refills: 0 | Status: COMPLETED | OUTPATIENT
Start: 2023-10-23 | End: 2023-10-23

## 2023-10-23 RX ORDER — ACETAMINOPHEN 500 MG
650 TABLET ORAL EVERY 6 HOURS
Refills: 0 | Status: DISCONTINUED | OUTPATIENT
Start: 2023-10-23 | End: 2023-10-27

## 2023-10-23 RX ORDER — CHLORHEXIDINE GLUCONATE 213 G/1000ML
1 SOLUTION TOPICAL DAILY
Refills: 0 | Status: DISCONTINUED | OUTPATIENT
Start: 2023-10-23 | End: 2023-10-27

## 2023-10-23 RX ORDER — AZITHROMYCIN 500 MG/1
TABLET, FILM COATED ORAL
Refills: 0 | Status: DISCONTINUED | OUTPATIENT
Start: 2023-10-23 | End: 2023-10-26

## 2023-10-23 RX ADMIN — Medication 100 MILLIGRAM(S): at 07:19

## 2023-10-23 RX ADMIN — AZITHROMYCIN 500 MILLIGRAM(S): 500 TABLET, FILM COATED ORAL at 11:55

## 2023-10-23 RX ADMIN — Medication 1 APPLICATION(S): at 08:26

## 2023-10-23 RX ADMIN — MIRTAZAPINE 15 MILLIGRAM(S): 45 TABLET, ORALLY DISINTEGRATING ORAL at 21:51

## 2023-10-23 RX ADMIN — Medication 500 MILLIGRAM(S): at 11:54

## 2023-10-23 RX ADMIN — Medication 1 APPLICATION(S): at 17:16

## 2023-10-23 RX ADMIN — CHLORHEXIDINE GLUCONATE 1 APPLICATION(S): 213 SOLUTION TOPICAL at 17:18

## 2023-10-23 RX ADMIN — MAGNESIUM HYDROXIDE 5 MILLILITER(S): 400 TABLET, CHEWABLE ORAL at 11:54

## 2023-10-23 RX ADMIN — Medication 1 APPLICATION(S): at 17:17

## 2023-10-23 RX ADMIN — CEFTRIAXONE 100 MILLIGRAM(S): 500 INJECTION, POWDER, FOR SOLUTION INTRAMUSCULAR; INTRAVENOUS at 13:04

## 2023-10-23 NOTE — CHART NOTE - NSCHARTNOTEFT_GEN_A_CORE
Planned for PEG replacement today. Per GI, case cancelled by anesthesia given cough/rhonchi on exam.    Case discussed w/ Dr. Vasques.    - Per Dr. Vasques, patient with rhonchi and cough today, findings new from yesterday; recommends IV Ceftriaxone, IV Azithromycin, CXR, Labs, RVP, NPO.    ELSIE in chart reviewed dated 10/16/2023, completed at Nursing Home, signed by Dr. Vasques and patient's sister, Lindsey Mendiola ([phone]).  Per ELSIE, wishes are as follows:  - DNR  - DNI and Do Not Use Non-Invasive Ventilation or Mechanical Ventilation  - Do not send to the hospital  - Comfort measures only  - Ok to use long term feeding tube and IV fluids.  - Ok to use antibiotics to treat infections  - Do not use dialysis  - Other orders and instructions: no lab work, no xray, no weight, keep her comfortable and pain free.    Case discussed w/ GI, plan as follows:  - Plan for EGD and PEG replacement when medically optimized, potentially tomorrow.    - Will need to check 1AM labs (CBC, BMP) daily in anticipation of PEG to avoid delay in care.  - Discussed potential to complete AM labs after daily clinical assessment by GI and anesthesia to avoid unnecessary lab draws in a patient otherwise deferring lab draws.  Per GI, this is not feasible and labs should be drawn daily to facilitate PEG when medically optimized.    Case discussed with HCP, Lindsey Mendiola (sister) [phone].  The above was discussed with her in great detail.  Plan of care explained including rationale for recommended treatments. Per HCP Lindsey Mendiola, her current wishes are as follows:  - No lab draw today  - Ok to obtain portable CXR today  - Ok to start IV Antibiotics  - Ok to draw daily labs tomorrow AM while awaiting PEG, but if course becomes prolonged while awaiting for medical optimization, she may decide to defer daily labs to ensure patient comfort.    HCP Lindsey Mendiola made aware Medical Team will follow her wishes as stated above, and she can notify team at any time regarding changes to her wishes.      Georgie Browning NP-BC  Department of Medicine  In House Pager #[phone]

## 2023-10-23 NOTE — PROGRESS NOTE ADULT - ASSESSMENT
77 yo F PMH Alzheimer's dementia s/p PEG 8/2023, GE flap valve, grade B esophagitis, Multiple Myeloma (previously on Revlimid), cataracts, chronic venous ulcers b/l LE, OA knee, HTN, primary pulmonary hypertension, MDD sent from NH given PEG dislodgement. GI consulted for PEG replacement, site has since closed.    # PEG tube replacement   # alzheimer's dementia   # moderate AS/mod- severe MR/hx of primary pHTN  -10/23 case cancelled by anesthesia given cough/rhonchi on exam    Recommendations:   - please repeat cxr  - started on antibiotics as per primary team  - please obtain cardiac risk stratification pre procedure  - can plan for egd/peg when medically optimized, potentially tomorrow, please keep npo p mn and check 1AM labs (cbc, bmp)  - discussed prior with HCP Sister Lindsey Mendiola [phone] about replacement of PEG tube, HCP agrees to replacement   - rest of care as per primary team     dw gi attg    ADÁN Menendez PA-C, RD, CDN  available on TEAMS for questions  after 5pm/weekends, please contact the on-call GI fellow at [phone]  79 yo F PMH Alzheimer's dementia s/p PEG 8/2023, GE flap valve, grade B esophagitis, Multiple Myeloma (previously on Revlimid), cataracts, chronic venous ulcers b/l LE, OA knee, HTN, primary pulmonary hypertension, MDD sent from NH given PEG dislodgement. GI consulted for PEG replacement, site has since closed.    # PEG tube replacement   # alzheimer's dementia   # moderate AS/mod- severe MR/hx of primary pHTN  -10/23 case cancelled by anesthesia given cough/rhonchi on exam    Recommendations:   - please repeat cxr  - started on antibiotics as per primary team  - discussed prior with HCP Sister Lindsey Mendiola [phone] about replacement of PEG tube, HCP would like to avoid PEG placement for now  - rest of care as per primary team     harinder gi attg    ADÁN Menendez PA-C, RD, CDN  available on TEAMS for questions  after 5pm/weekends, please contact the on-call GI fellow at [phone]

## 2023-10-23 NOTE — PROGRESS NOTE ADULT - SUBJECTIVE AND OBJECTIVE BOX
CHIEF COMPLAINT:    SUBJECTIVE:     REVIEW OF SYSTEMS:    CONSTITUTIONAL: (  )  weakness,  (  ) fevers or chills  EYES/ENT: (  )visual changes;     NECK: (  ) pain or stiffness  RESPIRATORY:   (  )cough, wheezing, hemoptysis;  (  ) shortness of breath  CARDIOVASCULAR:  (  )chest pain or palpitations  GASTROINTESTINAL:   (  )abdominal or epigastric pain.  (  ) nausea, vomiting, or hematemesis;   (   ) diarrhea or constipation.   GENITOURINARY:   (    ) dysuria, frequency or hematuria  NEUROLOGICAL:  (   ) numbness or weakness   All other review of systems is negative unless indicated above    Vital Signs Last 24 Hrs  T(C): 36.6 (23 Oct 2023 08:48), Max: 37.1 (22 Oct 2023 20:07)  T(F): 97.9 (23 Oct 2023 08:48), Max: 98.7 (22 Oct 2023 20:07)  HR: 86 (23 Oct 2023 08:48) (57 - 92)  BP: 135/95 (23 Oct 2023 08:48) (127/82 - 140/78)  BP(mean): --  RR: 20 (23 Oct 2023 08:48) (18 - 20)  SpO2: 100% (23 Oct 2023 08:48) (95% - 100%)    Parameters below as of 23 Oct 2023 08:48  Patient On (Oxygen Delivery Method): room air        I&O's Summary      CAPILLARY BLOOD GLUCOSE      POCT Blood Glucose.: 86 mg/dL (23 Oct 2023 07:57)      PHYSICAL EXAM:    Constitutional:  (   ) NAD,   (   )awake and alert  HEENT: PERR, EOMI,    Neck: Soft and supple, No LAD, No JVD  Respiratory:  (    Breath sounds are clear bilaterally,    (   ) wheezing, rales or rhonchi  Cardiovascular:     (   )S1 and S2, regular rate and rhythm, no Murmurs, gallops or rubs  Gastrointestinal:  (   )Bowel Sounds present, soft,   (  )nontender, nondistended,    Extremities:    (  ) peripheral edema  Vascular: 2+ peripheral pulses  Neurological:    (    )A/O x 3,   (  ) focal deficits  Musculoskeletal:    (   )  normal strength b/l upper  (     ) normal  lower extremities  Skin: No rashes    MEDICATIONS:  MEDICATIONS  (STANDING):  ascorbic acid 500 milliGRAM(s) Oral daily  ceFAZolin   IVPB 2000 milliGRAM(s) IV Intermittent every 8 hours  collagenase Ointment 1 Application(s) Topical two times a day  influenza  Vaccine (HIGH DOSE) 0.7 milliLiter(s) IntraMuscular once  magnesium hydroxide Suspension 5 milliLiter(s) Oral daily  mirtazapine 15 milliGRAM(s) Oral at bedtime  pantoprazole    Tablet 40 milliGRAM(s) Oral before breakfast  silver sulfADIAZINE 1% Cream 1 Application(s) Topical every 12 hours      LABS: All Labs Reviewed:                        7.9    5.54  )-----------( 268      ( 22 Oct 2023 08:08 )             26.3     10-22    133<L>  |  98  |  14  ----------------------------<  104<H>  4.7   |  24  |  0.38<L>    Ca    8.7      22 Oct 2023 08:08    TPro  7.0  /  Alb  2.7<L>  /  TBili  0.5  /  DBili  x   /  AST  61<H>  /  ALT  9   /  AlkPhos  122<H>  10-22    PT/INR - ( 22 Oct 2023 08:08 )   PT: 13.7 sec;   INR: 1.23 ratio               Blood Culture:   Urine Culture      RADIOLOGY/EKG:    ASSESSMENT AND PLAN:    DVT PPX:    ADVANCED DIRECTIVE:    DISPOSITION: CHIEF COMPLAINT:  patient was seen in the floor after cancellation of PEG tube insertion due to cough patient was seen in his room and 29 coughing her lungs has bilateral rhonchi left more than right  SUBJECTIVE:     REVIEW OF SYSTEMS:    CONSTITUTIONAL: ( x )  weakness,  (  ) fevers or chills  EYES/ENT: (  )visual changes;     NECK: (  ) pain or stiffness  RESPIRATORY:   (  )cough, wheezing, hemoptysis;  (  ) shortness of breath  CARDIOVASCULAR:  (  )chest pain or palpitations  GASTROINTESTINAL:   (  )abdominal or epigastric pain.  (  ) nausea, vomiting, or hematemesis;   (   ) diarrhea or constipation.   GENITOURINARY:   (    ) dysuria, frequency or hematuria  NEUROLOGICAL:  (   ) numbness or weakness   All other review of systems is negative unless indicated above    Vital Signs Last 24 Hrs  T(C): 36.6 (23 Oct 2023 08:48), Max: 37.1 (22 Oct 2023 20:07)  T(F): 97.9 (23 Oct 2023 08:48), Max: 98.7 (22 Oct 2023 20:07)  HR: 86 (23 Oct 2023 08:48) (57 - 92)  BP: 135/95 (23 Oct 2023 08:48) (127/82 - 140/78)  BP(mean): --  RR: 20 (23 Oct 2023 08:48) (18 - 20)  SpO2: 100% (23 Oct 2023 08:48) (95% - 100%)    Parameters below as of 23 Oct 2023 08:48  Patient On (Oxygen Delivery Method): room air        I&O's Summary      CAPILLARY BLOOD GLUCOSE      POCT Blood Glucose.: 86 mg/dL (23 Oct 2023 07:57)      PHYSICAL EXAM:    Constitutional:  ( x  ) NAD,   (   )awake and alert  HEENT: PERR, EOMI,    Neck: Soft and supple, No LAD, No JVD  Respiratory:  (   x Breath sounds are clear bilaterally,    ( x  )  rhonchi  Cardiovascular:     ( x  )S1 and S2, regular rate and rhythm, no Murmurs, gallops or rubs  Gastrointestinal:  ( x  )Bowel Sounds present, soft,   (  )nontender, nondistended,    Extremities:    (  ) peripheral edema  Vascular: 2+ peripheral pulses  Neurological:    ( x   )A/O x  1,   (  ) focal deficits  Musculoskeletal:    (   )  normal strength b/l upper  (     ) normal  lower extremities  Skin: multiple pressure ulcer    MEDICATIONS:  MEDICATIONS  (STANDING):  ascorbic acid 500 milliGRAM(s) Oral daily  ceFAZolin   IVPB 2000 milliGRAM(s) IV Intermittent every 8 hours  collagenase Ointment 1 Application(s) Topical two times a day  influenza  Vaccine (HIGH DOSE) 0.7 milliLiter(s) IntraMuscular once  magnesium hydroxide Suspension 5 milliLiter(s) Oral daily  mirtazapine 15 milliGRAM(s) Oral at bedtime  pantoprazole    Tablet 40 milliGRAM(s) Oral before breakfast  silver sulfADIAZINE 1% Cream 1 Application(s) Topical every 12 hours      LABS: All Labs Reviewed:                        7.9    5.54  )-----------( 268      ( 22 Oct 2023 08:08 )             26.3     10-22    133<L>  |  98  |  14  ----------------------------<  104<H>  4.7   |  24  |  0.38<L>    Ca    8.7      22 Oct 2023 08:08    TPro  7.0  /  Alb  2.7<L>  /  TBili  0.5  /  DBili  x   /  AST  61<H>  /  ALT  9   /  AlkPhos  122<H>  10-22    PT/INR - ( 22 Oct 2023 08:08 )   PT: 13.7 sec;   INR: 1.23 ratio               Blood Culture:   Urine Culture      RADIOLOGY/EKG:    ASSESSMENT AND PLAN:  77 yo F PMH Alzheimer's dementia s/p PEG 8/2023, GE flap valve, grade B esophagitis, Multiple Myeloma (previously on Revlimid), cataracts, chronic venous ulcers b/l LE, OA knee, HTN, primary pulmonary hypertension,  was sent from NH today due to PEG malfunction.        Problem/Plan - 1:  ·  Problem:  PEG malfunction was seen by GI  to  replaced  PEG in a.m. keep  NPO tonight  ·         Problem/Plan - 2:  -s/p  sepsis on recent admission completed IV antibiotic and her right-sided chest port was removed    h.    Problem/Plan - 3:  ·  Problem: Anemia.   ·  Plan: -Hgb was  6.6 on last admission will monitored no need for transfusion       Problem/Plan - 4:     ·  Problem: Multiple myeloma.   ·  Plan: -patient previously on revlimid per documentation       Problem/Plan - 5:  ·  Problem: Rheumatoid arthritis.   ·  Plan: -patient with ulnar deviation of b/l UE digits, documented RA       Problem/Plan -  6:  ·  Problem:  multiple pressure ulcer.  from her recent hospitalization  will  asked to be seen by wound team  local care   .    Problem/Plan -  7:  ·  Problem: History of esophagitis.   ·  Plan: -patient with grade B esophagitis on EGD 8/2023, also with Hill grade 3 gastroesophageal flap,   -home omeprazole not on formulary will order pantoprazole daily.    Problem/Plan - 8:  ·  Problem: Need for prophylactic measure.   ·  Plan: -DVT ppx: patient with documented hx hematoma after initial PEG placement in August 2023 ,  hold anticoagulation at present time for possible PEG insertion a.m.  -10/23/2023 her PEG tube insertion was canceled by anesthesia as per GI team will start her on antibiotic empirically and will do chest x-ray no lab test as per family request     DVT PPX:    ADVANCED DIRECTIVE:    DISPOSITION:

## 2023-10-23 NOTE — PROGRESS NOTE ADULT - NS ATTEND AMEND GEN_ALL_CORE FT
Discussed at length with HCP Lindsey re: PEG placement. Lindsey stated that patient would not want a feeding tube replaced and that would like to be "left alone". Reports no weight gain 3 months after placement of previous PEG. Patient reportedly can tolerate a puree diet but likely is not meeting her nutritional needs. Discussed that data on PEG in patients with advanced dementia showed limited benefit, with some data suggesting reduced QoL. Given the above, and in respect of the patient's wishes per Lindsey, decided to not proceed with PEG placement and concentrate on comfort care.   GI to sign off at this time, please re-consult as needed.

## 2023-10-23 NOTE — PROGRESS NOTE ADULT - SUBJECTIVE AND OBJECTIVE BOX
Interval Events: pt brought down to [facility] for peg replacement but case cancelled by anesthesia given cough and rhonchi on exam      Allergies:  No Known Allergies      Hospital Medications:  ascorbic acid 500 milliGRAM(s) Oral daily  azithromycin  IVPB      cefTRIAXone   IVPB 1000 milliGRAM(s) IV Intermittent every 24 hours  collagenase Ointment 1 Application(s) Topical two times a day  influenza  Vaccine (HIGH DOSE) 0.7 milliLiter(s) IntraMuscular once  magnesium hydroxide Suspension 5 milliLiter(s) Oral daily  mirtazapine 15 milliGRAM(s) Oral at bedtime  pantoprazole    Tablet 40 milliGRAM(s) Oral before breakfast  silver sulfADIAZINE 1% Cream 1 Application(s) Topical every 12 hours      ROS: unable to obtain from pt    Vital Signs:  Vital Signs Last 24 Hrs  T(C): 36.6 (23 Oct 2023 08:48), Max: 37.1 (22 Oct 2023 20:07)  T(F): 97.9 (23 Oct 2023 08:48), Max: 98.7 (22 Oct 2023 20:07)  HR: 86 (23 Oct 2023 08:48) (57 - 92)  BP: 135/95 (23 Oct 2023 08:48) (127/82 - 140/78)  BP(mean): --  RR: 20 (23 Oct 2023 08:48) (18 - 20)  SpO2: 100% (23 Oct 2023 08:48) (100% - 100%)    Parameters below as of 23 Oct 2023 08:48  Patient On (Oxygen Delivery Method): room air      Daily     Daily         LABS:                        7.9    5.54  )-----------( 268      ( 22 Oct 2023 08:08 )             26.3       10-22    133<L>  |  98  |  14  ----------------------------<  104<H>  4.7   |  24  |  0.38<L>    Ca    8.7      22 Oct 2023 08:08    TPro  7.0  /  Alb  2.7<L>  /  TBili  0.5  /  DBili  x   /  AST  61<H>  /  ALT  9   /  AlkPhos  122<H>  10-22    LIVER FUNCTIONS - ( 22 Oct 2023 08:08 )  Alb: 2.7 g/dL / Pro: 7.0 g/dL / ALK PHOS: 122 U/L / ALT: 9 U/L / AST: 61 U/L / GGT: x           PT/INR - ( 22 Oct 2023 08:08 )   PT: 13.7 sec;   INR: 1.23 ratio           Urinalysis Basic - ( 22 Oct 2023 08:08 )    Color: x / Appearance: x / SG: x / pH: x  Gluc: 104 mg/dL / Ketone: x  / Bili: x / Urobili: x   Blood: x / Protein: x / Nitrite: x   Leuk Esterase: x / RBC: x / WBC x   Sq Epi: x / Non Sq Epi: x / Bacteria: x                              7.9    5.54  )-----------( 268      ( 22 Oct 2023 08:08 )             26.3       PHYSICAL EXAM  GENERAL: lying in bed, frail, chronically ill appearing, in no apparent distress  HEENT: NCAT  EYES: anicteric sclerae, moist conjunctivae  NECK: trachea midline  CHEST:  respirations non labored  ABDOMEN:  soft, non-tender, dressing to old peg site  EXTREMITIES: WWP  SKIN:  warm/dry, no visible rash appreciated  PSYCH: awake but confused  NEURO: no tremor noted

## 2023-10-24 LAB
MRSA PCR RESULT.: SIGNIFICANT CHANGE UP
MRSA PCR RESULT.: SIGNIFICANT CHANGE UP
S AUREUS DNA NOSE QL NAA+PROBE: SIGNIFICANT CHANGE UP
S AUREUS DNA NOSE QL NAA+PROBE: SIGNIFICANT CHANGE UP

## 2023-10-24 PROCEDURE — 99223 1ST HOSP IP/OBS HIGH 75: CPT

## 2023-10-24 RX ORDER — PANTOPRAZOLE SODIUM 20 MG/1
40 TABLET, DELAYED RELEASE ORAL DAILY
Refills: 0 | Status: DISCONTINUED | OUTPATIENT
Start: 2023-10-24 | End: 2023-10-27

## 2023-10-24 RX ORDER — PANTOPRAZOLE SODIUM 20 MG/1
40 TABLET, DELAYED RELEASE ORAL
Refills: 0 | Status: DISCONTINUED | OUTPATIENT
Start: 2023-10-24 | End: 2023-10-24

## 2023-10-24 RX ADMIN — CEFTRIAXONE 100 MILLIGRAM(S): 500 INJECTION, POWDER, FOR SOLUTION INTRAMUSCULAR; INTRAVENOUS at 12:35

## 2023-10-24 RX ADMIN — CHLORHEXIDINE GLUCONATE 1 APPLICATION(S): 213 SOLUTION TOPICAL at 12:35

## 2023-10-24 RX ADMIN — PANTOPRAZOLE SODIUM 40 MILLIGRAM(S): 20 TABLET, DELAYED RELEASE ORAL at 06:42

## 2023-10-24 RX ADMIN — Medication 500 MILLIGRAM(S): at 11:36

## 2023-10-24 RX ADMIN — Medication 1 APPLICATION(S): at 17:32

## 2023-10-24 RX ADMIN — MAGNESIUM HYDROXIDE 5 MILLILITER(S): 400 TABLET, CHEWABLE ORAL at 11:37

## 2023-10-24 RX ADMIN — Medication 1 APPLICATION(S): at 06:41

## 2023-10-24 RX ADMIN — AZITHROMYCIN 255 MILLIGRAM(S): 500 TABLET, FILM COATED ORAL at 11:37

## 2023-10-24 RX ADMIN — MIRTAZAPINE 15 MILLIGRAM(S): 45 TABLET, ORALLY DISINTEGRATING ORAL at 21:49

## 2023-10-24 RX ADMIN — Medication 1 APPLICATION(S): at 06:42

## 2023-10-24 NOTE — DIETITIAN INITIAL EVALUATION ADULT - ORAL INTAKE PTA/DIET HISTORY
Per transfer paper from [facility], patient was on pureed feeds + PEG feeds of Jevity 1.5cal at goal rate of 50cc/hr provides total volume of 1000mL/d, 1500kcal, 63.8g pro.

## 2023-10-24 NOTE — DIETITIAN INITIAL EVALUATION ADULT - ETIOLOGY
increased demand for wound healing patient meets criteria for severe malnutrition in the context of chronic illness (advanced dementia)

## 2023-10-24 NOTE — DIETITIAN INITIAL EVALUATION ADULT - PERTINENT MEDS FT
MEDICATIONS  (STANDING):  ascorbic acid 500 milliGRAM(s) Oral daily  azithromycin  IVPB      azithromycin  IVPB 500 milliGRAM(s) IV Intermittent every 24 hours  cefTRIAXone   IVPB 1000 milliGRAM(s) IV Intermittent every 24 hours  chlorhexidine 2% Cloths 1 Application(s) Topical daily  collagenase Ointment 1 Application(s) Topical two times a day  influenza  Vaccine (HIGH DOSE) 0.7 milliLiter(s) IntraMuscular once  magnesium hydroxide Suspension 5 milliLiter(s) Oral daily  mirtazapine 15 milliGRAM(s) Oral at bedtime  pantoprazole  Injectable 40 milliGRAM(s) IV Push daily  silver sulfADIAZINE 1% Cream 1 Application(s) Topical every 12 hours    MEDICATIONS  (PRN):  acetaminophen   Oral Liquid .. 650 milliGRAM(s) Oral every 6 hours PRN Mild Pain (1 - 3), Moderate Pain (4 - 6)

## 2023-10-24 NOTE — CHART NOTE - NSCHARTNOTEFT_GEN_A_CORE
Spoke with patients HCP, Lindsey Mendiola (sister) [phone]. Per HCP Lindsey Mendiola, after thorough discussion with gastroenterologist, and previous ACP her current wishes are as follows:  - No PEG tube, continue with pureed diet.  - No more lab draws  - Continue IV Antibiotics    HCP Lindsey Mendiola made aware Medical Team she wishes for comforted centered care for patient, and she can notify team at any time regarding changes to her wishes.    Scooter Sosa PA-C  pager [phone]

## 2023-10-24 NOTE — CONSULT NOTE ADULT - ASSESSMENT
Assessment/Plan: 77 yo F PMH Alzheimer's dementia s/p PEG 8/2023, GE flap valve, grade B esophagitis, Multiple Myeloma (previously on Revlimid), cataracts, chronic venous ulcers b/l LE, OA knee, HTN, primary pulmonary hypertension,  was sent from NH today due to PEG malfunction.    Wound Consult requested to assist w/ management of bilateral buttocks Stage 2 pressure injury. Patient known to service line last seen on 10/13/23 for multiple full thickness pressure injuries. Patient was discharge the same day back to long term facility. Pending PEG tube placement to maximize nutrition.     full note to follow, orders in place.       Consider NICK/PVR  separate knees with pillows   Moisturize intact skin w/ SWEEN 24 moisturizer to lower extremities, daily. Avoid between toes or open wounds.   Pending Registered dietician consult for optimization as tolerated in pt w/ Protein Calorie Malnutrition        assists with meals while maintaining  aspiration precautions         consider MVI & Vit C to promote wound healing  Continue turning and positioning w/ offloading assistive devices as per protocol  Continue w/ Pericare as per protocol  Upgrade to Air fluidized bed given multiple full thickness injuries     Care as per medicine, will follow w/ you sporadically during hospital stay     Upon discharge f/u as outpatient at [facility] [address] [place], [place] [phone]  D/w team    Thank you for this consult  NAINA Cagle, CWJOHNATHONN (pager #[phone] or available in MS teams)    If after 4PM or before 7:30AM on Mon-Friday or weekend/holiday please contact general surgery for urgent matters.   Team A- [id number]   Team B- [id number]  For non-urgent matters e-mail [email or web address]    I spent 75  minutes face to face with this patient of which more than 50% of the time was spent counseling & coordinating care of this pt     Assessment/Plan: 79 yo F [facility] Alzheimer's dementia s/p PEG 8/2023, GE flap valve, grade B esophagitis, Multiple Myeloma (previously on Revlimid), cataracts, chronic venous ulcers b/l LE, OA knee, HTN, primary pulmonary hypertension,  was sent from NH  due to PEG malfunction.    Wound Consult requested to assist w/ management of bilateral buttocks Stage 2 pressure injury. Patient known to service line last seen on 10/13/23 for multiple full thickness pressure injuries. Patient was discharge the same day back to long term facility. Pending PEG tube placement to maximize nutrition.     Impression/recommendations   -Patient alert, dementia. severely contracted in upper and lower extremities, hands and fingers with arthritic changes, dependant in all ADLs. , thin frail. + cough. Incontinence of urine and stool.     Right lower back/upper buttock Stage 4 pressure injury complicated by friction   -Overall stable   -+ Undermining deepest extends 3cm from 3-4 o'clock   -CT of the ABD and pelvis 9/23/23, no mention of pressure injury. Remainder of findings as per primary team   -No associated cellulitis   -Minimal  bone palpable and exposed   -Non viable tendon and yellow slough, mostly pink agranular tissue   -Afebrile, WBC wnl (Of note last bloodwork  on 10/22/23)  -No fluctuance, no crepitus-No puss, no s/s of acute infection   -No sharp debridement indicated at this time as remaining slough is adhere   -No longer need chemical debridement with Dakins solution   -Continue with enzymatic debridement of necrotic tissue with collagenase and pack wound and undermining areas with Aquacel hydrofiber  -Continue to turn and position as per hospital protocol   - upgrade patient to Air fluidized therapy (Envella bed)  -Topical recommendations: Clean wound and periwound skin with NS. Pat dry. Apply liquid barrier film to periwound skin, allow to dry. Apply collagenase, nickel coin thickness over necrotic tissue, Lightly pack depth and undermining with Aquacel hydrofiber ribbon, leave 2 inches out to wick. Cover with silicone foam with border. Change daily or prn if soiled.     Left lower back/upper buttock Unstageable pressure injury complicated by pressure (prev evolving DTPI-->Now unstageable pressure injury)  -No s/s of acute infection   -Overall stable since last seen   -Tissue type mostly adhere gray/brown tan eschar, minimal purple maroon discoloration in superior aspect of wound   -No crepitus, no fluctuance.   -No purulence   -No sharp debridement indicated as eschar is adhere  -Topical recommendations: Clean wound and periwound skin with NS. Pat dry. Apply liquid barrier film to periwound skin. Allow to dry. Apply Medihoney gel to wound bed. Cover with silicone foam with border. Change daily or prn of soiled.   -Continue to turn and position as per  hospital protocol   -Upgrade to Air fluidized therapy (Envella bed)    left posterior trochanter (prev healing stage 2 pressure injury now healed as evident by hyperpigmentation.   Right trochanter with blanching erythema and bogginess (patient with tomas prominence)- Keep clean and dry. Apply liquid barrier film daily, May cover with silicone foam with border for protection against shearing. Change every other day, inspect skin daily.     Right medial leg wound secondary to venous insufficiency   -As per records chronic venous ulcers   -Healing ulcer exposing pink dermis and reepithelialization   -Would recommend Medihoney gel (antimicrobial gel) instead of current orders for silvadene cream BID   -Scant drainage   -Topical recommendations: Clean wound and periwound skin with NS. Pat dry. Apply liquid barrier film to periwound skin. Allow to dry. Apply Medihoney gel to wound bed. Cover with silicone foam with border. Change daily or prn if soiled.     Right foot skin changes secondary to vascular dx ? complicated by pressure (patient severely contracted).   -Overall stable, right lateral malleolus purple maroon discoloration resolved.   -Right 5th met head,  right medial great toe, Left medial lower leg, left anterior ankle with skin changes (purple maroon discoloration): Clean with NS. pat dry. Apply liquid barrier film daily.    -Consider NICK/PVR if aligned with GOC, no pulses palpated, multiple foot/toe wounds. No signs of overt ischemia (As per medical attending notes, would be difficult  to perform NICK given severe leg contractures).       Multiple Myeloma   -Management as per primary team          Anemia  -Management as per primary team     Additional Recs   -LUQ wound previous PEG tube site: Gently Clean with NS. pat dry. Apply liquid barrier film to periwound skin, allow to dry. Cover with silicone foam with border. Change daily or prn if soiled.    -Bilateral heels, apply liquid barrier film daily, cover with ABD pad and kerlix. secure with tape. Change daily.  -Offload heels with pillows   -Right sacrum healing pressure injury: Clean with NS. Pat dry. Apply liquid barrier film to periwound skin, allow to dry. Cover with silicone foam with border. Change daily or prn if soiled.   -Continue use of single breathable pad   -Continue to turn and position as per hospital protocol   -Apply Sween 24 moisturize to lower extremities, avoid between toes or open wounds. Apply daily.   -Continue turning and positioning w/ offloading assistive devices as per protocol  -Continue w/ Pericare as per protocol  separate knees with pillows   Moisturize intact skin w/ SWEEN 24 moisturizer to lower extremities, daily. Avoid between toes or open wounds.   Pending Registered dietician consult for optimization as tolerated in pt w/ Protein Calorie Malnutrition        assists with meals while maintaining  aspiration precautions         consider MVI, Vit C on board to promote wound healing  Continue turning and positioning w/ offloading assistive devices as per protocol  Continue w/ Pericare as per protocol  Upgrade to Air fluidized bed given multiple full thickness injuries     *Given multiple comorbidities, dementia. cachexia, multiple wounds, poor nutrition, anemia among others complete wound healing is guarded and patient identified at risks for further skin impairment. Continue vigilant incontinence care, single breathable pad, turning and position, assist with meals.     Care as per medicine, will follow w/ you periodically during hospital stay.     Upon discharge f/u as outpatient at [facility] [phone]   D/w team    Care as per medicine, will follow w/ you sporadically during hospital stay     Upon discharge f/u as outpatient at [facility] [phone]  D/w team    Thank you for this consult  NAINA Cagle, MELIDA (pager #[phone] or available in MS teams)    If after 4PM or before 7:30AM on Mon-Friday or weekend/holiday please contact general surgery for urgent matters.   Team A- [id number]   Team B- [id number]  For non-urgent matters e-mail [email or web address]    I spent 75  minutes face to face with this patient of which more than 50% of the time was spent counseling & coordinating care of this pt

## 2023-10-24 NOTE — PROGRESS NOTE ADULT - SUBJECTIVE AND OBJECTIVE BOX
CHIEF COMPLAINT:  patient was seen earlier today still and nonproductive cough awake and verbal and asking for food discussed with ACP team to start her on pure diet also discussed with her and healthcare proxy Lindsey on the phone in detail regarding her pneumonia and cancellation of the PEG insertion she was informed by GI consultant yesterday  SUBJECTIVE:     REVIEW OF SYSTEMS:    CONSTITUTIONAL: ( x )  weakness,  (  ) fevers or chills  EYES/ENT: (  )visual changes;     NECK: (  ) pain or stiffness  RESPIRATORY:   (x  )cough, wheezing, hemoptysis;  (  ) shortness of breath  CARDIOVASCULAR:  (  )chest pain or palpitations  GASTROINTESTINAL:   (  )abdominal or epigastric pain.  (  ) nausea, vomiting, or hematemesis;   (   ) diarrhea or constipation.   GENITOURINARY:   (    ) dysuria, frequency or hematuria  NEUROLOGICAL:  (   ) numbness or weakness   All other review of systems is negative unless indicated above    Vital Signs Last 24 Hrs  T(C): 36.9 (24 Oct 2023 13:27), Max: 36.9 (24 Oct 2023 06:30)  T(F): 98.4 (24 Oct 2023 13:27), Max: 98.4 (24 Oct 2023 06:30)  HR: 73 (24 Oct 2023 13:27) (73 - 97)  BP: 133/83 (24 Oct 2023 13:27) (130/78 - 133/83)  BP(mean): --  RR: 18 (24 Oct 2023 13:27) (17 - 18)  SpO2: 96% (24 Oct 2023 13:27) (95% - 96%)    Parameters below as of 24 Oct 2023 13:27  Patient On (Oxygen Delivery Method): room air        I&O's Summary      CAPILLARY BLOOD GLUCOSE      POCT Blood Glucose.: 74 mg/dL (23 Oct 2023 22:33)      PHYSICAL EXAM:    Constitutional:  ( x  ) NAD,   ( x  )awake and alert  HEENT: PERR, EOMI,    Neck: Soft and supple, No LAD, No JVD  Respiratory:  (   x Breath sounds are clear bilaterally,    ( x  )  rhonchi  Cardiovascular:     (  x )S1 and S2, regular rate and rhythm, no Murmurs, gallops or rubs  Gastrointestinal:  ( x  )Bowel Sounds present, soft,   (  )nontender, nondistended,    Extremities:    (  ) peripheral edema  Vascular: 2+ peripheral pulses  Neurological:    (  x  )A/O x 2   (  ) focal deficits  Musculoskeletal:    (   )  normal strength b/l upper  (     ) normal  lower extremities  Skin: No rashes    MEDICATIONS:  MEDICATIONS  (STANDING):  ascorbic acid 500 milliGRAM(s) Oral daily  azithromycin  IVPB      azithromycin  IVPB 500 milliGRAM(s) IV Intermittent every 24 hours  cefTRIAXone   IVPB 1000 milliGRAM(s) IV Intermittent every 24 hours  chlorhexidine 2% Cloths 1 Application(s) Topical daily  collagenase Ointment 1 Application(s) Topical two times a day  influenza  Vaccine (HIGH DOSE) 0.7 milliLiter(s) IntraMuscular once  magnesium hydroxide Suspension 5 milliLiter(s) Oral daily  mirtazapine 15 milliGRAM(s) Oral at bedtime  pantoprazole  Injectable 40 milliGRAM(s) IV Push daily  silver sulfADIAZINE 1% Cream 1 Application(s) Topical every 12 hours      LABS: All Labs Reviewed:                Blood Culture:   Urine Culture      RADIOLOGY/EKG:  PROCEDURE DATE:  10/23/2023          INTERPRETATION:  EXAMINATION: XR CHEST URGENT    CLINICAL INDICATION: Cough.    TECHNIQUE: Single frontal, portable view of the chest was obtained.    COMPARISON: Chest x-ray 10/22/2023.    FINDINGS:  Evaluation is limited secondary to rotated film.  Heart/Vascular: Heart size is borderline.  Pulmonary:Questionable left lower/retrocardiac opacity. No pleural   effusion. No pneumothorax.  Bones: No acute bony abnormalities.  Lines and tubes: None.    IMPRESSION:  Limited evaluation.    Questionable left lower/retrocardiac opacity.  ASSESSMENT AND PLAN:  77 yo F PMH Alzheimer's dementia s/p PEG 8/2023, GE flap valve, grade B esophagitis, Multiple Myeloma (previously on Revlimid), cataracts, chronic venous ulcers b/l LE, OA knee, HTN, primary pulmonary hypertension,  was sent from NH today due to PEG malfunction.        Problem/Plan - 1:  ·  Problem:  PEG malfunction was seen by GI  to  replaced  PEG in a.m. keep  NPO tonight  ·         Problem/Plan - 2:  -s/p  sepsis on recent admission completed IV antibiotic and her right-sided chest port was removed    h.    Problem/Plan - 3:  ·  Problem: Anemia.   ·  Plan: -Hgb was  6.6 on last admission will monitored no need for transfusion       Problem/Plan - 4:     ·  Problem: Multiple myeloma.   ·  Plan: -patient previously on revlimid per documentation       Problem/Plan - 5:  ·  Problem: Rheumatoid arthritis.   ·  Plan: -patient with ulnar deviation of b/l UE digits, documented RA       Problem/Plan -  6:  ·  Problem:  multiple pressure ulcer.  from her recent hospitalization  will  asked to be seen by wound team  local care   .    Problem/Plan -  7:  ·  Problem: History of esophagitis.   ·  Plan: -patient with grade B esophagitis on EGD 8/2023, also with Hill grade 3 gastroesophageal flap,   -home omeprazole not on formulary will order pantoprazole daily.    Problem/Plan - 8:  ·  Problem: Need for prophylactic measure.   ·  Plan: -DVT ppx: patient with documented hx hematoma after initial PEG placement in August 2023 ,  hold anticoagulation at present time for possible PEG insertion a.m.  -10/23/2023 her PEG tube insertion was canceled by anesthesia as per GI team will start her on antibiotic empirically and will do chest x-ray no lab test as per family request  -10/24/2023 continue Zithromax 500 mg daily and Rocephin 1 g daily no lab test done as per family request       DVT PPX:    ADVANCED DIRECTIVE:    DISPOSITION:

## 2023-10-24 NOTE — DIETITIAN INITIAL EVALUATION ADULT - ORAL NUTRITION SUPPLEMENTS
department will provide [employer] Vital Shake BID (520kcal, 22g pro per container) and Magic Cup daily (290kcal, 9gm pro) BID for added calories and protein.

## 2023-10-24 NOTE — DIETITIAN INITIAL EVALUATION ADULT - REASON FOR ADMISSION
Gastrostomy malfunction    Per chart review, 79 yo F medical history inclusive of Alzheimer's dementia s/p PEG 8/2023, GE flap valve, grade B esophagitis, Multiple Myeloma (previously on Revlimid), cataracts, chronic venous ulcers b/l LE, OA knee, HTN, primary pulmonary hypertension, MDD sent from NH given PEG dislodgement.

## 2023-10-24 NOTE — DIETITIAN INITIAL EVALUATION ADULT - PERTINENT LABORATORY DATA
POCT Blood Glucose.: 74 mg/dL (10-23-23 @ 22:33)  A1C with Estimated Average Glucose Result: 4.8 % (08-02-23 @ 05:40)

## 2023-10-24 NOTE — DIETITIAN INITIAL EVALUATION ADULT - OTHER INFO
Patient currently maintained on pleasure feeds, Pureed Diet per medical team. Patient with poor po intake <25% of meals on appetite stimulant Mirtazapine. No nausea/vomiting/diarrhea/constipation or difficulty chewing and swallowing to current consistency reported. Last bowel movement on 10/24. As per GI consult note on 10/23, discussion with patient sister (HCP Lindsey) regarding PEG placement, patient would not want a feeding tube replaced and that would like to be "left alone".  Per report, no weight gain 3 months after placement of previous PEG. Patient reportedly can tolerate a puree diet but predicted not meeting her nutritional needs. In respect of the patient's wishes per Lindsey, decided to not proceed with PEG placement and concentrate on comfort care at this time.

## 2023-10-24 NOTE — CHART NOTE - NSCHARTNOTEFT_GEN_A_CORE
As per Dr. Vasques, pt is requesting to eat, and can eat Pureed diet    MICHELLE VelardeC  pager [phone]

## 2023-10-24 NOTE — DIETITIAN INITIAL EVALUATION ADULT - NSFNSPHYEXAMSKINFT_GEN_A_CORE
Per wound care note 10/24/23    Right upper buttock/lower back- stage 4 pressure injury  Left upper buttock/lower back Unstageable pressure injury

## 2023-10-24 NOTE — CONSULT NOTE ADULT - SUBJECTIVE AND OBJECTIVE BOX
Wound SURGERY CONSULT NOTE    HPI: 79 yo F PMH Alzheimer's dementia s/p PEG 8/2023, GE flap valve, grade B esophagitis, Multiple Myeloma (previously on Revlimid), cataracts, chronic venous ulcers b/l LE, OA knee, HTN, primary pulmonary hypertension, I was called 5:30 AM  that  from NH given PEG dislodged recommended to be sent to the ER replace, ER team unable to replace that you due to bleeding in the area, stoma Was closed. GI consult was called for replacement  she was seen with GI team at the bedside discussed with them in detail that she has always being able to eat pureed diet. An artificial tube feeding was  for supplemental and maintain her calorie intake due to multiple bedsore. Discussed with her healthcare proxy Lindsey Mendiola regarding the new PEG placement (22 Oct 2023 11:12)    Wound consult requested to assist w/ management of  bilateral buttocks stage 2 pressure injuries. Patient alert, AOX1, reports she is in " [place]". pleasant but confused. She is aware she has wounds, " you are here to treat my wounds,  Thank you so much". Patient denies associated pain, No family at the bedside.     Current Diet: Diet, Pureed (10-24-23 @ 09:04)      PAST MEDICAL & SURGICAL HISTORY:  Multiple myeloma      Dementia      Rheumatoid arthritis      Mild protein-calorie malnutrition      Primary pulmonary hypertension      Osteoarthritis      Cataract      MDD (major depressive disorder)      H/O left knee surgery    REVIEW OF SYSTEMS: Pt unable to offer  General,  see above     MEDICATIONS  (STANDING):  ascorbic acid 500 milliGRAM(s) Oral daily  azithromycin  IVPB      azithromycin  IVPB 500 milliGRAM(s) IV Intermittent every 24 hours  cefTRIAXone   IVPB 1000 milliGRAM(s) IV Intermittent every 24 hours  chlorhexidine 2% Cloths 1 Application(s) Topical daily  collagenase Ointment 1 Application(s) Topical two times a day  influenza  Vaccine (HIGH DOSE) 0.7 milliLiter(s) IntraMuscular once  magnesium hydroxide Suspension 5 milliLiter(s) Oral daily  mirtazapine 15 milliGRAM(s) Oral at bedtime  pantoprazole  Injectable 40 milliGRAM(s) IV Push daily  silver sulfADIAZINE 1% Cream 1 Application(s) Topical every 12 hours    MEDICATIONS  (PRN):  acetaminophen   Oral Liquid .. 650 milliGRAM(s) Oral every 6 hours PRN Mild Pain (1 - 3), Moderate Pain (4 - 6)    Allergies    No Known Allergies    Intolerances    SOCIAL HISTORY:  NH resident, DNR/DNI. Unable to obtain substance use hx.     FAMILY HISTORY:    PHYSICAL EXAM:  Vital Signs Last 24 Hrs  T(C): 36.9 (24 Oct 2023 06:30), Max: 37.1 (23 Oct 2023 21:10)  T(F): 98.4 (24 Oct 2023 06:30), Max: 98.7 (23 Oct 2023 21:10)  HR: 97 (24 Oct 2023 06:30) (83 - 97)  BP: 130/78 (24 Oct 2023 06:30) (100/54 - 130/78)  BP(mean): --  RR: 17 (24 Oct 2023 06:30) (17 - 17)  SpO2: 95% (24 Oct 2023 06:30) (95% - 100%)    Parameters below as of 24 Oct 2023 06:30  Patient On (Oxygen Delivery Method): room air    Weight (kg): 42 (22 Oct 2023 20:07)  BMI (kg/m2): 14.5 (22 Oct 2023 20:07)    Constitutional: NAD/AOX1, dementia, alert, frail, ill appearing. Cachectic.   (+) low airloss support surface, (+) fluidized positioning devices, (+) complete cair boots  HEENT: NC/AT, non icteric, mucosa moist, throat clear, trachea midline, neck supple  Cardiovascular: Rate regular to tachy   Respiratory: Equal chest expansion, room air, + cough. NAD.   Gastrointestinal soft NT/ND, LUQ + hypergranular tissue from previous PEG tube site (8jvi0ji, flushed with skin), no associated cellulitis. Friable, no active bleeding. Silicone foam placed.   : incontinence of urine, external female urinary  in place, Purewick.   Neurology : weakened strength, able to follow simple commands, functional paraplegic.   Musculoskeletal:  limited aROM, bilateral knees contracted worse to right knee, muscle wasting, Adrian neck deformities to bilateral hands.   Vascular: BLE equally warm, capillary refill grater than 3sec,  no cyanosis, clubbing, edema. Ecchymosis to bilateral lower extremities.  Left lateral malleolus tattoo.  Thicken curled great toenails.   DP/PT pulses non palpable  no overt  ischemia noted  hemosiderin staining  Right medial healing shallow leg ulcers exposing 100% pink moist dermis and re-epithelization at wound edges.   Left leg previous shallow wound now with non fluctuant scab and reabsorbed blood filled blister entire area measuring 7mdr7fer9ku, (prev open wound measuring 0.6cmx0.6cmx0.1cm) No tissue type changes palpated.   Right medial leg healing shallow wound- 6qqw9mum9.1cm (prev 2cmx1.5cmx0.1cm)  No associated cellulitis, scant serous drainage. No odor.   Medihoney gel applied to wound and covered with silicone foam with border.   Multiple skin changes to bilateral feet.   Right 5th met head purple maroon discoloration measuring- 0.1zzv4soz3kp, (prev 4noc8mzb2zs), stable, + bogginess underneath purple maroon discoloration. No associated cellulitis   Right medial MPT joint of great toe-two intact reabsorbing fluid filled blister with purple maroon at the base. Entire area measuring 4ybi8brm6ln, No associated cellulitis. No drainage.   Left lateral 5th met head MPT blanching erythema resolved.   Skin:  poor  turgor  frail, ecchymosis w/o hematoma in upper extremities   Left trochanter with hyperpigmentation (prev healing stage 2 pressure injury)  Right trochanter with area of blanching erythema with palpable bogginess (new since last seen)- 2kgp5gug4cx. Silicone foam with border in place.   Left medial thigh linear hyperpigmentation w/o tissue type changes palpated- LBF applied.   Right sacrum shallow isolated ulceration surrounded by blanching erythema (new since last seen)- 0.5cmx0.3cmx0.1cm, tissue type exposing 100% pink moist dermis, no drainage. NO odor. Silicone foam with border placed.    Left upper buttock/lower back Unstageable pressure injury complicated by friction (prev evolving DTPI)- 7cmx3.5cmx0.3cm (prev 3lgd3oyd7.4cm), Unable to determine true  anatomical depth given necrotic tissue. Tissue type exposing 95% tan/brown adhere eschar and 5% purple maroon discoloration in superior aspect of wound. Minimally moist, No crepitus no fluctuance. Scant serous drainage. No odor. Periwound skin with hyperpigmentation.  no temperature changes. Medihoney gel applied over wound bed and cover with silicone foam with border.   Right upper buttock/lower back- stage 4 pressure injury complicated by friction- (heart shape)- 4vbz9ofz4.5cm, (prev 5cmx4.5cmx1.8cm), Undermining from 12-5 o'clock deepest at 3-4 o'clock extends 3cm (prev 2.5cm).  No purulence expressed. No crepitus, no fluctuance. Mixed tissue type centrally pink moist agranular tissue at least 70% and remaining yellow moist adhere slough/non viable tendon Bone is palpable and visualized (less than 5%). . Periwound skin with fading blanching erythema extending less than 2cm. Small serosanguinous drainage, no odor. No associated cellulitis. Wound packed with Aquacel hydrofiber ribbon.       LABS/ CULTURES/ RADIOLOGY:      133  |  98  |  14  ----------------------------<  104      [10-22-23 @ 08:08]  4.7   |  24  |  0.38        Ca     8.7     [10-22-23 @ 08:08]                               Wound SURGERY CONSULT NOTE    HPI: 77 yo F PMH Alzheimer's dementia s/p PEG 8/2023, GE flap valve, grade B esophagitis, Multiple Myeloma (previously on Revlimid), cataracts, chronic venous ulcers b/l LE, OA knee, HTN, primary pulmonary hypertension, medical attending called at 5:30 AM  that  from NH given PEG dislodged recommended to be sent to the ER replace, ER team unable to replace that you due to bleeding in the area, stoma Was closed. GI consult was called for replacement  she was seen with GI team at the bedside discussed with them in detail that she has always being able to eat pureed diet. An artificial tube feeding was  for supplemental and maintain her calorie intake due to multiple bedsore. Discussed with her healthcare proxy Lindsey Mendiola regarding the new PEG placement (22 Oct 2023 11:12)    Wound consult requested to assist w/ management of  bilateral buttocks stage 2 pressure injuries. Patient alert, AOX1, reports she is in " [place]". pleasant but confused. She is aware she has wounds, " you are here to treat my wounds,  Thank you so much". Patient denies associated pain, No family at the bedside.     Current Diet: Diet, Pureed (10-24-23 @ 09:04)      PAST MEDICAL & SURGICAL HISTORY:  Multiple myeloma      Dementia      Rheumatoid arthritis      Mild protein-calorie malnutrition      Primary pulmonary hypertension      Osteoarthritis      Cataract      MDD (major depressive disorder)      H/O left knee surgery    REVIEW OF SYSTEMS: Pt unable to offer  General,  see above     MEDICATIONS  (STANDING):  ascorbic acid 500 milliGRAM(s) Oral daily  azithromycin  IVPB      azithromycin  IVPB 500 milliGRAM(s) IV Intermittent every 24 hours  cefTRIAXone   IVPB 1000 milliGRAM(s) IV Intermittent every 24 hours  chlorhexidine 2% Cloths 1 Application(s) Topical daily  collagenase Ointment 1 Application(s) Topical two times a day  influenza  Vaccine (HIGH DOSE) 0.7 milliLiter(s) IntraMuscular once  magnesium hydroxide Suspension 5 milliLiter(s) Oral daily  mirtazapine 15 milliGRAM(s) Oral at bedtime  pantoprazole  Injectable 40 milliGRAM(s) IV Push daily  silver sulfADIAZINE 1% Cream 1 Application(s) Topical every 12 hours    MEDICATIONS  (PRN):  acetaminophen   Oral Liquid .. 650 milliGRAM(s) Oral every 6 hours PRN Mild Pain (1 - 3), Moderate Pain (4 - 6)    Allergies    No Known Allergies    Intolerances    SOCIAL HISTORY:  [facility] resident, DNR/DNI. Unable to obtain substance use hx.     FAMILY HISTORY:    PHYSICAL EXAM:  Vital Signs Last 24 Hrs  T(C): 36.9 (24 Oct 2023 06:30), Max: 37.1 (23 Oct 2023 21:10)  T(F): 98.4 (24 Oct 2023 06:30), Max: 98.7 (23 Oct 2023 21:10)  HR: 97 (24 Oct 2023 06:30) (83 - 97)  BP: 130/78 (24 Oct 2023 06:30) (100/54 - 130/78)  BP(mean): --  RR: 17 (24 Oct 2023 06:30) (17 - 17)  SpO2: 95% (24 Oct 2023 06:30) (95% - 100%)    Parameters below as of 24 Oct 2023 06:30  Patient On (Oxygen Delivery Method): room air    Weight (kg): 42 (22 Oct 2023 20:07)  BMI (kg/m2): 14.5 (22 Oct 2023 20:07)    Constitutional: NAD/AOX1, dementia, alert, frail, ill appearing. Cachectic.   (+) low airloss support surface, (+) fluidized positioning devices, (+) complete cair boots  HEENT: NC/AT, non icteric, mucosa moist, throat clear, trachea midline, neck supple  Cardiovascular: Rate regular to tachy   Respiratory: Equal chest expansion, room air, + cough. NAD.   Gastrointestinal soft NT/ND, LUQ + hypergranular tissue from previous PEG tube site (0wvq0uz, flushed with skin), no associated cellulitis. Friable, no active bleeding. Silicone foam placed.   : incontinence of urine, external female urinary  in place, Purewick.   Neurology : weakened strength, able to follow simple commands, functional paraplegic.   Musculoskeletal:  limited aROM, bilateral knees contracted worse to right knee, muscle wasting, Asher neck deformities to bilateral hands.   Vascular: BLE equally warm, capillary refill grater than 3sec,  no cyanosis, clubbing, edema. Ecchymosis to bilateral lower extremities.  Left lateral malleolus tattoo.  Thicken curled great toenails.   DP/PT pulses non palpable  no overt  ischemia noted  hemosiderin staining  Right medial healing shallow leg ulcers exposing 100% pink moist dermis and re-epithelization at wound edges.   Left leg previous shallow wound now with non fluctuant scab and reabsorbed blood filled blister entire area measuring 3ils0vvi6wr, (prev open wound measuring 0.6cmx0.6cmx0.1cm) No tissue type changes palpated.   Right medial leg healing shallow wound- 7tyh1int6.1cm (prev 2cmx1.5cmx0.1cm)  No associated cellulitis, scant serous drainage. No odor.   Medihoney gel applied to wound and covered with silicone foam with border.   Multiple skin changes to bilateral feet.   Right 5th met head purple maroon discoloration measuring- 0.3hvr9hll4bz, (prev 2dzz6bdl7wx), stable, + bogginess underneath purple maroon discoloration. No associated cellulitis   Right medial MPT joint of great toe-two intact reabsorbing fluid filled blister with purple maroon at the base. Entire area measuring 2ajz1zqk0py, No associated cellulitis. No drainage.   Left lateral 5th met head MPT blanching erythema resolved.   Skin:  poor  turgor  frail, ecchymosis w/o hematoma in upper extremities   Left trochanter with hyperpigmentation (prev healing stage 2 pressure injury)  Right trochanter with area of blanching erythema with palpable bogginess (new since last seen)- 3dif2xos8pc. Silicone foam with border in place.   Left medial thigh linear hyperpigmentation w/o tissue type changes palpated- LBF applied.   Right sacrum shallow isolated ulceration surrounded by blanching erythema (new since last seen)- 0.5cmx0.3cmx0.1cm, tissue type exposing 100% pink moist dermis, no drainage. NO odor. Silicone foam with border placed.    Left upper buttock/lower back Unstageable pressure injury complicated by friction (prev evolving DTPI)- 7cmx3.5cmx0.3cm (prev 9enj7ulc0.4cm), Unable to determine true  anatomical depth given necrotic tissue. Tissue type exposing 95% tan/brown adhere eschar and 5% purple maroon discoloration in superior aspect of wound. Minimally moist, No crepitus no fluctuance. Scant serous drainage. No odor. Periwound skin with hyperpigmentation.  no temperature changes. Medihoney gel applied over wound bed and cover with silicone foam with border.   Right upper buttock/lower back- stage 4 pressure injury complicated by friction- (heart shape)- 7bjy2kpp8.5cm, (prev 5cmx4.5cmx1.8cm), Undermining from 12-5 o'clock deepest at 3-4 o'clock extends 3cm (prev 2.5cm).  No purulence expressed. No crepitus, no fluctuance. Mixed tissue type centrally pink moist agranular tissue at least 70% and remaining yellow moist adhere slough/non viable tendon Bone is palpable and visualized (less than 5%). . Periwound skin with fading blanching erythema extending less than 2cm. Small serosanguinous drainage, no odor. No associated cellulitis. Wound packed with Aquacel hydrofiber ribbon.       LABS/ CULTURES/ RADIOLOGY:      133  |  98  |  14  ----------------------------<  104      [10-22-23 @ 08:08]  4.7   |  24  |  0.38        Ca     8.7     [10-22-23 @ 08:08]    < from: CT Abdomen and Pelvis No Cont (09.23.23 @ 08:57) >    ACC: [id number] EXAM:  CT ABDOMEN AND PELVIS   ORDERED BY: ELDA PRICE     PROCEDURE DATE:  09/23/2023          INTERPRETATION:  CLINICAL INFORMATION: Patient with Alzheimer's dementia,   multiple myeloma with recent PEG placement, evaluate for hematoma.    COMPARISON: None.    CONTRAST/COMPLICATIONS:  IV Contrast: None  Oral Contrast: None  Complications: None    PROCEDURE:  CT of the Abdomen and Pelvis was performed.  Sagittal and coronal reformats were performed.    FINDINGS:  Motion degraded study    LOWER CHEST: Partially visualized right chest port, with tip terminating   in the lower SVC. Ascending aortic dilation measuring up to 4.2 cm in   diameter. Descending thoracic aorta at the level of T10 measures up to   3.7 cm in diameter. Small bilateral pleural effusions with associated   passive atelectasis and groundglass opacities. Coronary artery and aortic   calcifications. Mild cardiomegaly.    LIVER: Punctate calcifications in the right hepatic lobe.  BILE DUCTS: Normal caliber.  GALLBLADDER: Cholecystectomy.  SPLEEN: Within normal limits.  PANCREAS: Within normal limits.  ADRENALS: Within normal limits.  KIDNEYS/URETERS: Large left renal cyst. No hydronephrosis.    BLADDER: Within normal limits.  REPRODUCTIVE ORGANS: Uterus and adnexa within normal limits.    BOWEL: No bowel obstruction. Appendix is normal. PEG tube with tip in the   stomach lumen.  PERITONEUM: No ascites.  VESSELS: Atherosclerotic changes.  RETROPERITONEUM/LYMPH NODES: No lymphadenopathy.  ABDOMINAL WALL: Anterior abdominal wall PEG tube, no associated   subcutaneous soft tissue swelling or hematoma visualized.  BONES: Degenerative changes. Mild anterior compression fracture and   retrolisthesis of L2. Mild compression fracture of T11. Severe   degenerative changes of the left hip.    IMPRESSION:  No abdominal wall hematoma or subcutaneous soft tissue swelling at the   PEG tube insertion site.    No hematoma elsewhere in the abdomen or pelvis.    Ascending and descending thoracic aortic dilation.        --- End of Report ---          MARTHA KANG MD; Resident Radiologist  This document has been electronically signed.  ROSETTE CHANDLER MD; Attending Radiologist  This document has been electronically signed. Sep 23 2023  1:31PM    < end of copied text >

## 2023-10-25 LAB
ANION GAP SERPL CALC-SCNC: 11 MMOL/L — SIGNIFICANT CHANGE UP (ref 7–14)
ANION GAP SERPL CALC-SCNC: 11 MMOL/L — SIGNIFICANT CHANGE UP (ref 7–14)
APTT BLD: 28.8 SEC — SIGNIFICANT CHANGE UP (ref 24.5–35.6)
APTT BLD: 28.8 SEC — SIGNIFICANT CHANGE UP (ref 24.5–35.6)
BLD GP AB SCN SERPL QL: NEGATIVE — SIGNIFICANT CHANGE UP
BLD GP AB SCN SERPL QL: NEGATIVE — SIGNIFICANT CHANGE UP
BUN SERPL-MCNC: 16 MG/DL — SIGNIFICANT CHANGE UP (ref 7–23)
BUN SERPL-MCNC: 16 MG/DL — SIGNIFICANT CHANGE UP (ref 7–23)
CALCIUM SERPL-MCNC: 8.5 MG/DL — SIGNIFICANT CHANGE UP (ref 8.4–10.5)
CALCIUM SERPL-MCNC: 8.5 MG/DL — SIGNIFICANT CHANGE UP (ref 8.4–10.5)
CHLORIDE SERPL-SCNC: 102 MMOL/L — SIGNIFICANT CHANGE UP (ref 98–107)
CHLORIDE SERPL-SCNC: 102 MMOL/L — SIGNIFICANT CHANGE UP (ref 98–107)
CO2 SERPL-SCNC: 25 MMOL/L — SIGNIFICANT CHANGE UP (ref 22–31)
CO2 SERPL-SCNC: 25 MMOL/L — SIGNIFICANT CHANGE UP (ref 22–31)
CREAT SERPL-MCNC: 0.48 MG/DL — LOW (ref 0.5–1.3)
CREAT SERPL-MCNC: 0.48 MG/DL — LOW (ref 0.5–1.3)
EGFR: 97 ML/MIN/1.73M2 — SIGNIFICANT CHANGE UP
EGFR: 97 ML/MIN/1.73M2 — SIGNIFICANT CHANGE UP
GLUCOSE SERPL-MCNC: 73 MG/DL — SIGNIFICANT CHANGE UP (ref 70–99)
GLUCOSE SERPL-MCNC: 73 MG/DL — SIGNIFICANT CHANGE UP (ref 70–99)
HCT VFR BLD CALC: 28.1 % — LOW (ref 34.5–45)
HCT VFR BLD CALC: 28.1 % — LOW (ref 34.5–45)
HGB BLD-MCNC: 8.1 G/DL — LOW (ref 11.5–15.5)
HGB BLD-MCNC: 8.1 G/DL — LOW (ref 11.5–15.5)
INR BLD: 1.11 RATIO — SIGNIFICANT CHANGE UP (ref 0.85–1.18)
INR BLD: 1.11 RATIO — SIGNIFICANT CHANGE UP (ref 0.85–1.18)
MAGNESIUM SERPL-MCNC: 2 MG/DL — SIGNIFICANT CHANGE UP (ref 1.6–2.6)
MAGNESIUM SERPL-MCNC: 2 MG/DL — SIGNIFICANT CHANGE UP (ref 1.6–2.6)
MCHC RBC-ENTMCNC: 21.1 PG — LOW (ref 27–34)
MCHC RBC-ENTMCNC: 21.1 PG — LOW (ref 27–34)
MCHC RBC-ENTMCNC: 28.8 GM/DL — LOW (ref 32–36)
MCHC RBC-ENTMCNC: 28.8 GM/DL — LOW (ref 32–36)
MCV RBC AUTO: 73.4 FL — LOW (ref 80–100)
MCV RBC AUTO: 73.4 FL — LOW (ref 80–100)
NRBC # BLD: 0 /100 WBCS — SIGNIFICANT CHANGE UP (ref 0–0)
NRBC # BLD: 0 /100 WBCS — SIGNIFICANT CHANGE UP (ref 0–0)
NRBC # FLD: 0 K/UL — SIGNIFICANT CHANGE UP (ref 0–0)
NRBC # FLD: 0 K/UL — SIGNIFICANT CHANGE UP (ref 0–0)
PHOSPHATE SERPL-MCNC: 1.9 MG/DL — LOW (ref 2.5–4.5)
PHOSPHATE SERPL-MCNC: 1.9 MG/DL — LOW (ref 2.5–4.5)
PLATELET # BLD AUTO: 259 K/UL — SIGNIFICANT CHANGE UP (ref 150–400)
PLATELET # BLD AUTO: 259 K/UL — SIGNIFICANT CHANGE UP (ref 150–400)
POTASSIUM SERPL-MCNC: 3.6 MMOL/L — SIGNIFICANT CHANGE UP (ref 3.5–5.3)
POTASSIUM SERPL-MCNC: 3.6 MMOL/L — SIGNIFICANT CHANGE UP (ref 3.5–5.3)
POTASSIUM SERPL-SCNC: 3.6 MMOL/L — SIGNIFICANT CHANGE UP (ref 3.5–5.3)
POTASSIUM SERPL-SCNC: 3.6 MMOL/L — SIGNIFICANT CHANGE UP (ref 3.5–5.3)
PROTHROM AB SERPL-ACNC: 12.5 SEC — SIGNIFICANT CHANGE UP (ref 9.5–13)
PROTHROM AB SERPL-ACNC: 12.5 SEC — SIGNIFICANT CHANGE UP (ref 9.5–13)
RBC # BLD: 3.83 M/UL — SIGNIFICANT CHANGE UP (ref 3.8–5.2)
RBC # BLD: 3.83 M/UL — SIGNIFICANT CHANGE UP (ref 3.8–5.2)
RBC # FLD: 22.4 % — HIGH (ref 10.3–14.5)
RBC # FLD: 22.4 % — HIGH (ref 10.3–14.5)
RH IG SCN BLD-IMP: POSITIVE — SIGNIFICANT CHANGE UP
RH IG SCN BLD-IMP: POSITIVE — SIGNIFICANT CHANGE UP
SODIUM SERPL-SCNC: 138 MMOL/L — SIGNIFICANT CHANGE UP (ref 135–145)
SODIUM SERPL-SCNC: 138 MMOL/L — SIGNIFICANT CHANGE UP (ref 135–145)
WBC # BLD: 4.08 K/UL — SIGNIFICANT CHANGE UP (ref 3.8–10.5)
WBC # BLD: 4.08 K/UL — SIGNIFICANT CHANGE UP (ref 3.8–10.5)
WBC # FLD AUTO: 4.08 K/UL — SIGNIFICANT CHANGE UP (ref 3.8–10.5)
WBC # FLD AUTO: 4.08 K/UL — SIGNIFICANT CHANGE UP (ref 3.8–10.5)

## 2023-10-25 PROCEDURE — 49450 REPLACE G/C TUBE PERC: CPT

## 2023-10-25 RX ADMIN — Medication 1 APPLICATION(S): at 05:27

## 2023-10-25 RX ADMIN — Medication 500 MILLIGRAM(S): at 12:40

## 2023-10-25 RX ADMIN — MIRTAZAPINE 15 MILLIGRAM(S): 45 TABLET, ORALLY DISINTEGRATING ORAL at 21:36

## 2023-10-25 RX ADMIN — CEFTRIAXONE 100 MILLIGRAM(S): 500 INJECTION, POWDER, FOR SOLUTION INTRAMUSCULAR; INTRAVENOUS at 13:29

## 2023-10-25 RX ADMIN — AZITHROMYCIN 255 MILLIGRAM(S): 500 TABLET, FILM COATED ORAL at 10:40

## 2023-10-25 RX ADMIN — CHLORHEXIDINE GLUCONATE 1 APPLICATION(S): 213 SOLUTION TOPICAL at 12:40

## 2023-10-25 RX ADMIN — PANTOPRAZOLE SODIUM 40 MILLIGRAM(S): 20 TABLET, DELAYED RELEASE ORAL at 05:26

## 2023-10-25 RX ADMIN — MAGNESIUM HYDROXIDE 5 MILLILITER(S): 400 TABLET, CHEWABLE ORAL at 12:40

## 2023-10-25 NOTE — CONSULT NOTE ADULT - SUBJECTIVE AND OBJECTIVE BOX
Reason for Consult: 78y Female     History of Present Illness:     79 yo F PMH Alzheimer's dementia s/p PEG 8/2023, GE flap valve, grade B esophagitis, Multiple Myeloma (previously on Revlimid), cataracts, chronic venous ulcers b/l LE, OA knee, HTN, primary pulmonary hypertension presents with dislodged PEG tube. Per discussion with primary team, Tube is believed to have dislodged on morning of 10/22, initially attempted bedside replacement, then endoscopic placement with GI, however case was cancelled due to patient cough and possible infiltrate on chest xray. It was at that time, the patients sister, the HCP, expressed no further interest in feeding tube replacement for her sister. After further discussions with care teams regarding patients nutritional status, HCP is agreeable to one last attempt for tube replacement with IR only, as she considers less invasive. IR consulted for evaluation       Pertinent PMH/PSH:     Multiple myeloma    Dementia    Rheumatoid arthritis    Mild protein-calorie malnutrition    Primary pulmonary hypertension    Osteoarthritis    Cataract    MDD (major depressive disorder)    H/O left knee surgery        Allergies:     No Known Allergies      Medications:     pantoprazole  Injectable  acetaminophen   Oral Liquid ..  chlorhexidine 2% Cloths  azithromycin  IVPB  azithromycin  IVPB  cefTRIAXone   IVPB  influenza  Vaccine (HIGH DOSE)  ascorbic acid  silver sulfADIAZINE 1% Cream  collagenase Ointment  mirtazapine  magnesium hydroxide Suspension      Vital Signs:    T(C): 37 (10-25-23 @ 12:53), Max: 37 (10-24-23 @ 21:46)  HR: 97 (10-25-23 @ 12:53) (85 - 97)  BP: 141/83 (10-25-23 @ 12:53) (121/76 - 141/83)  RR: 18 (10-25-23 @ 12:53) (17 - 18)  SpO2: 97% (10-25-23 @ 12:53) (97% - 100%)    Relevant Lab Results:            7.9  5.54)-----(268     (10-22-23 @ 08:08)         26.3     133 | 98 | 14  --------------------< 104     (10-22-23 @ 08:08)  4.7 | 24 | 0.38       PT: 13.7<H> 10-22-23 @ 08:08  aPTT: -- 10-22-23 @ 08:08   INR: 1.23<H> 10-22-23 @ 08:08      Imaging:     Pertinent imaging reviewed

## 2023-10-25 NOTE — CONSULT NOTE ADULT - ASSESSMENT
Assessment:   77 yo F PMH Alzheimer's dementia s/p PEG 8/2023, GE flap valve, grade B esophagitis, Multiple Myeloma (previously on Revlimid), cataracts, chronic venous ulcers b/l LE, OA knee, HTN, primary pulmonary hypertension presents with dislodged PEG tube. Per discussion with primary team, Tube is believed to have dislodged on morning of 10/22, initially attempted bedside replacement, then endoscopic placement with GI, however case was cancelled due to patient cough and possible infiltrate on chest xray. It was at that time, the patients sister, the HCP, expressed no further interest in feeding tube replacement for her sister. After further discussions with care teams regarding patients nutritional status, HCP is agreeable to one last attempt for tube replacement with IR only, as she considers less invasive. IR consulted for evaluation     Plan:   -Will attempt tract recannulization as add-on case for TODAY 10/25.  -If unsuccessful recannulization, will need CT abdomen and pelvis.     -Please place procedure order under Dr. Lawson   -IR pre procedure note.   -NPO   -hold therpaeutic/prophylactic anticoagulants  -AM CBC, BMP, and coags.     -discussed with primary team    Duke Montalvo MD PGY 3    For EMERGENT inquiries/questions:  [facility]-[phone]  [facility]-p.[phone] ([phone])    Available on [employer] TEAMS for all non-urgent questions  Non-emergent consults: Please place a sunrise order "Consult-Interventional Radiology" with an appropriate callback number.    For questions about scheduling during appropriate work hours, call IR :  [facility]: [phone]  [facility]: [phone]    For outpatient IR booking:  [facility]: [phone]  [facility]: [phone]

## 2023-10-25 NOTE — CHART NOTE - NSCHARTNOTEFT_GEN_A_CORE
PRE-INTERVENTIONAL RADIOLOGY PROCEDURE NOTE      Patient Age: 78    Patient Gender: F    Procedure: tract recannulization    Diagnosis/Indication: PEG dislodgement    Interventional Radiology Attending Physician: Dr. Lawson    Ordering Attending Physician: Dr. Vasques    Pertinent Medical History: dementia, multiple myeloma, pulm HTN    Pertinent labs:                    Patient and Family Aware ? Yes

## 2023-10-25 NOTE — PROGRESS NOTE ADULT - SUBJECTIVE AND OBJECTIVE BOX
CHIEF COMPLAINT:    SUBJECTIVE:     REVIEW OF SYSTEMS:    CONSTITUTIONAL: (  )  weakness,  (  ) fevers or chills  EYES/ENT: (  )visual changes;     NECK: (  ) pain or stiffness  RESPIRATORY:   (  )cough, wheezing, hemoptysis;  (  ) shortness of breath  CARDIOVASCULAR:  (  )chest pain or palpitations  GASTROINTESTINAL:   (  )abdominal or epigastric pain.  (  ) nausea, vomiting, or hematemesis;   (   ) diarrhea or constipation.   GENITOURINARY:   (    ) dysuria, frequency or hematuria  NEUROLOGICAL:  (   ) numbness or weakness   All other review of systems is negative unless indicated above    Vital Signs Last 24 Hrs  T(C): 36.8 (25 Oct 2023 05:20), Max: 37 (24 Oct 2023 21:46)  T(F): 98.2 (25 Oct 2023 05:20), Max: 98.6 (24 Oct 2023 21:46)  HR: 90 (25 Oct 2023 05:20) (73 - 90)  BP: 121/76 (25 Oct 2023 05:20) (121/76 - 138/77)  BP(mean): --  RR: 17 (25 Oct 2023 05:20) (17 - 18)  SpO2: 100% (25 Oct 2023 05:20) (96% - 100%)    Parameters below as of 25 Oct 2023 05:20  Patient On (Oxygen Delivery Method): room air        I&O's Summary      CAPILLARY BLOOD GLUCOSE          PHYSICAL EXAM:    Constitutional:  (   ) NAD,   (   )awake and alert  HEENT: PERR, EOMI,    Neck: Soft and supple, No LAD, No JVD  Respiratory:  (    Breath sounds are clear bilaterally,    (   ) wheezing, rales or rhonchi  Cardiovascular:     (   )S1 and S2, regular rate and rhythm, no Murmurs, gallops or rubs  Gastrointestinal:  (   )Bowel Sounds present, soft,   (  )nontender, nondistended,    Extremities:    (  ) peripheral edema  Vascular: 2+ peripheral pulses  Neurological:    (    )A/O x 3,   (  ) focal deficits  Musculoskeletal:    (   )  normal strength b/l upper  (     ) normal  lower extremities  Skin: No rashes    MEDICATIONS:  MEDICATIONS  (STANDING):  ascorbic acid 500 milliGRAM(s) Oral daily  azithromycin  IVPB      azithromycin  IVPB 500 milliGRAM(s) IV Intermittent every 24 hours  cefTRIAXone   IVPB 1000 milliGRAM(s) IV Intermittent every 24 hours  chlorhexidine 2% Cloths 1 Application(s) Topical daily  collagenase Ointment 1 Application(s) Topical two times a day  influenza  Vaccine (HIGH DOSE) 0.7 milliLiter(s) IntraMuscular once  magnesium hydroxide Suspension 5 milliLiter(s) Oral daily  mirtazapine 15 milliGRAM(s) Oral at bedtime  pantoprazole  Injectable 40 milliGRAM(s) IV Push daily  silver sulfADIAZINE 1% Cream 1 Application(s) Topical every 12 hours      LABS: All Labs Reviewed:                Blood Culture:   Urine Culture      RADIOLOGY/EKG:    ASSESSMENT AND PLAN:    DVT PPX:    ADVANCED DIRECTIVE:    DISPOSITION: CHIEF COMPLAINT:  patient was seen in the bed awake verbal as per nursing team only she consuming less than 25% of her meal wound care team noted regarding her multiple pressure ulcer discussed with her healthcare proxy that patient needs nutrition to improve her condition and also improve the healing of that so after a long discussion with her she agreed for the last attempt be done by IR discussed with ACP team and discussed with the interventional radiology regarding her condition in detail  SUBJECTIVE:     REVIEW OF SYSTEMS:    CONSTITUTIONAL: ( x )  weakness,  (  ) fevers or chills  EYES/ENT: (  )visual changes;     NECK: (  ) pain or stiffness  RESPIRATORY:   (  )cough, wheezing, hemoptysis;  (  ) shortness of breath  CARDIOVASCULAR:  (  )chest pain or palpitations  GASTROINTESTINAL:   (  )abdominal or epigastric pain.  (  ) nausea, vomiting, or hematemesis;   (   ) diarrhea or constipation.   GENITOURINARY:   (    ) dysuria, frequency or hematuria  NEUROLOGICAL:  (   ) numbness or weakness   All other review of systems is negative unless indicated above    Vital Signs Last 24 Hrs  T(C): 36.8 (25 Oct 2023 05:20), Max: 37 (24 Oct 2023 21:46)  T(F): 98.2 (25 Oct 2023 05:20), Max: 98.6 (24 Oct 2023 21:46)  HR: 90 (25 Oct 2023 05:20) (73 - 90)  BP: 121/76 (25 Oct 2023 05:20) (121/76 - 138/77)  BP(mean): --  RR: 17 (25 Oct 2023 05:20) (17 - 18)  SpO2: 100% (25 Oct 2023 05:20) (96% - 100%)    Parameters below as of 25 Oct 2023 05:20  Patient On (Oxygen Delivery Method): room air        I&O's Summary      CAPILLARY BLOOD GLUCOSE          PHYSICAL EXAM:    Constitutional:  ( x  ) NAD,   ( x  )awake and  verbal  HEENT: PERR, EOMI,    Neck: Soft and supple, No LAD, No JVD  Respiratory:  (  x  Breath sounds are clear bilaterally,    (   ) wheezing, rales or rhonchi  Cardiovascular:     (  x )S1 and S2, regular rate and rhythm, no Murmurs, gallops or rubs  Gastrointestinal:  (  x )Bowel Sounds present, soft,   (  )nontender, nondistended,    Extremities:    (  ) peripheral edema  Vascular: 2+ peripheral pulses  Neurological:    (  x  )A/O x  1,   (  ) focal deficits  Musculoskeletal:    (   )  normal strength b/l upper  (     ) normal  lower extremities  Skin:  multiple pressure ulcer  MEDICATIONS:  MEDICATIONS  (STANDING):  ascorbic acid 500 milliGRAM(s) Oral daily  azithromycin  IVPB      azithromycin  IVPB 500 milliGRAM(s) IV Intermittent every 24 hours  cefTRIAXone   IVPB 1000 milliGRAM(s) IV Intermittent every 24 hours  chlorhexidine 2% Cloths 1 Application(s) Topical daily  collagenase Ointment 1 Application(s) Topical two times a day  influenza  Vaccine (HIGH DOSE) 0.7 milliLiter(s) IntraMuscular once  magnesium hydroxide Suspension 5 milliLiter(s) Oral daily  mirtazapine 15 milliGRAM(s) Oral at bedtime  pantoprazole  Injectable 40 milliGRAM(s) IV Push daily  silver sulfADIAZINE 1% Cream 1 Application(s) Topical every 12 hours      LABS: All Labs Reviewed:                Blood Culture:   Urine Culture      RADIOLOGY/EKG:    ASSESSMENT AND PLAN:  79 yo F PMH Alzheimer's dementia s/p PEG 8/2023, GE flap valve, grade B esophagitis, Multiple Myeloma (previously on Revlimid), cataracts, chronic venous ulcers b/l LE, OA knee, HTN, primary pulmonary hypertension,  was sent from NH today due to PEG malfunction.        Problem/Plan - 1:  ·  Problem:  PEG malfunction was seen by GI  to  replaced  PEG in a.m. keep  NPO tonight  ·         Problem/Plan - 2:  -s/p  sepsis on recent admission completed IV antibiotic and her right-sided chest port was removed    h.    Problem/Plan - 3:  ·  Problem: Anemia.   ·  Plan: -Hgb was  6.6 on last admission will monitored no need for transfusion       Problem/Plan - 4:     ·  Problem: Multiple myeloma.   ·  Plan: -patient previously on revlimid per documentation       Problem/Plan - 5:  ·  Problem: Rheumatoid arthritis.   ·  Plan: -patient with ulnar deviation of b/l UE digits, documented RA       Problem/Plan -  6:  ·  Problem:  multiple pressure ulcer.  from her recent hospitalization  will  asked to be seen by wound team  local care   .    Problem/Plan -  7:  ·  Problem: History of esophagitis.   ·  Plan: -patient with grade B esophagitis on EGD 8/2023, also with Hill grade 3 gastroesophageal flap,   -home omeprazole not on formulary will order pantoprazole daily.    Problem/Plan - 8:  ·  Problem: Need for prophylactic measure.   ·  Plan: -DVT ppx: patient with documented hx hematoma after initial PEG placement in August 2023 ,  hold anticoagulation at present time for possible PEG insertion a.m.  -10/23/2023 her PEG tube insertion was canceled by anesthesia as per GI team will start her on antibiotic empirically and will do chest x-ray no lab test as per family request  -10/24/2023 continue Zithromax 500 mg daily and Rocephin 1 g daily no lab test done as per family request  -10/25/2023 will try to insert the gastrostomy tube with IR discussed with the sister  and ACP and interventional radiologist attending     DVT PPX:    ADVANCED DIRECTIVE:    DISPOSITION:

## 2023-10-25 NOTE — CHART NOTE - NSCHARTNOTEFT_GEN_A_CORE
Spoke with patients HCP, Lindsey Mnediola (sister) [phone] with Dr. Vasques. After discussion with Dr. Vasques, HCP Lindsey Horton requests IR evaluation for PEG tube replacement as patient as had poor PO intake while on pureed diet however if IR unable to replace PEG tube, Lindsey does not want to puruse PEG placement.. Lindsey also made aware pt midline which she receives antibiotics is infiltrated, and pt will require a new line to continue antibiotics, she understands and agrees to replacement of midline as this time. As per Dr. Vasques, will consult IR for eval of PEG tube replacement.     Scooter Sosa PA-C  pager [phone]. Spoke with patients HCP, Lindsey Mendiola (sister) [phone] with Dr. Vasques. After discussion with Dr. Vasques, HCP Lindsey Horton requests IR evaluation for PEG tube replacement as patient as had poor PO intake while on pureed diet however if IR unable to replace PEG tube, Lindsey does not want to puruse PEG placement.. Lindsey also made aware pt midline which she receives antibiotics is infiltrated, and pt will require a new line to continue antibiotics, she understands and agrees to replacement of midline as this time. As per Dr. Vasques, will consult IR for eval of PEG tube replacement.       Addendum 10/25/2023 13:49  - Spoke w/ IR resident, as per IR they will attempt recanalization of PEG tube today, however will require pre-procedure lab draws. Discussed w/ HCP Lindsey [phone], she agrees to obtain pre-procedure labs for today, however moving forward would not want lab work. Dr. Vasques made aware and agrees to plan.    Scooter Sosa PA-C  pager [phone].

## 2023-10-26 RX ORDER — AZITHROMYCIN 500 MG/1
500 TABLET, FILM COATED ORAL EVERY 24 HOURS
Refills: 0 | Status: COMPLETED | OUTPATIENT
Start: 2023-10-27 | End: 2023-10-27

## 2023-10-26 RX ORDER — CEFTRIAXONE 500 MG/1
1000 INJECTION, POWDER, FOR SOLUTION INTRAMUSCULAR; INTRAVENOUS EVERY 24 HOURS
Refills: 0 | Status: COMPLETED | OUTPATIENT
Start: 2023-10-27 | End: 2023-10-27

## 2023-10-26 RX ADMIN — CEFTRIAXONE 100 MILLIGRAM(S): 500 INJECTION, POWDER, FOR SOLUTION INTRAMUSCULAR; INTRAVENOUS at 12:26

## 2023-10-26 RX ADMIN — PANTOPRAZOLE SODIUM 40 MILLIGRAM(S): 20 TABLET, DELAYED RELEASE ORAL at 12:32

## 2023-10-26 RX ADMIN — Medication 1 APPLICATION(S): at 06:10

## 2023-10-26 RX ADMIN — CHLORHEXIDINE GLUCONATE 1 APPLICATION(S): 213 SOLUTION TOPICAL at 12:26

## 2023-10-26 RX ADMIN — MIRTAZAPINE 15 MILLIGRAM(S): 45 TABLET, ORALLY DISINTEGRATING ORAL at 22:05

## 2023-10-26 RX ADMIN — MAGNESIUM HYDROXIDE 5 MILLILITER(S): 400 TABLET, CHEWABLE ORAL at 11:44

## 2023-10-26 RX ADMIN — Medication 1 APPLICATION(S): at 17:39

## 2023-10-26 RX ADMIN — Medication 500 MILLIGRAM(S): at 11:43

## 2023-10-26 RX ADMIN — Medication 1 APPLICATION(S): at 17:40

## 2023-10-26 RX ADMIN — AZITHROMYCIN 255 MILLIGRAM(S): 500 TABLET, FILM COATED ORAL at 10:07

## 2023-10-26 NOTE — DISCHARGE NOTE PROVIDER - NSDCCPCAREPLAN_GEN_ALL_CORE_FT
PRINCIPAL DISCHARGE DIAGNOSIS  Diagnosis: PEG tube malfunction  Assessment and Plan of Treatment: [facility] Please follow up with your PCP at your facility within 1 week for further evaluation and treatment.      SECONDARY DISCHARGE DIAGNOSES  Diagnosis: Pneumonia due to virus  Assessment and Plan of Treatment: During your admisiso you were noted with a worsening cough. A chest xray was performed which showed a consolidation in your left lung. You were treated with 5 days antibitocs and improved. Please follow up with your PCP at your facility within 1 week for further evaluation and treatment.

## 2023-10-26 NOTE — DISCHARGE NOTE PROVIDER - NSDCMRMEDTOKEN_GEN_ALL_CORE_FT
ascorbic acid 500 mg oral tablet: 1 tab(s) orally once a day  ceFAZolin 2 g intravenous injection: 2 gram(s) intravenous every 8 hours until 10/22  collagenase 250 units/g topical ointment: 1 Apply topically to affected area 2 times a day  ferrous fumarate 100 mg/5 mL oral suspension: 220 milliliter(s) by PEG tube 2 times a day  magnesium hydroxide 8% oral suspension: 5 milliliter(s) orally once a day  omeprazole 20 mg oral delayed release tablet: 1 tab(s) by gastrostomy tube once a day  Remeron 15 mg oral tablet: 1 tab(s) by PEG tube once a day (at bedtime)  silver sulfADIAZINE 1% topical cream: 1 Apply topically to affected area every 12 hours  sodium hypochlorite 0.125% topical solution: 1 Apply topically to affected area 2 times a day   acetaminophen 160 mg/5 mL oral suspension: 20.31 milliliter(s) by gastrostomy tube every 6 hours as needed for Mild Pain (1 - 3), Moderate Pain (4 - 6)  ascorbic acid 500 mg oral tablet: 1 tab(s) by gastrostomy tube once a day  collagenase 250 units/g topical ointment: 1 Apply topically to affected area 2 times a day  ferrous fumarate 100 mg/5 mL oral suspension: 220 milliliter(s) by PEG tube 2 times a day  magnesium hydroxide 8% oral suspension: 5 milliliter(s) by gastrostomy tube once a day  omeprazole 20 mg oral delayed release tablet: 1 tab(s) by gastrostomy tube once a day  Remeron 15 mg oral tablet: 1 tab(s) by PEG tube once a day (at bedtime)  silver sulfADIAZINE 1% topical cream: 1 Apply topically to affected area every 12 hours  sodium hypochlorite 0.125% topical solution: 1 Apply topically to affected area 2 times a day

## 2023-10-26 NOTE — DISCHARGE NOTE PROVIDER - DETAILS OF MALNUTRITION DIAGNOSIS/DIAGNOSES
This patient has been assessed with a concern for Malnutrition and was treated during this hospitalization for the following Nutrition diagnosis/diagnoses:     -  10/24/2023: Severe protein-calorie malnutrition   -  10/24/2023: Underweight (BMI < 19)

## 2023-10-26 NOTE — DISCHARGE NOTE PROVIDER - HOSPITAL COURSE
77 yo F PMH Alzheimer's dementia s/p PEG 8/2023, GE flap valve, grade B esophagitis, Multiple Myeloma (previously on Revlimid), cataracts, chronic venous ulcers b/l LE, OA knee, HTN, primary pulmonary hypertension,  was sent from NH today due to PEG malfunction.    PEG malfunction   - PEG originally planned to be replaced by GI however procedure was canceled 2/2 cough and HCP decided that she would only want PEG recannulization if able to be done w/ IR as it is less invasive.   - IR consulted  - S/p re-insertion of PEG by IR 10/25  - Continue pureed diet   - F/u Nutrition consult ____    PNA  - CXR w/ questionable left lower/retrocardiac opacity.  - S/p CTX and Azithro x 5 days    Anemia.   - Hgb was 6.6 on last admission   - Family requesting no further labs     Multiple myeloma.    - Patient previously on revlimid per documentation      Rheumatoid arthritis.   - Patient with ulnar deviation of b/l UE digits, documented RA     Multiple pressure ulcer  - Wound care consulted    History of esophagitis.   - Patient with grade B esophagitis on EGD 8/2023, also with Hill grade 3 gastroesophageal flap,   - C/w PPI       Case discussed with Dr. Vasques on ______. Pt medically cleared to return to NH.  Reviewed discharge medications with patient; All new medications requiring new prescription sent to pharmacy of patients choice. Reviewed need for prescription for previous home medication and new prescriptions sent if requested. Patient in agreement and understands. 77 y/o Female, with a PmHx of Alzheimer's dementia s/p PEG 8/2023, GE flap valve, grade B esophagitis, Multiple Myeloma (previously on Revlimid), cataracts, chronic venous ulcers b/l LE, OA knee, HTN, primary pulmonary hypertension,  was sent from NH today due to PEG malfunction.    PEG malfunction   - PEG originally planned to be replaced by GI however procedure was canceled 2/2 cough and HCP decided that she would only want PEG recannulization if able to be done w/ IR as it is less invasive.   - IR consulted  - S/p re-insertion of PEG by IR 10/25  - Continue pureed diet   - Nutrition c/s done    PNA  - CXR w/ questionable left lower/retrocardiac opacity.  - S/p CTX and Azithro x 5 days    Anemia.   - Hgb was 6.6 on last admission   - Family requesting no further labs     Multiple myeloma.    - Patient previously on revlimid per documentation      Rheumatoid arthritis.   - Patient with ulnar deviation of b/l UE digits, documented RA     Multiple pressure ulcer  - Wound care consulted    History of esophagitis.   - Patient with grade B esophagitis on EGD 8/2023, also with Hill grade 3 gastroesophageal flap,   - C/w PPI       Case discussed with Dr. Vsaques on 10/27. Pt medically cleared to return to NH.  Reviewed discharge medications with patient; All new medications requiring new prescription sent to pharmacy of patients choice. Reviewed need for prescription for previous home medication and new prescriptions sent if requested. Patient in agreement and understands.

## 2023-10-26 NOTE — PROGRESS NOTE ADULT - SUBJECTIVE AND OBJECTIVE BOX
CHIEF COMPLAINT:    SUBJECTIVE:     REVIEW OF SYSTEMS:    CONSTITUTIONAL: (  )  weakness,  (  ) fevers or chills  EYES/ENT: (  )visual changes;     NECK: (  ) pain or stiffness  RESPIRATORY:   (  )cough, wheezing, hemoptysis;  (  ) shortness of breath  CARDIOVASCULAR:  (  )chest pain or palpitations  GASTROINTESTINAL:   (  )abdominal or epigastric pain.  (  ) nausea, vomiting, or hematemesis;   (   ) diarrhea or constipation.   GENITOURINARY:   (    ) dysuria, frequency or hematuria  NEUROLOGICAL:  (   ) numbness or weakness   All other review of systems is negative unless indicated above    Vital Signs Last 24 Hrs  T(C): 37.1 (26 Oct 2023 06:07), Max: 37.2 (25 Oct 2023 21:11)  T(F): 98.8 (26 Oct 2023 06:07), Max: 98.9 (25 Oct 2023 21:11)  HR: 95 (26 Oct 2023 06:07) (82 - 97)  BP: 147/83 (26 Oct 2023 06:07) (126/65 - 152/79)  BP(mean): --  RR: 18 (26 Oct 2023 06:07) (16 - 18)  SpO2: 97% (26 Oct 2023 06:07) (97% - 100%)    Parameters below as of 26 Oct 2023 06:07  Patient On (Oxygen Delivery Method): room air        I&O's Summary      CAPILLARY BLOOD GLUCOSE          PHYSICAL EXAM:    Constitutional:  (   ) NAD,   (   )awake and alert  HEENT: PERR, EOMI,    Neck: Soft and supple, No LAD, No JVD  Respiratory:  (    Breath sounds are clear bilaterally,    (   ) wheezing, rales or rhonchi  Cardiovascular:     (   )S1 and S2, regular rate and rhythm, no Murmurs, gallops or rubs  Gastrointestinal:  (   )Bowel Sounds present, soft,   (  )nontender, nondistended,    Extremities:    (  ) peripheral edema  Vascular: 2+ peripheral pulses  Neurological:    (    )A/O x 3,   (  ) focal deficits  Musculoskeletal:    (   )  normal strength b/l upper  (     ) normal  lower extremities  Skin: No rashes    MEDICATIONS:  MEDICATIONS  (STANDING):  ascorbic acid 500 milliGRAM(s) Oral daily  azithromycin  IVPB      azithromycin  IVPB 500 milliGRAM(s) IV Intermittent every 24 hours  cefTRIAXone   IVPB 1000 milliGRAM(s) IV Intermittent every 24 hours  chlorhexidine 2% Cloths 1 Application(s) Topical daily  collagenase Ointment 1 Application(s) Topical two times a day  influenza  Vaccine (HIGH DOSE) 0.7 milliLiter(s) IntraMuscular once  magnesium hydroxide Suspension 5 milliLiter(s) Oral daily  mirtazapine 15 milliGRAM(s) Oral at bedtime  pantoprazole  Injectable 40 milliGRAM(s) IV Push daily  silver sulfADIAZINE 1% Cream 1 Application(s) Topical every 12 hours      LABS: All Labs Reviewed:                        8.1    4.08  )-----------( 259      ( 25 Oct 2023 15:51 )             28.1     10-25    138  |  102  |  16  ----------------------------<  73  3.6   |  25  |  0.48<L>    Ca    8.5      25 Oct 2023 15:51  Phos  1.9     10-25  Mg     2.00     10-25      PT/INR - ( 25 Oct 2023 15:51 )   PT: 12.5 sec;   INR: 1.11 ratio         PTT - ( 25 Oct 2023 15:51 )  PTT:28.8 sec      Blood Culture:   Urine Culture      RADIOLOGY/EKG:    ASSESSMENT AND PLAN:    DVT PPX:    ADVANCED DIRECTIVE:    DISPOSITION: CHIEF COMPLAINT:  Patient condition remain stable gastrostomy was inserted by radiology appreciated patient   was only on free water discussed with the team to start her on 40 cc feeding  SUBJECTIVE:     REVIEW OF SYSTEMS:    CONSTITUTIONAL: ( x )  weakness,  (  ) fevers or chills  EYES/ENT: (  )visual changes;     NECK: (  ) pain or stiffness  RESPIRATORY:   (  )cough, wheezing, hemoptysis;  (  ) shortness of breath  CARDIOVASCULAR:  (  )chest pain or palpitations  GASTROINTESTINAL:   (  )abdominal or epigastric pain.  (  ) nausea, vomiting, or hematemesis;   (   ) diarrhea or constipation.   GENITOURINARY:   (    ) dysuria, frequency or hematuria  NEUROLOGICAL:  (   ) numbness or weakness   All other review of systems is negative unless indicated above    Vital Signs Last 24 Hrs  T(C): 37.1 (26 Oct 2023 06:07), Max: 37.2 (25 Oct 2023 21:11)  T(F): 98.8 (26 Oct 2023 06:07), Max: 98.9 (25 Oct 2023 21:11)  HR: 95 (26 Oct 2023 06:07) (82 - 97)  BP: 147/83 (26 Oct 2023 06:07) (126/65 - 152/79)  BP(mean): --  RR: 18 (26 Oct 2023 06:07) (16 - 18)  SpO2: 97% (26 Oct 2023 06:07) (97% - 100%)    Parameters below as of 26 Oct 2023 06:07  Patient On (Oxygen Delivery Method): room air        I&O's Summary      CAPILLARY BLOOD GLUCOSE          PHYSICAL EXAM:    Constitutional:  ( x  ) NAD,   ( x  )awake and alert  HEENT: PERR, EOMI,    Neck: Soft and supple, No LAD, No JVD  Respiratory:  (   x Breath sounds are clear bilaterally,    (   ) wheezing, rales or rhonchi  Cardiovascular:     ( x  )S1 and S2, regular rate and rhythm, no Murmurs, gallops or rubs  Gastrointestinal:  (  x )Bowel Sounds present, soft,   (  )nontender, nondistended,  + gastrostomy tube  Extremities:    (  ) peripheral edema  Vascular: 2+ peripheral pulses  Neurological:    (  x  )A/O x  1   (  ) focal deficits  Musculoskeletal:    (   )  normal strength b/l upper  (     ) normal  lower extremities  Skin:  multiple pressure ulcer  MEDICATIONS:  MEDICATIONS  (STANDING):  ascorbic acid 500 milliGRAM(s) Oral daily  azithromycin  IVPB      azithromycin  IVPB 500 milliGRAM(s) IV Intermittent every 24 hours  cefTRIAXone   IVPB 1000 milliGRAM(s) IV Intermittent every 24 hours  chlorhexidine 2% Cloths 1 Application(s) Topical daily  collagenase Ointment 1 Application(s) Topical two times a day  influenza  Vaccine (HIGH DOSE) 0.7 milliLiter(s) IntraMuscular once  magnesium hydroxide Suspension 5 milliLiter(s) Oral daily  mirtazapine 15 milliGRAM(s) Oral at bedtime  pantoprazole  Injectable 40 milliGRAM(s) IV Push daily  silver sulfADIAZINE 1% Cream 1 Application(s) Topical every 12 hours      LABS: All Labs Reviewed:                        8.1    4.08  )-----------( 259      ( 25 Oct 2023 15:51 )             28.1     10-25    138  |  102  |  16  ----------------------------<  73  3.6   |  25  |  0.48<L>    Ca    8.5      25 Oct 2023 15:51  Phos  1.9     10-25  Mg     2.00     10-25      PT/INR - ( 25 Oct 2023 15:51 )   PT: 12.5 sec;   INR: 1.11 ratio         PTT - ( 25 Oct 2023 15:51 )  PTT:28.8 sec      Blood Culture:   Urine Culture      RADIOLOGY/EKG:    ASSESSMENT AND PLAN:  79 yo F PMH Alzheimer's dementia s/p PEG 8/2023, GE flap valve, grade B esophagitis, Multiple Myeloma (previously on Revlimid), cataracts, chronic venous ulcers b/l LE, OA knee, HTN, primary pulmonary hypertension,  was sent from NH today due to PEG malfunction.        Problem/Plan - 1:  ·  Problem:  PEG malfunction was seen by GI  to  replaced  PEG in a.m. keep  NPO tonight  ·         Problem/Plan - 2:  -s/p  sepsis on recent admission completed IV antibiotic and her right-sided chest port was removed    h.    Problem/Plan - 3:  ·  Problem: Anemia.   ·  Plan: -Hgb was  6.6 on last admission will monitored no need for transfusion       Problem/Plan - 4:     ·  Problem: Multiple myeloma.   ·  Plan: -patient previously on revlimid per documentation       Problem/Plan - 5:  ·  Problem: Rheumatoid arthritis.   ·  Plan: -patient with ulnar deviation of b/l UE digits, documented RA       Problem/Plan -  6:  ·  Problem:  multiple pressure ulcer.  from her recent hospitalization  will  asked to be seen by wound team  local care   .    Problem/Plan -  7:  ·  Problem: History of esophagitis.   ·  Plan: -patient with grade B esophagitis on EGD 8/2023, also with Hill grade 3 gastroesophageal flap,   -home omeprazole not on formulary will order pantoprazole daily.    Problem/Plan - 8:  ·  Problem: Need for prophylactic measure.   ·  Plan: -DVT ppx: patient with documented hx hematoma after initial PEG placement in August 2023 ,  hold anticoagulation at present time for possible PEG insertion a.m.  -10/23/2023 her PEG tube insertion was canceled by anesthesia as per GI team will start her on antibiotic empirically and will do chest x-ray no lab test as per family request  -10/24/2023 continue Zithromax 500 mg daily and Rocephin 1 g daily no lab test done as per family request  -10/25/2023 will try to insert the gastrostomy tube with IR discussed with the sister  and ACP and interventional radiologist attending  -10/26/2023 patient condition improving cough subsided with complete IV antibiotic in a.m. feeding to be restarted at 40 cc/h discharge planning in a.m.     DVT PPX:    ADVANCED DIRECTIVE:    DISPOSITION:

## 2023-10-26 NOTE — DISCHARGE NOTE PROVIDER - INSTRUCTIONS
Jevity 1.5Cal - Total Volume for 24 hours (mL): 1200; Continuous Starting Tube Feed Rate: 50 (mL per Hour)  Free Water Flush: Flush 1: 125mL H2O ac/pc for a total of 250mL; Flush 2: 35 mL/hr free water flush throughout   feeding

## 2023-10-26 NOTE — DISCHARGE NOTE PROVIDER - CARE PROVIDER_API CALL
Linda Vasques [place]  Internal Medicine  [id number] [place] [id number]  Phone: [phone]  Fax: [phone]  Follow Up Time: 1 week

## 2023-10-27 VITALS
HEART RATE: 95 BPM | RESPIRATION RATE: 17 BRPM | TEMPERATURE: 99 F | SYSTOLIC BLOOD PRESSURE: 152 MMHG | DIASTOLIC BLOOD PRESSURE: 77 MMHG | OXYGEN SATURATION: 100 %

## 2023-10-27 RX ORDER — ACETAMINOPHEN 500 MG
20.31 TABLET ORAL
Qty: 0 | Refills: 0 | DISCHARGE
Start: 2023-10-27

## 2023-10-27 RX ORDER — PANTOPRAZOLE SODIUM 20 MG/1
40 TABLET, DELAYED RELEASE ORAL DAILY
Refills: 0 | Status: DISCONTINUED | OUTPATIENT
Start: 2023-10-27 | End: 2023-10-27

## 2023-10-27 RX ADMIN — Medication 1 APPLICATION(S): at 06:11

## 2023-10-27 RX ADMIN — Medication 1 APPLICATION(S): at 06:10

## 2023-10-27 RX ADMIN — PANTOPRAZOLE SODIUM 40 MILLIGRAM(S): 20 TABLET, DELAYED RELEASE ORAL at 12:54

## 2023-10-27 RX ADMIN — CHLORHEXIDINE GLUCONATE 1 APPLICATION(S): 213 SOLUTION TOPICAL at 13:44

## 2023-10-27 RX ADMIN — AZITHROMYCIN 255 MILLIGRAM(S): 500 TABLET, FILM COATED ORAL at 12:52

## 2023-10-27 RX ADMIN — Medication 500 MILLIGRAM(S): at 12:54

## 2023-10-27 RX ADMIN — CEFTRIAXONE 100 MILLIGRAM(S): 500 INJECTION, POWDER, FOR SOLUTION INTRAMUSCULAR; INTRAVENOUS at 06:10

## 2023-10-27 RX ADMIN — MAGNESIUM HYDROXIDE 5 MILLILITER(S): 400 TABLET, CHEWABLE ORAL at 13:59

## 2023-10-27 RX ADMIN — Medication 1 APPLICATION(S): at 17:23

## 2023-10-27 RX ADMIN — Medication 1 APPLICATION(S): at 17:21

## 2023-10-27 NOTE — PROGRESS NOTE ADULT - SUBJECTIVE AND OBJECTIVE BOX
medical attending discharge planning  79 y/o Female, with a PmHx of Alzheimer's dementia s/p PEG 8/2023, GE flap valve, grade B esophagitis, Multiple Myeloma (previously on Revlimid), cataracts, chronic venous ulcers b/l LE, OA knee, HTN, primary pulmonary hypertension,  was sent from NH today due to PEG malfunction.    PEG malfunction   - PEG originally planned to be replaced by GI however procedure was canceled 2/2 cough and HCP decided that she would only want PEG recannulization if able to be done w/ IR as it is less invasive.   - IR consulted  - S/p re-insertion of PEG by IR 10/25  - Continue pureed diet   - Nutrition c/s done    PNA  - CXR w/ questionable left lower/retrocardiac opacity.  - S/p CTX and Azithro x 5 days    Anemia.   - Hgb was 6.6 on last admission   - Family requesting no further labs     Multiple myeloma.    - Patient previously on revlimid per documentation      Rheumatoid arthritis.   - Patient with ulnar deviation of b/l UE digits, documented RA     Multiple pressure ulcer  - Wound care consulted    History of esophagitis.   - Patient with grade B esophagitis on EGD 8/2023, also with Hill grade 3 gastroesophageal flap,   - C/w PPI       Case discussed with  Me on 10/27. Pt medically cleared to return to NH.  Reviewed discharge medications with patient; All new medications requiring new prescription sent to pharmacy of patients choice. Reviewed need for prescription for previous home medication and new prescriptions sent if requested. Patient in agreement and understands.     Med Reconciliation:  Override IMPROVE-DD recommendations due to:  IMPROVE-DD Application Not Available  Recommended Post-Discharge VTE Prophylaxis  IMPROVE-DD Application Not Available  Medication Reconciliation Status  Admission Reconciliation is Not Complete  Discharge Reconciliation is Completed  Discharge Medications  acetaminophen 160 mg/5 mL oral suspension: 20.31 milliliter(s) by gastrostomy tube every 6 hours as needed for Mild Pain (1 - 3), Moderate Pain (4 - 6)  ascorbic acid 500 mg oral tablet: 1 tab(s) by gastrostomy tube once a day  collagenase 250 units/g topical ointment: 1 Apply topically to affected area 2 times a day  ferrous fumarate 100 mg/5 mL oral suspension: 220 milliliter(s) by PEG tube 2 times a day  magnesium hydroxide 8% oral suspension: 5 milliliter(s) by gastrostomy tube once a day  omeprazole 20 mg oral delayed release tablet: 1 tab(s) by gastrostomy tube once a day  Remeron 15 mg oral tablet: 1 tab(s) by PEG tube once a day (at bedtime)  silver sulfADIAZINE 1% topical cream: 1 Apply topically to affected area every 12 hours  sodium hypochlorite 0.125% topical solution: 1 Apply topically to affected area 2 times a day  ,  ,  Care Plan/Procedures:  Discharge Diagnoses, Assessment and Plan of Treatment  PRINCIPAL DISCHARGE DIAGNOSIS  Diagnosis: PEG tube malfunction  Assessment and Plan of Treatment: [facility] Please follow up with your PCP at your facility within 1 week for further evaluation and treatment.      SECONDARY DISCHARGE DIAGNOSES  Diagnosis: Pneumonia due to virus  Assessment and Plan of Treatment: During your admisiso you were noted with a worsening cough. A chest xray was performed which showed a consolidation in your left lung. You were treated with 5 days antibitocs and improved. Please follow up with your PCP at your facility within 1 week for further evaluation and treatment.  Goal(s)  To get better and follow your care plan as instructed.

## 2023-10-27 NOTE — CHART NOTE - NSCHARTNOTEFT_GEN_A_CORE
Source: Patient [X ]  confused, disoriented  Family [ ]     other [ X] electronic chart, RN, ACP    Diet : Diet, NPO with Tube Feed:   Tube Feeding Modality: Gastrostomy  Jevity 1.5 Vignesh (JEVITY1.5RTH)  Total Volume for 24 Hours (mL): 960  Continuous  Starting Tube Feed Rate {mL per Hour}: 40  Until Goal Tube Feed Rate (mL per Hour): 40  Tube Feed Duration (in Hours): 24  Tube Feed Start Time: 09:51  Free Water Flush  Free Water Flush Instructions:  Flush 1: 125mL H20 ac/pc for a total of 250mL; Flush 2: 35 mL/hr free water flush throughout feeding (10-27-23 @ 09:54)    Nutrition follow-up note, consult for TF. Per chart review, 77 yo F PMH Alzheimer's dementia s/p PEG 8/2023, GE flap valve, grade B esophagitis, Multiple Myeloma (previously on Revlimid), cataracts, chronic venous ulcers b/l LE, OA knee, HTN, primary pulmonary hypertension,  was sent from NH today due to PEG malfunction. Previously family did not want PEG replaced and now amendable, s/p PEG re-insertion by IR on 10/25.       As mentioned last RD note on 10/24/23- Prior to admission, patient was on pureed feeds + PEG feeds of Jevity 1.5cal at goal rate of 50cc/hr provides total volume of 1000mL/d, 1500kcal, 63.8g pro as per transfer paper from [facility].     Patient was maintained on Pureed, pleasure feeds per MD in-house, consuming <25% of meals. No nausea/vomiting/diarrhea/constipation reported. PEG feeds will provides main source of nutrition to meet estimated energy needs and protein needs to promote wound healing. At this time, patient now started on tube feeds of Jevity1.5cal at 40cc/hr [id number] provides total volume of 960mL, 1440kcal, 61g pro and 730mL free water. Recommend increase goal rate to 50cc/hr x 24hours as nursing home regimen mentioned above. RD paged ACP to provide recommendation as below.    Weight (kg): 42 (10-22 @ 20:07)  Height (cm): 170.2cm  BMI = 14.5kg/m2  % Weight Change- no new weight to address at this time.     Pertinent Medications: MEDICATIONS  (STANDING):  ascorbic acid 500 milliGRAM(s) Oral daily  azithromycin  IVPB 500 milliGRAM(s) IV Intermittent every 24 hours  chlorhexidine 2% Cloths 1 Application(s) Topical daily  collagenase Ointment 1 Application(s) Topical two times a day  influenza  Vaccine (HIGH DOSE) 0.7 milliLiter(s) IntraMuscular once  magnesium hydroxide Suspension 5 milliLiter(s) Oral daily  mirtazapine 15 milliGRAM(s) Oral at bedtime  pantoprazole  Injectable 40 milliGRAM(s) IV Push daily  silver sulfADIAZINE 1% Cream 1 Application(s) Topical every 12 hours    MEDICATIONS  (PRN):  acetaminophen   Oral Liquid .. 650 milliGRAM(s) Oral every 6 hours PRN Mild Pain (1 - 3), Moderate Pain (4 - 6)    Pertinent Labs:  10-25 Na138 mmol/L Glu 73 mg/dL K+ 3.6 mmol/L Cr  0.48 mg/dL<L> BUN 16 mg/dL 10-25 Phos 1.9 mg/dL<L> 10-22 Alb 2.7 g/dL<L>      Skin: Per wound care note 10/24/23    Right upper buttock/lower back- stage 4 pressure injury  Left upper buttock/lower back Unstageable pressure injury    Estimated Needs:   [X ] no change since previous assessment: ABW-42kg on 10/22  Estimated Energy Needs (30-35 kcal/kg of ABW): 1260-1470kcal/d  Estimated Protein Needs (1.4-1.6g/kg of ABW): 59-67g pro/d     Previous Nutrition Diagnosis:    Malnutrition, Severe           Nutrition Diagnosis is [X ] ongoing  [ ] resolved [ ] not applicable   Increased Nutrient Needs   Nutrition Diagnosis is [X] ongoing  [ ] resolved [ ] not applicable       Education:    [  ] Given today    Type of education provided:    [  ] Given on previous assessment by RD    [X  ] Not applicable 2/2 cognitive deficit    [  ] Pt refusal of education offered    [  ] Not applicable 2/2 current prognosis    [  ] Not warranted at present    Recommend:     1. As mentioned above, recommend increase TF of Jevity1.5cal to goal rate of 50cc/hr x 24hours as nursing home regimen mentioned above. PO diet per MD discretion.   2. Monitor weights, BM's, skin integrity, p.o. intake (if continued) and EN tolerance.   3. Further adjustments to rate/volume/duration/free water provision of enteral feeds dependant on long term monitoring of patient's tolerance, weight trends and needs.     RD remains available    Michael Pritchard, MS, CDN, RDN     pager [phone] or TEAMS

## 2023-10-27 NOTE — PROGRESS NOTE ADULT - NUTRITIONAL ASSESSMENT
This patient has been assessed with a concern for Malnutrition and has been determined to have a diagnosis/diagnoses of Severe protein-calorie malnutrition and Underweight (BMI < 19).    This patient is being managed with:   Diet Pureed-  Entered: Oct 24 2023  9:04AM  
This patient has been assessed with a concern for Malnutrition and has been determined to have a diagnosis/diagnoses of Severe protein-calorie malnutrition and Underweight (BMI < 19).    This patient is being managed with:   Diet Pureed-  Entered: Oct 24 2023  9:04AM  
This patient has been assessed with a concern for Malnutrition and has been determined to have a diagnosis/diagnoses of Severe protein-calorie malnutrition and Underweight (BMI < 19).    This patient is being managed with:   Diet Pureed-  Tube Feeding Modality: Gastrostomy  Jevity 1.5 Vignesh (JEVITY1.5RTH)  Total Volume for 24 Hours (mL): 1200  Continuous  Starting Tube Feed Rate {mL per Hour}: 40  Until Goal Tube Feed Rate (mL per Hour): 50  Tube Feed Duration (in Hours): 24  Tube Feed Start Time: 10:29  Free Water Flush  Free Water Flush Instructions:  Flush 1: 125mL H20 ac/pc for a total of 250mL; Flush 2: 35 mL/hr free water flush throughout feeding  Entered: Oct 27 2023 10:30AM  
This patient has been assessed with a concern for Malnutrition and has been determined to have a diagnosis/diagnoses of Severe protein-calorie malnutrition and Underweight (BMI < 19).    This patient is being managed with:   Diet NPO-  Except Medications  With Ice Chips/Sips of Water  Entered: Oct 25 2023  1:45PM

## 2023-10-27 NOTE — PROGRESS NOTE ADULT - PROVIDER SPECIALTY LIST ADULT
Internal Medicine
Gastroenterology
Internal Medicine
Internal Medicine

## 2023-10-27 NOTE — DISCHARGE NOTE NURSING/CASE MANAGEMENT/SOCIAL WORK - NSDCPEFALRISK_GEN_ALL_CORE
For information on Fall & Injury Prevention, visit: [email or web address] OR  [email or web address] OR  https://www.cdc.gov/steadi/patient.html

## 2023-10-27 NOTE — CHART NOTE - NSCHARTNOTESELECT_GEN_ALL_CORE
Event Note
Nutrition Follow-up Note/Nutrition Services
pre-IR note/Event Note

## 2023-10-27 NOTE — DISCHARGE NOTE NURSING/CASE MANAGEMENT/SOCIAL WORK - PATIENT PORTAL LINK FT
You can access the FollowMyHealth Patient Portal offered by [facility] by registering at the following website: [email or web address]. By joining [facility]’s FollowMyHealth portal, you will also be able to view your health information using other applications (apps) compatible with our system.

## 2025-01-20 NOTE — PATIENT PROFILE ADULT - NSPROPTRIGHTSUPPORTPERSON_GEN_A_NUR
pt would benefit from MBS once weaned from HFNC/VFSS/MBS SLP to f/u for instrumental swallow assessment. pending scheduling/FEES yes

## (undated) DEVICE — TUBING MEDI-VAC W MAXIGRIP CONNECTORS 1/4"X6'

## (undated) DEVICE — LINE BREATHE SAMPLNG

## (undated) DEVICE — DRSG BANDAID 0.75X3"

## (undated) DEVICE — DRSG CURITY GAUZE SPONGE 4 X 4" 12-PLY NON-STERILE

## (undated) DEVICE — CONTAINER FORMALIN 80ML YELLOW

## (undated) DEVICE — CATH IV SAFE BC 22G X 1" (BLUE)

## (undated) DEVICE — DRSG 2X2

## (undated) DEVICE — DENTURE CUP PINK

## (undated) DEVICE — SALIVA EJECTOR (BLUE)

## (undated) DEVICE — TUBING IV SET GRAVITY 3Y 100" MACRO

## (undated) DEVICE — PACK IV START WITH CHG

## (undated) DEVICE — ELCTR ECG CONDUCTIVE ADHESIVE

## (undated) DEVICE — BITE BLOCK ADULT 20 X 27MM (GREEN)

## (undated) DEVICE — LUBRICATING JELLY HR ONE SHOT 3G

## (undated) DEVICE — BIOPSY FORCEP COLD DISP

## (undated) DEVICE — BASIN EMESIS 10IN GRADUATED MAUVE

## (undated) DEVICE — UNDERPAD LINEN SAVER 17 X 24"

## (undated) DEVICE — BIOPSY FORCEP RADIAL JAW 4 STANDARD WITH NEEDLE

## (undated) DEVICE — GOWN LG

## (undated) DEVICE — CLAMP BX HOT RAD JAW 3